# Patient Record
Sex: FEMALE | Race: BLACK OR AFRICAN AMERICAN | NOT HISPANIC OR LATINO | Employment: FULL TIME | URBAN - METROPOLITAN AREA
[De-identification: names, ages, dates, MRNs, and addresses within clinical notes are randomized per-mention and may not be internally consistent; named-entity substitution may affect disease eponyms.]

---

## 2017-01-03 ENCOUNTER — PATIENT MESSAGE (OUTPATIENT)
Dept: ADMINISTRATIVE | Facility: OTHER | Age: 42
End: 2017-01-03

## 2017-01-05 ENCOUNTER — HOSPITAL ENCOUNTER (OUTPATIENT)
Dept: RADIOLOGY | Facility: HOSPITAL | Age: 42
Discharge: HOME | End: 2017-01-05
Attending: OBSTETRICS & GYNECOLOGY
Payer: COMMERCIAL

## 2017-01-05 DIAGNOSIS — R92.8 ABNORMAL MAMMOGRAM: ICD-10-CM

## 2017-01-05 PROCEDURE — 27201044 MAMMO BREAST STEREOTACTIC BIOPSY 1ST SITE COMPLETE

## 2017-01-05 PROCEDURE — 88305 TISSUE EXAM BY PATHOLOGIST: CPT | Performed by: PATHOLOGY

## 2017-01-05 PROCEDURE — 77065 DX MAMMO INCL CAD UNI: CPT | Mod: 26,,, | Performed by: RADIOLOGY

## 2017-01-05 PROCEDURE — 88305 TISSUE EXAM BY PATHOLOGIST: CPT | Mod: 26,,, | Performed by: PATHOLOGY

## 2017-01-05 PROCEDURE — A4648 IMPLANTABLE TISSUE MARKER: HCPCS

## 2017-01-05 PROCEDURE — 77061 BREAST TOMOSYNTHESIS UNI: CPT | Mod: TC

## 2017-01-05 PROCEDURE — 77061 BREAST TOMOSYNTHESIS UNI: CPT | Mod: 26,,, | Performed by: RADIOLOGY

## 2017-01-05 PROCEDURE — 19081 BX BREAST 1ST LESION STRTCTC: CPT | Mod: ,,, | Performed by: RADIOLOGY

## 2017-01-05 PROCEDURE — 88360 TUMOR IMMUNOHISTOCHEM/MANUAL: CPT | Mod: 26,,, | Performed by: PATHOLOGY

## 2017-01-11 ENCOUNTER — TELEPHONE (OUTPATIENT)
Dept: SURGERY | Facility: CLINIC | Age: 42
End: 2017-01-11

## 2017-01-11 NOTE — TELEPHONE ENCOUNTER
----- Message from Lory De La Cruz sent at 1/11/2017  9:30 AM CST -----  Contact: Self   Avis States that they need a call returned by a nurse in reference to Dr. Santana speaking with your cardiologist , Dr. Honorio greenwood. Please call Avis  @ [phone]                                                 Thanks :)

## 2017-01-12 ENCOUNTER — RESEARCH ENCOUNTER (OUTPATIENT)
Dept: RESEARCH | Facility: HOSPITAL | Age: 42
End: 2017-01-12

## 2017-01-12 ENCOUNTER — OFFICE VISIT (OUTPATIENT)
Dept: SURGERY | Facility: CLINIC | Age: 42
End: 2017-01-12
Payer: COMMERCIAL

## 2017-01-12 ENCOUNTER — PATIENT MESSAGE (OUTPATIENT)
Dept: INTERNAL MEDICINE | Facility: CLINIC | Age: 42
End: 2017-01-12

## 2017-01-12 VITALS
SYSTOLIC BLOOD PRESSURE: 100 MMHG | HEART RATE: 81 BPM | WEIGHT: 132.63 LBS | DIASTOLIC BLOOD PRESSURE: 55 MMHG | BODY MASS INDEX: 27.84 KG/M2 | TEMPERATURE: 98 F | HEIGHT: 58 IN

## 2017-01-12 DIAGNOSIS — Z80.3 FAMILY HISTORY OF BREAST CANCER: ICD-10-CM

## 2017-01-12 DIAGNOSIS — I42.8 CARDIOMYOPATHY, NONISCHEMIC: ICD-10-CM

## 2017-01-12 DIAGNOSIS — C50.811 CANCER OF OVERLAPPING SITES OF RIGHT FEMALE BREAST: ICD-10-CM

## 2017-01-12 DIAGNOSIS — Z95.810 AUTOMATIC IMPLANTABLE CARDIOVERTER-DEFIBRILLATOR IN SITU: Primary | ICD-10-CM

## 2017-01-12 DIAGNOSIS — I50.22 HEART FAILURE, SYSTOLIC, CHRONIC: ICD-10-CM

## 2017-01-12 DIAGNOSIS — C50.911 INVASIVE DUCTAL CARCINOMA OF BREAST, FEMALE, RIGHT: ICD-10-CM

## 2017-01-12 PROCEDURE — 99999 PR PBB SHADOW E&M-EST. PATIENT-LVL III: CPT | Mod: PBBFAC,,, | Performed by: SURGERY

## 2017-01-12 PROCEDURE — 99213 OFFICE O/P EST LOW 20 MIN: CPT | Mod: S$GLB,,, | Performed by: NURSE PRACTITIONER

## 2017-01-12 PROCEDURE — 1159F MED LIST DOCD IN RCRD: CPT | Mod: S$GLB,,, | Performed by: NURSE PRACTITIONER

## 2017-01-12 PROCEDURE — 99999 PR PBB SHADOW E&M-EST. PATIENT-LVL I: CPT | Mod: PBBFAC,,,

## 2017-01-12 PROCEDURE — 99245 OFF/OP CONSLTJ NEW/EST HI 55: CPT | Mod: S$GLB,,, | Performed by: SURGERY

## 2017-01-12 NOTE — LETTER
January 15, 2017      Obi Caro Jr., MD  [address]  [facility]  [facility] [address]           [facility]  [address]  [facility] [address]  Phone: [phone]          Patient: Avis Graves   MR Number: [id number]   YOB: 1975   Date of Visit: 1/12/2017       Dear Dr. Obi Caro Jr.:    Thank you for referring Avis Graves to me for evaluation. Attached you will find relevant portions of my assessment and plan of care.    If you have questions, please do not hesitate to call me. I look forward to following Avis Graves along with you.    Sincerely,    Orestes Santana MD    Enclosure  CC:  No Recipients    If you would like to receive this communication electronically, please contact [email or web address] or [phone] to request more information on [employer] Link access.    For providers and/or their staff who would like to refer a patient to [facility], please contact us through our one-stop-shop provider referral line, [facility], at [phone].    If you feel you have received this communication in error or would no longer like to receive these types of communications, please e-mail [email or web address]

## 2017-01-12 NOTE — LETTER
[facility]  [address]  [facility] [address]  Phone: [phone] January 15, 2017      Obi Caro Jr., MD  [address] [facility]  [place] [address]    Patient: Avis Graves   MR Number: [id number]   YOB: 1975   Date of Visit: 1/12/2017     Dear Dr. Caro:    Thank you for referring Avis Graves to me for evaluation. Below are the relevant portions of my assessment and plan of care.    The patient is a very pleasant 41-year-old -American female who presents with many family members for initial office consultation regarding her newly diagnosed high-grade, grade III invasive ductal carcinoma of the right medial breast. Diagnostic imaging had revealed approximately a 3.7 cm mass in the medial 3 o'clock position of the right breast. She underwent core needle biopsy, which revealed grade III invasive ductal carcinoma that was estrogen receptor positive, progesterone receptor negative and HER-2/dann negative. Her significant comorbidity is that she has hereditary cardiomyopathy and has a left anterior chest wall AICD defibrillator in place. Her baseline ejection fraction is 30%. The patient states she does not know her own ejection fraction because Dr. Escobar and she felt that she did not necessarily want to know the percentage.     The patient presents to discuss surgical options. Certainly normally she would be a candidate to discuss neoadjuvant upfront primary chemotherapy given the clinical presentation as a T2 N0 high-grade cancer; however, she is estrogen receptor positive and HER-2/dann negative and may ultimately undergo Oncotype DX testing if her sentinel lymph node is negative oreven if she is found to have N1 disease be considered for the MINDACT data analysis or SWOG  analysis that if she has a low Oncotype score potentially defer chemotherapy as well. She may not be eligible for chemotherapy  given the degree of cardiomyopathy. Certainly, she would not be eligible for any cardiotoxic drug such as anthracyclines Adriamycin or epirubicin.     At this point, we discussed options, she is not a candidate for an MRI because of the presence of the defibrillator. She is 41 years of age and we discussed options of breast conservation surgery and radiotherapy versus mastectomy given the medial location of the tumor, she would likely have some radiotherapy affecting her cardiac status since she has a 3.7 cm mass and she is less than 50 years of age, she would not be a candidate for brachytherapy and with potential increased cardiac risks because of the radiotherapy, I have recommended a right total complete therapeutic mastectomy with traditional non-skin sparing and non-nipple sparing to optimize and reduce surgical time. She is interested in potentially a tissue expander implant technique reconstruction and certainly she would qualify for sentinel lymph node biopsy. She has a clinically negative axilla.    PAST MEDICAL HISTORY: Significant for the cardiomyopathy and congestive heart failure.  GYNECOLOGICAL HISTORY: Reveals no history of any GYN cancers.     The patient had a benign breast biopsy at age 17. She is a  0. She took oral contraceptive products x17 years, but is currently not on them as she is status post oophorectomy and subsequent hysterectomy having now had a complete total abdominal hysterectomy and bilateral salpingo-oophorectomy at age 36. She denies any history of hormone replacement therapy.    FAMILY HISTORY: Reveals a history of breast cancer in her mother. She also has a paternal aunt currently being worked up for abnormal diagnostic mammogram andimaging with biopsy pending of her breast, but no definitive breast cancer diagnosis.    PHYSICAL EXAMINATION: As noted above, also definitive clinical breast exam revealed a 4 cm mobile mass in the medial right breast there is no fixation to  the chest wall. There are no suspicious skin changes. There is no edema, erythema, peau d'orange or retraction of the skin. There are no nipple areolar complex changes. There is no palpable lymphadenopathy. She has a symmetrical breast exam. She is a candidate for upfront surgery and mastectomy as we could easily obtain surgically clear margins with the current presentation.    The patient was counseled at length. Approximate time spent with the patient and all family members today answering questions was over 60 minutes with more than 50% of time in counseling. We will refer her to our nurse practitioner midlevel provider for counseling and genetic testing since she qualifies strictly on age criteria. The patient is set up to see Dr. Nunez next Wednesday to discuss placement of a unilateral right side tissue expander. We will hopefully not perform any contralateral left surgery, unless she was found to have a genetic mutation. Anyhow, at that point, I would strongly discuss perhaps performing ipsilateral surgery only given her high-risk comorbidities with cardiomyopathy, congestive heart failure with ejection fraction of 30% and AICD. Again, the patient does not actually have her own ejection fraction.     At this point, we will await the genetic counseling and consultation. We will await consult with Dr. Nunez. We will then tentatively schedule her for a right mastectomy through traditional incision with non-skin sparing non-nipple areolar complex sparing with concomitant right axillary sentinel lymph node biopsy, possible axillary lymph node dissection with placement of a tissue expander. If her node is negative certainly, she would be a good candidate to do Oncotype DX testing, if her node positive, one can still consider genomic assessment, possibly with MammaPrint based on the MINDACT data if she is N1 to truly assess her risk and potential benefit from chemotherapy for which she could potentially have  some toxic side effects because of the underlying comorbidity cardiomyopathy. The defibrillator remains in place in the left anterior superior chest.    If you have questions, please do not hesitate to call me. I look forward to following Avis Tete along with you.    Sincerely,      Orestes Santana MD   Medical Director, [facility]  Section of General and Oncologic Surgery  [facility]    RLC/hcr    CC  MD Alejandra Bañuelos MD Hector O. Ventura, MD

## 2017-01-12 NOTE — PROGRESS NOTES
Patient presented for genetic counseling. See pedigree for full family history. Suggestive of hereditary breast cancer. Discussed hereditary verses family clustering verses sporadic cancer. Recommended Integrated BRACAnalysis based on history provided, discussed possible results, implications for self and family and insurance/cost of testing. Informed consent obtained, buccal sample collected and sent to [facility], results expected in 2-3 weeks    Time in counseling 30 min, total time 30 min

## 2017-01-12 NOTE — PROGRESS NOTES
New Breast Cancer  History and Physical  [facility]  Department of Surgery    REFERRING PROVIDER: Obi Caro Jr., MD  [address] [facility]  [place] [address]    CHIEF COMPLAINT: right breast cancer    Subjective:      Avis Graves is a 41 y.o. postmenopausal female referred for evaluation of recently diagnosed carcinoma of the right breast. The patient was initially referred for surgical evaluation of a breast lump on exam first noted 2017 and mammogram 2016 showed suspicious focal assymmetry. A stereotactic biopsy was performed on 2017 with pathology revealing infiltrating ductal carcinoma of the breast.     Patient does routinely do self breast exams.  Patient has noted a change on breast exam.  Patient denies recent nipple discharge, though she does note nipple discharge that spontaneously resolved over 10 years ago. Patient admits to to previous lumpectomy when she was 17 years old. Patient denies a personal history of breast cancer.    Invasive ductal carcinoma, Grade 3 (3,3,3).    Findings at that time were the following:   Tumor size: 3.7 cm  Tumor grade: Grade 3 (3,3,3)   Estrogen receptor: Positive; strong nuclear staining in over 95% tumor cells.  Progesterone receptor: Negative; 0% tumor cell staining.  HER-2/dann: Negative; (Stain score = 0).    Notably, Mrs. Graves has a defibrillator for hypertrophic cardiomyopathy.      GYN History:  Age of menarche was 9. Age of menopause was 37 when she had YONI-BSO for ovarian cysts. Last menstrual period was 37. Patient denies hormonal therapy. She has a 17 year long history of OCP use. Patient is .    FAMILY History:  mother with breast CA in early 50's  1 paternal aunt with recent abnormal mammogram, not diagnosed with anything yet  4 distant relatives with breast cancer (ex. father's cousin's daughter)  1 Maternal uncle with prostate cancer  1 Maternal uncle with  mesothelioma  Father with RCC  paternal GF with lung cancer      Past Medical History   Diagnosis Date    Allergy     Asthma     Cardiomyopathy     CHF (congestive heart failure)     Diverticulitis      Past Surgical History   Procedure Laterality Date    Tonsillectomy      Hysterectomy      Cardiac defibrillator placement       X 2 Medtronic     Cardiac defibrillator placement       medtronic     Current Outpatient Prescriptions on File Prior to Visit   Medication Sig Dispense Refill    cetirizine (ZYRTEC) 10 MG tablet Take 10 mg by mouth once daily.      COREG CR 80 mg 24 hr capsule Take 1 capsule (80 mg total) by mouth once daily. 30 capsule 6    enalapril (VASOTEC) 2.5 MG tablet TAKE 1 TABLET (2.5 MG TOTAL) BY MOUTH 2 (TWO) TIMES DAILY. 60 tablet 2    furosemide (LASIX) 20 MG tablet TAKE 1 TABLET (20 MG TOTAL) BY MOUTH ONCE DAILY. 30 tablet 0    furosemide (LASIX) 20 MG tablet TAKE 1 TABLET (20 MG TOTAL) BY MOUTH ONCE DAILY. 30 tablet 0    mometasone (NASONEX) 50 mcg/actuation nasal spray 2 sprays by Nasal route once daily. 1 each 3    potassium chloride (MICRO-K) 10 MEQ CpSR TAKE 1 CAPSULE (10 MEQ TOTAL) BY MOUTH 2 (TWO) TIMES DAILY. 60 capsule 0    COREG CR 80 mg 24 hr capsule TAKE 1 CAPSULE BY MOUTH DAILY 30 capsule 1     No current facility-administered medications on file prior to visit.      Social History     Social History    Marital status:      Spouse name: N/A    Number of children: N/A    Years of education: N/A     Occupational History    Not on file.     Social History Main Topics    Smoking status: Never Smoker    Smokeless tobacco: Never Used    Alcohol use No    Drug use: No    Sexual activity: Not on file     Other Topics Concern    Not on file     Social History Narrative     Family History   Problem Relation Age of Onset    Stroke Mother     Heart disease Mother     Cancer Mother     Asthma Mother     Kidney disease Father     Cancer Father      "Diabetes Brother     Stomach cancer Paternal Grandfather     Liver disease Neg Hx     Liver cancer Neg Hx     Cirrhosis Neg Hx     Colon cancer Neg Hx     Colon polyps Neg Hx     Rectal cancer Neg Hx     Esophageal cancer Neg Hx     Ulcerative colitis Neg Hx     Cystic fibrosis Neg Hx         Review of Systems  Review of Systems     - SOB: hx of asthma  - No fatigue, appetite change, weight loss, fever, chills, sweats, nausea, vomiting, diarrhea, constipation, abd pain, BPR, chest pain, palpitations, hemoptysis, LOC, seizures.         Objective:   PHYSICAL EXAM:  Visit Vitals    BP (!) 100/55 (BP Location: Left arm, Patient Position: Sitting, BP Method: Automatic)    Pulse 81    Temp 97.9 °F (36.6 °C) (Oral)    Ht 4' 10" (1.473 m)    Wt 60.1 kg (132 lb 9.6 oz)    BMI 27.71 kg/m2       Physical Exam  The patient generally appears in good health, is appropriately interactive, and is in no apparent distress.  Skin: No lesions.  Mental: Cooperative with normal affect.  Neuro: Grossly intact.  HEENT: Normal. No evidence of lymphadenopathy.  Chest: Clear to ascultation bilaterally, normal inspiratory effort.  Breast:   Heart: Regular rhythm.  No murmurs  Abdomen: non-distended  Extremities: No edema      Radiology review:    -Mammogram 1/5/2017:  On the present examination, there is a focal asymmetry measuring 3.7 cm in the posterior region of the right breast at 3 o'clock. Focal asymmetry in the right breast is suspicious.  Biopsy should be considered. Tomosynthesis guided biopsy is recommended. ACR BI-RADS Category 4: Suspicious Abnormality     -US Breast 12/30/2016: New Focal change of echogenicity in the right breast is suspicious.  Histology using core biopsy is recommended. ACR BI-RADS Category 4: Suspicious Abnormality    Pathology review:  Right breast, core biopsy 1/5/2016: Invasive ductal carcinoma, Grade 3 (3,3,3). Tumor measures 0.9 cm (9 mm) in greatest linear dimension within the core biopsy " specimen. Immunohistochemical stains for hormonal receptors will be completed and results will follow in a supplemental report.      Assessment:      Avis Graves is a 41 y.o. postmenopausal female with recently diagnosed invasive ductal carcinoma of the right breast.      Plan:      Please see Dr. Santana's dictation for the plan.      Rachelle Lauren MD  PGY-1  Breast Surgery  I have personally taken the history and examined this patient and agree with the resident's note as stated above.  ADDENDUM:  The patient is a very pleasant 41-year-old -American female   who presents with many family members for initial office consultation regarding   her newly diagnosed high-grade, grade III invasive ductal carcinoma of the right   medial breast.  Diagnostic imaging had revealed approximately a 3.7 cm mass in   the medial 3 o'clock position of the right breast.  She underwent core needle   biopsy, which revealed grade III invasive ductal carcinoma that was estrogen   receptor positive, progesterone receptor negative and HER-2/dann negative.  Her   significant comorbidity is that she has hereditary cardiomyopathy and has a left   anterior chest wall AICD defibrillator in place.  Her baseline ejection   fraction is 30%.  The patient states she does not know her own ejection fraction   because Dr. Escobar and she felt that she did not necessarily want to know the   percentage.  The patient presents to discuss surgical options.  Certainly   normally she would be a candidate to discuss neoadjuvant upfront primary   chemotherapy given the clinical presentation as a T2 N0 high-grade cancer;   however, she is estrogen receptor positive and HER-2/dann negative and may   ultimately undergo Oncotype DX testing if her sentinel lymph node is negative or   even if she is found to have N1 disease be considered for the MINDACT data   analysis or SWOG  analysis that if she has a low Oncotype score potentially   defer  chemotherapy as well.  She may not be eligible for chemotherapy given the   degree of cardiomyopathy.  Certainly, she would not be eligible for any   cardiotoxic drug such as anthracyclines Adriamycin or epirubicin.  At this   point, we discussed options, she is not a candidate for an MRI because of the   presence of the defibrillator.  She is 41 years of age and we discussed options   of breast conservation surgery and radiotherapy versus mastectomy given the   medial location of the tumor, she would likely have some radiotherapy affecting   her cardiac status since she has a 3.7 cm mass and she is less than 50 years of   age, she would not be a candidate for brachytherapy and with potential increased   cardiac risks because of the radiotherapy, I have recommended a right total   complete therapeutic mastectomy with traditional non-skin sparing and non-nipple   sparing to optimize and reduce surgical time.  She is interested in potentially   a tissue expander implant technique reconstruction and certainly she would   qualify for sentinel lymph node biopsy.  She has a clinically negative axilla.    PAST MEDICAL HISTORY:  Significant for the cardiomyopathy and congestive heart   failure.    GYNECOLOGICAL HISTORY:  Reveals no history of any GYN cancers.  The patient had   a benign breast biopsy at age 17.  She is a  0.  She took oral   contraceptive products x17 years, but is currently not on them as she is status   post oophorectomy and subsequent hysterectomy having now had a complete total   abdominal hysterectomy and bilateral salpingo-oophorectomy at age 36.  She   denies any history of hormone replacement therapy.    FAMILY HISTORY:  Reveals a history of breast cancer in her mother.  She also has   a paternal aunt currently being worked up for abnormal diagnostic mammogram and   imaging with biopsy pending of her breast, but no definitive breast cancer   diagnosis.    PHYSICAL EXAMINATION:  As noted  above, also definitive clinical breast exam   revealed a 4 cm mobile mass in the medial right breast there is no fixation to   the chest wall.  There are no suspicious skin changes.  There is no edema,   erythema, peau d'orange or retraction of the skin.  There are no nipple areolar   complex changes.  There is no palpable lymphadenopathy.  She has a symmetrical   breast exam.  She is a candidate for upfront surgery and mastectomy as we could   easily obtain surgically clear margins with the current presentation.    The patient was counseled at length.  Approximate time spent with the patient   and all family members today answering questions was over 60 minutes with more   than 50% of time in counseling.  We will refer her to our nurse practitioner   midlevel provider for counseling and genetic testing since she qualifies   strictly on age criteria.  The patient is set up to see Dr. Nunez next   Wednesday to discuss placement of a unilateral right side tissue expander.  We   will hopefully not perform any contralateral left surgery, unless she was found   to have a genetic mutation.  Anyhow, at that point, I would strongly discuss   perhaps performing ipsilateral surgery only given her high-risk comorbidities   with cardiomyopathy, congestive heart failure with ejection fraction of 30% and   AICD.  Again, the patient does not actually her own ejection fraction.  At this   point, we will await the genetic counseling and consultation.  We will await   consult with Dr. Nunez.  We will then tentatively schedule her for a right   mastectomy through traditional incision with non-skin sparing non-nipple areolar   complex sparing with concomitant right axillary sentinel lymph node biopsy,   possible axillary lymph node dissection with placement of a tissue expander.  If   her node is negative certainly, she would be a good candidate to do Oncotype DX   testing, if her node positive, one can still consider genomic  assessment,   possibly with MammaPrint based on the MINDACT data if she is N1 to truly assess   her risk and potential benefit from chemotherapy for which she could potentially   have some toxic side effects because of the underlying comorbidity   cardiomyopathy.  The defibrillator remains in place in the left anterior   superior chest.      GIANCARLO/ALEXEI  dd: 01/15/2017 14:36:57 (CST)  td: 01/16/2017 09:18:24 (CST)  Doc ID   #[id number]  Job ID #[id number]    CC:     Job # [id number].

## 2017-01-15 PROBLEM — C50.811: Status: ACTIVE | Noted: 2017-01-15

## 2017-01-15 NOTE — PROGRESS NOTES
Distress Screening Results: Psychosocial Distress screening score of Distress Score: 1   No role for psychosocial intervention.

## 2017-01-18 ENCOUNTER — OFFICE VISIT (OUTPATIENT)
Dept: INTERNAL MEDICINE | Facility: CLINIC | Age: 42
End: 2017-01-18
Payer: COMMERCIAL

## 2017-01-18 ENCOUNTER — OFFICE VISIT (OUTPATIENT)
Dept: PLASTIC SURGERY | Facility: CLINIC | Age: 42
End: 2017-01-18
Payer: COMMERCIAL

## 2017-01-18 VITALS
BODY MASS INDEX: 27.67 KG/M2 | HEIGHT: 58 IN | WEIGHT: 131.81 LBS | HEART RATE: 71 BPM | SYSTOLIC BLOOD PRESSURE: 114 MMHG | DIASTOLIC BLOOD PRESSURE: 70 MMHG

## 2017-01-18 VITALS
BODY MASS INDEX: 27.66 KG/M2 | SYSTOLIC BLOOD PRESSURE: 107 MMHG | WEIGHT: 131.75 LBS | DIASTOLIC BLOOD PRESSURE: 70 MMHG | HEART RATE: 84 BPM | TEMPERATURE: 98 F | HEIGHT: 58 IN

## 2017-01-18 DIAGNOSIS — Z01.818 PREOP TESTING: Primary | ICD-10-CM

## 2017-01-18 DIAGNOSIS — C50.811 CANCER OF OVERLAPPING SITES OF RIGHT FEMALE BREAST: Primary | ICD-10-CM

## 2017-01-18 DIAGNOSIS — Z00.00 ANNUAL PHYSICAL EXAM: Primary | ICD-10-CM

## 2017-01-18 DIAGNOSIS — I50.22 HEART FAILURE, SYSTOLIC, CHRONIC: ICD-10-CM

## 2017-01-18 DIAGNOSIS — J45.30 MILD PERSISTENT ASTHMA WITHOUT COMPLICATION: ICD-10-CM

## 2017-01-18 DIAGNOSIS — Z95.810 AUTOMATIC IMPLANTABLE CARDIOVERTER-DEFIBRILLATOR IN SITU: ICD-10-CM

## 2017-01-18 DIAGNOSIS — K58.1 IRRITABLE BOWEL SYNDROME WITH CONSTIPATION: ICD-10-CM

## 2017-01-18 DIAGNOSIS — C50.911 MALIGNANT NEOPLASM OF RIGHT FEMALE BREAST, UNSPECIFIED SITE OF BREAST: Primary | ICD-10-CM

## 2017-01-18 DIAGNOSIS — C50.811 CANCER OF OVERLAPPING SITES OF RIGHT FEMALE BREAST: ICD-10-CM

## 2017-01-18 DIAGNOSIS — I47.20 VENTRICULAR TACHYCARDIA: ICD-10-CM

## 2017-01-18 DIAGNOSIS — I42.8 NICM (NONISCHEMIC CARDIOMYOPATHY): ICD-10-CM

## 2017-01-18 PROCEDURE — 99396 PREV VISIT EST AGE 40-64: CPT | Mod: S$GLB,,, | Performed by: INTERNAL MEDICINE

## 2017-01-18 PROCEDURE — 99204 OFFICE O/P NEW MOD 45 MIN: CPT | Mod: S$GLB,,, | Performed by: SURGERY

## 2017-01-18 PROCEDURE — 99999 PR PBB SHADOW E&M-EST. PATIENT-LVL III: CPT | Mod: PBBFAC,,, | Performed by: SURGERY

## 2017-01-18 PROCEDURE — 1159F MED LIST DOCD IN RCRD: CPT | Mod: S$GLB,,, | Performed by: SURGERY

## 2017-01-18 PROCEDURE — 99999 PR PBB SHADOW E&M-EST. PATIENT-LVL III: CPT | Mod: PBBFAC,,, | Performed by: INTERNAL MEDICINE

## 2017-01-18 RX ORDER — BUDESONIDE AND FORMOTEROL FUMARATE DIHYDRATE 160; 4.5 UG/1; UG/1
1 AEROSOL RESPIRATORY (INHALATION) EVERY 12 HOURS
Qty: 10.2 G | Refills: 0
Start: 2017-01-18 | End: 2020-03-02

## 2017-01-18 NOTE — LETTER
January 18, 2017      Orestes Santana MD  [address]  [facility] [address]           [facility]  [address]  [facility] [address]  Phone: [phone]  Fax: [phone]          Patient: Avis Graves   MR Number: [id number]   YOB: 1975   Date of Visit: 1/18/2017       Dear Dr. Orestes Santana:    Thank you for referring Avis Graves to me for evaluation. Attached you will find relevant portions of my assessment and plan of care.    If you have questions, please do not hesitate to call me. I look forward to following Avis Graves along with you.    Sincerely,    Bam Nunez MD    Enclosure  CC:  No Recipients    If you would like to receive this communication electronically, please contact [email or web address] or [phone] to request more information on [employer] Link access.    For providers and/or their staff who would like to refer a patient to [facility], please contact us through our one-stop-shop provider referral line, [facility], at [phone].    If you feel you have received this communication in error or would no longer like to receive these types of communications, please e-mail [email or web address]

## 2017-01-18 NOTE — MR AVS SNAPSHOT
[facility] - Internal Medicine  [address]  [place] [address]  Phone: [phone]  Fax: [phone]                  Avis Graves   2017 11:00 AM   Office Visit    Description:  Female : 1975   Provider:  Alejandra Nuno MD   Department:  [facility]           Reason for Visit     Annual Exam     Establish Care           Diagnoses this Visit        Comments    Annual physical exam    -  Primary     Mild persistent asthma without complication         NICM (nonischemic cardiomyopathy)         Automatic implantable cardioverter-defibrillator in situ         Ventricular tachycardia         Irritable bowel syndrome with constipation         Heart failure, systolic, chronic         Cancer of overlapping sites of right female breast                To Do List           Future Appointments        Provider Department Dept Phone    2017 11:30 AM Bam Nunez MD [facility] - Plastic Surg [place] [phone]    2017 10:00 AM Liss Giraldo RN [facility] [phone]    2017 11:00 AM LAB, APPOINTMENT [place] [facility] [phone]    2017 8:00 AM HOME MONITOR DEVICE CHECK, [facility] - Arrhythmia [phone]    3/21/2017 1:40 PM Alejandra Nuno MD [facility] Internal Medicine [phone]      Goals (5 Years of Data)     None      Follow-Up and Disposition     Return in about 1 year (around 2018) for one year PE.       These Medications        Disp Refills Start End    budesonide-formoterol 160-4.5 mcg (SYMBICORT) 160-4.5 mcg/actuation HFAA 10.2 g 0 2017    Inhale 1 puff into the lungs every 12 (twelve) hours. - Inhalation    Pharmacy: [facility] - [facility]  [address] [facility] Ph #: [phone]         [facility]     [facility] Care [facility] -  Assistance  Registered nurses in the [facility] provide clinical  "advisement, health education, appointment booking, and other advisory services.  Call for this free service at [phone].             Medications           Message regarding Medications     Verify the changes and/or additions to your medication regime listed below are the same as discussed with your clinician today.  If any of these changes or additions are incorrect, please notify your healthcare provider.        START taking these NEW medications        Refills    budesonide-formoterol 160-4.5 mcg (SYMBICORT) 160-4.5 mcg/actuation HFAA 0    Sig: Inhale 1 puff into the lungs every 12 (twelve) hours.    Class: No Print    Route: Inhalation           Verify that the below list of medications is an accurate representation of the medications you are currently taking.  If none reported, the list may be blank. If incorrect, please contact your healthcare provider. Carry this list with you in case of emergency.           Current Medications     budesonide-formoterol 160-4.5 mcg (SYMBICORT) 160-4.5 mcg/actuation HFAA Inhale 1 puff into the lungs every 12 (twelve) hours.    cetirizine (ZYRTEC) 10 MG tablet Take 10 mg by mouth once daily.    COREG CR 80 mg 24 hr capsule Take 1 capsule (80 mg total) by mouth once daily.    COREG CR 80 mg 24 hr capsule TAKE 1 CAPSULE BY MOUTH DAILY    enalapril (VASOTEC) 2.5 MG tablet TAKE 1 TABLET (2.5 MG TOTAL) BY MOUTH 2 (TWO) TIMES DAILY.    furosemide (LASIX) 20 MG tablet TAKE 1 TABLET (20 MG TOTAL) BY MOUTH ONCE DAILY.    furosemide (LASIX) 20 MG tablet TAKE 1 TABLET (20 MG TOTAL) BY MOUTH ONCE DAILY.    mometasone (NASONEX) 50 mcg/actuation nasal spray 2 sprays by Nasal route once daily.    potassium chloride (MICRO-K) 10 MEQ CpSR TAKE 1 CAPSULE (10 MEQ TOTAL) BY MOUTH 2 (TWO) TIMES DAILY.           Clinical Reference Information           Vital Signs - Last Recorded  Most recent update: 1/18/2017 10:32 AM by Courtney Barrera MA    BP Pulse Ht Wt BMI    114/70 71 4' 10" (1.473 " m) 59.8 kg (131 lb 13.4 oz) 27.55 kg/m2      Blood Pressure          Most Recent Value    BP  114/70      Allergies as of 1/18/2017     Ciprofloxacin      Immunizations Administered on Date of Encounter - 1/18/2017     None      Instructions      Flu Vaccines for Adults   The flu (influenza) is caused by a virus that is easily spread. A flu vaccine protects you and others from the flu. Its best to get a flu shot each fall, as soon as the vaccine is available in your area. You can get it at your health care providers office or a health clinic. Drugstores, senior centers, and workplaces often offer flu shots, too. If you want to know if your provider has the flu vaccine available, or if you have other questions, ask your healthcare provider.    Flu facts  · The flu shot will not give you the flu.  · The flu can be dangerous--even life-threatening. Every year, about 36,000 people die of complications from the flu.  · The flu is caused by a virus. It cant be treated with antibiotics.  · Influenza is not the same as stomach flu, the 24-hour bug that causes vomiting and diarrhea. This is most likely due to a GI (gastrointestinal) infection--not the flu.  · You need to get a flu shot each year.   Flu symptoms  Flu symptoms tend to come on quickly. Fever, headache, fatigue, cough, sore throat, runny nose, and muscle aches are symptoms of the flu. Upset stomach and vomiting are not common for adults. Some symptoms, such as fatigue and cough, may last a few weeks.  How a flu shot protects you  There are many strains (types) of the flu virus. Medical experts predict which strains are most likely to make people sick each year. Flu shots are made from these strains. When you get a flu vaccine, inactivated (killed) or very mild flu viruses are injected into your body or sprayed into your nose. These cannot give you the flu. But they do prompt your body to make antibodies to fight these flu strains. If youre exposed to the  same strains later in the flu season, the antibodies will fight off the germs.  Recommendations for the flu vaccine  The CDC recommends that infants over the age of 6 months and all children and adults should get flu shots every year.  Some people are at an increased risk of developing serious complications from the flu. It is extremely important that these people get the vaccine. They include those with:  · Long-term heart and lung conditions  · Other serious medical conditions  ¨ Endocrine disorders, like diabetes  ¨ Kidney or liver disorders  ¨ Weakened immune systems from disease of medical treatment; for example, those with HIV or AIDS or taking long-term steroids or medications to treat cancer  ¨ Blood disorders, such as sickle cell disease  It is also very important that others that have an increased risk of being exposed to the flu or are around people with increased risk of complications get the vaccine. They are:  · Health care providers and other staff that provide care in hospitals, nursing homes, home health, and other facilities  · Household members, including children, of people in high-risk groups  Types of flu vaccines  The flu vaccine is available as a shot and as a nasal spray. Your health care provider will determine which vaccine is right for you.  · The shot is available in a few different forms. There is a high-dose vaccine for those over 65 and a vaccine for those with egg allergies. It is safe for most people. Talk with your provider if you have had:  ¨ A severe allergic reaction to a previous flu vaccine  ¨ Guillain-Townville syndrome (a severe paralyzing condition)  · The nasal spray is recommended for people from 2 to 49 years old. It should not be given to adults who:  ¨ Are pregnant  ¨ Have weakened immune systems  ¨ Have egg allergies  ¨ Will be in close contact with someone with a weakened immune system  ¨ Have taken antiviral medication in the past 2 days  © 4845-9121 [employer]. [address] [place] [address]. All rights reserved. This information is not intended as a substitute for professional medical care. Always follow your healthcare professional's instructions.        Pneumonia (Adult)  Pneumonia is an infection deep within the lungs. It is in the small air sacs (alveoli). Pneumonia may be caused by a virus or bacteria. Pneumonia caused by bacteria is usually treated with an antibiotic. Severe cases may need to be treated in the hospital. Milder cases can be treated at home. Symptoms usually start to get better during the first 2 days of treatment.    Home care  Follow these guidelines when caring for yourself at home:  · Rest at home for the first 2 to 3 days, or until you feel stronger. Dont let yourself get overly tired when you go back to your activities.  · Stay away from cigarette smoke - yours or other peoples.  · You may use acetaminophen or ibuprofen to control fever or pain, unless another medicine was prescribed. If you have chronic liver or kidney disease, talk with your health care provider before using these medicines. Also talk with your provider if youve had a stomach ulcer or GI bleeding. Dont give aspirin to anyone younger than 18 years of age who is ill with a fever. It may cause severe liver damage.  · Your appetite may be poor, so a light diet is fine.  · Drink 6 to 8 glasses of fluids every day to make sure you are getting enough fluids. Beverages can include water, sport drinks, sodas without caffeine, juices, tea, or soup. Fluids will help loosen secretions in the lung. This will make it easier for you to cough up the phlegm (sputum). If you also have heart or kidney disease, check with your health care provider before you drink extra fluids.  · Take antibiotic medicine prescribed until it is all gone, even if you are feeling better after a few days.  Follow-up care  Follow up with your health care provider in the next 2 to 3 days,  or as advised. This is to be sure the medicine is helping you get better.  If you are 65 or older, you should get a pneumococcal vaccine and a yearly flu (influenza) shot. You should also get these vaccines if you have chronic lung disease like asthma, emphysema, or COPD. Ask your provider about this.  When to seek medical advice  Call your health care provider right away if any of these occur:  · You dont get better within the first 48 hours of treatment  · Shortness of breath gets worse  · Rapid breathing (more than 25 breaths per minute)  · Coughing up blood  · Chest pain gets worse with breathing  · Fever of 102°F (38°C) or higher that doesnt get better with fever medicine  · Weakness, dizziness, or fainting that gets worse  · Thirst or dry mouth that gets worse  · Sinus pain, headache, or a stiff neck  · Chest pain not caused by coughing  © 4810-9733 [employer]. [address] [place] [address]. All rights reserved. This information is not intended as a substitute for professional medical care. Always follow your healthcare professional's instructions.        Pneumococcal Vaccination  There are 2 pneumococcal vaccinations that protect people against pneumococcal disease.  Pneumococcal disease  Pneumococcal disease is caused by a bacteria (Streptococcus pneumoniae). This germ is easily spread when someone with the bacteria coughs, sneezes, laughs, or talks. You can get pneumococcal disease more than once. This is because there are many different types (strains) of the bacteria. Some strains are also resistant to treatment with antibiotics.  There are different kinds of pneumococcal disease depending on what part of the body is infected, they include:  · Pneumonia. Infection in the lungs  · Meningitis. Infection of the covering of the brain and spinal cord  · Otitis media. Infection of the middle ear  · Bacteremia or septicemia. Infection in the blood  Pneumococcal disease can be  life-threatening, especially for people in high-risk groups. Each year, thousands of people die of this disease and thousands more become seriously ill.  The vaccine    The pneumococcal vaccines are the best way to avoid pneumococcal disease. They are safe and effective. The vaccine are given as shots (injections). This can be done at your health care provider's office or a health clinic. Drugstores, senior centers, and workplaces often offer vaccinations, too. If you have questions about getting vaccinated, ask your health care provider.  The pneumococcal vaccine are recommended for:  · Persons 65 and older  · Infants  · People with chronic health problems (such as diabetes, chronic lung or heart disease, liver disease); or who have a cochlear implant  · People who have weakened immune systems  · People who live in nursing homes or other long-term care facilities  · People who smoke or have asthma  They are given:  · In a 4-dose series in infants  · One time in adults, some people need a second dose of one of the vaccines  Your health care provider can tell you more about the vaccines, whether you should get them, and the number of shots you should receive.  © 5089-6870 [employer]. [address] [place] [address], [place] [address]. All rights reserved. This information is not intended as a substitute for professional medical care. Always follow your healthcare professional's instructions.

## 2017-01-18 NOTE — PROGRESS NOTES
Plastic Surgery Clinic Note    Subjective:      Avis Graves is a 41 y.o. postmenopausal female referred for evaluation of implant following recently diagnosed carcinoma of the right breast. The patient was initially referred for surgical evaluation of a breast lump on exam first noted 2017 and mammogram 2016 showed suspicious focal assymmetry. A stereotactic biopsy was performed on 2017 with pathology revealing infiltrating ductal carcinoma of the breast. Patient does routinely do self breast exams.  Patient has noted a change on breast exam.  Patient denies recent nipple discharge, though she does note nipple discharge that spontaneously resolved over 10 years ago. Patient admits to to previous lumpectomy when she was 17 years old. Patient denies a personal history of breast cancer. She is here today to discuss placement of a right breast implant.     Invasive ductal carcinoma, Grade 3 (3,3,3).    Findings at that time were the following:   Tumor size: 3.7 cm  Tumor grade: Grade 3 (3,3,3)   Estrogen receptor: Positive; strong nuclear staining in over 95% tumor cells.  Progesterone receptor: Negative; 0% tumor cell staining.  HER-2/dann: Negative; (Stain score = 0).    Notably, Mrs. Graves has a defibrillator for hypertrophic cardiomyopathy.      GYN History:  Age of menarche was 9. Age of menopause was 37 when she had YONI-BSO for ovarian cysts. Last menstrual period was 37. Patient denies hormonal therapy. She has a 17 year long history of OCP use. Patient is .    FAMILY History:  mother with breast CA in early 50's  1 paternal aunt with recent abnormal mammogram, not diagnosed with anything yet  4 distant relatives with breast cancer (ex. father's cousin's daughter)  1 Maternal uncle with prostate cancer  1 Maternal uncle with mesothelioma  Father with RCC  paternal GF with lung cancer      Past Medical History   Diagnosis Date    Allergy     Asthma     Cardiomyopathy     CHF  (congestive heart failure)     Diverticulitis      Past Surgical History   Procedure Laterality Date    Tonsillectomy      Hysterectomy      Cardiac defibrillator placement       X 2 [employer]     Cardiac defibrillator placement       medtronic     Current Outpatient Prescriptions on File Prior to Visit   Medication Sig Dispense Refill    cetirizine (ZYRTEC) 10 MG tablet Take 10 mg by mouth once daily.      COREG CR 80 mg 24 hr capsule Take 1 capsule (80 mg total) by mouth once daily. 30 capsule 6    COREG CR 80 mg 24 hr capsule TAKE 1 CAPSULE BY MOUTH DAILY 30 capsule 1    enalapril (VASOTEC) 2.5 MG tablet TAKE 1 TABLET (2.5 MG TOTAL) BY MOUTH 2 (TWO) TIMES DAILY. 60 tablet 2    furosemide (LASIX) 20 MG tablet TAKE 1 TABLET (20 MG TOTAL) BY MOUTH ONCE DAILY. 30 tablet 0    furosemide (LASIX) 20 MG tablet TAKE 1 TABLET (20 MG TOTAL) BY MOUTH ONCE DAILY. 30 tablet 0    mometasone (NASONEX) 50 mcg/actuation nasal spray 2 sprays by Nasal route once daily. 1 each 3    potassium chloride (MICRO-K) 10 MEQ CpSR TAKE 1 CAPSULE (10 MEQ TOTAL) BY MOUTH 2 (TWO) TIMES DAILY. 60 capsule 0     No current facility-administered medications on file prior to visit.      Social History     Social History    Marital status:      Spouse name: N/A    Number of children: N/A    Years of education: N/A     Occupational History    Not on file.     Social History Main Topics    Smoking status: Never Smoker    Smokeless tobacco: Never Used    Alcohol use No    Drug use: No    Sexual activity: Not on file     Other Topics Concern    Not on file     Social History Narrative     Family History   Problem Relation Age of Onset    Stroke Mother     Heart disease Mother     Cancer Mother     Asthma Mother     Kidney disease Father     Cancer Father     Kidney cancer Father     Diabetes Brother     Stomach cancer Paternal Grandfather     Lung cancer Paternal Grandfather     Asbestos Maternal Uncle      "Prostate cancer Maternal Uncle     Liver disease Neg Hx     Liver cancer Neg Hx     Cirrhosis Neg Hx     Colon cancer Neg Hx     Colon polyps Neg Hx     Rectal cancer Neg Hx     Esophageal cancer Neg Hx     Ulcerative colitis Neg Hx     Cystic fibrosis Neg Hx         Review of Systems  Review of Systems     - SOB: hx of asthma  - No fatigue, appetite change, weight loss, fever, chills, sweats, nausea, vomiting, diarrhea, constipation, abd pain, BPR, chest pain, palpitations, hemoptysis, LOC, seizures.         Objective:   PHYSICAL EXAM:  Visit Vitals    /70 (BP Location: Left arm, Patient Position: Sitting, BP Method: Automatic)    Pulse 84    Temp 97.7 °F (36.5 °C) (Oral)    Ht 4' 10" (1.473 m)    Wt 59.8 kg (131 lb 11.6 oz)    BMI 27.53 kg/m2       Physical Exam  The patient generally appears in good health, is appropriately interactive, and is in no apparent distress.  Skin: No lesions.  Mental: Cooperative with normal affect.  Neuro: Grossly intact.  HEENT: Normal. No evidence of lymphadenopathy.  Chest: Clear to ascultation bilaterally, normal inspiratory effort.  Breast: Palpable mass in the right breast  Heart: Regular rhythm.  No murmurs  Abdomen: non-distended  Extremities: No edema      Radiology review:    -Mammogram 1/5/2017:  On the present examination, there is a focal asymmetry measuring 3.7 cm in the posterior region of the right breast at 3 o'clock. Focal asymmetry in the right breast is suspicious.  Biopsy should be considered. Tomosynthesis guided biopsy is recommended. ACR BI-RADS Category 4: Suspicious Abnormality     -US Breast 12/30/2016: New Focal change of echogenicity in the right breast is suspicious.  Histology using core biopsy is recommended. ACR BI-RADS Category 4: Suspicious Abnormality    Pathology review:  Right breast, core biopsy 1/5/2016: Invasive ductal carcinoma, Grade 3 (3,3,3). Tumor measures 0.9 cm (9 mm) in greatest linear dimension within the core " biopsy specimen. Immunohistochemical stains for hormonal receptors will be completed and results will follow in a supplemental report.      Assessment:      Avis Graves is a 41 y.o. postmenopausal female with recently diagnosed invasive ductal carcinoma of the right breast here today to discuss breast implant during her mastectomy procedure.       Plan:   The procedure along with the risks, benefits and post operative course were discussed with the patient, her  and parents. All questions were answered and patient was in agreement with the plan. Plan to go ahead with right mastectomy with tissue expander placement and left mastopexy on 1/30/17. Pt's genetic testing is pending and surgical plans could change based on those results. Will discuss patient with Dr. Max Singh M.D.  PGY 1

## 2017-01-18 NOTE — PATIENT INSTRUCTIONS
Flu Vaccines for Adults   The flu (influenza) is caused by a virus that is easily spread. A flu vaccine protects you and others from the flu. Its best to get a flu shot each fall, as soon as the vaccine is available in your area. You can get it at your health care providers office or a health clinic. Drugstores, senior centers, and workplaces often offer flu shots, too. If you want to know if your provider has the flu vaccine available, or if you have other questions, ask your healthcare provider.    Flu facts  · The flu shot will not give you the flu.  · The flu can be dangerous--even life-threatening. Every year, about 36,000 people die of complications from the flu.  · The flu is caused by a virus. It cant be treated with antibiotics.  · Influenza is not the same as stomach flu, the 24-hour bug that causes vomiting and diarrhea. This is most likely due to a GI (gastrointestinal) infection--not the flu.  · You need to get a flu shot each year.   Flu symptoms  Flu symptoms tend to come on quickly. Fever, headache, fatigue, cough, sore throat, runny nose, and muscle aches are symptoms of the flu. Upset stomach and vomiting are not common for adults. Some symptoms, such as fatigue and cough, may last a few weeks.  How a flu shot protects you  There are many strains (types) of the flu virus. Medical experts predict which strains are most likely to make people sick each year. Flu shots are made from these strains. When you get a flu vaccine, inactivated (killed) or very mild flu viruses are injected into your body or sprayed into your nose. These cannot give you the flu. But they do prompt your body to make antibodies to fight these flu strains. If youre exposed to the same strains later in the flu season, the antibodies will fight off the germs.  Recommendations for the flu vaccine  The CDC recommends that infants over the age of 6 months and all children and adults should get flu shots every year.  Some  people are at an increased risk of developing serious complications from the flu. It is extremely important that these people get the vaccine. They include those with:  · Long-term heart and lung conditions  · Other serious medical conditions  ¨ Endocrine disorders, like diabetes  ¨ Kidney or liver disorders  ¨ Weakened immune systems from disease of medical treatment; for example, those with HIV or AIDS or taking long-term steroids or medications to treat cancer  ¨ Blood disorders, such as sickle cell disease  It is also very important that others that have an increased risk of being exposed to the flu or are around people with increased risk of complications get the vaccine. They are:  · Health care providers and other staff that provide care in hospitals, nursing homes, home health, and other facilities  · Household members, including children, of people in high-risk groups  Types of flu vaccines  The flu vaccine is available as a shot and as a nasal spray. Your health care provider will determine which vaccine is right for you.  · The shot is available in a few different forms. There is a high-dose vaccine for those over 65 and a vaccine for those with egg allergies. It is safe for most people. Talk with your provider if you have had:  ¨ A severe allergic reaction to a previous flu vaccine  ¨ Guillain-Fords syndrome (a severe paralyzing condition)  · The nasal spray is recommended for people from 2 to 49 years old. It should not be given to adults who:  ¨ Are pregnant  ¨ Have weakened immune systems  ¨ Have egg allergies  ¨ Will be in close contact with someone with a weakened immune system  ¨ Have taken antiviral medication in the past 2 days  © 9813-0288 [employer] [address] [place] [address], [place] [address]. All rights reserved. This information is not intended as a substitute for professional medical care. Always follow your healthcare professional's instructions.        Pneumonia  (Adult)  Pneumonia is an infection deep within the lungs. It is in the small air sacs (alveoli). Pneumonia may be caused by a virus or bacteria. Pneumonia caused by bacteria is usually treated with an antibiotic. Severe cases may need to be treated in the hospital. Milder cases can be treated at home. Symptoms usually start to get better during the first 2 days of treatment.    Home care  Follow these guidelines when caring for yourself at home:  · Rest at home for the first 2 to 3 days, or until you feel stronger. Dont let yourself get overly tired when you go back to your activities.  · Stay away from cigarette smoke - yours or other peoples.  · You may use acetaminophen or ibuprofen to control fever or pain, unless another medicine was prescribed. If you have chronic liver or kidney disease, talk with your health care provider before using these medicines. Also talk with your provider if youve had a stomach ulcer or GI bleeding. Dont give aspirin to anyone younger than 18 years of age who is ill with a fever. It may cause severe liver damage.  · Your appetite may be poor, so a light diet is fine.  · Drink 6 to 8 glasses of fluids every day to make sure you are getting enough fluids. Beverages can include water, sport drinks, sodas without caffeine, juices, tea, or soup. Fluids will help loosen secretions in the lung. This will make it easier for you to cough up the phlegm (sputum). If you also have heart or kidney disease, check with your health care provider before you drink extra fluids.  · Take antibiotic medicine prescribed until it is all gone, even if you are feeling better after a few days.  Follow-up care  Follow up with your health care provider in the next 2 to 3 days, or as advised. This is to be sure the medicine is helping you get better.  If you are 65 or older, you should get a pneumococcal vaccine and a yearly flu (influenza) shot. You should also get these vaccines if you have chronic lung  disease like asthma, emphysema, or COPD. Ask your provider about this.  When to seek medical advice  Call your health care provider right away if any of these occur:  · You dont get better within the first 48 hours of treatment  · Shortness of breath gets worse  · Rapid breathing (more than 25 breaths per minute)  · Coughing up blood  · Chest pain gets worse with breathing  · Fever of 102°F (38°C) or higher that doesnt get better with fever medicine  · Weakness, dizziness, or fainting that gets worse  · Thirst or dry mouth that gets worse  · Sinus pain, headache, or a stiff neck  · Chest pain not caused by coughing  © 8863-4014 [employer]. [address] [place] [address], [place] [address]. All rights reserved. This information is not intended as a substitute for professional medical care. Always follow your healthcare professional's instructions.        Pneumococcal Vaccination  There are 2 pneumococcal vaccinations that protect people against pneumococcal disease.  Pneumococcal disease  Pneumococcal disease is caused by a bacteria (Streptococcus pneumoniae). This germ is easily spread when someone with the bacteria coughs, sneezes, laughs, or talks. You can get pneumococcal disease more than once. This is because there are many different types (strains) of the bacteria. Some strains are also resistant to treatment with antibiotics.  There are different kinds of pneumococcal disease depending on what part of the body is infected, they include:  · Pneumonia. Infection in the lungs  · Meningitis. Infection of the covering of the brain and spinal cord  · Otitis media. Infection of the middle ear  · Bacteremia or septicemia. Infection in the blood  Pneumococcal disease can be life-threatening, especially for people in high-risk groups. Each year, thousands of people die of this disease and thousands more become seriously ill.  The vaccine    The pneumococcal vaccines are the best way to avoid pneumococcal  disease. They are safe and effective. The vaccine are given as shots (injections). This can be done at your health care provider's office or a health clinic. Drugstores, senior centers, and workplaces often offer vaccinations, too. If you have questions about getting vaccinated, ask your health care provider.  The pneumococcal vaccine are recommended for:  · Persons 65 and older  · Infants  · People with chronic health problems (such as diabetes, chronic lung or heart disease, liver disease); or who have a cochlear implant  · People who have weakened immune systems  · People who live in nursing homes or other long-term care facilities  · People who smoke or have asthma  They are given:  · In a 4-dose series in infants  · One time in adults, some people need a second dose of one of the vaccines  Your health care provider can tell you more about the vaccines, whether you should get them, and the number of shots you should receive.  © 6600-6260 [employer]. [address] [place] [address], [place] [address]. All rights reserved. This information is not intended as a substitute for professional medical care. Always follow your healthcare professional's instructions.

## 2017-01-19 ENCOUNTER — TELEPHONE (OUTPATIENT)
Dept: TRANSPLANT | Facility: CLINIC | Age: 42
End: 2017-01-19

## 2017-01-19 DIAGNOSIS — I50.22 CHRONIC SYSTOLIC CONGESTIVE HEART FAILURE: Primary | ICD-10-CM

## 2017-01-20 ENCOUNTER — ANESTHESIA EVENT (OUTPATIENT)
Dept: SURGERY | Facility: HOSPITAL | Age: 42
End: 2017-01-20
Payer: COMMERCIAL

## 2017-01-20 ENCOUNTER — HOSPITAL ENCOUNTER (OUTPATIENT)
Dept: PREADMISSION TESTING | Facility: HOSPITAL | Age: 42
Discharge: HOME | End: 2017-01-20
Attending: ANESTHESIOLOGY
Payer: COMMERCIAL

## 2017-01-20 VITALS
HEART RATE: 70 BPM | WEIGHT: 131.81 LBS | DIASTOLIC BLOOD PRESSURE: 62 MMHG | OXYGEN SATURATION: 98 % | TEMPERATURE: 98 F | HEIGHT: 58 IN | SYSTOLIC BLOOD PRESSURE: 107 MMHG | BODY MASS INDEX: 27.67 KG/M2 | RESPIRATION RATE: 18 BRPM

## 2017-01-20 NOTE — IP AVS SNAPSHOT
[facility]  [address]  [facility] [address]  Phone: [phone]           Patient Discharge Instructions    Our goal is to set you up for success. This packet includes information on your condition, medications, and your home care. It will help you to care for yourself so you don't get sicker.     Please ask your nurse if you have any questions.        There are many details to remember when preparing for your surgery. Here is what you will need to do, please ask your nurse if there are more specific instructions and if you have any questions:    1. 24 hours before procedure Do not smoke or drink alcoholic beverages 24 hours prior to your procedure    2. Eating before procedure Do not eat or drink anything 8 hours before your procedure - this includes gum, mints, and candy.     3. Day of procedure Please remove all jewelry for the procedure. If you wear contact lenses, dentures, hearing aids or glasses, bring a container to put them in during your surgery and give to a family member for safekeeping.  If your doctor has scheduled you for an overnight stay, bring a small overnight bag with any personal items that you need.    4. After procedure Make arrangements in advance for transportation home by a responsible adult. It is not safe to drive a vehicle during the 24 hours following surgery.     PLEASE NOTE: You may be contacted the day before your surgery to confirm your surgery date and arrival time. The Surgery schedule has many variables which may affect the time of your surgery case. Family members should be available if your surgery time changes.                [facility]  Unless otherwise directed by your provider, please contact [facility], our nurse care line that is available for 24/7 assistance.     [phone] (toll-free)    Registered nurses in the [facility] provide clinical advisement, health education, appointment booking, and other  advisory services.                    ** Verify the list of medication(s) below is accurate and up to date. Carry this with you in case of emergency. If your medications have changed, please notify your healthcare provider.             Medication List      TAKE these medications        Additional Info                      budesonide-formoterol 160-4.5 mcg 160-4.5 mcg/actuation Hfaa   Commonly known as:  SYMBICORT   Quantity:  10.2 g   Refills:  0   Dose:  1 puff    Instructions:  Inhale 1 puff into the lungs every 12 (twelve) hours.     Begin Date    AM    Noon    PM    Bedtime       cetirizine 10 MG tablet   Commonly known as:  ZYRTEC   Refills:  0   Dose:  10 mg    Instructions:  Take 10 mg by mouth once daily.     Begin Date    AM    Noon    PM    Bedtime       * COREG CR 80 MG 24 hr capsule   Quantity:  30 capsule   Refills:  6   Dose:  80 mg   Generic drug:  carvedilol    Instructions:  Take 1 capsule (80 mg total) by mouth once daily.     Begin Date    AM    Noon    PM    Bedtime       * COREG CR 80 MG 24 hr capsule   Quantity:  30 capsule   Refills:  1   Generic drug:  carvedilol    Instructions:  TAKE 1 CAPSULE BY MOUTH DAILY     Begin Date    AM    Noon    PM    Bedtime       enalapril 2.5 MG tablet   Commonly known as:  VASOTEC   Quantity:  60 tablet   Refills:  2    Instructions:  TAKE 1 TABLET (2.5 MG TOTAL) BY MOUTH 2 (TWO) TIMES DAILY.     Begin Date    AM    Noon    PM    Bedtime       * furosemide 20 MG tablet   Commonly known as:  LASIX   Quantity:  30 tablet   Refills:  0    Instructions:  TAKE 1 TABLET (20 MG TOTAL) BY MOUTH ONCE DAILY.     Begin Date    AM    Noon    PM    Bedtime       * furosemide 20 MG tablet   Commonly known as:  LASIX   Quantity:  30 tablet   Refills:  0    Instructions:  TAKE 1 TABLET (20 MG TOTAL) BY MOUTH ONCE DAILY.     Begin Date    AM    Noon    PM    Bedtime       mometasone 50 mcg/actuation nasal spray   Commonly known as:  NASONEX   Quantity:  1 each   Refills:  3    Dose:  2 spray    Instructions:  2 sprays by Nasal route once daily.     Begin Date    AM    Noon    PM    Bedtime       potassium chloride 10 MEQ Cpsr   Commonly known as:  MICRO-K   Quantity:  60 capsule   Refills:  0    Instructions:  TAKE 1 CAPSULE (10 MEQ TOTAL) BY MOUTH 2 (TWO) TIMES DAILY.     Begin Date    AM    Noon    PM    Bedtime       * Notice:  This list has 4 medication(s) that are the same as other medications prescribed for you. Read the directions carefully, and ask your doctor or other care provider to review them with you.               Please bring to all follow up appointments:    1. A copy of your discharge instructions.  2. All medicines you are currently taking in their original bottles.  3. Identification and insurance card.    Please arrive 15 minutes ahead of scheduled appointment time.    Please call 24 hours in advance if you must reschedule your appointment and/or time.        Your Scheduled Appointments     Jan 20, 2017 11:00 AM CST   Non-Fasting Lab with LAB, APPOINTMENT [place] [facility] ([facility])    [address] [facility] [id number] [phone]            Jan 26, 2017 11:30 AM CST   Pre OP with NURSE, PLASTIC SURGERY   [facility] [place] ([facility] )    [address] [facility] [address] [phone]            Jan 26, 2017  1:00 PM CST   CARDI PULM Stress Test with CPX   [facility] - Echo/Stress Lab ([facility] )    [address]  [place] [id number] [phone]            Jan 26, 2017  2:30 PM CST   Color Flow Doppler Echo with ECHO, [facility] - Echo/Stress Lab ([facility] )    [address]  [place] [id number] [phone]            Jan 26, 2017  3:30 PM CST   Established Patient Visit with Jennifer Gaitan MD   [facility] ([facility] )    [address]  [place] [id number] [phone]              Your Future  Surgeries/Procedures     Jan 30, 2017   Surgery with Orestes Santana MD   [facility] ([facility])    [address] [address] [phone]                  Discharge Instructions       Your surgery has been scheduled for:__________________________________________    You should report to:  ____[facility], located on the [place] side of the first floor of the           [facility] ([phone])  ____The Second Floor Surgery Center, located on the [facility] side of the            Second floor of the [facility] ([phone])  ____3rd Floor SSCU located on the [facility] side of the [facility] [phone]  Please Note   - Tell your doctor if you take Aspirin, products containing Aspirin, herbal medications  or blood thinners, such as Coumadin, Ticlid, or Plavix.  (Consult your provider regarding holding or stopping before surgery).  - Arrange for someone to drive you home following surgery.  You will not be allowed to leave the surgical facility alone or drive yourself home following sedation and anesthesia.  Before Surgery  - Stop taking all herbal medications 14days prior to surgery  - No Motrin/Advil (Ibuprofen) 7 days before surgery  - No Aleve (Naproxen) 7 days before surgery  - Stop Taking Asprin, products containing Asprin _____days before surgery  - Stop taking blood thinners_______days before surgery  - Refrain from drinking alcoholic beverages for 24hours before and after surgery  - Stop or limit smoking _________days before surgery  Night before Surgery  - DO NOT EAT OR DRINK ANYTHING AFTER MIDNIGHT, INCLUDING GUM, HARD CANDY, MINTS, OR CHEWING TOBACCO.  - Take a shower or bath (shower is recommended).  Bathe with Hibiclens soap or an antibacterial soap from the neck down.  If not supplied by your surgeon, hibiclens soap will need to be purchased over the counter in  pharmacy.  Rinse soap off thoroughly.  - Shampoo your hair with your regular shampoo  The Day of Surgery  - Take another bath or shower with hibiclens or any antibacterial soap, to reduce the chance of infection.  - Take heart and blood pressure medications with a small sip of water, as advised by the perioperative team.  - Do not take fluid pills  - You may brush your teeth and rinse your mouth, but do not swall any additional water.   - Do not apply perfumes, powder, body lotions or deodorant on the day of surgery.  - Nail polish should be removed.  - Do not wear makeup or moisturizer  - Wear comfortable clothes, such as a button front shirt and loose fitting pants.  - Leave all jewelry, including body piercings, and valuables at home.    - Bring any devices you will neeed after surgery such as crutches or canes.  - If you have sleep apnea, please bring your CPAP machine  In the event that your physical condition changes including the onset of a cold or respiratory illness, or if you have to delay or cancel your surgery, please notify your surgeon.Anesthesia: General Anesthesia  Youre due to have surgery. During surgery, youll be given medication called anesthesia. (It is also called anesthetic.) This will keep you comfortable and pain-free. Your surgeon will use general anesthesia. This sheet tells you more about it.    What Is General Anesthesia?  General anesthesia puts you into a state like deep sleep. It goes into the bloodstream (IV anesthetics), into the lungs (gas anesthetics), or both. You feel nothing during the procedure. You will not remember it. During the procedure, the anesthesia provider monitors you. He or she checks your heart rate and rhythm, blood pressure, and blood oxygen.  · IV Anesthetics  IV anesthetics are given through an IV line in your arm. Theyre often given first. This is so you are asleep before a gas anesthetic is started. Some kinds of IV anesthetics relieve pain. Others relax  you. Your doctor will decide which kind is best in your case.  · Gas Anesthetics  Gas anesthetics are breathed into the lungs. They are often used to keep you asleep. They can be given through a facemask, an endotracheal tube, or a laryngeal mask airway.  ¨ If you have a facemask, your anesthesia provider will most likely place it over your nose and mouth while youre still awake. Youll breathe oxygen through the mask as your IV anesthetic is started. Gas anesthetic may be added through the mask.  ¨ If you have an endotracheal tube or a laryngeal mask airway, it will be inserted into your throat after youre asleep.  Anesthesia Tools and Medications  You will likely have:  · IV anesthetics sent through an IV line into your bloodstream.  · Gas anesthetics breathed into your lungs, where they pass into your bloodstream.  · A pulse oximeter on the end of your finger. This measures your blood oxygen level.  · Electrocardiography leads (electrodes) on your chest. These record your heart rate and rhythm.  · A blood pressure cuff. This reads your blood pressure.  Risks and Possible Complications  General anesthesia has some risks. These include:  · Breathing problems  · Nausea and vomiting  · Sore throat or hoarseness  · Allergic reaction to the anesthetic  · Ongoing numbness (rare)  · Irregular heartbeat (rare)  · Cardiac arrest (rare)   Anesthesia Safety  · Follow all instructions you are given for how long not to eat or drink before your procedure.  · Be sure your doctor knows what medications you take. This includes over-the-counter medications, herbs, and supplements.  · Have an adult family member or friend drive you home after the procedure.  · For the first 24 hours after your surgery:  ¨ Do not drive or use heavy equipment.  ¨ Do not make important decisions or sign documents.  ¨ Avoid alcohol.  ¨ Have someone stay with you, if possible. They can watch for problems and help keep you safe.  © 4021-7478 [employer]  "[facility], [address] [place] [address]. All rights reserved. This information is not intended as a substitute for professional medical care. Always follow your healthcare professional's instructions.      Admission Information     Date & Time Provider Department CSN    1/20/2017 10:00 AM Rhonda G Leopold, MD [facility] [id number]      Care Providers     Provider Role Specialty Primary office phone    Rhonda G Leopold, MD Attending Provider Anesthesiology [phone]      Your Vitals Were     BP Pulse Temp Resp Height Weight    107/62 70 98.2 °F (36.8 °C) 18 4' 9.5" (1.461 m) 59.8 kg (131 lb 13.4 oz)    SpO2 BMI             98% 28.03 kg/m2         Recent Lab Values     No lab values to display.      Allergies as of 1/20/2017        Reactions    Ciprofloxacin Rash      Advance Directives     An advance directive is a document which, in the event you are no longer able to make decisions for yourself, tells your healthcare team what kind of treatment you do or do not want to receive, or who you would like to make those decisions for you.  If you do not currently have an advance directive, [facility] encourages you to create one.  For more information call:  [phone]), [phone] ([phone]),  or log on to [email or web address].        Language Assistance Services     ATTENTION: Language assistance services are available, free of charge. Please call [phone].      ATENCIÓN: Si habla español, tiene a ray disposición servicios gratuitos de asistencia lingüística. Llame al [phone].     [facility] Ý: N?u b?n nói Ti?ng Vi?t, có các d?ch v? h? tr? ngôn ng? mi?n phí dành cho b?n. G?i s? [phone].        Heart Failure Education       Heart Failure: Being Active  You have a condition called heart failure. Being active doesnt mean that you have to wear yourself out. Even a little movement each day helps to strengthen your heart. If you cant get out to exercise, " you can do simple stretching and strengthening exercises at home. These are good ways to keep you well-conditioned and prevent you and your heart from becoming excessively weak.    Ideas to get you started  · Add a little movement to things you do now. Walk to mail letters. Park your car at the far end of the parking lot and walk to the store. Walk up a flight of stairs instead of taking the elevator.  · Choose activities you enjoy. You might walk, swim, or ride an exercise bike. Things like gardening and washing the car count, too. Other possibilities include: washing dishes, walking the dog, walking around the mall, and doing aerobic activities with friends.  · Join a group exercise program at a [facility] or [facility], a senior center, or a community center. Or look into a hospital cardiac rehabilitation program. Ask your doctor if you qualify.  Tips to keep you going  · Get up and get dressed each day. Go to a coffee shop and read a newspaper or go somewhere that you'll be in the presence of other active people. Youll feel more like being active.  · Make a plan. Choose one or more activities that you enjoy and that you can easily do. Then plan to do at least one each day. You might write your plan on a calendar.  · Go with a friend or a group if you like company. This can help you feel supported and stay motivated, too.  · Plan social events that you enjoy. This will keep you mentally engaged as well as physically motivated to do things you find pleasure in.  For your safety  · Talk with your healthcare provider before starting an exercise program.  · Exercise indoors when its too hot or too cold outside, or when the air quality is poor. Try walking at a shopping mall.  · Wear socks and sturdy shoes to maintain your balance and prevent falls.  · Start slowly. Do a few minutes several times a day at first. Increase your time and speed little by little.  · Stop and rest whenever you feel tired or get short of  breath.  · Dont push yourself on days when you dont feel well.  © 0648-9363 [employer]. [address] [place] [address], [place] [address]. All rights reserved. This information is not intended as a substitute for professional medical care. Always follow your healthcare professional's instructions.              Heart Failure: Evaluating Your Heart  You have a condition called heart failure. To evaluate your condition, your doctor will examine you, ask questions, and do some tests. Along with looking for signs of heart failure, the doctor looks for any other health problems that may have led to heart failure. The results of your evaluation will help your doctor form a treatment plan.  Health history and physical exam  Your visit will start with a health history. Tell the doctor about any symptoms youve noticed and about all medicines you take. Then youll have a physical exam. This includes listening to your heartbeat and breathing. Youll also be checked for swelling (edema) in your legs and neck. When you have fluid buildup or fluid in the lungs, it may be called congestive heart failure.  Diagnosing heart failure     During an echocardiogram, sound waves bounce off the heart. These are converted into a picture on the screen.   The following may be done to help your doctor form a diagnosis:  · X-rays show the size and shape of your heart. These pictures can also show fluid in your lungs.  · An electrocardiogram (ECG or EKG) shows the pattern of your heartbeat. Small pads (electrodes) are placed on your chest, arms, and legs. Wires connect the pads to the ECG machine, which records your hearts electrical signals. This can give the doctor information about heart function.  · An echocardiogram uses ultrasound waves to show the structure and movement of your heart muscle. This shows how well the heart pumps. It also shows the thickness of the heart walls, and if the heart is enlarged. It is one of the most  useful, non-invasive tests as it provides information about the heart's general function. This helps your doctor make treatment decisions.  · Lab tests evaluate small amounts of blood or urine for signs of problems. A BNP lab test can help diagnose and evaluate heart failure. BNP stands for B-type natriuretic peptide. The ventricles secrete more BNP when heart failure worsens. Lab tests can also provide information about metabolic dysfunction or heart dysfunction.  Your treatment plan  Based on the results of your evaluation and tests, your doctor will develop a treatment plan. This plan is designed to relieve some of your heart failure symptoms and help make you more comfortable. Your treatment plan may include:  · Medicine to help your heart work better and improve your quality of life  · Changes in what you eat and drink to help prevent fluid from backing up in your body  · Daily monitoring of your weight and heart failure symptoms to see how well your treatment plan is working  · Exercise to help you stay healthy  · Help with quitting smoking  · Emotional and psychological support to help adjust to the changes  · Referrals to other specialists to make sure you are being treated comprehensively  © 0492-5565 [employer]. [address] [place] [address], [place] [address]. All rights reserved. This information is not intended as a substitute for professional medical care. Always follow your healthcare professional's instructions.              Heart Failure: Making Changes to Your Diet  You have a condition called heart failure. When you have heart failure, excess fluid is more likely to build up in your body because your heart isn't working well. This makes the heart work harder to pump blood. Fluid buildup causes symptoms such as shortness of breath and swelling (edema). This is often referred to as congestive heart failure or CHF. Controlling the amount of salt (sodium) you eat may help stop fluid from  building up. Your doctor may also tell you to reduce the amount of fluid you drink.  Reading food labels    Your healthcare provider will tell you how much sodium you can eat each day. Read food labels to keep track. Keep in mind that certain foods are high in salt. These include canned, frozen, and processed foods. Check the amount of sodium in each serving. Watch out for high-sodium ingredients. These include MSG (monosodium glutamate), baking soda, and sodium phosphate.   Eating less salt  Give yourself time to get used to eating less salt. It may take a little while. Here are some tips to help:  · Take the saltshaker off the table. Replace it with salt-free herb mixes and spices.  · Eat fresh or plain frozen vegetables. These have much less salt than canned vegetables.  · Choose low-sodium snacks like sodium-free pretzels, crackers, or air-popped popcorn.  · Dont add salt to your food when youre cooking. Instead, season your foods with pepper, lemon, garlic, or onion.  · When you eat out, ask that your food be cooked without added salt.  · Avoid eating fried foods as these often have a great deal of salt.  If youre told to limit fluids  You may need to limit how much fluid you have to help prevent swelling. This includes anything that is liquid at room temperature, such as ice cream and soup. If your doctor tells you to limit fluid, try these tips:  · Measure drinks in a measuring cup before you drink them. This will help you meet daily goals.  · Chill drinks to make them more refreshing.  · Suck on frozen lemon wedges to quench thirst.  · Only drink when youre thirsty.  · Chew sugarless gum or suck on hard candy to keep your mouth moist.  · Weigh yourself daily to know if your body's fluid content is rising.  My sodium goal  Your healthcare provider may give you a sodium goal to meet each day. This includes sodium found in food as well as salt that you add. My goal is to eat no more than ___________ mg of  sodium per day.     When to call your doctor  Call your doctor right away if you have any symptoms of worsening heart failure. These can include:  · Sudden weight gain  · Increased swelling of your legs or ankles  · Trouble breathing when youre resting or at night  · Increase in the number of pillows you have to sleep on  · Chest pain, pressure, discomfort, or pain in the jaw, neck, or back   © [id number] [employer]. [address] [place] [address], [place] [address]. All rights reserved. This information is not intended as a substitute for professional medical care. Always follow your healthcare professional's instructions.              Heart Failure: Medicines to Help Your Heart    You have a condition called heart failure (also known as congestive heart failure, or CHF). Your doctor will likely prescribe medicines for heart failure and any underlying health problems you have. Most heart failure patients take one or more types of medicinen. Your healthcare provider will work to find the combination of medicines that works best for you.  Heart failure medicines  Here are the most common heart failure medicines:  · ACE inhibitors lower blood pressure and decrease strain on the heart. This makes it easier for the heart to pump. Angiotensin receptor blockers have similar effects. These are prescribed for some patients instead of ACE inhibitors.  · Beta-blockers relieve stress on the heart. They also improve symptoms. They may also improve the heart's pumping action over time.  · Diuretics (also called water pills) help rid your body of excess water. This can help rid your body of swelling (edema). Having less fluid to pump means your heart doesnt have to work as hard. Some diuretics make your body lose a mineral called potassium. Your doctor will tell you if you need to take supplements or eat more foods high in potassium.  · Digoxin helps your heart pump with more strength. This helps your heart pump more  blood with each beat. So, more oxygen-rich blood travels to the rest of the body.  · Aldosterone antagonists help alter hormones and decrease strain on the heart.  · Hydralazine and nitrates are two separate medicines used together to treat heart failure. They may come in one combination pill. They lower blood pressure and decrease how hard the heart has to pump.  Medicines for related conditions  Controlling other heart problems helps keep heart failure under control, too. Depending on other heart problems you have, medicines may be prescribed to:  · Lower blood pressure (antihypertensives).  · Lower cholesterol levels (statins).  · Prevent blood clots (anticoagulants or aspirin).  · Keep the heartbeat steady (antiarrhythmics).  © 6691-4271 [employer]. [address] [place] [address], [place] [address]. All rights reserved. This information is not intended as a substitute for professional medical care. Always follow your healthcare professional's instructions.              Heart Failure: Procedures That May Help    The heart is a muscle that pumps oxygen-rich blood to all parts of the body. When you have heart failure, the heart is not able to pump as well as it should. Blood and fluid may back up into the lungs (congestive heart failure), and some parts of the body dont get enough oxygen-rich blood to work normally. These problems lead to the symptoms of heart failure.     Certain procedures may help the heart pump better in some cases of heart failure. Some procedures are done to treat health problems that may have caused the heart failure such as coronary artery disease or heart rhythm problems. For more serious heart failure, other options are available.  Treating artery and valve problems  If you have coronary artery disease or valve disease, procedures may be done to improve blood flow. This helps the heart pump better, which can improve heart failure symptoms. First, your doctor may do a cardiac  catheterization to help detect clogged blood vessels or valve damage. During this procedure, a  thin tube (catheter) in inserted into a blood vessel and guided to the heart. There a dye is injected and a special type of X-ray (angiogram) is taken of the blood vessels. Procedures to open a blocked artery or fix damaged valves can also be done using catheterization.  · Angioplasty uses a balloon-tipped instrument at the end of the catheter. The balloon is inflated to widen the narrowed artery. In many cases, a stent is expanded to further support the narrowed artery. A stent is a metal mesh tube.  · Valve surgery repairs or replacement of faulty valves can also be done during catheterization so blood can flow properly through the chambers of the heart.  Bypass surgery is another option to help treat blocked arteries. It uses a healthy blood vessel from elsewhere in the body. The healthy blood vessel is attached above and below the blocked area so that blood can flow around the blocked artery.  Treating heart rhythm problems  A device may be placed in the chest to help a weak heart maintain a healthy, heartbeat so the heart can pump more effectively:  · Pacemaker. A pacemaker is an implanted device that regulates your heartbeat electronically. It monitors your heart's rhythm and generates a painless electric impulse that helps the heart beat in a regular rhythm. A pacemaker is programmed to meet your specific heart rhythm needs.  · Biventricular pacing/cardiac resynchronization therapy. A type of pacemaker that paces both pumping chambers of the heart at the same time to coordinate contractions and to improve the heart's function. Some people with heart failure are candidates for this therapy.  · Implantable cardioverter defibrillator. A device similar to a pacemaker that senses when the heart is beating too fast and delivers an electrical shock to convert the fast rhythm to a normal rhythm. This can be a life saving  device.  In severe cases  In more serious cases of heart failure when other treatments no longer work, other options may include:  · Ventricular assist devices (VADs). These are mechanical devices used to take over the pumping function for one or both of the heart's ventricles, or pumping chambers. A VAD may be necessary when heart failure progresses to the point that medicines and other treatments no longer help. In some cases, a VAD may be used as a bridge to transplant.  · Heart transplant. This is replacing the diseased heart with a healthy one from a donor. This is an option for a few people who are very sick. A heart transplant is very serious and not an option for all patients. Your doctor can tell you more.  © 9653-5427 [employer]. [address] [place] [address], [place] [address]. All rights reserved. This information is not intended as a substitute for professional medical care. Always follow your healthcare professional's instructions.              Heart Failure: Tracking Your Weight  You have a condition called heart failure. When you have heart failure, a sudden weight gain or a steady rise in weight is a warning sign that your body is retaining too much water and salt. This could mean your heart failure is getting worse. If left untreated, it can cause problems for your lungs and result in shortness of breath. Weighing yourself each day is the best way to know if youre retaining water. If your weight goes up quickly, call your doctor. You will be given instructions on how to get rid of the excess water. You will likely need medicines and to avoid salt. This will help your heart work better.  Call your doctor if you gain more than 2 pounds in 1 day, more than 5 pounds in 1 week, or whatever weight gain you were told to report by your doctor. This is often a sign of worsening heart failure and needs to be evaluated and treated. Your doctor will tell you what to do next.   Tips for weighing  yourself    · Weigh yourself at the same time each morning, wearing the same clothes. Weigh yourself after urinating and before eating.  · Use the same scale each day. Make sure the numbers are easy to read. Put the scale on a flat, hard surface -- not on a rug or carpet.  · Do not stop weighing yourself. If you forget one day, weigh again the next morning.  How to use your weight chart  · Keep your weight chart near the scale. Write your weight on the chart as soon as you get off the scale.  · Fill in the month and the start date on the chart. Then write down your weight each day. Your chart will look like this:    · If you miss a day, leave the space blank. Weigh yourself the next day and write your weight in the next space.  · Take your weight chart with you when you go to see your doctor.  © 9673-3686 [employer]. [address] [place] [address]. All rights reserved. This information is not intended as a substitute for professional medical care. Always follow your healthcare professional's instructions.              Heart Failure: Warning Signs of a Flare-Up  You have a condition called heart failure. Once you have heart failure, flare-ups can happen. Below are signs that can mean your heart failure is getting worse. If you notice any of these warning signs, call your healthcare provider.  Swelling    · Your feet, ankles, or lower legs get puffier.  · You notice skin changes on your lower legs.  · Your shoes feel too tight.  · Your clothes are tighter in the waist.  · You have trouble getting rings on or off your fingers.  Shortness of breath  · You have to breathe harder even when youre doing your normal activities or when youre resting.  · You are short of breath walking up stairs or even short distances.  · You wake up at night short of breath or coughing.  · You need to use more pillows or sit up to sleep.  · You wake up tired or restless.  Other warning signs  · You feel weaker,  dizzy, or more tired.  · You have chest pain or changes in your heartbeat.  · You have a cough that wont go away.  · You cant remember things or dont feel like eating.  Tracking your weight  Gaining weight is often the first warning sign that heart failure is getting worse. Gaining even a few pounds can be a sign that your body is retaining excess water and salt. Weighing yourself each day in the morning after you urinate and before you eat, is the best way to know if you're retaining water. Get a scale that is easy to read and make sure you wear the same clothes and use the same scale every time you weigh. Your healthcare provider will show you how to track your weight. Call your doctor if you gain more than 2 pounds in 1 day, 5 pounds in 1 week, or whatever weight gain you were told to report by your doctor. This is often a sign of worsening heart failure and needs to be evaluated and treated before it compromises your breathing. Your doctor will tell you what to do next.    © 5990-9859 [employer]. [address] [place] [address], [place] [address]. All rights reserved. This information is not intended as a substitute for professional medical care. Always follow your healthcare professional's instructions.               [facility] complies with applicable Federal civil rights laws and does not discriminate on the basis of race, color, national origin, age, disability, or sex.

## 2017-01-20 NOTE — ANESTHESIA PREPROCEDURE EVALUATION
Pre-operative evaluation for MASTECTOMY-BREAST-UNILATERAL right non skin sparing/non nipple sparing   (Right), BIOPSY-SENTINEL NODE (Right), INJECTION-SENTINEL NODE (Right), DISSECTION-AXILLARY LYMPH NODE (Right), INSERTION-BREAST TISSUE EXPANDER (Right), MASTOPEXY (Left)    ID:   Avis Graves is a 41 y.o. female with HFrEF (25%) 2/2 NICM s/p single RV lead AICD, Hx of VT and LV Thrombus, and Moderate Asthma      Chief Complaint: SCC of the R Breast. Seen by Cardiologist (Dr. Escobar) 1/25/17 and given clearance for procedure but had repeat 2D echo 1/26/17 with concern for possible LV thrombus and recommendation for f/u limited contrast study.No follow-up noted in the chart and patient not currently on OAC. Several runs of NSVT and possible SVT on most recent AICD interrogation. No issues with previous anesthesia.    Previous Airway:  - None on file      Past Surgical History   Procedure Laterality Date    Tonsillectomy      Hysterectomy      Cardiac defibrillator placement       X 2 [employer]     Cardiac defibrillator placement       medtronic         Vital Signs Range (Last 24H):         CBC:     Recent Labs  Lab 01/20/17  1209 01/26/17  1240   WBC 6.25 7.77   RBC 4.98 4.70   HGB 15.3 14.3   HCT 45.3 42.6    229   MCV 91 91   MCH 30.7 30.4   MCHC 33.8 33.6       CMP:   Recent Labs  Lab 01/20/17  1209 01/26/17  1240    140   K 4.1 4.4    104   CO2 31* 30*   BUN 15 19   CREATININE 0.9 0.9    102   MG  --  2.2   CALCIUM 9.2 9.4   ALBUMIN  --  3.6   PROT  --  6.7   ALKPHOS  --  52*   ALT  --  15   AST  --  20   BILITOT  --  0.4       INR:  No results for input(s): INR, PROTIME, APTT in the last 720 hours.    Invalid input(s): PT      Diagnostic Studies:      EKG 1/26/17:  - NSR    2D Echo 1/26/17:  CONCLUSIONS     1 - Severely depressed left ventricular systolic function (EF 25-30%). Prominent trabeculation in the LV apex vs. thrombus, recommend  limited contrast study r/o LV  thrombus. Prior studies could not be retrieved for comparison, but apical thrombus or   prominent trabeculation was not reported.     2 - Normal right ventricular systolic function .     3 - Normal left ventricular diastolic function.     4 - The estimated PA systolic pressure is 17 mmHg.     5 - Trivial aortic regurgitation.     6 - Trivial to mild mitral regurgitation.     7 - Mild tricuspid regurgitation.     8 - Trivial pulmonic regurgitation.       [facility] Anesthesia Evaluation    I have reviewed the Patient Summary Reports.    I have reviewed the Nursing Notes.   I have reviewed the Medications.     Review of Systems  Anesthesia Hx:  No problems with previous Anesthesia Denies Hx of Anesthetic complications  History of prior surgery of interest to airway management or planning: Denies Family Hx of Anesthesia complications.   Denies Personal Hx of Anesthesia complications.   Social:  Non-Smoker, No Alcohol Use    Cardiovascular:   Exercise tolerance: good Pacemaker Past MI Denies CAD.    Denies CABG/stent.   Denies Angina. CHF      Functional Capacity good / => 4 METS, walks/jogs  Congestive Heart Failure (CHF) , on chronic Rx, no recent change Cardiomyopathy, Non-Ischemic Cardiomyopathy Meeting with Dr. Escobar next week.   Pulmonary:   Asthma Denies Shortness of breath.  Denies Recent URI.  Asthma:  last episode was 1 - 12 months ago. Emergency visits this year is none.  Inhaler use is maintenance inhaler daily and rescue inhaler PRN.    Renal/:   Denies Chronic Renal Disease.     Hepatic/GI:   GERD, well controlled Denies Liver Disease. Denies Hepatitis.    Musculoskeletal:   Denies Arthritis.   Denies Spine Disorders    Neurological:   Denies CVA. Denies Seizures.    Endocrine:   Denies Diabetes. Denies Hypothyroidism. Denies Hyperthyroidism.    Psych:   denies anxiety denies depression          Physical Exam  General:  Well nourished    Airway/Jaw/Neck:  Airway Findings: Mouth Opening: Normal Tongue: Normal   General Airway Assessment: Adult  Mallampati: II  TM Distance: Normal, at least 6 cm  Jaw/Neck Findings:  Neck ROM: Normal ROM      Dental:  Dental Findings: In tact   Chest/Lungs:  Chest/Lungs Findings: Clear to auscultation, Normal Respiratory Rate     Heart/Vascular:  Heart Findings: Rate: Normal  Rhythm: Regular Rhythm  Sounds: Normal        Mental Status:  Mental Status Findings:  Cooperative, Alert and Oriented         Addendum 1/30/17 1043: pt cleared by Dr Escobar on 1/26/17: Low to medium risk for surgery No issues   /Liss Giraldo RN        Anesthesia Plan  Type of Anesthesia, risks & benefits discussed:  Anesthesia Type:  general, regional  Patient's Preference:   Intra-op Monitoring Plan: standard ASA monitors  Intra-op Monitoring Plan Comments:   Post Op Pain Control Plan:   Post Op Pain Control Plan Comments:   Induction:   IV  Beta Blocker:  Patient is on a Beta-Blocker and has received one dose within the past 24 hours (No further documentation required).       Informed Consent: Patient understands risks and agrees with Anesthesia plan.  Questions answered. Anesthesia consent signed with patient.  ASA Score: 3     Day of Surgery Review of History & Physical:    H&P update referred to the surgeon.     Anesthesia Plan Notes: Spoke with Dr. Escobar this am. He stated OK to proceed with surgery, no further testing needed. Plan for GETA. Possible supplementation with Remifentanyl/nitrous oxide if hemodynamics don't tolerate volatile agent. Will need deactivation of AICD.         Ready For Surgery From Anesthesia Perspective.

## 2017-01-24 ENCOUNTER — DOCUMENTATION ONLY (OUTPATIENT)
Dept: TRANSPLANT | Facility: CLINIC | Age: 42
End: 2017-01-24

## 2017-01-24 ENCOUNTER — PATIENT MESSAGE (OUTPATIENT)
Dept: SURGERY | Facility: CLINIC | Age: 42
End: 2017-01-24

## 2017-01-24 ENCOUNTER — PATIENT MESSAGE (OUTPATIENT)
Dept: INTERNAL MEDICINE | Facility: CLINIC | Age: 42
End: 2017-01-24

## 2017-01-24 ENCOUNTER — PATIENT MESSAGE (OUTPATIENT)
Dept: TRANSPLANT | Facility: CLINIC | Age: 42
End: 2017-01-24

## 2017-01-24 NOTE — PROGRESS NOTES
1:30 pm: Dr. Escobar just asked that I call and reschedule pt from 1/26/17 appt with Dr. Gaitan to see him tomorrow 1/25/17 @ 1130 am. Called and discussed with pt and she is pleased with this plan

## 2017-01-25 ENCOUNTER — OFFICE VISIT (OUTPATIENT)
Dept: TRANSPLANT | Facility: CLINIC | Age: 42
End: 2017-01-25
Payer: COMMERCIAL

## 2017-01-25 VITALS
DIASTOLIC BLOOD PRESSURE: 55 MMHG | HEART RATE: 87 BPM | BODY MASS INDEX: 26.54 KG/M2 | SYSTOLIC BLOOD PRESSURE: 108 MMHG | WEIGHT: 131.63 LBS | HEIGHT: 59 IN

## 2017-01-25 DIAGNOSIS — I47.20 VENTRICULAR TACHYCARDIA: ICD-10-CM

## 2017-01-25 DIAGNOSIS — Z95.810 AUTOMATIC IMPLANTABLE CARDIOVERTER-DEFIBRILLATOR IN SITU: ICD-10-CM

## 2017-01-25 DIAGNOSIS — C50.811 CANCER OF OVERLAPPING SITES OF RIGHT FEMALE BREAST: ICD-10-CM

## 2017-01-25 DIAGNOSIS — I50.22 HEART FAILURE, SYSTOLIC, CHRONIC: Primary | ICD-10-CM

## 2017-01-25 PROCEDURE — 99999 PR PBB SHADOW E&M-EST. PATIENT-LVL II: CPT | Mod: PBBFAC,,, | Performed by: INTERNAL MEDICINE

## 2017-01-25 PROCEDURE — 1159F MED LIST DOCD IN RCRD: CPT | Mod: S$GLB,,, | Performed by: INTERNAL MEDICINE

## 2017-01-25 PROCEDURE — 99215 OFFICE O/P EST HI 40 MIN: CPT | Mod: S$GLB,,, | Performed by: INTERNAL MEDICINE

## 2017-01-25 NOTE — PROGRESS NOTES
Subjective:    Patient ID:  Avis Graves is a 41 y.o. female who presents for follow-up of Congestive Heart Failure (Pt here for cardiac clearance)      Congestive Heart Failure   Pertinent negatives include no abdominal pain, chest pain, chills, coughing, diaphoresis, fever or weakness.     41 year old with NICM with history of LV thrombus(10/18/10 echo revealed no thrombus EF of 20%),and chronic systolic heart failure she is here for preop clearance. Asymptoamtic    Review of Systems   Constitution: Negative for chills, decreased appetite, diaphoresis, fever, weakness, malaise/fatigue, night sweats, weight gain and weight loss.   Cardiovascular: Negative for chest pain, claudication, cyanosis, dyspnea on exertion, irregular heartbeat, leg swelling, near-syncope, orthopnea and palpitations.   Respiratory: Negative for cough, hemoptysis, shortness of breath, sleep disturbances due to breathing, snoring, sputum production and wheezing.    Gastrointestinal: Negative for abdominal pain and diarrhea.        Objective:    Physical Exam   Constitutional: She is oriented to person, place, and time. She appears well-developed and well-nourished.   HENT:   Head: Normocephalic and atraumatic.   Eyes: Conjunctivae and EOM are normal. Pupils are equal, round, and reactive to light.   Neck: Neck supple. No JVD present. No tracheal deviation present. No thyromegaly present.   Cardiovascular: Normal rate, regular rhythm and normal heart sounds.  PMI is displaced.  Exam reveals no gallop and no friction rub.    No murmur heard.  Pulmonary/Chest: Effort normal and breath sounds normal. No respiratory distress. She has no wheezes. She has no rales. She exhibits no tenderness.   Abdominal: Soft. Bowel sounds are normal. She exhibits no distension and no mass. There is no tenderness. There is no rebound and no guarding.   Musculoskeletal: Normal range of motion. She exhibits no edema or tenderness.   Lymphadenopathy:     She  has no cervical adenopathy.   Neurological: She is alert and oriented to person, place, and time. She has normal reflexes. No cranial nerve deficit. She exhibits normal muscle tone. Coordination normal.   Skin: Skin is warm and dry.   Psychiatric: She has a normal mood and affect. Her behavior is normal. Thought content normal.         Assessment:       1. Heart failure, systolic, chronic    2. Ventricular tachycardia    3. Cancer of overlapping sites of right female breast    4. Automatic implantable cardioverter-defibrillator in situ         Plan:       Low to medium risk for surgery  No issues

## 2017-01-26 ENCOUNTER — HOSPITAL ENCOUNTER (OUTPATIENT)
Dept: CARDIOLOGY | Facility: CLINIC | Age: 42
Discharge: HOME | End: 2017-01-26
Payer: COMMERCIAL

## 2017-01-26 DIAGNOSIS — I50.22 CHRONIC SYSTOLIC CONGESTIVE HEART FAILURE: ICD-10-CM

## 2017-01-26 LAB
AORTIC VALVE REGURGITATION: ABNORMAL
DIASTOLIC DYSFUNCTION: NO
DIASTOLIC DYSFUNCTION: NO
ESTIMATED PA SYSTOLIC PRESSURE: 17.44
MITRAL VALVE MOBILITY: NORMAL
MITRAL VALVE REGURGITATION: ABNORMAL
RETIRED EF AND QEF - SEE NOTES: 25 (ref 55–65)
TRICUSPID VALVE REGURGITATION: ABNORMAL

## 2017-01-26 PROCEDURE — 94621 CARDIOPULM EXERCISE TESTING: CPT | Mod: S$GLB,,, | Performed by: INTERNAL MEDICINE

## 2017-01-26 PROCEDURE — 93306 TTE W/DOPPLER COMPLETE: CPT | Mod: S$GLB,,, | Performed by: INTERNAL MEDICINE

## 2017-01-30 ENCOUNTER — DOCUMENTATION ONLY (OUTPATIENT)
Dept: SURGERY | Facility: CLINIC | Age: 42
End: 2017-01-30

## 2017-01-30 DIAGNOSIS — C50.911 MALIGNANT NEOPLASM OF RIGHT FEMALE BREAST, UNSPECIFIED SITE OF BREAST: Primary | ICD-10-CM

## 2017-01-30 NOTE — PROGRESS NOTES
Results received from [facility], negative [facility], patient phoned and results discussed, this therefore most probably represents a family clustering verses sporadic cancer

## 2017-01-31 ENCOUNTER — TELEPHONE (OUTPATIENT)
Dept: ELECTROPHYSIOLOGY | Facility: CLINIC | Age: 42
End: 2017-01-31

## 2017-01-31 NOTE — TELEPHONE ENCOUNTER
I called patient to see if she is connected to her home ICD monitor. Her home number is disconnected. I called patient mobile number. No answer. I left a message for her to return my call.

## 2017-02-07 ENCOUNTER — SURGERY (OUTPATIENT)
Age: 42
End: 2017-02-07

## 2017-02-07 ENCOUNTER — HOSPITAL ENCOUNTER (OUTPATIENT)
Facility: HOSPITAL | Age: 42
Discharge: HOME | End: 2017-02-08
Attending: SURGERY | Admitting: SURGERY
Payer: COMMERCIAL

## 2017-02-07 ENCOUNTER — ANESTHESIA (OUTPATIENT)
Dept: SURGERY | Facility: HOSPITAL | Age: 42
End: 2017-02-07
Payer: COMMERCIAL

## 2017-02-07 ENCOUNTER — HOSPITAL ENCOUNTER (OUTPATIENT)
Dept: RADIOLOGY | Facility: HOSPITAL | Age: 42
Discharge: HOME | End: 2017-02-07
Attending: SURGERY | Admitting: SURGERY
Payer: COMMERCIAL

## 2017-02-07 DIAGNOSIS — Z95.810 PRESENCE OF AUTOMATIC IMPLANTABLE CARDIOVERTER-DEFIBRILLATOR: ICD-10-CM

## 2017-02-07 DIAGNOSIS — C50.811 CANCER OF OVERLAPPING SITES OF RIGHT FEMALE BREAST: Primary | ICD-10-CM

## 2017-02-07 DIAGNOSIS — C50.919 BREAST CANCER: ICD-10-CM

## 2017-02-07 DIAGNOSIS — C50.911 MALIGNANT NEOPLASM OF RIGHT FEMALE BREAST, UNSPECIFIED SITE OF BREAST: ICD-10-CM

## 2017-02-07 LAB
ABO + RH BLD: NORMAL
ALBUMIN SERPL BCP-MCNC: 3.6 G/DL
ALP SERPL-CCNC: 53 U/L
ALT SERPL W/O P-5'-P-CCNC: 16 U/L
ANION GAP SERPL CALC-SCNC: 8 MMOL/L
AST SERPL-CCNC: 28 U/L
BILIRUB SERPL-MCNC: 0.6 MG/DL
BLD GP AB SCN CELLS X3 SERPL QL: NORMAL
BUN SERPL-MCNC: 14 MG/DL
CALCIUM SERPL-MCNC: 8.3 MG/DL
CHLORIDE SERPL-SCNC: 110 MMOL/L
CO2 SERPL-SCNC: 23 MMOL/L
CREAT SERPL-MCNC: 0.8 MG/DL
EST. GFR  (AFRICAN AMERICAN): >60 ML/MIN/1.73 M^2
EST. GFR  (NON AFRICAN AMERICAN): >60 ML/MIN/1.73 M^2
GLUCOSE SERPL-MCNC: 177 MG/DL
MAGNESIUM SERPL-MCNC: 2.1 MG/DL
PHOSPHATE SERPL-MCNC: 3.3 MG/DL
POTASSIUM SERPL-SCNC: 4.8 MMOL/L
PROT SERPL-MCNC: 6.4 G/DL
SODIUM SERPL-SCNC: 141 MMOL/L

## 2017-02-07 PROCEDURE — 63600175 PHARM REV CODE 636 W HCPCS

## 2017-02-07 PROCEDURE — 71000033 HC RECOVERY, INTIAL HOUR: Performed by: SURGERY

## 2017-02-07 PROCEDURE — 19357 TISS XPNDR PLMT BRST RCNSTJ: CPT | Mod: ,,, | Performed by: SURGERY

## 2017-02-07 PROCEDURE — 27201423 OPTIME MED/SURG SUP & DEVICES STERILE SUPPLY: Performed by: SURGERY

## 2017-02-07 PROCEDURE — 93287 PERI-PX DEVICE EVAL & PRGR: CPT | Mod: 59,,, | Performed by: INTERNAL MEDICINE

## 2017-02-07 PROCEDURE — 88341 IMHCHEM/IMCYTCHM EA ADD ANTB: CPT | Mod: 26,,, | Performed by: PATHOLOGY

## 2017-02-07 PROCEDURE — 37000009 HC ANESTHESIA EA ADD 15 MINS: Performed by: SURGERY

## 2017-02-07 PROCEDURE — 36000707: Performed by: SURGERY

## 2017-02-07 PROCEDURE — 38792 RA TRACER ID OF SENTINL NODE: CPT | Mod: ,,, | Performed by: RADIOLOGY

## 2017-02-07 PROCEDURE — C1789 PROSTHESIS, BREAST, IMP: HCPCS | Performed by: SURGERY

## 2017-02-07 PROCEDURE — 80053 COMPREHEN METABOLIC PANEL: CPT

## 2017-02-07 PROCEDURE — 63600175 PHARM REV CODE 636 W HCPCS: Performed by: SURGERY

## 2017-02-07 PROCEDURE — 25000003 PHARM REV CODE 250: Performed by: ANESTHESIOLOGY

## 2017-02-07 PROCEDURE — 64520 N BLOCK LUMBAR/THORACIC: CPT | Performed by: ANESTHESIOLOGY

## 2017-02-07 PROCEDURE — 88342 IMHCHEM/IMCYTCHM 1ST ANTB: CPT | Mod: 26,,, | Performed by: PATHOLOGY

## 2017-02-07 PROCEDURE — 38792 RA TRACER ID OF SENTINL NODE: CPT | Mod: 59,,, | Performed by: SURGERY

## 2017-02-07 PROCEDURE — 63600175 PHARM REV CODE 636 W HCPCS: Performed by: ANESTHESIOLOGY

## 2017-02-07 PROCEDURE — 36000706: Performed by: SURGERY

## 2017-02-07 PROCEDURE — 71000039 HC RECOVERY, EACH ADD'L HOUR: Performed by: SURGERY

## 2017-02-07 PROCEDURE — 38900 IO MAP OF SENT LYMPH NODE: CPT | Mod: ,,, | Performed by: SURGERY

## 2017-02-07 PROCEDURE — 27000221 HC OXYGEN, UP TO 24 HOURS

## 2017-02-07 PROCEDURE — G0378 HOSPITAL OBSERVATION PER HR: HCPCS

## 2017-02-07 PROCEDURE — 25000003 PHARM REV CODE 250: Performed by: STUDENT IN AN ORGANIZED HEALTH CARE EDUCATION/TRAINING PROGRAM

## 2017-02-07 PROCEDURE — C1729 CATH, DRAINAGE: HCPCS | Performed by: SURGERY

## 2017-02-07 PROCEDURE — 15777 ACELLULAR DERM MATRIX IMPLT: CPT | Mod: ,,, | Performed by: SURGERY

## 2017-02-07 PROCEDURE — 84100 ASSAY OF PHOSPHORUS: CPT

## 2017-02-07 PROCEDURE — 93287 PERI-PX DEVICE EVAL & PRGR: CPT | Mod: ,,, | Performed by: INTERNAL MEDICINE

## 2017-02-07 PROCEDURE — 25000003 PHARM REV CODE 250: Performed by: SURGERY

## 2017-02-07 PROCEDURE — 88307 TISSUE EXAM BY PATHOLOGIST: CPT | Mod: 26,,, | Performed by: PATHOLOGY

## 2017-02-07 PROCEDURE — 88305 TISSUE EXAM BY PATHOLOGIST: CPT | Mod: 26,,, | Performed by: PATHOLOGY

## 2017-02-07 PROCEDURE — 88305 TISSUE EXAM BY PATHOLOGIST: CPT | Performed by: PATHOLOGY

## 2017-02-07 PROCEDURE — 38525 BIOPSY/REMOVAL LYMPH NODES: CPT | Mod: 51,RT,, | Performed by: SURGERY

## 2017-02-07 PROCEDURE — 88331 PATH CONSLTJ SURG 1 BLK 1SPC: CPT | Mod: 26,,, | Performed by: PATHOLOGY

## 2017-02-07 PROCEDURE — 19303 MAST SIMPLE COMPLETE: CPT | Mod: RT,,, | Performed by: SURGERY

## 2017-02-07 PROCEDURE — 86901 BLOOD TYPING SEROLOGIC RH(D): CPT

## 2017-02-07 PROCEDURE — D9220A PRA ANESTHESIA: Mod: ,,, | Performed by: ANESTHESIOLOGY

## 2017-02-07 PROCEDURE — 38792 RA TRACER ID OF SENTINL NODE: CPT | Mod: TC

## 2017-02-07 PROCEDURE — 83735 ASSAY OF MAGNESIUM: CPT

## 2017-02-07 PROCEDURE — 37000008 HC ANESTHESIA 1ST 15 MINUTES: Performed by: SURGERY

## 2017-02-07 PROCEDURE — 64450 NJX AA&/STRD OTHER PN/BRANCH: CPT | Mod: 59,RT,, | Performed by: ANESTHESIOLOGY

## 2017-02-07 PROCEDURE — 86900 BLOOD TYPING SEROLOGIC ABO: CPT

## 2017-02-07 PROCEDURE — 76942 ECHO GUIDE FOR BIOPSY: CPT | Mod: 26,,, | Performed by: ANESTHESIOLOGY

## 2017-02-07 DEVICE — IMPLANTABLE DEVICE: Type: IMPLANTABLE DEVICE | Site: BREAST | Status: FUNCTIONAL

## 2017-02-07 DEVICE — GRAFT PERF 9.6X19.3 THCK TISS: Type: IMPLANTABLE DEVICE | Site: BREAST | Status: FUNCTIONAL

## 2017-02-07 RX ORDER — FUROSEMIDE 20 MG/1
20 TABLET ORAL DAILY
Status: DISCONTINUED | OUTPATIENT
Start: 2017-02-08 | End: 2017-02-08 | Stop reason: HOSPADM

## 2017-02-07 RX ORDER — CARVEDILOL 25 MG/1
25 TABLET ORAL 2 TIMES DAILY
Status: DISCONTINUED | OUTPATIENT
Start: 2017-02-07 | End: 2017-02-08 | Stop reason: HOSPADM

## 2017-02-07 RX ORDER — SUCCINYLCHOLINE CHLORIDE 20 MG/ML
INJECTION INTRAMUSCULAR; INTRAVENOUS
Status: DISCONTINUED | OUTPATIENT
Start: 2017-02-07 | End: 2017-02-07

## 2017-02-07 RX ORDER — ENALAPRIL MALEATE 2.5 MG/1
2.5 TABLET ORAL DAILY
Status: DISCONTINUED | OUTPATIENT
Start: 2017-02-08 | End: 2017-02-08 | Stop reason: HOSPADM

## 2017-02-07 RX ORDER — OXYCODONE HYDROCHLORIDE 5 MG/1
5 TABLET ORAL
Status: DISCONTINUED | OUTPATIENT
Start: 2017-02-07 | End: 2017-02-07

## 2017-02-07 RX ORDER — HYDROCODONE BITARTRATE AND ACETAMINOPHEN 5; 325 MG/1; MG/1
1 TABLET ORAL EVERY 4 HOURS PRN
Status: DISCONTINUED | OUTPATIENT
Start: 2017-02-07 | End: 2017-02-08 | Stop reason: HOSPADM

## 2017-02-07 RX ORDER — ONDANSETRON 8 MG/1
8 TABLET, ORALLY DISINTEGRATING ORAL EVERY 8 HOURS PRN
Status: DISCONTINUED | OUTPATIENT
Start: 2017-02-07 | End: 2017-02-08 | Stop reason: HOSPADM

## 2017-02-07 RX ORDER — HYDROCODONE BITARTRATE AND ACETAMINOPHEN 10; 325 MG/1; MG/1
1 TABLET ORAL EVERY 4 HOURS PRN
Status: DISCONTINUED | OUTPATIENT
Start: 2017-02-07 | End: 2017-02-08 | Stop reason: HOSPADM

## 2017-02-07 RX ORDER — MIDAZOLAM HYDROCHLORIDE 1 MG/ML
2 INJECTION INTRAMUSCULAR; INTRAVENOUS ONCE
Status: COMPLETED | OUTPATIENT
Start: 2017-02-07 | End: 2017-02-07

## 2017-02-07 RX ORDER — KETAMINE HCL IN 0.9 % NACL 50 MG/5 ML
SYRINGE (ML) INTRAVENOUS
Status: DISCONTINUED | OUTPATIENT
Start: 2017-02-07 | End: 2017-02-07

## 2017-02-07 RX ORDER — SODIUM CHLORIDE 0.9 % (FLUSH) 0.9 %
3 SYRINGE (ML) INJECTION EVERY 8 HOURS
Status: DISCONTINUED | OUTPATIENT
Start: 2017-02-07 | End: 2017-02-08 | Stop reason: HOSPADM

## 2017-02-07 RX ORDER — ISOSULFAN BLUE 50 MG/5ML
INJECTION, SOLUTION SUBCUTANEOUS
Status: DISCONTINUED | OUTPATIENT
Start: 2017-02-07 | End: 2017-02-07 | Stop reason: HOSPADM

## 2017-02-07 RX ORDER — ETOMIDATE 2 MG/ML
INJECTION INTRAVENOUS
Status: DISCONTINUED | OUTPATIENT
Start: 2017-02-07 | End: 2017-02-07

## 2017-02-07 RX ORDER — HYDROMORPHONE HYDROCHLORIDE 2 MG/ML
INJECTION, SOLUTION INTRAMUSCULAR; INTRAVENOUS; SUBCUTANEOUS
Status: DISCONTINUED | OUTPATIENT
Start: 2017-02-07 | End: 2017-02-07

## 2017-02-07 RX ORDER — ONDANSETRON 2 MG/ML
4 INJECTION INTRAMUSCULAR; INTRAVENOUS EVERY 12 HOURS PRN
Status: DISCONTINUED | OUTPATIENT
Start: 2017-02-07 | End: 2017-02-07

## 2017-02-07 RX ORDER — FENTANYL CITRATE 50 UG/ML
25 INJECTION, SOLUTION INTRAMUSCULAR; INTRAVENOUS EVERY 5 MIN PRN
Status: DISCONTINUED | OUTPATIENT
Start: 2017-02-07 | End: 2017-02-07

## 2017-02-07 RX ORDER — ROCURONIUM BROMIDE 10 MG/ML
INJECTION, SOLUTION INTRAVENOUS
Status: DISCONTINUED | OUTPATIENT
Start: 2017-02-07 | End: 2017-02-07

## 2017-02-07 RX ORDER — SODIUM CHLORIDE 9 MG/ML
INJECTION, SOLUTION INTRAVENOUS CONTINUOUS
Status: DISCONTINUED | OUTPATIENT
Start: 2017-02-07 | End: 2017-02-07

## 2017-02-07 RX ORDER — ACETAMINOPHEN 10 MG/ML
INJECTION, SOLUTION INTRAVENOUS
Status: DISCONTINUED | OUTPATIENT
Start: 2017-02-07 | End: 2017-02-07

## 2017-02-07 RX ORDER — GLYCOPYRROLATE 0.2 MG/ML
INJECTION INTRAMUSCULAR; INTRAVENOUS
Status: DISCONTINUED | OUTPATIENT
Start: 2017-02-07 | End: 2017-02-07

## 2017-02-07 RX ORDER — FLUTICASONE PROPIONATE 50 MCG
2 SPRAY, SUSPENSION (ML) NASAL DAILY PRN
Status: DISCONTINUED | OUTPATIENT
Start: 2017-02-07 | End: 2017-02-08 | Stop reason: HOSPADM

## 2017-02-07 RX ORDER — FUROSEMIDE 10 MG/ML
10 INJECTION INTRAMUSCULAR; INTRAVENOUS ONCE
Status: COMPLETED | OUTPATIENT
Start: 2017-02-07 | End: 2017-02-07

## 2017-02-07 RX ORDER — FENTANYL CITRATE 50 UG/ML
100 INJECTION, SOLUTION INTRAMUSCULAR; INTRAVENOUS ONCE
Status: COMPLETED | OUTPATIENT
Start: 2017-02-07 | End: 2017-02-07

## 2017-02-07 RX ORDER — PROMETHAZINE HYDROCHLORIDE 25 MG/ML
INJECTION, SOLUTION INTRAMUSCULAR; INTRAVENOUS
Status: DISCONTINUED | OUTPATIENT
Start: 2017-02-07 | End: 2017-02-07

## 2017-02-07 RX ORDER — FAMOTIDINE 10 MG/ML
INJECTION INTRAVENOUS
Status: DISCONTINUED | OUTPATIENT
Start: 2017-02-07 | End: 2017-02-07

## 2017-02-07 RX ORDER — LIDOCAINE HYDROCHLORIDE 10 MG/ML
1 INJECTION, SOLUTION EPIDURAL; INFILTRATION; INTRACAUDAL; PERINEURAL ONCE
Status: DISCONTINUED | OUTPATIENT
Start: 2017-02-07 | End: 2017-02-07

## 2017-02-07 RX ORDER — FENTANYL CITRATE 50 UG/ML
25 INJECTION, SOLUTION INTRAMUSCULAR; INTRAVENOUS EVERY 5 MIN PRN
Status: CANCELLED | OUTPATIENT
Start: 2017-02-07

## 2017-02-07 RX ORDER — POTASSIUM CHLORIDE 750 MG/1
10 CAPSULE, EXTENDED RELEASE ORAL 2 TIMES DAILY
Status: DISCONTINUED | OUTPATIENT
Start: 2017-02-07 | End: 2017-02-08 | Stop reason: HOSPADM

## 2017-02-07 RX ORDER — BACITRACIN 50000 [IU]/1
INJECTION, POWDER, FOR SOLUTION INTRAMUSCULAR
Status: DISCONTINUED | OUTPATIENT
Start: 2017-02-07 | End: 2017-02-07 | Stop reason: HOSPADM

## 2017-02-07 RX ORDER — DEXAMETHASONE SODIUM PHOSPHATE 4 MG/ML
INJECTION, SOLUTION INTRA-ARTICULAR; INTRALESIONAL; INTRAMUSCULAR; INTRAVENOUS; SOFT TISSUE
Status: DISCONTINUED | OUTPATIENT
Start: 2017-02-07 | End: 2017-02-07

## 2017-02-07 RX ORDER — SODIUM CHLORIDE 0.9 % (FLUSH) 0.9 %
3 SYRINGE (ML) INJECTION EVERY 8 HOURS
Status: DISCONTINUED | OUTPATIENT
Start: 2017-02-07 | End: 2017-02-07

## 2017-02-07 RX ORDER — ESMOLOL HYDROCHLORIDE 10 MG/ML
INJECTION INTRAVENOUS
Status: DISCONTINUED | OUTPATIENT
Start: 2017-02-07 | End: 2017-02-07

## 2017-02-07 RX ORDER — FLUTICASONE FUROATE AND VILANTEROL 100; 25 UG/1; UG/1
1 POWDER RESPIRATORY (INHALATION) DAILY
Status: DISCONTINUED | OUTPATIENT
Start: 2017-02-08 | End: 2017-02-08 | Stop reason: HOSPADM

## 2017-02-07 RX ORDER — MIDAZOLAM HYDROCHLORIDE 1 MG/ML
0.5 INJECTION INTRAMUSCULAR; INTRAVENOUS EVERY 5 MIN PRN
Status: CANCELLED | OUTPATIENT
Start: 2017-02-07

## 2017-02-07 RX ORDER — CLINDAMYCIN PHOSPHATE 900 MG/50ML
INJECTION, SOLUTION INTRAVENOUS CONTINUOUS PRN
Status: DISCONTINUED | OUTPATIENT
Start: 2017-02-07 | End: 2017-02-07 | Stop reason: HOSPADM

## 2017-02-07 RX ORDER — ONDANSETRON 2 MG/ML
4 INJECTION INTRAMUSCULAR; INTRAVENOUS ONCE
Status: DISCONTINUED | OUTPATIENT
Start: 2017-02-07 | End: 2017-02-07

## 2017-02-07 RX ORDER — SODIUM CHLORIDE 0.9 % (FLUSH) 0.9 %
3 SYRINGE (ML) INJECTION
Status: DISCONTINUED | OUTPATIENT
Start: 2017-02-07 | End: 2017-02-07

## 2017-02-07 RX ORDER — HYDROMORPHONE HYDROCHLORIDE 1 MG/ML
0.2 INJECTION, SOLUTION INTRAMUSCULAR; INTRAVENOUS; SUBCUTANEOUS EVERY 5 MIN PRN
Status: DISCONTINUED | OUTPATIENT
Start: 2017-02-07 | End: 2017-02-07

## 2017-02-07 RX ORDER — LIDOCAINE HCL/PF 100 MG/5ML
SYRINGE (ML) INTRAVENOUS
Status: DISCONTINUED | OUTPATIENT
Start: 2017-02-07 | End: 2017-02-07

## 2017-02-07 RX ORDER — CETIRIZINE HYDROCHLORIDE 10 MG/1
10 TABLET ORAL DAILY PRN
Status: DISCONTINUED | OUTPATIENT
Start: 2017-02-07 | End: 2017-02-08 | Stop reason: HOSPADM

## 2017-02-07 RX ADMIN — SUCCINYLCHOLINE CHLORIDE 120 MG: 20 INJECTION, SOLUTION INTRAMUSCULAR; INTRAVENOUS at 08:02

## 2017-02-07 RX ADMIN — HYDROMORPHONE HYDROCHLORIDE 0.2 MG: 1 INJECTION, SOLUTION INTRAMUSCULAR; INTRAVENOUS; SUBCUTANEOUS at 02:02

## 2017-02-07 RX ADMIN — ISOSULFAN BLUE 5 ML: 10 INJECTION, SOLUTION SUBCUTANEOUS at 11:02

## 2017-02-07 RX ADMIN — OXYCODONE HYDROCHLORIDE 5 MG: 5 TABLET ORAL at 01:02

## 2017-02-07 RX ADMIN — Medication 3 ML: at 10:02

## 2017-02-07 RX ADMIN — HYDROMORPHONE HYDROCHLORIDE 0.4 MG: 2 INJECTION INTRAMUSCULAR; INTRAVENOUS; SUBCUTANEOUS at 10:02

## 2017-02-07 RX ADMIN — ROCURONIUM BROMIDE 10 MG: 10 INJECTION, SOLUTION INTRAVENOUS at 10:02

## 2017-02-07 RX ADMIN — FENTANYL CITRATE 150 MCG: 50 INJECTION, SOLUTION INTRAMUSCULAR; INTRAVENOUS at 08:02

## 2017-02-07 RX ADMIN — ROCURONIUM BROMIDE 20 MG: 10 INJECTION, SOLUTION INTRAVENOUS at 08:02

## 2017-02-07 RX ADMIN — FENTANYL CITRATE 50 MCG: 50 INJECTION, SOLUTION INTRAMUSCULAR; INTRAVENOUS at 09:02

## 2017-02-07 RX ADMIN — SODIUM CHLORIDE, SODIUM GLUCONATE, SODIUM ACETATE, POTASSIUM CHLORIDE, MAGNESIUM CHLORIDE, SODIUM PHOSPHATE, DIBASIC, AND POTASSIUM PHOSPHATE: .53; .5; .37; .037; .03; .012; .00082 INJECTION, SOLUTION INTRAVENOUS at 08:02

## 2017-02-07 RX ADMIN — BACITRACIN 50000 UNITS: 50000 INJECTION, POWDER, LYOPHILIZED, FOR SOLUTION INTRAMUSCULAR at 09:02

## 2017-02-07 RX ADMIN — Medication 10 MG: at 10:02

## 2017-02-07 RX ADMIN — GLYCOPYRROLATE 0.6 MG: 0.2 INJECTION, SOLUTION INTRAMUSCULAR; INTRAVENOUS at 11:02

## 2017-02-07 RX ADMIN — ROCURONIUM BROMIDE 5 MG: 10 INJECTION, SOLUTION INTRAVENOUS at 08:02

## 2017-02-07 RX ADMIN — DEXAMETHASONE SODIUM PHOSPHATE 8 MG: 4 INJECTION, SOLUTION INTRAMUSCULAR; INTRAVENOUS at 08:02

## 2017-02-07 RX ADMIN — ETOMIDATE 20 MG: 2 INJECTION, SOLUTION INTRAVENOUS at 08:02

## 2017-02-07 RX ADMIN — PROMETHAZINE HYDROCHLORIDE 6.25 MG: 25 INJECTION INTRAMUSCULAR; INTRAVENOUS at 11:02

## 2017-02-07 RX ADMIN — SODIUM CHLORIDE 10 ML/HR: 0.9 INJECTION, SOLUTION INTRAVENOUS at 06:02

## 2017-02-07 RX ADMIN — Medication 20 MG: at 09:02

## 2017-02-07 RX ADMIN — MIDAZOLAM HYDROCHLORIDE 1 MG: 1 INJECTION, SOLUTION INTRAMUSCULAR; INTRAVENOUS at 07:02

## 2017-02-07 RX ADMIN — FAMOTIDINE 20 MG: 10 INJECTION, SOLUTION INTRAVENOUS at 11:02

## 2017-02-07 RX ADMIN — HYDROCODONE BITARTRATE AND ACETAMINOPHEN 1 TABLET: 10; 325 TABLET ORAL at 05:02

## 2017-02-07 RX ADMIN — CLINDAMYCIN PHOSPHATE 900 MG: 18 INJECTION, SOLUTION INTRAVENOUS at 09:02

## 2017-02-07 RX ADMIN — Medication 2 G: at 09:02

## 2017-02-07 RX ADMIN — ACETAMINOPHEN 1000 MG: 10 INJECTION, SOLUTION INTRAVENOUS at 08:02

## 2017-02-07 RX ADMIN — SODIUM CHLORIDE: 0.9 INJECTION, SOLUTION INTRAVENOUS at 07:02

## 2017-02-07 RX ADMIN — ISOSULFAN BLUE 5 ML: 10 INJECTION, SOLUTION SUBCUTANEOUS at 08:02

## 2017-02-07 RX ADMIN — LIDOCAINE HYDROCHLORIDE 60 MG: 20 INJECTION, SOLUTION INTRAVENOUS at 08:02

## 2017-02-07 RX ADMIN — FUROSEMIDE 10 MG: 10 INJECTION, SOLUTION INTRAVENOUS at 01:02

## 2017-02-07 RX ADMIN — ESMOLOL HYDROCHLORIDE 10 MG: 10 INJECTION INTRAVENOUS at 11:02

## 2017-02-07 RX ADMIN — MIDAZOLAM HYDROCHLORIDE 2 MG: 1 INJECTION, SOLUTION INTRAMUSCULAR; INTRAVENOUS at 07:02

## 2017-02-07 RX ADMIN — HYDROCODONE BITARTRATE AND ACETAMINOPHEN 1 TABLET: 10; 325 TABLET ORAL at 09:02

## 2017-02-07 RX ADMIN — ROCURONIUM BROMIDE 20 MG: 10 INJECTION, SOLUTION INTRAVENOUS at 09:02

## 2017-02-07 NOTE — PROGRESS NOTES
Pt seen in [facility] #23 prior to surgery.  Pt has a single chamber MDT ICD with 0% .  Tachy therapy disabled for surgery.  Call device clinic at [phone] after surgery for device reprogramming.

## 2017-02-07 NOTE — TRANSFER OF CARE
Anesthesia Transfer of Care Note    Patient: Avis Graves    Procedure(s) Performed: Procedure(s) (LRB):  MASTECTOMY-BREAST-UNILATERAL right non skin sparing/non nipple sparing   (Right)  BIOPSY-SENTINEL NODE (Right)  INJECTION-SENTINEL NODE (Right)  INSERTION-BREAST TISSUE EXPANDER (Right)    Patient location: PACU    Anesthesia Type: general    Transport from OR: Transported from OR on 6-10 L/min O2 by face mask with adequate spontaneous ventilation    Post pain: adequate analgesia    Post assessment: no apparent anesthetic complications and tolerated procedure well    Post vital signs: stable    Level of consciousness: awake    Nausea/Vomiting: no nausea/vomiting    Complications: none    Comments: Had run of SVT in OR. Stopped with esmolol IV. Needs IV Lasix and f/u labs.       Last vitals:   Visit Vitals    BP (!) 98/54    Pulse 73    Temp 36.5 °C (97.7 °F) (Axillary)    Resp 18    SpO2 100%    Breastfeeding No

## 2017-02-07 NOTE — H&P (VIEW-ONLY)
New Breast Cancer  History and Physical  [facility]  Department of Surgery    REFERRING PROVIDER: Obi Caro Jr., MD  [address] [facility]  [place] [id number]    CHIEF COMPLAINT: right breast cancer    Subjective:      Avis Graves is a 41 y.o. postmenopausal female referred for evaluation of recently diagnosed carcinoma of the right breast. The patient was initially referred for surgical evaluation of a breast lump on exam first noted 2017 and mammogram 2016 showed suspicious focal assymmetry. A stereotactic biopsy was performed on 2017 with pathology revealing infiltrating ductal carcinoma of the breast.     Patient does routinely do self breast exams.  Patient has noted a change on breast exam.  Patient denies recent nipple discharge, though she does note nipple discharge that spontaneously resolved over 10 years ago. Patient admits to to previous lumpectomy when she was 17 years old. Patient denies a personal history of breast cancer.    Invasive ductal carcinoma, Grade 3 (3,3,3).    Findings at that time were the following:   Tumor size: 3.7 cm  Tumor grade: Grade 3 (3,3,3)   Estrogen receptor: Positive; strong nuclear staining in over 95% tumor cells.  Progesterone receptor: Negative; 0% tumor cell staining.  HER-2/dann: Negative; (Stain score = 0).    Notably, Mrs. Graves has a defibrillator for hypertrophic cardiomyopathy.      GYN History:  Age of menarche was 9. Age of menopause was 37 when she had YONI-BSO for ovarian cysts. Last menstrual period was 37. Patient denies hormonal therapy. She has a 17 year long history of OCP use. Patient is .    FAMILY History:  mother with breast CA in early 50's  1 paternal aunt with recent abnormal mammogram, not diagnosed with anything yet  4 distant relatives with breast cancer (ex. father's cousin's daughter)  1 Maternal uncle with prostate cancer  1 Maternal uncle with  mesothelioma  Father with RCC  paternal GF with lung cancer      Past Medical History   Diagnosis Date    Allergy     Asthma     Cardiomyopathy     CHF (congestive heart failure)     Diverticulitis      Past Surgical History   Procedure Laterality Date    Tonsillectomy      Hysterectomy      Cardiac defibrillator placement       X 2 Medtronic     Cardiac defibrillator placement       medtronic     Current Outpatient Prescriptions on File Prior to Visit   Medication Sig Dispense Refill    cetirizine (ZYRTEC) 10 MG tablet Take 10 mg by mouth once daily.      COREG CR 80 mg 24 hr capsule Take 1 capsule (80 mg total) by mouth once daily. 30 capsule 6    enalapril (VASOTEC) 2.5 MG tablet TAKE 1 TABLET (2.5 MG TOTAL) BY MOUTH 2 (TWO) TIMES DAILY. 60 tablet 2    furosemide (LASIX) 20 MG tablet TAKE 1 TABLET (20 MG TOTAL) BY MOUTH ONCE DAILY. 30 tablet 0    furosemide (LASIX) 20 MG tablet TAKE 1 TABLET (20 MG TOTAL) BY MOUTH ONCE DAILY. 30 tablet 0    mometasone (NASONEX) 50 mcg/actuation nasal spray 2 sprays by Nasal route once daily. 1 each 3    potassium chloride (MICRO-K) 10 MEQ CpSR TAKE 1 CAPSULE (10 MEQ TOTAL) BY MOUTH 2 (TWO) TIMES DAILY. 60 capsule 0    COREG CR 80 mg 24 hr capsule TAKE 1 CAPSULE BY MOUTH DAILY 30 capsule 1     No current facility-administered medications on file prior to visit.      Social History     Social History    Marital status:      Spouse name: N/A    Number of children: N/A    Years of education: N/A     Occupational History    Not on file.     Social History Main Topics    Smoking status: Never Smoker    Smokeless tobacco: Never Used    Alcohol use No    Drug use: No    Sexual activity: Not on file     Other Topics Concern    Not on file     Social History Narrative     Family History   Problem Relation Age of Onset    Stroke Mother     Heart disease Mother     Cancer Mother     Asthma Mother     Kidney disease Father     Cancer Father      "Diabetes Brother     Stomach cancer Paternal Grandfather     Liver disease Neg Hx     Liver cancer Neg Hx     Cirrhosis Neg Hx     Colon cancer Neg Hx     Colon polyps Neg Hx     Rectal cancer Neg Hx     Esophageal cancer Neg Hx     Ulcerative colitis Neg Hx     Cystic fibrosis Neg Hx         Review of Systems  Review of Systems     - SOB: hx of asthma  - No fatigue, appetite change, weight loss, fever, chills, sweats, nausea, vomiting, diarrhea, constipation, abd pain, BPR, chest pain, palpitations, hemoptysis, LOC, seizures.         Objective:   PHYSICAL EXAM:  Visit Vitals    BP (!) 100/55 (BP Location: Left arm, Patient Position: Sitting, BP Method: Automatic)    Pulse 81    Temp 97.9 °F (36.6 °C) (Oral)    Ht 4' 10" (1.473 m)    Wt 60.1 kg (132 lb 9.6 oz)    BMI 27.71 kg/m2       Physical Exam  The patient generally appears in good health, is appropriately interactive, and is in no apparent distress.  Skin: No lesions.  Mental: Cooperative with normal affect.  Neuro: Grossly intact.  HEENT: Normal. No evidence of lymphadenopathy.  Chest: Clear to ascultation bilaterally, normal inspiratory effort.  Breast:   Heart: Regular rhythm.  No murmurs  Abdomen: non-distended  Extremities: No edema      Radiology review:    -Mammogram 1/5/2017:  On the present examination, there is a focal asymmetry measuring 3.7 cm in the posterior region of the right breast at 3 o'clock. Focal asymmetry in the right breast is suspicious.  Biopsy should be considered. Tomosynthesis guided biopsy is recommended. ACR BI-RADS Category 4: Suspicious Abnormality     -US Breast 12/30/2016: New Focal change of echogenicity in the right breast is suspicious.  Histology using core biopsy is recommended. ACR BI-RADS Category 4: Suspicious Abnormality    Pathology review:  Right breast, core biopsy 1/5/2016: Invasive ductal carcinoma, Grade 3 (3,3,3). Tumor measures 0.9 cm (9 mm) in greatest linear dimension within the core biopsy " specimen. Immunohistochemical stains for hormonal receptors will be completed and results will follow in a supplemental report.      Assessment:      Avis Graves is a 41 y.o. postmenopausal female with recently diagnosed invasive ductal carcinoma of the right breast.      Plan:      Please see Dr. Santana's dictation for the plan.      Rachelle Lauren MD  PGY-1  Breast Surgery  I have personally taken the history and examined this patient and agree with the resident's note as stated above.  ADDENDUM:  The patient is a very pleasant 41-year-old -American female   who presents with many family members for initial office consultation regarding   her newly diagnosed high-grade, grade III invasive ductal carcinoma of the right   medial breast.  Diagnostic imaging had revealed approximately a 3.7 cm mass in   the medial 3 o'clock position of the right breast.  She underwent core needle   biopsy, which revealed grade III invasive ductal carcinoma that was estrogen   receptor positive, progesterone receptor negative and HER-2/dann negative.  Her   significant comorbidity is that she has hereditary cardiomyopathy and has a left   anterior chest wall AICD defibrillator in place.  Her baseline ejection   fraction is 30%.  The patient states she does not know her own ejection fraction   because Dr. Escobar and she felt that she did not necessarily want to know the   percentage.  The patient presents to discuss surgical options.  Certainly   normally she would be a candidate to discuss neoadjuvant upfront primary   chemotherapy given the clinical presentation as a T2 N0 high-grade cancer;   however, she is estrogen receptor positive and HER-2/dann negative and may   ultimately undergo Oncotype DX testing if her sentinel lymph node is negative or   even if she is found to have N1 disease be considered for the MINDACT data   analysis or SWOG  analysis that if she has a low Oncotype score potentially   defer  chemotherapy as well.  She may not be eligible for chemotherapy given the   degree of cardiomyopathy.  Certainly, she would not be eligible for any   cardiotoxic drug such as anthracyclines Adriamycin or epirubicin.  At this   point, we discussed options, she is not a candidate for an MRI because of the   presence of the defibrillator.  She is 41 years of age and we discussed options   of breast conservation surgery and radiotherapy versus mastectomy given the   medial location of the tumor, she would likely have some radiotherapy affecting   her cardiac status since she has a 3.7 cm mass and she is less than 50 years of   age, she would not be a candidate for brachytherapy and with potential increased   cardiac risks because of the radiotherapy, I have recommended a right total   complete therapeutic mastectomy with traditional non-skin sparing and non-nipple   sparing to optimize and reduce surgical time.  She is interested in potentially   a tissue expander implant technique reconstruction and certainly she would   qualify for sentinel lymph node biopsy.  She has a clinically negative axilla.    PAST MEDICAL HISTORY:  Significant for the cardiomyopathy and congestive heart   failure.    GYNECOLOGICAL HISTORY:  Reveals no history of any GYN cancers.  The patient had   a benign breast biopsy at age 17.  She is a  0.  She took oral   contraceptive products x17 years, but is currently not on them as she is status   post oophorectomy and subsequent hysterectomy having now had a complete total   abdominal hysterectomy and bilateral salpingo-oophorectomy at age 36.  She   denies any history of hormone replacement therapy.    FAMILY HISTORY:  Reveals a history of breast cancer in her mother.  She also has   a paternal aunt currently being worked up for abnormal diagnostic mammogram and   imaging with biopsy pending of her breast, but no definitive breast cancer   diagnosis.    PHYSICAL EXAMINATION:  As noted  above, also definitive clinical breast exam   revealed a 4 cm mobile mass in the medial right breast there is no fixation to   the chest wall.  There are no suspicious skin changes.  There is no edema,   erythema, peau d'orange or retraction of the skin.  There are no nipple areolar   complex changes.  There is no palpable lymphadenopathy.  She has a symmetrical   breast exam.  She is a candidate for upfront surgery and mastectomy as we could   easily obtain surgically clear margins with the current presentation.    The patient was counseled at length.  Approximate time spent with the patient   and all family members today answering questions was over 60 minutes with more   than 50% of time in counseling.  We will refer her to our nurse practitioner   midlevel provider for counseling and genetic testing since she qualifies   strictly on age criteria.  The patient is set up to see Dr. Nunez next   Wednesday to discuss placement of a unilateral right side tissue expander.  We   will hopefully not perform any contralateral left surgery, unless she was found   to have a genetic mutation.  Anyhow, at that point, I would strongly discuss   perhaps performing ipsilateral surgery only given her high-risk comorbidities   with cardiomyopathy, congestive heart failure with ejection fraction of 30% and   AICD.  Again, the patient does not actually her own ejection fraction.  At this   point, we will await the genetic counseling and consultation.  We will await   consult with Dr. Nunez.  We will then tentatively schedule her for a right   mastectomy through traditional incision with non-skin sparing non-nipple areolar   complex sparing with concomitant right axillary sentinel lymph node biopsy,   possible axillary lymph node dissection with placement of a tissue expander.  If   her node is negative certainly, she would be a good candidate to do Oncotype DX   testing, if her node positive, one can still consider genomic  assessment,   possibly with MammaPrint based on the MINDACT data if she is N1 to truly assess   her risk and potential benefit from chemotherapy for which she could potentially   have some toxic side effects because of the underlying comorbidity   cardiomyopathy.  The defibrillator remains in place in the left anterior   superior chest.      GIANCARLO/ALEXEI  dd: 01/15/2017 14:36:57 (CST)  td: 01/16/2017 09:18:24 (CST)  Doc ID   #[id number]  Job ID #[id number]    CC:     Job # [id number].

## 2017-02-07 NOTE — PROGRESS NOTES
Paged pacemaker beeper on call to notify pt to have procedure today. Paged DR. Roberto Yeung for clarification on 2 Decho orders.   0600- Paged resident on call for DR. Santana and for Dr. Simon to notify no sx consents or sx orders.   0617- Left message for Maren and spoke w/ Tricia at [facility] notify  ready.   0620- Per Dr. Han, anesthesia, spoke w/ Dr. MARIA A Escobar, and 2D echo does not need to be repeated. Will discontnue the order. Per Dr. Han, AICD does need to be turned off.   0625- Paged EP fellow on call again to notify pacemaker needs to be turned off.  0629- Spoke w/ Dr. Jha from EP will get someone to turn of pacemaker.   0640- Tommy at bedside to turn off pacemaker. Pt put on monitor.   0650- Report given to Karma Kimbrough RN. Pt aaox3 and in no distress.   0655- Spoke w/ DR. Jeancarlos Goncalves w/ Dr. Santana , stated will do site donavon after conference.

## 2017-02-07 NOTE — ANESTHESIA PROCEDURE NOTES
Paravertebral Single Shot Block    Patient location during procedure: pre-op   Block not for primary anesthetic.  Reason for block: at surgeon's request and post-op pain management   Post-op Pain Location: right breast pain  Start time: 2/7/2017 7:00 AM  Timeout: 2/7/2017 7:00 AM   End time: 2/7/2017 7:15 AM  Staffing  Anesthesiologist: APOLONIA ADAMS  Resident/CRNA: SO MYLES  Performed by: resident/CRNA   Preanesthetic Checklist  Completed: patient identified, site marked, surgical consent, pre-op evaluation, timeout performed, IV checked, risks and benefits discussed and monitors and equipment checked  Peripheral Block  Patient position: sitting  Prep: ChloraPrep  Patient monitoring: heart rate, cardiac monitor, continuous pulse ox, continuous capnometry and frequent blood pressure checks  Block type: paravertebral - thoracic  Laterality: right  Injection technique: single shot  Needle  Needle type: Tuohy   Needle gauge: 17 G  Needle length: 3.5 in  Needle localization: anatomical landmarks     Assessment  Injection assessment: negative aspiration and negative parasthesia  Paresthesia pain: none  Heart rate change: no  Slow fractionated injection: yes  Medications:  Bolus administered: 30 mL of 0.5 ropivacaine  Epinephrine added: 3.75 mcg/mL (1/300,000)  Additional Notes  T2 os at 4.0 cm  T4 os at 3.5 cm  VSS.  [facility] RN monitoring vitals throughout procedure.  Patient tolerated procedure well.

## 2017-02-07 NOTE — ANESTHESIA PROCEDURE NOTES
Pec Single Shot Block    Patient location during procedure: pre-op   Block not for primary anesthetic.  Reason for block: at surgeon's request and post-op pain management   Post-op Pain Location: right breast pain   Start time: 2/7/2017 7:15 AM  Timeout: 2/7/2017 7:15 AM   End time: 2/7/2017 7:21 AM  Staffing  Anesthesiologist: APOLONIA ADAMS  Resident/CRNA: ROBB CHAVEZ  Performed by: resident/CRNA   Preanesthetic Checklist  Completed: patient identified, site marked, surgical consent, pre-op evaluation, timeout performed, IV checked, risks and benefits discussed and monitors and equipment checked  Peripheral Block  Patient position: supine  Prep: ChloraPrep  Patient monitoring: heart rate, cardiac monitor, continuous pulse ox, continuous capnometry and frequent blood pressure checks  Block type: pectoral  Laterality: right  Injection technique: single shot  Needle  Needle type: Stimuplex   Needle gauge: 21 G  Needle length: 4 in  Needle localization: anatomical landmarks and ultrasound guidance   -ultrasound image captured on disc.  Assessment  Injection assessment: negative aspiration, negative parasthesia and local visualized surrounding nerve  Paresthesia pain: none  Heart rate change: no  Slow fractionated injection: yes  Medications:  Bolus administered: 10 mL of 0.25 ropivacaine (plus clonidine 50mcg & dexamethasone 1mg per 20ml)  Epinephrine added: 3.75 mcg/mL (1/300,000)  Additional Notes  VSS.  [facility] RN monitoring vitals throughout procedure.  Patient tolerated procedure well.     + decadron + clonidine

## 2017-02-07 NOTE — ANESTHESIA POSTPROCEDURE EVALUATION
Anesthesia Post Evaluation    Patient: Avis Graves    Procedure(s) Performed: Procedure(s) (LRB):  MASTECTOMY-BREAST-UNILATERAL right non skin sparing/non nipple sparing   (Right)  BIOPSY-SENTINEL NODE (Right)  INJECTION-SENTINEL NODE (Right)  INSERTION-BREAST TISSUE EXPANDER (Right)    Final Anesthesia Type: general  Patient location during evaluation: PACU  Patient participation: Yes- Able to Participate  Level of consciousness: awake and alert and oriented  Post-procedure vital signs: reviewed and stable  Pain management: adequate  Airway patency: patent  PONV status at discharge: No PONV  Anesthetic complications: no      Cardiovascular status: blood pressure returned to baseline and hemodynamically stable  Respiratory status: unassisted, spontaneous ventilation and room air  Hydration status: euvolemic  Follow-up not needed.        Visit Vitals    BP (!) 115/57    Pulse 65    Temp 36.5 °C (97.7 °F) (Axillary)    Resp 16    SpO2 100%    Breastfeeding No       Pain/Vamshi Score: Pain Assessment Performed: Yes (2/7/2017 12:30 PM)  Presence of Pain: denies (2/7/2017 12:30 PM)  Pain Rating Prior to Med Admin: 5 (2/7/2017  2:10 PM)  Pain Rating Post Med Admin: 0 (2/7/2017  6:06 AM)  Vamshi Score: 8 (2/7/2017 12:30 PM)    Patient had intermittent runs of SVT intraoperatively which resolved with esmolol. Electrolytes check post-op--all lytes WNL. OK for discharge.

## 2017-02-07 NOTE — ANESTHESIA RELEASE NOTE
Anesthesia Release from PACU Note    Patient: Avis Graves    Procedure(s) Performed: Procedure(s) (LRB):  MASTECTOMY-BREAST-UNILATERAL right non skin sparing/non nipple sparing   (Right)  BIOPSY-SENTINEL NODE (Right)  INJECTION-SENTINEL NODE (Right)  INSERTION-BREAST TISSUE EXPANDER (Right)    Last Vitals:   Visit Vitals    BP (!) 115/57    Pulse 65    Temp 36.5 °C (97.7 °F) (Axillary)    Resp 16    SpO2 100%    Breastfeeding No       Anesthesia type: general    Post pain: Adequate analgesia    Post assessment: no apparent anesthetic complications, tolerated procedure well and no evidence of recall    Post vital signs: stable    Level of consciousness: awake, alert  and oriented    Nausea/Vomiting: no nausea/no vomiting    Complications: none    Airway Patency: patent    Respiratory: unassisted, spontaneous ventilation, room air    Cardiovascular: stable and blood pressure at baseline    Hydration: euvolemic

## 2017-02-07 NOTE — PROGRESS NOTES
ICD device clinic.  Order received to enable ICD in PACU.  Programming done at bedside.  ICD tachy detection and therapies enabled.  Pt will follow up with device clinic as prev scheduled.

## 2017-02-07 NOTE — IP AVS SNAPSHOT
[facility]  [address]  [place] [address]  Phone: [phone]           Patient Discharge Instructions     Our goal is to set you up for success. This packet includes information on your condition, medications, and your home care. It will help you to care for yourself so you don't get sicker and need to go back to the hospital.     Please ask your nurse if you have any questions.        There are many details to remember when preparing to leave the hospital. Here is what you will need to do:    1. Take your medicine. If you are prescribed medications, review your Medication List in the following pages. You may have new medications to  at the pharmacy and others that you'll need to stop taking. Review the instructions for how and when to take your medications. Talk with your doctor or nurses if you are unsure of what to do.     2. Go to your follow-up appointments. Specific follow-up information is listed in the following pages. Your may be contacted by a transition nurse or clinical provider about future appointments. Be sure we have all of the phone numbers to reach you, if needed. Please contact your provider's office if you are unable to make an appointment.     3. Watch for warning signs. Your doctor or nurse will give you detailed warning signs to watch for and when to call for assistance. These instructions may also include educational information about your condition. If you experience any of warning signs to your health, call your doctor.               [facility]  Unless otherwise directed by your provider, please contact [facility], our nurse care line that is available for 24/7 assistance.     [phone] (toll-free)    Registered nurses in the [facility] provide clinical advisement, health education, appointment booking, and other advisory services.                    ** Verify the list of medication(s) below is accurate and up  to date. Carry this with you in case of emergency. If your medications have changed, please notify your healthcare provider.             Medication List      START taking these medications        Additional Info                      clindamycin 300 MG capsule   Commonly known as:  CLEOCIN   Quantity:  30 capsule   Refills:  0   Dose:  300 mg    Instructions:  Take 1 capsule (300 mg total) by mouth 3 (three) times daily.     Begin Date    AM    Noon    PM    Bedtime       diazePAM 5 MG tablet   Commonly known as:  VALIUM   Quantity:  28 tablet   Refills:  0   Dose:  5 mg    Instructions:  Take 1 tablet (5 mg total) by mouth every 6 (six) hours as needed (muscle spasms).     Begin Date    AM    Noon    PM    Bedtime       hydrocodone-acetaminophen 5-325mg 5-325 mg per tablet   Commonly known as:  NORCO   Quantity:  60 tablet   Refills:  0   Dose:  1-2 tablet    Instructions:  Take 1-2 tablets by mouth every 4 (four) hours as needed for Pain.     Begin Date    AM    Noon    PM    Bedtime       senna-docusate 8.6-50 mg 8.6-50 mg per tablet   Commonly known as:  PERICOLACE   Quantity:  30 tablet   Refills:  0   Dose:  1 tablet    Instructions:  Take 1 tablet by mouth once daily. While taking prescription pain medications.     Begin Date    AM    Noon    PM    Bedtime         CHANGE how you take these medications        Additional Info                      COREG CR 80 MG 24 hr capsule   Quantity:  30 capsule   Refills:  6   Dose:  80 mg   What changed:  when to take this   Generic drug:  carvedilol    Instructions:  Take 1 capsule (80 mg total) by mouth once daily.     Begin Date    AM    Noon    PM    Bedtime       mometasone 50 mcg/actuation nasal spray   Commonly known as:  NASONEX   Quantity:  1 each   Refills:  3   Dose:  2 spray   What changed:    - when to take this  - reasons to take this    Instructions:  2 sprays by Nasal route once daily.     Begin Date    AM    Noon    PM    Bedtime         CONTINUE taking  these medications        Additional Info                      budesonide-formoterol 160-4.5 mcg 160-4.5 mcg/actuation Hfaa   Commonly known as:  SYMBICORT   Quantity:  10.2 g   Refills:  0   Dose:  1 puff    Instructions:  Inhale 1 puff into the lungs every 12 (twelve) hours.     Begin Date    AM    Noon    PM    Bedtime       cetirizine 10 MG tablet   Commonly known as:  ZYRTEC   Refills:  0   Dose:  10 mg    Instructions:  Take 10 mg by mouth daily as needed.     Begin Date    AM    Noon    PM    Bedtime       enalapril 2.5 MG tablet   Commonly known as:  VASOTEC   Quantity:  60 tablet   Refills:  2    Last time this was given:  2.5 mg on 2/8/2017  9:29 AM   Instructions:  TAKE 1 TABLET (2.5 MG TOTAL) BY MOUTH 2 (TWO) TIMES DAILY.     Begin Date    AM    Noon    PM    Bedtime       furosemide 20 MG tablet   Commonly known as:  LASIX   Quantity:  30 tablet   Refills:  0    Instructions:  TAKE 1 TABLET (20 MG TOTAL) BY MOUTH ONCE DAILY.     Begin Date    AM    Noon    PM    Bedtime       potassium chloride 10 MEQ Cpsr   Commonly known as:  MICRO-K   Quantity:  60 capsule   Refills:  0    Instructions:  TAKE 1 CAPSULE (10 MEQ TOTAL) BY MOUTH 2 (TWO) TIMES DAILY.     Begin Date    AM    Noon    PM    Bedtime       PROBIOTIC ORAL   Refills:  0    Instructions:  Take by mouth every evening.     Begin Date    AM    Noon    PM    Bedtime       UNABLE TO FIND   Refills:  0    Instructions:  Bioclens 1 tablet every AM for bowels (OTC)     Begin Date    AM    Noon    PM    Bedtime            Where to Get Your Medications      You can get these medications from any pharmacy     Bring a paper prescription for each of these medications     clindamycin 300 MG capsule    diazePAM 5 MG tablet    hydrocodone-acetaminophen 5-325mg 5-325 mg per tablet    senna-docusate 8.6-50 mg 8.6-50 mg per tablet                  Please bring to all follow up appointments:    1. A copy of your discharge instructions.  2. All medicines you are  currently taking in their original bottles.  3. Identification and insurance card.    Please arrive 15 minutes ahead of scheduled appointment time.    Please call 24 hours in advance if you must reschedule your appointment and/or time.        Your Scheduled Appointments     Feb 23, 2017  8:45 AM CST   Post OP with Orestes Santana MD   [facility] ([address] )    [address]  [facility] [id number] [phone]            Mar 21, 2017  1:40 PM CDT   Physical with Alejandra Nuno MD   [facility] ([facility])    [address]  [facility] [address] [phone]            May 10, 2017  8:00 AM CDT   Remote Interrogation with HOME MONITOR DEVICE CHECK, [facility]   [facility] ([facility] )    [address]  [facility] [address] [phone]              Follow-up Information     Follow up with Bam Nunez MD. Call on 2/15/2017.    Specialty:  Plastic Surgery    Why:  Incision check and possible drain removal.    Contact information:    [address]  [place] [id number] [phone]          Follow up with Orestes Santana MD In 2 weeks.    Specialty:  General Surgery    Why:  s/p R- mastectomy , For wound re-check    Contact information:    [address] [facility] [id number] [phone]          Discharge Instructions     Future Orders    Activity as tolerated     Call MD for:  persistent nausea and vomiting or diarrhea     Call MD for:  redness, tenderness, or signs of infection (pain, swelling, redness, odor or green/yellow discharge around incision site)     Call MD for:  severe uncontrolled pain     Call MD for:  temperature >100.4     Diet general     Questions:    Total calories:      Fat restriction, if any:      Protein restriction, if any:      Na restriction, if any:      Fluid restriction:      Additional restrictions:      Lifting restrictions     Comments:    Do  not lift anything >10 pounds for 6 weeks.    No dressing needed     Other restrictions (specify):     Comments:    No driving while on prescription pain medication.    Shower on day dressing removed (No bath)     Comments:    Okay to shower 48 hours after surgery. Do not submerge incision in water. Do not go swimming.        Discharge Instructions       POSTOPERATIVE INSTRUCTIONS FOLLOWING   MASTECTOMY AND/OR AXILLARY LYMPH NODE DISSECTION    The following are post-operative instructions that will help you to recover from your surgery.  Please read over these instructions carefully and contact us if we can answer any of your questions or concerns.    Post-op care/Dressing/breast binder (surgi-bra)  A surgical bra may be placed around your chest after your surgery.  If you are given the bra, please wear it for the first 48 hours after surgery. After 48 hours you can remove your surgical bra and dressing to shower/cleanse the chest wall with antibacterial soap and warm water. Do not take a tub bath and do not soak the surgical site for at least 2 weeks.     The final pathology report will be available approximately 7-10 days after your surgery.  Our office will call you with your pathology report when it becomes available.    If blue dye was used to locate your sentinel lymph nodes, your urine and stool may be blue-green in color for 1 or 2 days.    Dr. Nam patients: please wear the surgical bra as close to 24 hours a day as possible until your post-operative clinic appointment.  If the elastic around the bra irritates your skin, you may wear a soft t-shirt underneath the bra. You may shower AFTER the drains are removed.  Please sponge bathe until then. Please do not remove the white strips of tape (steri-strips) that cover your incision.  They will be removed at your clinic visit. You may go without wearing the bra long enough to bath, to launder and dry the bra. If you have fluffy filler placed inside the bra,  the filler should be removed whenever the bra is taken off. Please reinsert the fluffy filler, or insert the new soft filler, under the bra when you put the bra back on.  If the bra is extremely uncomfortable, you may wear a supportive sports bra instead after 2 days.    Activity    You will be able to do much of your own personal care, such as bathing, dressing, preparing simple meals, etc.   A short walk each day will help with your recovery   You may find that you need to take rest breaks between activities, but you should not need to stay in bed for prolonged periods of time during the day. A good rule during this time is to listen to your body, do what is comfortable, and stop and rest when your feel tired.  If it hurts, dont do it.   Return to taking your daily medications as prescribed   Please avoid activities that require moderate to heavy lifting (grocery shopping) or pushing/pulling (vacuuming) and repetitive motions (such as washing windows). Do not lift anything heavier than a gallon of milk.   Following a lymph node dissection, dont avoid using your arm, but dont exercise your arm until after your first post-operative visit.  At your first post-op visit, you will be given arm exercises to regain movement and flexibility.  You may be referred to physical therapy if needed.   You may restart driving when you are no longer on narcotics and you feel safe turning the wheel and stopping quickly.   You will need to be out of work approximately 2-6 weeks depending on your particular surgery and how well you are recovering.  We will evaluate how you are doing at the first post-op appointment.  This is a good time to ask when you may return to work and what activities you may do.    Medication for pain   You will be given a prescription for pain medication. You should not drive or operate machinery while taking these.  Please take prescription pain medication (narcotics) with food.  Narcotics can  cause, or worsen, constipation.  You will need to increase your fluid intake, eat high fiber foods (such as fruits and bran) and make sure that you are up and walking. You may need to take an over the counter stool softener for constipation.   Short term use of an icepack may be helpful to decrease discomfort and swelling, particularly to the armpit after lymph node surgery.   A small pillow positioned in the armpit may also decrease discomfort after lymph node surgery.   If you are given a prescription for antibiotics, take them as prescribed.    How to care for your Drain(s)  1. Wash hands--STRIP or milk the drainage tube as it comes out of your body toward the bulb.   a. Beginning where the drain comes out of your body, hold drainage tubing with one hand and with the other, stretch and release tubing an inch at time while moving downward with both hands toward the bulb.  b. Do this 2-3 times before emptying the bulb.  2. Remove the stopper from the bulbs port  (drainage port)  3. Pour the drainage in the measuring cup provided by the nurse  4. Flatten/squeeze the bulb to create a vacuum and replace the stopper before letting go of the bulb.  5. Record the date, time and amount of drainage in ccs (not ounces) each time bulb is emptied. If you have more than one drain, record each separately.  6. Discard the drainage into the toilet after measuring and then wash hands.  7. Empty bulbs 2-3 times/day or as needed if it fills up before 8 hours.  8. Remember to bring the output record with you to your doctors appointment.    Please report the following:   Temperature greater than 101 degrees   Discharge or bad odor from the wound   Excessive bleeding, such as saturated bloody dressing or extreme bruising   Redness at incision and/or drain sites   Swelling or buildup of fluid around incision   Persistent fevers, chills, nausea, vomiting, or diarrhea    Additional information  Your surgeon will see you  approximately 2 weeks following your surgery.  If this follow-up appointment has not been made, please call the office.    If you have any questions or problems, please call my office or my nurse.    Dr. Shyann Garcia, RN Ciaran Anderson, JEFFREY Anderson, RN Rachael Galindo, RN  [phone]    Neda Oleary PA-C  [phone]  Fany Harrison, JEFFREY  [phone]    After hours and on weekends, you may call the main [facility] line at [phone] and ask to have the general surgery resident paged or have me paged      Lymphedema Risk Reduction    Lymphedema is a swelling of a part of the body, caused by an insufficient lymphatic system and an accumulation of fluid in the bodys tissues.  Lymphedema may occur when normal drainage of fluid is disrupted, such as an infection, injury, cancer, scar tissue, or removal of lymph nodes.    If you had a full axillary lymph node dissection procedure, you may be at greater risk for lymphedema.     For those patients having a sentinel lymph node biopsy, these risks may be smaller and the recommendations are provided for your review and consideration.    The following list contains recommendations for reducing your risk of developing lymphedema.    I. Skin Care--avoid trauma/injury to reduce infection risk   Keep the hand and arm on the side of surgery clean and dry   Pay attention to nail care and do not cut cuticles   Avoid punctures, such as injections and blood draws from you on the side of your surgery   Wear gloves while doing activities that may cause skin injury (washing dishes, gardening, etc.)   If scratches or punctures occur, wash area with soap and water, and observe for signs of infections (redness, drainage, swelling)   If a rash, itching, redness, pain, increased skin temperatures, fever, or flu-like symptoms occur, contact your physician  "immediately for early treatment of a possible infection  II. Activity/Lifestyle   Gradually build up the duration and intensity of any activity or exercise   Take frequent rest periods during activity to allow for arm recovery   Monitor your arm and upper body during and after activity for any change in size, shape, tissue, texture, soreness, heaviness, or firmness  III. Avoid constriction of your arm on the side of your surgery   Avoid having blood pressure taken on the arm on the side of your surgery   Wear loose fitting jewelry and clothing   Be careful not to rest a heavy purse, luggage, or grocery bags on that arm   When you return to wearing a bra, make sure that it is well fitted and not too tight      Discharge References/Attachments     BREAST SURGERY, EXERCISES AFTER: BALL SQUEEZE, ARM CROSS, BROOM STRETCH   (ENGLISH)    BREAST SURGERY, EXERCISES AFTER: WALL CLIMB, CHICKEN WING   (ENGLISH)    MASTECTOMY: HEALING AT HOME (ENGLISH)        Primary Diagnosis     Your primary diagnosis was:  Breast Cancer      Admission Information     Date & Time Provider Department [facility]    2/7/2017  5:11 AM Orestes Santana MD [facility] [id number]      Care Providers     Provider Role Specialty Primary office phone    Orestes Santana MD Attending Provider General Surgery [phone]    Bam Nunez MD Surgeon  Plastic Surgery [phone]    Orestes Santana MD Surgeon  General Surgery [phone]      Your Vitals Were     BP Pulse Temp Resp Height Weight    121/68 (BP Location: Left arm, Patient Position: Lying, BP Method: Automatic) 77 97.5 °F (36.4 °C) (Oral) 18 4' 9" (1.448 m) 58.1 kg (128 lb)    SpO2 BMI             95% 27.7 kg/m2         Recent Lab Values     No lab values to display.      Pending Labs     Order Current Status    Specimen to Pathology - Surgery In process      Allergies as of 2/8/2017        Reactions    Ciprofloxacin Rash      Advance Directives     An " advance directive is a document which, in the event you are no longer able to make decisions for yourself, tells your healthcare team what kind of treatment you do or do not want to receive, or who you would like to make those decisions for you.  If you do not currently have an advance directive, [facility] encourages you to create one.  For more information call:  [phone]), [phone] ([phone]),  or log on to [email or web address].        Language Assistance Services     ATTENTION: Language assistance services are available, free of charge. Please call [phone].      ATENCIÓN: Si habla español, tiene a ray disposición servicios gratuitos de asistencia lingüística. Llame al [phone].     CHÚ Ý: N?u b?n nói Ti?ng Vi?t, có các d?ch v? h? tr? ngôn ng? mi?n phí dành cho b?n. G?i s? [phone].        Heart Failure Education       Heart Failure: Being Active  You have a condition called heart failure. Being active doesnt mean that you have to wear yourself out. Even a little movement each day helps to strengthen your heart. If you cant get out to exercise, you can do simple stretching and strengthening exercises at home. These are good ways to keep you well-conditioned and prevent you and your heart from becoming excessively weak.    Ideas to get you started  · Add a little movement to things you do now. Walk to mail letters. Park your car at the far end of the parking lot and walk to the store. Walk up a flight of stairs instead of taking the elevator.  · Choose activities you enjoy. You might walk, swim, or ride an exercise bike. Things like gardening and washing the car count, too. Other possibilities include: washing dishes, walking the dog, walking around the mall, and doing aerobic activities with friends.  · Join a group exercise program at a [facility], a senior center, or a community center. Or look into a hospital cardiac rehabilitation program. Ask your doctor if  you qualify.  Tips to keep you going  · Get up and get dressed each day. Go to a coffee shop and read a newspaper or go somewhere that you'll be in the presence of other active people. Youll feel more like being active.  · Make a plan. Choose one or more activities that you enjoy and that you can easily do. Then plan to do at least one each day. You might write your plan on a calendar.  · Go with a friend or a group if you like company. This can help you feel supported and stay motivated, too.  · Plan social events that you enjoy. This will keep you mentally engaged as well as physically motivated to do things you find pleasure in.  For your safety  · Talk with your healthcare provider before starting an exercise program.  · Exercise indoors when its too hot or too cold outside, or when the air quality is poor. Try walking at a shopping mall.  · Wear socks and sturdy shoes to maintain your balance and prevent falls.  · Start slowly. Do a few minutes several times a day at first. Increase your time and speed little by little.  · Stop and rest whenever you feel tired or get short of breath.  · Dont push yourself on days when you dont feel well.  Date Last Reviewed: 3/20/2016  © 9529-0752 [employer]. [address] [place] [address], [place] [address]. All rights reserved. This information is not intended as a substitute for professional medical care. Always follow your healthcare professional's instructions.              Heart Failure: Evaluating Your Heart  You have a condition called heart failure. To evaluate your condition, your doctor will examine you, ask questions, and do some tests. Along with looking for signs of heart failure, the doctor looks for any other health problems that may have led to heart failure. The results of your evaluation will help your doctor form a treatment plan.  Health history and physical exam  Your visit will start with a health history. Tell the doctor about any symptoms  youve noticed and about all medicines you take. Then youll have a physical exam. This includes listening to your heartbeat and breathing. Youll also be checked for swelling (edema) in your legs and neck. When you have fluid buildup or fluid in the lungs, it may be called congestive heart failure.  Diagnosing heart failure     During an echocardiogram, sound waves bounce off the heart. These are converted into a picture on the screen.   The following may be done to help your doctor form a diagnosis:  · X-rays show the size and shape of your heart. These pictures can also show fluid in your lungs.  · An electrocardiogram (ECG or EKG) shows the pattern of your heartbeat. Small pads (electrodes) are placed on your chest, arms, and legs. Wires connect the pads to the ECG machine, which records your hearts electrical signals. This can give the doctor information about heart function.  · An echocardiogram uses ultrasound waves to show the structure and movement of your heart muscle. This shows how well the heart pumps. It also shows the thickness of the heart walls, and if the heart is enlarged. It is one of the most useful, non-invasive tests as it provides information about the heart's general function. This helps your doctor make treatment decisions.  · Lab tests evaluate small amounts of blood or urine for signs of problems. A BNP lab test can help diagnose and evaluate heart failure. BNP stands for B-type natriuretic peptide. The ventricles secrete more BNP when heart failure worsens. Lab tests can also provide information about metabolic dysfunction or heart dysfunction.  Your treatment plan  Based on the results of your evaluation and tests, your doctor will develop a treatment plan. This plan is designed to relieve some of your heart failure symptoms and help make you more comfortable. Your treatment plan may include:  · Medicine to help your heart work better and improve your quality of life  · Changes in what  you eat and drink to help prevent fluid from backing up in your body  · Daily monitoring of your weight and heart failure symptoms to see how well your treatment plan is working  · Exercise to help you stay healthy  · Help with quitting smoking  · Emotional and psychological support to help adjust to the changes  · Referrals to other specialists to make sure you are being treated comprehensively  Date Last Reviewed: 3/21/2016  © 1960-0993 [email or web address]. [email or web address] [place] [address], [place] [address]. All rights reserved. This information is not intended as a substitute for professional medical care. Always follow your healthcare professional's instructions.              Heart Failure: Making Changes to Your Diet  You have a condition called heart failure. When you have heart failure, excess fluid is more likely to build up in your body because your heart isn't working well. This makes the heart work harder to pump blood. Fluid buildup causes symptoms such as shortness of breath and swelling (edema). This is often referred to as congestive heart failure or CHF. Controlling the amount of salt (sodium) you eat may help stop fluid from building up. Your doctor may also tell you to reduce the amount of fluid you drink.  Reading food labels    Your healthcare provider will tell you how much sodium you can eat each day. Read food labels to keep track. Keep in mind that certain foods are high in salt. These include canned, frozen, and processed foods. Check the amount of sodium in each serving. Watch out for high-sodium ingredients. These include MSG (monosodium glutamate), baking soda, and sodium phosphate.   Eating less salt  Give yourself time to get used to eating less salt. It may take a little while. Here are some tips to help:  · Take the saltshaker off the table. Replace it with salt-free herb mixes and spices.  · Eat fresh or plain frozen vegetables. These have much less salt than canned  vegetables.  · Choose low-sodium snacks like sodium-free pretzels, crackers, or air-popped popcorn.  · Dont add salt to your food when youre cooking. Instead, season your foods with pepper, lemon, garlic, or onion.  · When you eat out, ask that your food be cooked without added salt.  · Avoid eating fried foods as these often have a great deal of salt.  If youre told to limit fluids  You may need to limit how much fluid you have to help prevent swelling. This includes anything that is liquid at room temperature, such as ice cream and soup. If your doctor tells you to limit fluid, try these tips:  · Measure drinks in a measuring cup before you drink them. This will help you meet daily goals.  · Chill drinks to make them more refreshing.  · Suck on frozen lemon wedges to quench thirst.  · Only drink when youre thirsty.  · Chew sugarless gum or suck on hard candy to keep your mouth moist.  · Weigh yourself daily to know if your body's fluid content is rising.  My sodium goal  Your healthcare provider may give you a sodium goal to meet each day. This includes sodium found in food as well as salt that you add. My goal is to eat no more than ___________ mg of sodium per day.     When to call your doctor  Call your doctor right away if you have any symptoms of worsening heart failure. These can include:  · Sudden weight gain  · Increased swelling of your legs or ankles  · Trouble breathing when youre resting or at night  · Increase in the number of pillows you have to sleep on  · Chest pain, pressure, discomfort, or pain in the jaw, neck, or back   Date Last Reviewed: 3/21/2016  © 3871-0340 [employer]. [address] [place] [address], [place] [address]. All rights reserved. This information is not intended as a substitute for professional medical care. Always follow your healthcare professional's instructions.              Heart Failure: Medicines to Help Your Heart    You have a condition called heart failure  (also known as congestive heart failure, or CHF). Your doctor will likely prescribe medicines for heart failure and any underlying health problems you have. Most heart failure patients take one or more types of medicinen. Your healthcare provider will work to find the combination of medicines that works best for you.  Heart failure medicines  Here are the most common heart failure medicines:  · ACE inhibitors lower blood pressure and decrease strain on the heart. This makes it easier for the heart to pump. Angiotensin receptor blockers have similar effects. These are prescribed for some patients instead of ACE inhibitors.  · Beta-blockers relieve stress on the heart. They also improve symptoms. They may also improve the heart's pumping action over time.  · Diuretics (also called water pills) help rid your body of excess water. This can help rid your body of swelling (edema). Having less fluid to pump means your heart doesnt have to work as hard. Some diuretics make your body lose a mineral called potassium. Your doctor will tell you if you need to take supplements or eat more foods high in potassium.  · Digoxin helps your heart pump with more strength. This helps your heart pump more blood with each beat. So, more oxygen-rich blood travels to the rest of the body.  · Aldosterone antagonists help alter hormones and decrease strain on the heart.  · Hydralazine and nitrates are two separate medicines used together to treat heart failure. They may come in one combination pill. They lower blood pressure and decrease how hard the heart has to pump.  Medicines for related conditions  Controlling other heart problems helps keep heart failure under control, too. Depending on other heart problems you have, medicines may be prescribed to:  · Lower blood pressure (antihypertensives).  · Lower cholesterol levels (statins).  · Prevent blood clots (anticoagulants or aspirin).  · Keep the heartbeat steady  (antiarrhythmics).  Date Last Reviewed: 3/5/2016  © 3155-7177 [employer]. [address] [place] [address], [place] [address]. All rights reserved. This information is not intended as a substitute for professional medical care. Always follow your healthcare professional's instructions.              Heart Failure: Procedures That May Help    The heart is a muscle that pumps oxygen-rich blood to all parts of the body. When you have heart failure, the heart is not able to pump as well as it should. Blood and fluid may back up into the lungs (congestive heart failure), and some parts of the body dont get enough oxygen-rich blood to work normally. These problems lead to the symptoms of heart failure.     Certain procedures may help the heart pump better in some cases of heart failure. Some procedures are done to treat health problems that may have caused the heart failure such as coronary artery disease or heart rhythm problems. For more serious heart failure, other options are available.  Treating artery and valve problems  If you have coronary artery disease or valve disease, procedures may be done to improve blood flow. This helps the heart pump better, which can improve heart failure symptoms. First, your doctor may do a cardiac catheterization to help detect clogged blood vessels or valve damage. During this procedure, a  thin tube (catheter) in inserted into a blood vessel and guided to the heart. There a dye is injected and a special type of X-ray (angiogram) is taken of the blood vessels. Procedures to open a blocked artery or fix damaged valves can also be done using catheterization.  · Angioplasty uses a balloon-tipped instrument at the end of the catheter. The balloon is inflated to widen the narrowed artery. In many cases, a stent is expanded to further support the narrowed artery. A stent is a metal mesh tube.  · Valve surgery repairs or replacement of faulty valves can also be done during  catheterization so blood can flow properly through the chambers of the heart.  Bypass surgery is another option to help treat blocked arteries. It uses a healthy blood vessel from elsewhere in the body. The healthy blood vessel is attached above and below the blocked area so that blood can flow around the blocked artery.  Treating heart rhythm problems  A device may be placed in the chest to help a weak heart maintain a healthy, heartbeat so the heart can pump more effectively:  · Pacemaker. A pacemaker is an implanted device that regulates your heartbeat electronically. It monitors your heart's rhythm and generates a painless electric impulse that helps the heart beat in a regular rhythm. A pacemaker is programmed to meet your specific heart rhythm needs.  · Biventricular pacing/cardiac resynchronization therapy. A type of pacemaker that paces both pumping chambers of the heart at the same time to coordinate contractions and to improve the heart's function. Some people with heart failure are candidates for this therapy.  · Implantable cardioverter defibrillator. A device similar to a pacemaker that senses when the heart is beating too fast and delivers an electrical shock to convert the fast rhythm to a normal rhythm. This can be a life saving device.  In severe cases  In more serious cases of heart failure when other treatments no longer work, other options may include:  · Ventricular assist devices (VADs). These are mechanical devices used to take over the pumping function for one or both of the heart's ventricles, or pumping chambers. A VAD may be necessary when heart failure progresses to the point that medicines and other treatments no longer help. In some cases, a VAD may be used as a bridge to transplant.  · Heart transplant. This is replacing the diseased heart with a healthy one from a donor. This is an option for a few people who are very sick. A heart transplant is very serious and not an option for all  patients. Your doctor can tell you more.  Date Last Reviewed: 3/20/2016  © 3601-3544 [employer]. [address] [place] [address], [place] [address]. All rights reserved. This information is not intended as a substitute for professional medical care. Always follow your healthcare professional's instructions.              Heart Failure: Tracking Your Weight  You have a condition called heart failure. When you have heart failure, a sudden weight gain or a steady rise in weight is a warning sign that your body is retaining too much water and salt. This could mean your heart failure is getting worse. If left untreated, it can cause problems for your lungs and result in shortness of breath. Weighing yourself each day is the best way to know if youre retaining water. If your weight goes up quickly, call your doctor. You will be given instructions on how to get rid of the excess water. You will likely need medicines and to avoid salt. This will help your heart work better.  Call your doctor if you gain more than 2 pounds in 1 day, more than 5 pounds in 1 week, or whatever weight gain you were told to report by your doctor. This is often a sign of worsening heart failure and needs to be evaluated and treated. Your doctor will tell you what to do next.   Tips for weighing yourself    · Weigh yourself at the same time each morning, wearing the same clothes. Weigh yourself after urinating and before eating.  · Use the same scale each day. Make sure the numbers are easy to read. Put the scale on a flat, hard surface -- not on a rug or carpet.  · Do not stop weighing yourself. If you forget one day, weigh again the next morning.  How to use your weight chart  · Keep your weight chart near the scale. Write your weight on the chart as soon as you get off the scale.  · Fill in the month and the start date on the chart. Then write down your weight each day. Your chart will look like this:    · If you miss a day, leave the  space blank. Weigh yourself the next day and write your weight in the next space.  · Take your weight chart with you when you go to see your doctor.  Date Last Reviewed: 3/20/2016  © 2984-6236 [employer]. [address] [place] [address], [place] [address]. All rights reserved. This information is not intended as a substitute for professional medical care. Always follow your healthcare professional's instructions.              Heart Failure: Warning Signs of a Flare-Up  You have a condition called heart failure. Once you have heart failure, flare-ups can happen. Below are signs that can mean your heart failure is getting worse. If you notice any of these warning signs, call your healthcare provider.  Swelling    · Your feet, ankles, or lower legs get puffier.  · You notice skin changes on your lower legs.  · Your shoes feel too tight.  · Your clothes are tighter in the waist.  · You have trouble getting rings on or off your fingers.  Shortness of breath  · You have to breathe harder even when youre doing your normal activities or when youre resting.  · You are short of breath walking up stairs or even short distances.  · You wake up at night short of breath or coughing.  · You need to use more pillows or sit up to sleep.  · You wake up tired or restless.  Other warning signs  · You feel weaker, dizzy, or more tired.  · You have chest pain or changes in your heartbeat.  · You have a cough that wont go away.  · You cant remember things or dont feel like eating.  Tracking your weight  Gaining weight is often the first warning sign that heart failure is getting worse. Gaining even a few pounds can be a sign that your body is retaining excess water and salt. Weighing yourself each day in the morning after you urinate and before you eat, is the best way to know if you're retaining water. Get a scale that is easy to read and make sure you wear the same clothes and use the same scale every time you weigh. Your  healthcare provider will show you how to track your weight. Call your doctor if you gain more than 2 pounds in 1 day, 5 pounds in 1 week, or whatever weight gain you were told to report by your doctor. This is often a sign of worsening heart failure and needs to be evaluated and treated before it compromises your breathing. Your doctor will tell you what to do next.    Date Last Reviewed: 3/15/2016  © 7397-1501 [employer]. [address] [place] [address], [place] [address]. All rights reserved. This information is not intended as a substitute for professional medical care. Always follow your healthcare professional's instructions.               [facility] complies with applicable Federal civil rights laws and does not discriminate on the basis of race, color, national origin, age, disability, or sex.

## 2017-02-08 VITALS
SYSTOLIC BLOOD PRESSURE: 109 MMHG | BODY MASS INDEX: 27.61 KG/M2 | TEMPERATURE: 98 F | DIASTOLIC BLOOD PRESSURE: 54 MMHG | HEIGHT: 57 IN | WEIGHT: 128 LBS | OXYGEN SATURATION: 95 % | RESPIRATION RATE: 16 BRPM | HEART RATE: 85 BPM

## 2017-02-08 LAB
ANION GAP SERPL CALC-SCNC: 6 MMOL/L
BASOPHILS # BLD AUTO: 0.01 K/UL
BASOPHILS NFR BLD: 0.1 %
BUN SERPL-MCNC: 13 MG/DL
CALCIUM SERPL-MCNC: 8.6 MG/DL
CHLORIDE SERPL-SCNC: 107 MMOL/L
CO2 SERPL-SCNC: 27 MMOL/L
CREAT SERPL-MCNC: 0.8 MG/DL
DIFFERENTIAL METHOD: ABNORMAL
EOSINOPHIL # BLD AUTO: 0 K/UL
EOSINOPHIL NFR BLD: 0 %
ERYTHROCYTE [DISTWIDTH] IN BLOOD BY AUTOMATED COUNT: 12 %
EST. GFR  (AFRICAN AMERICAN): >60 ML/MIN/1.73 M^2
EST. GFR  (NON AFRICAN AMERICAN): >60 ML/MIN/1.73 M^2
GLUCOSE SERPL-MCNC: 107 MG/DL
HCT VFR BLD AUTO: 38.7 %
HGB BLD-MCNC: 12.7 G/DL
LYMPHOCYTES # BLD AUTO: 2.1 K/UL
LYMPHOCYTES NFR BLD: 17.5 %
MAGNESIUM SERPL-MCNC: 2.1 MG/DL
MCH RBC QN AUTO: 30.3 PG
MCHC RBC AUTO-ENTMCNC: 32.8 %
MCV RBC AUTO: 92 FL
MONOCYTES # BLD AUTO: 1.5 K/UL
MONOCYTES NFR BLD: 12.6 %
NEUTROPHILS # BLD AUTO: 8.3 K/UL
NEUTROPHILS NFR BLD: 69.6 %
PHOSPHATE SERPL-MCNC: 3.5 MG/DL
PLATELET # BLD AUTO: 190 K/UL
PMV BLD AUTO: 10.7 FL
POTASSIUM SERPL-SCNC: 4.1 MMOL/L
RBC # BLD AUTO: 4.19 M/UL
SODIUM SERPL-SCNC: 140 MMOL/L
WBC # BLD AUTO: 11.86 K/UL

## 2017-02-08 PROCEDURE — 80048 BASIC METABOLIC PNL TOTAL CA: CPT

## 2017-02-08 PROCEDURE — 85025 COMPLETE CBC W/AUTO DIFF WBC: CPT

## 2017-02-08 PROCEDURE — 25000003 PHARM REV CODE 250: Performed by: STUDENT IN AN ORGANIZED HEALTH CARE EDUCATION/TRAINING PROGRAM

## 2017-02-08 PROCEDURE — 84100 ASSAY OF PHOSPHORUS: CPT

## 2017-02-08 PROCEDURE — 83735 ASSAY OF MAGNESIUM: CPT

## 2017-02-08 PROCEDURE — 36415 COLL VENOUS BLD VENIPUNCTURE: CPT

## 2017-02-08 RX ORDER — CLINDAMYCIN HYDROCHLORIDE 300 MG/1
300 CAPSULE ORAL 3 TIMES DAILY
Qty: 30 CAPSULE | Refills: 0 | Status: SHIPPED | OUTPATIENT
Start: 2017-02-08 | End: 2017-02-18

## 2017-02-08 RX ORDER — ONDANSETRON 4 MG/1
4 TABLET, ORALLY DISINTEGRATING ORAL EVERY 6 HOURS PRN
Qty: 30 TABLET | Refills: 0 | Status: SHIPPED | OUTPATIENT
Start: 2017-02-08 | End: 2017-08-31 | Stop reason: SDUPTHER

## 2017-02-08 RX ORDER — AMOXICILLIN 250 MG
1 CAPSULE ORAL DAILY
Qty: 30 TABLET | Refills: 0 | Status: SHIPPED | OUTPATIENT
Start: 2017-02-08 | End: 2018-07-10 | Stop reason: SDUPTHER

## 2017-02-08 RX ORDER — HYDROCODONE BITARTRATE AND ACETAMINOPHEN 5; 325 MG/1; MG/1
1-2 TABLET ORAL EVERY 4 HOURS PRN
Qty: 60 TABLET | Refills: 0 | Status: SHIPPED | OUTPATIENT
Start: 2017-02-08 | End: 2017-12-14

## 2017-02-08 RX ORDER — HYDROMORPHONE HYDROCHLORIDE 1 MG/ML
0.5 INJECTION, SOLUTION INTRAMUSCULAR; INTRAVENOUS; SUBCUTANEOUS EVERY 4 HOURS PRN
Status: DISCONTINUED | OUTPATIENT
Start: 2017-02-08 | End: 2017-02-08 | Stop reason: HOSPADM

## 2017-02-08 RX ORDER — DIAZEPAM 5 MG/1
5 TABLET ORAL EVERY 6 HOURS PRN
Qty: 28 TABLET | Refills: 0 | Status: SHIPPED | OUTPATIENT
Start: 2017-02-08 | End: 2017-12-14

## 2017-02-08 RX ORDER — DIAZEPAM 5 MG/1
5 TABLET ORAL EVERY 6 HOURS PRN
Status: DISCONTINUED | OUTPATIENT
Start: 2017-02-08 | End: 2017-02-08 | Stop reason: HOSPADM

## 2017-02-08 RX ADMIN — HYDROMORPHONE HYDROCHLORIDE 0.5 MG: 1 INJECTION, SOLUTION INTRAMUSCULAR; INTRAVENOUS; SUBCUTANEOUS at 01:02

## 2017-02-08 RX ADMIN — HYDROCODONE BITARTRATE AND ACETAMINOPHEN 1 TABLET: 10; 325 TABLET ORAL at 01:02

## 2017-02-08 RX ADMIN — ENALAPRIL MALEATE 2.5 MG: 2.5 TABLET ORAL at 09:02

## 2017-02-08 RX ADMIN — HYDROCODONE BITARTRATE AND ACETAMINOPHEN 1 TABLET: 10; 325 TABLET ORAL at 09:02

## 2017-02-08 RX ADMIN — HYDROCODONE BITARTRATE AND ACETAMINOPHEN 1 TABLET: 10; 325 TABLET ORAL at 04:02

## 2017-02-08 NOTE — PROGRESS NOTES
Pt settled in room, no immediate needs, call light explained and given to patient, SCDs intact, I. S at bedside. BITE pain therapy menu, TV guide, pain control pamphlet, diet menu given, explained, and offered to patient

## 2017-02-08 NOTE — PROGRESS NOTES
Progress Note  Breast Surgery    Admit Date: 2/7/2017  Attending: Max  S/P: Procedure(s) (LRB):  MASTECTOMY-BREAST-UNILATERAL right non skin sparing/non nipple sparing   (Right)  BIOPSY-SENTINEL NODE (Right)  INJECTION-SENTINEL NODE (Right)  INSERTION-BREAST TISSUE EXPANDER (Right)    Post-operative Day: 1 Day Post-Op    Hospital Day: 2    SUBJECTIVE:   No acute events overnight. Pain well controlled with PRN, one dose of IV dilaudid required. Doing well this morning. Singleton in place, to be d/c-ed this AM.     OBJECTIVE:   Vital Signs Range (Last 24H):     Temp:  [97.4 °F (36.3 °C)-98.2 °F (36.8 °C)]   Pulse:  [64-99]   Resp:  [14-21]   BP: ()/(54-75)   SpO2:  [94 %-100 %]     I & O (Last 24H):           Intake/Output Summary (Last 24 hours) at 02/08/17 0911  Last data filed at 02/08/17 0600   Gross per 24 hour   Intake             1790 ml   Output             2230 ml   Net             -440 ml     Physical Exam:  General: No acute distress  HEENT: normocephalic, atraumatic  Lungs: even and unlabored respirations  Chest: Heart: regular rate   Abdomen: soft, ND  Neuro: alert and oriented x3  Extremities: no edema, warm and well perfused    Wound:  right breast incision cdi, dermabond and tape, no erythema, expected ttp and minimal swelling, bra in place    Drains/Lines:  R-breast x 2, ss, 105 and 70    Laboratory:  CBC:   Recent Labs  Lab 02/08/17  0446   WBC 11.86   RBC 4.19   HGB 12.7   HCT 38.7      MCV 92   MCH 30.3   MCHC 32.8     CMP:   Recent Labs  Lab 02/07/17  1224 02/08/17  0446   * 107   CALCIUM 8.3* 8.6*   ALBUMIN 3.6  --    PROT 6.4  --     140   K 4.8 4.1   CO2 23 27    107   BUN 14 13   CREATININE 0.8 0.8   ALKPHOS 53*  --    ALT 16  --    AST 28  --    BILITOT 0.6  --      Imaging: none    Microbiology: none    ASSESSMENT/PLAN:   LUISA NESBITT 41 y.o.female s/p Procedure(s) (LRB):  MASTECTOMY-BREAST-UNILATERAL right non skin sparing/non nipple sparing    (Right)  BIOPSY-SENTINEL NODE (Right)  INJECTION-SENTINEL NODE (Right)  INSERTION-BREAST TISSUE EXPANDER (Right) 1 Day Post-Op, doing well.     Cardiac Diet  Pain and muscle relaxant prn  Nausea prn  Home meds  Lasix x 1 this AM  Daily labs; no lyte derangements, H/H stable, no leukocytosis  Encourage ambulation and IS    Dispo: ready for discharge once seen by staff this morning, will keep drains in place and follow-up with plastics in 1 week. Rx for norco, valium, and clindamycin.     Rachelle Chand MD  General Surgery, PGY-1  Pager: [phone]

## 2017-02-08 NOTE — PROGRESS NOTES
Pt discharged. Instructions and prescriptions given and explained. Pt verbalized understanding with no questions. Pt AAOx3, drain care teaching provided along with paperwork with explanation.

## 2017-02-08 NOTE — DISCHARGE INSTRUCTIONS
POSTOPERATIVE INSTRUCTIONS FOLLOWING   MASTECTOMY AND/OR AXILLARY LYMPH NODE DISSECTION    The following are post-operative instructions that will help you to recover from your surgery.  Please read over these instructions carefully and contact us if we can answer any of your questions or concerns.    Post-op care/Dressing/breast binder (surgi-bra)  A surgical bra may be placed around your chest after your surgery.  If you are given the bra, please wear it for the first 48 hours after surgery. After 48 hours you can remove your surgical bra and dressing to shower/cleanse the chest wall with antibacterial soap and warm water. Do not take a tub bath and do not soak the surgical site for at least 2 weeks.     The final pathology report will be available approximately 7-10 days after your surgery.  Our office will call you with your pathology report when it becomes available.    If blue dye was used to locate your sentinel lymph nodes, your urine and stool may be blue-green in color for 1 or 2 days.    Dr. Nam patients: please wear the surgical bra as close to 24 hours a day as possible until your post-operative clinic appointment.  If the elastic around the bra irritates your skin, you may wear a soft t-shirt underneath the bra. You may shower AFTER the drains are removed.  Please sponge bathe until then. Please do not remove the white strips of tape (steri-strips) that cover your incision.  They will be removed at your clinic visit. You may go without wearing the bra long enough to bath, to launder and dry the bra. If you have fluffy filler placed inside the bra, the filler should be removed whenever the bra is taken off. Please reinsert the fluffy filler, or insert the new soft filler, under the bra when you put the bra back on.  If the bra is extremely uncomfortable, you may wear a supportive sports bra instead after 2 days.    Activity    You will be able to do much of your own personal care, such as bathing,  dressing, preparing simple meals, etc.   A short walk each day will help with your recovery   You may find that you need to take rest breaks between activities, but you should not need to stay in bed for prolonged periods of time during the day. A good rule during this time is to listen to your body, do what is comfortable, and stop and rest when your feel tired.  If it hurts, dont do it.   Return to taking your daily medications as prescribed   Please avoid activities that require moderate to heavy lifting (grocery shopping) or pushing/pulling (vacuuming) and repetitive motions (such as washing windows). Do not lift anything heavier than a gallon of milk.   Following a lymph node dissection, dont avoid using your arm, but dont exercise your arm until after your first post-operative visit.  At your first post-op visit, you will be given arm exercises to regain movement and flexibility.  You may be referred to physical therapy if needed.   You may restart driving when you are no longer on narcotics and you feel safe turning the wheel and stopping quickly.   You will need to be out of work approximately 2-6 weeks depending on your particular surgery and how well you are recovering.  We will evaluate how you are doing at the first post-op appointment.  This is a good time to ask when you may return to work and what activities you may do.    Medication for pain   You will be given a prescription for pain medication. You should not drive or operate machinery while taking these.  Please take prescription pain medication (narcotics) with food.  Narcotics can cause, or worsen, constipation.  You will need to increase your fluid intake, eat high fiber foods (such as fruits and bran) and make sure that you are up and walking. You may need to take an over the counter stool softener for constipation.   Short term use of an icepack may be helpful to decrease discomfort and swelling, particularly to the armpit after  lymph node surgery.   A small pillow positioned in the armpit may also decrease discomfort after lymph node surgery.   If you are given a prescription for antibiotics, take them as prescribed.    How to care for your Drain(s)  1. Wash hands--STRIP or milk the drainage tube as it comes out of your body toward the bulb.   a. Beginning where the drain comes out of your body, hold drainage tubing with one hand and with the other, stretch and release tubing an inch at time while moving downward with both hands toward the bulb.  b. Do this 2-3 times before emptying the bulb.  2. Remove the stopper from the bulbs port  (drainage port)  3. Pour the drainage in the measuring cup provided by the nurse  4. Flatten/squeeze the bulb to create a vacuum and replace the stopper before letting go of the bulb.  5. Record the date, time and amount of drainage in ccs (not ounces) each time bulb is emptied. If you have more than one drain, record each separately.  6. Discard the drainage into the toilet after measuring and then wash hands.  7. Empty bulbs 2-3 times/day or as needed if it fills up before 8 hours.  8. Remember to bring the output record with you to your doctors appointment.    Please report the following:   Temperature greater than 101 degrees   Discharge or bad odor from the wound   Excessive bleeding, such as saturated bloody dressing or extreme bruising   Redness at incision and/or drain sites   Swelling or buildup of fluid around incision   Persistent fevers, chills, nausea, vomiting, or diarrhea    Additional information  Your surgeon will see you approximately 2 weeks following your surgery.  If this follow-up appointment has not been made, please call the office.    If you have any questions or problems, please call my office or my nurse.    Dr. Shyann Garcia, JEFFREY Anderson, JEFFREY Anderson, JEFFREY Galindo,  RN  [phone]    Neda Oleary PA-C  [phone]  Fany Harrison RN  [phone]    After hours and on weekends, you may call the main [facility] line at [phone] and ask to have the general surgery resident paged or have me paged      Lymphedema Risk Reduction    Lymphedema is a swelling of a part of the body, caused by an insufficient lymphatic system and an accumulation of fluid in the bodys tissues.  Lymphedema may occur when normal drainage of fluid is disrupted, such as an infection, injury, cancer, scar tissue, or removal of lymph nodes.    If you had a full axillary lymph node dissection procedure, you may be at greater risk for lymphedema.     For those patients having a sentinel lymph node biopsy, these risks may be smaller and the recommendations are provided for your review and consideration.    The following list contains recommendations for reducing your risk of developing lymphedema.    I. Skin Care--avoid trauma/injury to reduce infection risk   Keep the hand and arm on the side of surgery clean and dry   Pay attention to nail care and do not cut cuticles   Avoid punctures, such as injections and blood draws from you on the side of your surgery   Wear gloves while doing activities that may cause skin injury (washing dishes, gardening, etc.)   If scratches or punctures occur, wash area with soap and water, and observe for signs of infections (redness, drainage, swelling)   If a rash, itching, redness, pain, increased skin temperatures, fever, or flu-like symptoms occur, contact your physician immediately for early treatment of a possible infection  II. Activity/Lifestyle   Gradually build up the duration and intensity of any activity or exercise   Take frequent rest periods during activity to allow for arm recovery   Monitor your arm and upper body during and after activity for any change in size, shape, tissue, texture, soreness,  heaviness, or firmness  III. Avoid constriction of your arm on the side of your surgery   Avoid having blood pressure taken on the arm on the side of your surgery   Wear loose fitting jewelry and clothing   Be careful not to rest a heavy purse, luggage, or grocery bags on that arm   When you return to wearing a bra, make sure that it is well fitted and not too tight

## 2017-02-08 NOTE — NURSING TRANSFER
Nursing Transfer Note      2/7/2017     Transfer To: 522    Transfer via stretcher    Transfer with cardiac monitoring    Transported by pct    Medicines sent: n/a    Chart send with patient: Yes    Notified: spouse

## 2017-02-08 NOTE — OP NOTE
DATE OF PROCEDURE:  02/07/2017    PREOPERATIVE DIAGNOSIS:  Breast cancer.    POSTOPERATIVE DIAGNOSIS:  Breast cancer.    PROCEDURES PERFORMED:  1.  Immediate right breast reconstruction using tissue expander.  2.  Placement of AlloDerm, right breast.    DESCRIPTION OF PROCEDURE:  After completion of the mastectomy, I entered the   room.  The pectoralis major muscle was elevated from the chest wall and was   divided from its insertion site.  Next, a small flap of pectoralis minor   serratus anterior flap was elevated laterally.  A template was made of the base   of the tissue expander.  Methylene blue was used to donavon it out.  Next, a   perforated AlloDerm was sutured to the inframammary fold using a running 2-0 PDS   mattress suture.  The AlloDerm was also sutured to the pectoralis major muscle   using a running 2-0 PDS mattress suture.  The tissue expander was then soaked in   triple antibiotic solution.  Gloves were changed.  Chest wall was then   reprepped.  The expander was then placed in the sub-AlloDerm subpectoral pocket   and inflated with 300 mL of fluid.  A drain was placed below the tissue expander   and one in the subcutaneous space.  Skin was then closed using interrupted 3-0   Monocryl followed by a running 4-0 Monocryl subcuticular suture.  There were no   complications.      CRB/HN  dd: 02/07/2017 15:12:50 (CST)  td: 02/07/2017 19:38:28 (CST)  Doc ID   #[id number]  Job ID #[id number]    CC:

## 2017-02-08 NOTE — DISCHARGE SUMMARY
[facility]  Discharge Summary  General Surgery      Admit Date: 2/7/2017    Discharge Date and Time:  02/08/2017 9:31 AM    Attending Physician: Oretses Santana MD     Discharge Provider: Antonio Crandall    Reason for Admission: R breast invasive ductal carcinoma    Procedures Performed: Procedure(s) (LRB):  MASTECTOMY-BREAST-UNILATERAL right non skin sparing/non nipple sparing   (Right)  BIOPSY-SENTINEL NODE (Right)  INJECTION-SENTINEL NODE (Right)  INSERTION-BREAST TISSUE EXPANDER (Right)    Hospital Course (synopsis of major diagnoses, care, treatment, and services provided during the course of the hospital stay): 42 yo F with R breast IDC admitted for R mastectomy with SLNB and tissue expander placement.  The procedure was uncomplicated and the patient tolerated it well.  She complained of shoulder pain overnight which was controlled with IV dilaudid.  She tolerated a regular diet on POD 1 and her pain was controlled with PO meds.  She was discharged home in stable condition.     Consults: none      Final Diagnoses:   Principal Problem: Breast cancer   Secondary Diagnoses:   Active Hospital Problems    Diagnosis  POA    *Breast cancer [C50.919]  Yes      Resolved Hospital Problems    Diagnosis Date Resolved POA   No resolved problems to display.       Discharged Condition: good    Disposition: Home or Self Care    Follow Up/Patient Instructions:     Medications:  Reconciled Home Medications:   Current Discharge Medication List      START taking these medications    Details   clindamycin (CLEOCIN) 300 MG capsule Take 1 capsule (300 mg total) by mouth 3 (three) times daily.  Qty: 30 capsule, Refills: 0      diazePAM (VALIUM) 5 MG tablet Take 1 tablet (5 mg total) by mouth every 6 (six) hours as needed (muscle spasms).  Qty: 28 tablet, Refills: 0      hydrocodone-acetaminophen 5-325mg (NORCO) 5-325 mg per tablet Take 1-2 tablets by mouth every 4 (four) hours as needed for Pain.  Qty: 60  tablet, Refills: 0      senna-docusate 8.6-50 mg (PERICOLACE) 8.6-50 mg per tablet Take 1 tablet by mouth once daily. While taking prescription pain medications.  Qty: 30 tablet, Refills: 0         CONTINUE these medications which have NOT CHANGED    Details   budesonide-formoterol 160-4.5 mcg (SYMBICORT) 160-4.5 mcg/actuation HFAA Inhale 1 puff into the lungs every 12 (twelve) hours.  Qty: 10.2 g, Refills: 0      cetirizine (ZYRTEC) 10 MG tablet Take 10 mg by mouth daily as needed.       COREG CR 80 mg 24 hr capsule Take 1 capsule (80 mg total) by mouth once daily.  Qty: 30 capsule, Refills: 6      enalapril (VASOTEC) 2.5 MG tablet TAKE 1 TABLET (2.5 MG TOTAL) BY MOUTH 2 (TWO) TIMES DAILY.  Qty: 60 tablet, Refills: 2    Associated Diagnoses: Chronic combined systolic and diastolic congestive heart failure      furosemide (LASIX) 20 MG tablet TAKE 1 TABLET (20 MG TOTAL) BY MOUTH ONCE DAILY.  Qty: 30 tablet, Refills: 0      LACTOBACILLUS ACIDOPHILUS (PROBIOTIC ORAL) Take by mouth every evening.      mometasone (NASONEX) 50 mcg/actuation nasal spray 2 sprays by Nasal route once daily.  Qty: 1 each, Refills: 3      potassium chloride (MICRO-K) 10 MEQ CpSR TAKE 1 CAPSULE (10 MEQ TOTAL) BY MOUTH 2 (TWO) TIMES DAILY.  Qty: 60 capsule, Refills: 0    Associated Diagnoses: Hypokalemia      UNABLE TO FIND Bioclens 1 tablet every AM for bowels (OTC)             Discharge Procedure Orders  Diet general     Activity as tolerated     Lifting restrictions   Order Comments: Do not lift anything >10 pounds for 6 weeks.     Call MD for:  temperature >100.4     Call MD for:  persistent nausea and vomiting or diarrhea     Call MD for:  severe uncontrolled pain     Call MD for:  redness, tenderness, or signs of infection (pain, swelling, redness, odor or green/yellow discharge around incision site)     No dressing needed     Shower on day dressing removed (No bath)   Order Comments: Okay to shower 48 hours after surgery. Do not submerge  incision in water. Do not go swimming.     Other restrictions (specify):   Order Comments: No driving while on prescription pain medication.       Follow-up Information     Follow up with Bam Nunez MD. Call on 2/15/2017.    Specialty:  Plastic Surgery    Why:  Incision check and possible drain removal.    Contact information:    [address]  [facility] [id number] [phone]          Follow up with Orestes Santana MD In 2 weeks.    Specialty:  General Surgery    Why:  s/p R- mastectomy , For wound re-check    Contact information:    [address]  [facility] [address] [phone]

## 2017-02-08 NOTE — ADDENDUM NOTE
Addendum  created 02/08/17 0808 by Jc Chacon MD    Anesthesia Event edited, Procedure Event Log accessed

## 2017-02-08 NOTE — PLAN OF CARE
Problem: Patient Care Overview  Goal: Discharge Needs Assessment  Outcome: Ongoing (interventions implemented as appropriate)  Pt awake, alert and oriented x4. Vitals stable. Singleton in place for tonight. No fall or truama this shift. q2h rounding protocol followed. q2h rounding protocol followed. Prn pain med. schdule on white board. Pt. Aware of how to use call light and bed controls.

## 2017-02-08 NOTE — PROGRESS NOTES
Progress Note  Plastic Surgery    Admit Date: 2/7/2017  Post-operative Day: 1 Day Post-Op  Hospital Day: 2    SUBJECTIVE:     Patient doing well this morning. No acute events overnight. Tolerated procedure well.     Follow-up For: Procedure(s) (LRB):  MASTECTOMY-BREAST-UNILATERAL right non skin sparing/non nipple sparing   (Right)  BIOPSY-SENTINEL NODE (Right)  INJECTION-SENTINEL NODE (Right)  INSERTION-BREAST TISSUE EXPANDER (Right)    Scheduled Meds:   carvedilol  25 mg Oral BID    enalapril  2.5 mg Oral Daily    fluticasone-vilanterol  1 puff Inhalation Daily    furosemide  20 mg Oral Daily    potassium chloride  10 mEq Oral BID    sodium chloride 0.9%  3 mL Intravenous Q8H     Continuous Infusions:   PRN Meds:cetirizine, fluticasone, hydrocodone-acetaminophen 10-325mg, hydrocodone-acetaminophen 5-325mg, HYDROmorphone, ondansetron    Review of patient's allergies indicates:   Allergen Reactions    Ciprofloxacin Rash       OBJECTIVE:     Vital Signs (Most Recent)  Temp: 97.5 °F (36.4 °C) (02/08/17 0430)  Pulse: 80 (02/08/17 0700)  Resp: 18 (02/08/17 0430)  BP: 121/68 (02/08/17 0430)  SpO2: 95 % (02/08/17 0430)    Vital Signs Range (Last 24H):  Temp:  [97.4 °F (36.3 °C)-98.2 °F (36.8 °C)]   Pulse:  [64-99]   Resp:  [14-21]   BP: ()/(54-75)   SpO2:  [94 %-100 %]     I & O (Last 24H):  Intake/Output Summary (Last 24 hours) at 02/08/17 0808  Last data filed at 02/08/17 0600   Gross per 24 hour   Intake             1790 ml   Output             2380 ml   Net             -590 ml     Physical Exam:  Right breast incision c/d/i, dermabond and tape in place, no errythema, drains x2 in place with ss output, bra and ABD in place      ASSESSMENT/PLAN:     40 y/o female s/p right mastectomy and SLNB and tissue expander reconstruction. POD 1  - Patient tolerated procedure well  - Continue drains and drain teaching per nursing staff  - Follow up in PLS clinic 1 week  - Recommend Rx for muscle relaxer to improve  muscle pain/spasm  - Likely discharge today per primary service.

## 2017-02-08 NOTE — OP NOTE
DATE OF PROCEDURE:  02/07/2017.    PRIMARY SURGEON:  Orestes Santana M.D.    PREOPERATIVE DIAGNOSIS:  T2 invasive infiltrating carcinoma of the right medial   breast.    POSTOPERATIVE DIAGNOSIS:  T2 invasive infiltrating carcinoma of the right medial   breast.    PROCEDURES PERFORMED:  Right total complete therapeutic mastectomy; right deep   axillary sentinel lymph node biopsies x2; injection of right breast with   isosulfan Lymphazurin blue dye and technetium-labeled radiocolloid for sentinel   lymph node identification.    PROCEDURE IN DETAIL:  The patient underwent informed consent.  The right breast   was marked.  She underwent a preoperative paravertebral block per the Anesthesia   regional team.  She was brought to the Operating Room under general anesthesia.    The right breast was injected in the subareolar region with both the   technetium-labeled radiocolloid and the isosulfan Lymphazurin blue dye.    Appropriate time was allowed for this to migrate to the axilla while the right   breast, anterior chest, right arm and axilla were prepped and draped in a   sterile fashion.  A standard mastectomy transverse elliptical incision was   marked to excise the entire nipple-areolar complex as well as the skin directly   over the mass in the medial right breast as well as the prior core needle biopsy   site more medial to the area of the breast mass itself.  The standard   transverse ellipse was marked.  The skin was incised sharply.  A hot spot was   already noted in the right axilla.  The superior mastectomy flap was raised   cephalad to the clavicle, medially to the midline to the lateral border of the   sternum, identifying the intercostal perforators, but not crossing midline.  The   lateral dissection was carried up and over the upper outer quadrant axillary   tail of Valadez breast tissue, identifying the superior lateral margin of the   pectoralis muscle.  We followed the radioactivity and blue dye up and  over the   upper outer quadrant axillary tail of Valadez breast tissue through it to the   clavipectoral fascia through that and into the level 1 region of the axilla   where we identified several sentinel nodes.  The first sentinel node was hot and   blue and subsequent to that, we trimmed off some additional axillary tissue   from that which was not radioactive or blue; this was submitted as additional   axillary tissue from the axilla.  After placing the probe back in the axilla.    We found another sentinel lymph node that was hot and blue with the greatest   radioactivity on sentinel nodes being over 3000.  We found a third sentinel   node, which was warm and blue with counts in the few hundred range.  The three   lymph nodes were submitted for frozen section.  The additional axillary tissue,   which did not have any suspicious lymphadenopathy, blue dye or radioactivity   within it, was submitted as specimen #2.  The frozen section revealed two   negative nodes and one that was suspicious, but not confirmatory, for carcinoma.    I spoke with the pathologist directly and it was felt to defer final decision   on immunostains and final histopathology rather than performing a completion   axillary dissection at that time given the suspicious, but not diagnostic nature   of the frozen section.  Once this was done, the probe was placed back in the   axilla.  There was scant radioactivity, certainly well less than 10% of the   greatest hottest node and this was in the level 3 region, medial and deep to the   pectoralis minor muscle.  There was no palpable adenopathy and no further blue   dye staining noted in the axilla.  The mastectomy was then completed by taking   the inferior flap down to 1 cm below the inframammary crease identifying the   fascia of the rectus abdominis muscle.  It was carried medially to midline to   the lateral border of the sternum and laterally to identify the anterior border   of the  latissimus dorsi muscle, the serratus anterior muscle and the lateral   border of the pectoralis muscle.  The breast was removed from a medial to   lateral direction taking the pectoralis fascia with it.  The T2 mass could be   palpated within the medial aspect of the mastectomy specimen, but it was felt to   have excellent gross margins and the mass was felt to be well contained within   the mastectomy specimen.  The dissection was carried to the level of the   clavipectoral fascia to the level 1 region of the axilla to the point where the   first sentinel lymph node had been taken.  The breast was oriented with a short   stitch superiorly and a long stitch lateral and submitted as specimen #5.  Skin   flaps were inspected.  They were made hemostatic with cautery.  They were   uniform in thickness, approximately 6 to 7 mm, leaving only skin and   subcutaneous tissue, having performed the dissection outside of the superficial   investing fascial layer of the breast.  With appropriate sentinel lymph node   sampling and a suspicious but not confirmatory frozen section, we did not   perform the axillary dissection and we will defer that possibility to permanent   sections on the sentinel lymph node tissue.  This was discussed with the family.    The case was turned over to Plastic Surgery for anticipated tissue expander   reconstruction by Dr. Nunez.  She tolerated this portion of the procedure well   without complication.  Estimated blood loss was minimal.  All needle,   instrument and sponge counts were accounted for.      GIANCARLO/ALEXEI  dd: 02/08/2017 08:39:38 (CST)  td: 02/08/2017 09:14:56 (CST)  Doc ID   #[id number]  Job ID #[id number]    CC:

## 2017-02-09 NOTE — PROGRESS NOTES
Spoke with Dr. Chand; MD notified of pt's request for Zofran Rx. MD stated she would call Rx in to pt's pharmacy ([facility]) in [place], [place].

## 2017-02-09 NOTE — PROGRESS NOTES
Pt pending discharge home at this time and requesting prescription for Zofran. Paged on-call to request. Awaiting return response. Will continue to monitor.

## 2017-02-09 NOTE — PROGRESS NOTES
Second page placed to on-call team re: antiemetic prescription for discharge. Awaiting return response.

## 2017-02-13 DIAGNOSIS — E87.6 HYPOKALEMIA: ICD-10-CM

## 2017-02-13 RX ORDER — POTASSIUM CHLORIDE 750 MG/1
CAPSULE, EXTENDED RELEASE ORAL
Qty: 60 CAPSULE | Refills: 0 | Status: SHIPPED | OUTPATIENT
Start: 2017-02-13 | End: 2017-04-28 | Stop reason: SDUPTHER

## 2017-02-15 ENCOUNTER — OFFICE VISIT (OUTPATIENT)
Dept: PLASTIC SURGERY | Facility: CLINIC | Age: 42
End: 2017-02-15
Payer: COMMERCIAL

## 2017-02-15 VITALS
BODY MASS INDEX: 29.01 KG/M2 | SYSTOLIC BLOOD PRESSURE: 92 MMHG | WEIGHT: 134.06 LBS | TEMPERATURE: 98 F | DIASTOLIC BLOOD PRESSURE: 66 MMHG | HEART RATE: 86 BPM

## 2017-02-15 DIAGNOSIS — C50.811 MALIGNANT NEOPLASM OF OVERLAPPING SITES OF RIGHT FEMALE BREAST: Primary | ICD-10-CM

## 2017-02-15 DIAGNOSIS — Z09 SURGERY FOLLOW-UP EXAMINATION: Primary | ICD-10-CM

## 2017-02-15 PROCEDURE — 99999 PR PBB SHADOW E&M-EST. PATIENT-LVL II: CPT | Mod: PBBFAC,,, | Performed by: SURGERY

## 2017-02-15 PROCEDURE — 99024 POSTOP FOLLOW-UP VISIT: CPT | Mod: S$GLB,,, | Performed by: SURGERY

## 2017-02-15 NOTE — PROGRESS NOTES
Plastic Surgery Post Op Visit    DATE OF PROCEDURE: 02/07/2017     DIAGNOSIS: Breast cancer.     PROCEDURES PERFORMED:  1. Immediate right breast reconstruction using tissue expander.  2. Placement of AlloDerm, right breast.    Patient presents today for post op visit. Doing well. C/o burning nerve pain in right axilla, no drainage from incision, drains working well    Right breast incision clean and dry with dermabond tape in place, no erythema, deep drain removed today. Superficial drain in place with serous output.    Plan for follow up 1 week for drain removal. Post op care and activity restrictions discussed.

## 2017-02-21 ENCOUNTER — OFFICE VISIT (OUTPATIENT)
Dept: SURGERY | Facility: CLINIC | Age: 42
End: 2017-02-21
Payer: COMMERCIAL

## 2017-02-21 VITALS
HEART RATE: 77 BPM | HEIGHT: 59 IN | BODY MASS INDEX: 25.64 KG/M2 | SYSTOLIC BLOOD PRESSURE: 104 MMHG | TEMPERATURE: 98 F | WEIGHT: 127.19 LBS | DIASTOLIC BLOOD PRESSURE: 58 MMHG

## 2017-02-21 DIAGNOSIS — C50.811 CANCER OF OVERLAPPING SITES OF RIGHT FEMALE BREAST: Primary | ICD-10-CM

## 2017-02-21 PROCEDURE — 99999 PR PBB SHADOW E&M-EST. PATIENT-LVL III: CPT | Mod: PBBFAC,,, | Performed by: SURGERY

## 2017-02-21 PROCEDURE — 99024 POSTOP FOLLOW-UP VISIT: CPT | Mod: S$GLB,,, | Performed by: SURGERY

## 2017-02-21 NOTE — Clinical Note
F/u with me in 4 months.  Set up through IR for a left arm peripheral pasport catheter placement for her to have her chemotherapy be administered through. [facility]

## 2017-02-21 NOTE — PROGRESS NOTES
REFERRING PHYSICIAN:  No referring provider defined for this encounter.       Gonzalo Rivas MD    MEDICAL ONCOLOGIST:    Needs referral  RADIATION ONCOLOGIST:   Needs referral    DIAGNOSIS:    This is a 41 y.o. female with a stage pT2 N1a M0 grade III IDC ER + (95%) [place]- (0%) HER2/ dann negative (stain score = 0) of the right breast.    TREATMENT SUMMARY:  The patient is status post right non skin sparing and non nipple sparing mastectomy and sentinel node biopsy on 2/7/17 with plastics coordinated tissue expansion.  Final pathology showed 3/4 lymph nodes with macrometastases, extra-jonathon extension and lymph-vascular invasion present. She was discussed at tumor board conference 2/21/17. Decided to proceed with systemic chemotherapy with placement of pass port by IR given defibrillator for cardiomyopathy. Also, due to her cardiomyopathy and EF of 20%, not be eligible for cardiotoxic drugs such as adriamycin. Will then proceed with XRT as recommended at conference this morning as well, and then she will receive endocrine therapy.  PET negative for metastatic disease.     INTERVAL HISTORY:   Avsi Graves comes in for a post-op check.  She denies fever, chills, chest pain or shortness of breath.  Her pain is well controlled. Only complaint is right inner arm burning sensation, worse at night.     MEDICATIONS:  Current Outpatient Prescriptions   Medication Sig Dispense Refill    budesonide-formoterol 160-4.5 mcg (SYMBICORT) 160-4.5 mcg/actuation HFAA Inhale 1 puff into the lungs every 12 (twelve) hours. 10.2 g 0    cetirizine (ZYRTEC) 10 MG tablet Take 10 mg by mouth daily as needed.       COREG CR 80 mg 24 hr capsule Take 1 capsule (80 mg total) by mouth once daily. (Patient taking differently: Take 80 mg by mouth every evening. ) 30 capsule 6    diazePAM (VALIUM) 5 MG tablet Take 1 tablet (5 mg total) by mouth every 6 (six) hours as needed (muscle spasms). 28 tablet 0    enalapril (VASOTEC) 2.5 MG  tablet TAKE 1 TABLET (2.5 MG TOTAL) BY MOUTH 2 (TWO) TIMES DAILY. 60 tablet 2    furosemide (LASIX) 20 MG tablet TAKE 1 TABLET (20 MG TOTAL) BY MOUTH ONCE DAILY. 30 tablet 0    ondansetron (ZOFRAN-ODT) 4 MG TbDL Take 1 tablet (4 mg total) by mouth every 6 (six) hours as needed. 30 tablet 0    senna-docusate 8.6-50 mg (PERICOLACE) 8.6-50 mg per tablet Take 1 tablet by mouth once daily. While taking prescription pain medications. 30 tablet 0    hydrocodone-acetaminophen 5-325mg (NORCO) 5-325 mg per tablet Take 1-2 tablets by mouth every 4 (four) hours as needed for Pain. 60 tablet 0    LACTOBACILLUS ACIDOPHILUS (PROBIOTIC ORAL) Take by mouth every evening.      mometasone (NASONEX) 50 mcg/actuation nasal spray 2 sprays by Nasal route once daily. (Patient taking differently: 2 sprays by Nasal route daily as needed. ) 1 each 3    potassium chloride (MICRO-K) 10 MEQ CpSR TAKE 1 CAPSULE (10 MEQ TOTAL) BY MOUTH 2 (TWO) TIMES DAILY. 60 capsule 0    UNABLE TO FIND Bioclens 1 tablet every AM for bowels (OTC)       No current facility-administered medications for this visit.        ALLERGIES:   Review of patient's allergies indicates:   Allergen Reactions    Ciprofloxacin Rash       PHYSICAL EXAMINATION:   General:  This is a well appearing female with appropriate speech, affect and gait.     Breast:  Incision clean, dry, and intact    IMPRESSION:   The patient has had an uneventful postoperative course.    PLAN:   1. return in 4 months for a follow up office visit and breast exam  2. right mammogram in 6 months  3. The patient is advised in continued exam of the breast chest wall and to report to this office sooner should she note any areas of abnormality or concern.   4.  She has been instructed to meet with med onc and rad onc for discussion of adjuvant treatment recommendations    Rachelle Chand MD  General Surgery, PGY-I  Pager: [phone]  I have personally taken the history and examined this patient and agree  with the resident's note as stated above.  Avis Graves is a 41-year-old -American female who presents with   her  and a friend for her postoperative visit.  On 02/07/2017, she   underwent a right total complete therapeutic mastectomy with right axillary   sentinel lymph node biopsy with tissue expander placement in the subpectoral   position by Dr. Nunez.  She presents today to discuss her pathology results   further.  Pathology had revealed 3 of the 4 sentinel nodes that were sampled   positive for macro-metastatic breast cancer with extranodal extension.  The   primary tumor measured 4 cm in greatest dimension.  Surgical margins with the   mastectomy were 1 cm.  She was presented at multidisciplinary breast cancer   conference today after we obtained a PET scan by PET/CT method for staging   purposes, which revealed no evidence of any distant or further regional   metastatic disease.  The patient was recommended at this point with or without   completion axillary dissection that she would receive comprehensive chest wall   radiation and regional lymphatic radiotherapy including the internal mammaries   because of the 3 macro-metastatic nodes with extranodal extension.  We therefore   will defer completion axillary dissection given the AMAROS trial data and that   local regional control should be essentially equivalent with either completion   axillary dissection or regional lymphatic radiotherapy.  The patient was also   presented to discuss adjuvant therapy options.  Her tumor was estrogen receptor   positive, progesterone receptor negative and HER-2/dann negative.  She has   cardiomyopathy with a baseline ejection fraction of 25% to 30%.  Therefore, she   is not a candidate for anthracyclines or Adriamycin because of this.  She was   discussed that she could receive CMF versus taxane with platinum-based therapy.    She is scheduled to see Dr. Jerry Birmingham for Medical Oncology  consultation   as well as Dr. Karen Tompkins for Radiation/Oncology consultation, both on   02/23/2017.  The incision site is healing appropriately without signs of   infection.  She continues her follow up with Plastic Surgery following tissue   expander placement.  The drain is still in place, but she has no signs of   infection.  A copy of the pathology report was discussed with the patient and   provided to her.  From my standpoint, she will follow up with me in 4 months and   we discussed setting her up through Interventional Radiology for a peripheral   passport placement in her left upper extremity for her to receive her adjuvant   chemotherapy since she has an AICD defibrillator on the left chest wall and she   has recently completed a mastectomy with tissue expander placement on the right   chest wall.  This was discussed with her as well.  Follow up with me in 4 months   or sooner if there are any surgical or surgical oncology issues the patient has   while undergoing adjuvant therapies as outlined above.      RLC/HN  dd: 02/21/2017 16:58:29 (CST)  td: 02/22/2017 07:26:49 (CST)  Doc ID   #[id number]  Job ID #[id number]    CC:      Job # [id number]    Patient may desire to have her chemotherapy and radiation done closer to home in [place] which is about an hour away from [place] but wishes to proceed with med and rad onc consults here to get our treatment recommendations.

## 2017-02-21 NOTE — LETTER
[facility]  [address]  [facility] [address]  Phone: [phone] February 21, 2017      [place]  [address] [facility] [address]  [facility] [address]    Patient: Avis Graves   MR Number: [id number]   YOB: 1975   Date of Visit: 2/21/2017     Dear Dr. Rivas:    I saw your patient Avis Graves in my Breast Surgery Clinic. Below are the relevant portions of my assessment and plan of care.    Avis Graves is a 41-year-old -American female who presents with her  and a friend for her postoperative visit. On 02/07/2017, she underwent a right total complete therapeutic mastectomy with right axillary sentinel lymph node biopsy with tissue expander placement in the subpectoral position by Dr. Nunez.    She presents today to discuss her pathology results further. Pathology had revealed 3 of the 4 sentinel nodes that were sampled positive for macro-metastatic breast cancer with extranodal extension. The primary tumor measured 4 cm in greatest dimension. Surgical margins with the mastectomy were 1 cm. She was presented at multidisciplinary breast cancer conference today after we obtained a PET scan by PET/CT method for staging purposes, which revealed no evidence of any distant or further regional metastatic disease. The patient was recommended at this point with or without completion axillary dissection that she would receive comprehensive chest wall radiation and regional lymphatic radiotherapy including the internal mammaries because of the 3 macro-metastatic nodes with extranodal extension. We therefore will defer completion axillary dissection given the AMAROS trial data and that local regional control should be essentially equivalent with either completion axillary dissection or regional lymphatic radiotherapy.     The patient was also presented to discuss adjuvant therapy options. Her tumor was estrogen receptor positive,  progesterone receptor negative and HER-2/dann negative. She has cardiomyopathy with a baseline ejection fraction of 25% to 30%. Therefore, she is not a candidate for anthracyclines or Adriamycin because of this. She was discussed that she could receive CMF versus taxane with platinum-based therapy. She is scheduled to see Dr. Jerry Birmingham for Medical Oncology consultation as well as Dr. Karen Tompkins for Radiation/Oncology consultation, both on 02/23/2017.     The incision site is healing appropriately without signs of infection. She continues her follow up with Plastic Surgery following tissue expander placement. The drain is still in place, but she has no signs of infection. A copy of the pathology report was discussed with the patient and provided to her.     From my standpoint, she will follow up with me in 4 months and we discussed setting her up through Interventional Radiology for a peripheral passport placement in her left upper extremity for her to receive her adjuvant chemotherapy since she has an AICD defibrillator on the left chest wall and she has recently completed a mastectomy with tissue expander placement on the right chest wall. This was discussed with her as well. Follow up with me in 4 months or sooner if there are any surgical or surgical oncology issues the patient has while undergoing adjuvant therapies as outlined above.    If you have questions, please do not hesitate to call me. I look forward to following Avis Epstein along with you.    Sincerely,        Orestes Santana MD   Medical Director, [facility] Breast [place]  Section of General and Oncologic Surgery  [facility]    RLC/hcr    CC  MD Bam Hayden Jr., MD Mary L. McCormick, MD Hector O. Ventura, MD Chris Theodossiou, MD Mini J. Elnaggar, MD

## 2017-02-22 ENCOUNTER — OFFICE VISIT (OUTPATIENT)
Dept: PLASTIC SURGERY | Facility: CLINIC | Age: 42
End: 2017-02-22
Payer: COMMERCIAL

## 2017-02-22 VITALS
SYSTOLIC BLOOD PRESSURE: 98 MMHG | TEMPERATURE: 98 F | BODY MASS INDEX: 25.76 KG/M2 | DIASTOLIC BLOOD PRESSURE: 67 MMHG | HEART RATE: 76 BPM | WEIGHT: 127.56 LBS

## 2017-02-22 DIAGNOSIS — Z09 SURGERY FOLLOW-UP EXAMINATION: Primary | ICD-10-CM

## 2017-02-22 PROCEDURE — 99024 POSTOP FOLLOW-UP VISIT: CPT | Mod: S$GLB,,, | Performed by: PHYSICIAN ASSISTANT

## 2017-02-22 PROCEDURE — 99999 PR PBB SHADOW E&M-EST. PATIENT-LVL III: CPT | Mod: PBBFAC,,, | Performed by: PHYSICIAN ASSISTANT

## 2017-02-22 NOTE — PROGRESS NOTES
Avis Graves presents to Plastic Surgery Clinic on 2/22/2017 for a follow up visit status post R breast recon with TE placement on 02/07/2017    Vitals:    02/22/17 1109   BP: 98/67   Pulse: 76   Temp: 98.3 °F (36.8 °C)     Current Outpatient Prescriptions on File Prior to Visit   Medication Sig Dispense Refill    budesonide-formoterol 160-4.5 mcg (SYMBICORT) 160-4.5 mcg/actuation HFAA Inhale 1 puff into the lungs every 12 (twelve) hours. 10.2 g 0    cetirizine (ZYRTEC) 10 MG tablet Take 10 mg by mouth daily as needed.       COREG CR 80 mg 24 hr capsule Take 1 capsule (80 mg total) by mouth once daily. (Patient taking differently: Take 80 mg by mouth every evening. ) 30 capsule 6    diazePAM (VALIUM) 5 MG tablet Take 1 tablet (5 mg total) by mouth every 6 (six) hours as needed (muscle spasms). 28 tablet 0    enalapril (VASOTEC) 2.5 MG tablet TAKE 1 TABLET (2.5 MG TOTAL) BY MOUTH 2 (TWO) TIMES DAILY. 60 tablet 2    furosemide (LASIX) 20 MG tablet TAKE 1 TABLET (20 MG TOTAL) BY MOUTH ONCE DAILY. 30 tablet 0    hydrocodone-acetaminophen 5-325mg (NORCO) 5-325 mg per tablet Take 1-2 tablets by mouth every 4 (four) hours as needed for Pain. 60 tablet 0    LACTOBACILLUS ACIDOPHILUS (PROBIOTIC ORAL) Take by mouth every evening.      mometasone (NASONEX) 50 mcg/actuation nasal spray 2 sprays by Nasal route once daily. (Patient taking differently: 2 sprays by Nasal route daily as needed. ) 1 each 3    ondansetron (ZOFRAN-ODT) 4 MG TbDL Take 1 tablet (4 mg total) by mouth every 6 (six) hours as needed. 30 tablet 0    potassium chloride (MICRO-K) 10 MEQ CpSR TAKE 1 CAPSULE (10 MEQ TOTAL) BY MOUTH 2 (TWO) TIMES DAILY. 60 capsule 0    senna-docusate 8.6-50 mg (PERICOLACE) 8.6-50 mg per tablet Take 1 tablet by mouth once daily. While taking prescription pain medications. 30 tablet 0    UNABLE TO FIND Bioclens 1 tablet every AM for bowels (OTC)       No current facility-administered medications on file prior  to visit.      Patient Active Problem List   Diagnosis    Heart failure, systolic, chronic    Ventricular tachycardia    Automatic implantable cardioverter-defibrillator in situ    Thrombus due to any device, implant or graft    Generalized headaches    NICM (nonischemic cardiomyopathy)    Abdominal pain, other specified site    Irritable bowel syndrome with constipation    Colitis    Clostridium difficile colitis    Diverticulitis of colon    Cancer of overlapping sites of right female breast    Mild persistent asthma without complication    Breast cancer     Past Surgical History   Procedure Laterality Date    Tonsillectomy      Hysterectomy      Cardiac defibrillator placement       X 2 [employer]     Cardiac defibrillator placement       medtronic     Doing well today and seen by myself and Dr. Bam Nunez     R breast incisional tape intact. Mastectomy flaps viable. No sign of infection.     80ccNS injected to R TE for 80/380cc total. Tolerated well.     Drain removed as she reports approx 25cc/24 hours, serous output.     All questions were answered. She will return to clinic in 1 week. The patient was advised to call the clinic with any questions or concerns prior to their next visit.

## 2017-02-23 ENCOUNTER — TELEPHONE (OUTPATIENT)
Dept: HEMATOLOGY/ONCOLOGY | Facility: CLINIC | Age: 42
End: 2017-02-23

## 2017-02-23 ENCOUNTER — OFFICE VISIT (OUTPATIENT)
Dept: SURGERY | Facility: CLINIC | Age: 42
End: 2017-02-23
Payer: COMMERCIAL

## 2017-02-23 VITALS
DIASTOLIC BLOOD PRESSURE: 61 MMHG | DIASTOLIC BLOOD PRESSURE: 61 MMHG | HEART RATE: 77 BPM | TEMPERATURE: 98 F | RESPIRATION RATE: 18 BRPM | SYSTOLIC BLOOD PRESSURE: 85 MMHG | WEIGHT: 127 LBS | SYSTOLIC BLOOD PRESSURE: 85 MMHG | BODY MASS INDEX: 12.23 KG/M2 | HEART RATE: 75 BPM | TEMPERATURE: 98 F | BODY MASS INDEX: 26.54 KG/M2 | RESPIRATION RATE: 14 BRPM | WEIGHT: 58.25 LBS | HEIGHT: 58 IN

## 2017-02-23 DIAGNOSIS — C50.811 CANCER OF OVERLAPPING SITES OF RIGHT FEMALE BREAST: Primary | ICD-10-CM

## 2017-02-23 DIAGNOSIS — I42.8 NICM (NONISCHEMIC CARDIOMYOPATHY): ICD-10-CM

## 2017-02-23 DIAGNOSIS — I50.22 HEART FAILURE, SYSTOLIC, CHRONIC: ICD-10-CM

## 2017-02-23 DIAGNOSIS — C50.211 MALIGNANT NEOPLASM OF UPPER-INNER QUADRANT OF RIGHT FEMALE BREAST: ICD-10-CM

## 2017-02-23 PROCEDURE — 99205 OFFICE O/P NEW HI 60 MIN: CPT | Mod: S$GLB,,, | Performed by: INTERNAL MEDICINE

## 2017-02-23 PROCEDURE — 99205 OFFICE O/P NEW HI 60 MIN: CPT | Mod: S$GLB,,, | Performed by: RADIOLOGY

## 2017-02-23 PROCEDURE — 99999 PR PBB SHADOW E&M-EST. PATIENT-LVL III: CPT | Mod: PBBFAC,,, | Performed by: INTERNAL MEDICINE

## 2017-02-23 PROCEDURE — 99999 PR PBB SHADOW E&M-EST. PATIENT-LVL III: CPT | Mod: PBBFAC,,, | Performed by: RADIOLOGY

## 2017-02-23 NOTE — PATIENT INSTRUCTIONS
"  Radiation Therapy Treatment  Radiation therapy can help you in your fight against cancer. It begins with a consultation with your doctor to discuss a treatment strategy. If radiation is indicated you will then return for a "simulation." The simulation is a planning session that helps the doctor target your cancer and design a radiation plan to protect normal tissues. After the simulation and plan are completed (anywhere from 1 to 7 days you will return for another verification session and then begin your daily treatments. Treatment is usually once daily Monday to Friday and takes less than a half an hour. Sometimes radiation is recommended twice a day usually 4 to 6 hours between treatments. After the course of radiation is complete you will be scheduled to return for follow up appointments to make sure the cancer is under control and any side effects that may have occurred during treatment are taken care of.  Radiation therapy uses high-energy X-rays to kill cancer cells.   Your treatment planning visit --the simulation  Your radiation therapy team uses a special machine called a simulator to map out your treatment. The simulator is usually an X-ray machine (flouroscopy) or CT scanner (computerized tomography) or MRI scanner or PET-CT scanner machine. Laser lights act as guides to help position your body accurately. During this visit:  · The best position for your body is determined. Notes are made in your chart so youll be placed the same way each time.  · Special devices may be used to keep your body correctly positioned and still during treatment. These may include molds, masks, rests, and blocks.  · Ink marks are made on your skin over the spot to be treated. Tiny permanent tattoos may also be used. The marks act as a target for the treatment to stay at the exact same place each time.  · Markers, such as metal balls or wires, may be placed on or in your body. Sometime these are taped to this skin to help " with the imaging process. These work with the X-rays to position your body. The markers are removed when the visit is over.  After the imaging and data are acquired the information is sent into the computer planning system for your doctor and their team of physicists, dosimetrists, to design a treatment field. The field will best target your cancer and its routes of spread while helping limit radiation to normal tissues nearby.  Your treatments  Each treatment usually takes 10 to 30 minutes. You may need to change into a hospital gown. The radiation therapist positions you on the treatment table, then leaves the room. Sometimes before each treatment delivery imaging will be obtained on the machine. The machine may take digital X-rays or a CT scan to help align you. During treatment, lie as still as you can and breathe normally. You will hear noises coming from the machine. You can talk to the radiation therapist, who watches you from the control room on a TV monitor. After treatment, the therapist will help you off the table. You can then get dressed and go back to your normal activities.  Date Last Reviewed: 1/14/2016 © 2000-2016 [employer]. [address] [place] [address]. All rights reserved. This information is not intended as a substitute for professional medical care. Always follow your healthcare professional's instructions.        Radiation Therapy: Managing Short-Term Side Effects     Take short walks daily.     Radiation therapy uses high-energy X-rays or particles to kill cancer cells. Some normal cells can also be affected and result in side effects, such as dry skin, fatigue, or appetite changes. Most side effects heal when your radiation therapy is over.  Having side effects of radiation therapy does not mean that your cancer is getting worse or that therapy isnt working.   Caring for your skin  Skin reactions may occur where your body receives radiation. Your skin may become  dry, itchy, red, and peeling. It may darken in that spot, like a tan. To care for your skin:  · Dont scrub or use soap on the treatment area.  · Ask your therapy team what lotion to use.  · Avoid sun on the treated area. Ask your team about using a sunscreen.  · Do not remove ink marks unless your radiation therapist says you can. Dont scrub or use soap on the marks when you wash. Let water run over them and pat them dry.  · Protect your skin from heat or cold. Avoid hot tubs, saunas, hot pads, and ice packs.  · Wear soft, loose clothing to avoid rubbing skin.  Fighting fatigue  The cancer itself or the radiation therapy may cause you to feel tired. Your body is working hard to heal and repair itself. To feel better:  · Try light exercise each day. Take short walks.  · Plan tasks for the times when you tend to have the most energy. Ask for help when you need it.  · Relax before you go to bed to sleep better. Try reading or listening to soothing music.  · Be sure to let your cancer care team know if you continue to have fatigue that is not getting better. They may be able to offer ways to help.   Coping with appetite changes  Tell your therapy team if you find it hard to eat or have no appetite. You may be referred to a nutritionist, a specialist in meal planning. To keep your strength up, you need to eat well and maintain your weight. Think of healthy eating as part of your treatment. Try these tips:  · Eat slowly.  · Eat small meals several times a day.  · Eat more food when youre feeling better, even if it is not mealtime.  · Ask others to keep you company when you eat.  · Stock up on easy-to-prepare foods.  · Eat foods high in protein and calories.  · Drink plenty of water and other fluids.  · Ask your healthcare provider before taking any vitamins.  Site-specific side effects  These side effects include the following:   · Hair loss may happen in the area being treated. The hair often grows back after  treatment.  · Your mouth or throat can become dry or sore if the head or neck is being treated. Sip cool water to help ease discomfort.  · Nausea and bowel changes can happen with radiation to the pelvic region. Tell your healthcare provider if you have nausea, diarrhea or constipation. You may be given medicine or told to follow a special diet.  Talk to your healthcare team  Radiation therapy can also have other side effects, including some that might not show up until years later. Be sure to talk to your healthcare team about what to expect with the type of radiation therapy you are getting, including when you should call them with concerns.   Date Last Reviewed: 5/1/2016  © 8327-0507 [employer]. [address] [place] [address], [place] [address]. All rights reserved. This information is not intended as a substitute for professional medical care. Always follow your healthcare professional's instructions.    Return for follow up and CT/sim after chemotherapy.

## 2017-02-23 NOTE — TELEPHONE ENCOUNTER
Left message letting pt know her port placement is scheduled for 3/23 @ 8 am. Asked pt to call back with any questions or concerns. Also let pt know I will send a letter in the mail with the apt info and instructions on where to go.

## 2017-02-23 NOTE — PROGRESS NOTES
REFERRING PHYSICIAN: Orestes Santana M.D.    DIAGNOSIS:   p T2 N1a M0 infiltrating ductal carcinoma of the right breast    HISTORY OF PRESENT ILLNESS:   Ms. Graves is a 41 year old female with cardiomyopathy, CHF, and with defibrillator on the left chest wall, who was recently diagnosed with loco regionally advanced right breast cancer after she presented with a palpable nodule in the lower inner quadrant of the right breast. A mammogram and ultrasound in 2016 revealed 3.7 cm lesion at the 3 o'clock position. A core needle biopsy on 2017 revealed infiltrating ductal carcinoma, grade 3, which is ER positive (>95%), ND negative, and Her 2 negative. On 2017, she underwent non skin sparing non nipple sparing mastectomy, sentinel node biopsy, and tissue expander placement for future reconstruction. The pathology revealed the right breast with 4 cm IDC, grade 2, with associated DCIS, solid type with central necrosis. Three out of 4 sentinel lymph nodes were positive with extracapsular extension. The closest margin is 1 cm from the deep margin. A PET scan on 2017 is negative for metastasis. She is here today for recommendations regarding further treatment.      At present, she is healing from the surgery without any unexpected sided effects. She is currently undergoing serial expansions of the temporary tissue expander per plastic surgery. She reports pain associated with that. She demes skin erythema, discharge, fever, night sweats, or weight loss.    EO    REVIEW OF SYSTEMS:   As above.  In addition, patient denies headaches, visual problems, dizziness, chest pain, shortness of breath, cough, nausea, vomiting, diarrhea, or any new bony pains.  Patient also denies easy bruising, skin rashes, or numbness or tingling.      GYN HISTORY:  Menarche at age 9. Surgical menopause at age 37. 17 year history of OCP, denies hormone replacement therapy.     PAST MEDICAL HISTORY:  Past Medical  History   Diagnosis Date    Allergy     Asthma     Cardiomyopathy     CHF (congestive heart failure)     Diverticulitis        PAST SURGICAL HISTORY:  Past Surgical History   Procedure Laterality Date    Tonsillectomy      Hysterectomy      Cardiac defibrillator placement       X 2 Medtronic     Cardiac defibrillator placement       medtronic       ALLERGIES:   Review of patient's allergies indicates:   Allergen Reactions    Ciprofloxacin Rash       MEDICATIONS:  Current Outpatient Prescriptions   Medication Sig    budesonide-formoterol 160-4.5 mcg (SYMBICORT) 160-4.5 mcg/actuation HFAA Inhale 1 puff into the lungs every 12 (twelve) hours.    cetirizine (ZYRTEC) 10 MG tablet Take 10 mg by mouth daily as needed.     COREG CR 80 mg 24 hr capsule Take 1 capsule (80 mg total) by mouth once daily. (Patient taking differently: Take 80 mg by mouth every evening. )    diazePAM (VALIUM) 5 MG tablet Take 1 tablet (5 mg total) by mouth every 6 (six) hours as needed (muscle spasms).    enalapril (VASOTEC) 2.5 MG tablet TAKE 1 TABLET (2.5 MG TOTAL) BY MOUTH 2 (TWO) TIMES DAILY.    furosemide (LASIX) 20 MG tablet TAKE 1 TABLET (20 MG TOTAL) BY MOUTH ONCE DAILY.    hydrocodone-acetaminophen 5-325mg (NORCO) 5-325 mg per tablet Take 1-2 tablets by mouth every 4 (four) hours as needed for Pain.    LACTOBACILLUS ACIDOPHILUS (PROBIOTIC ORAL) Take by mouth every evening.    mometasone (NASONEX) 50 mcg/actuation nasal spray 2 sprays by Nasal route once daily. (Patient taking differently: 2 sprays by Nasal route daily as needed. )    ondansetron (ZOFRAN-ODT) 4 MG TbDL Take 1 tablet (4 mg total) by mouth every 6 (six) hours as needed.    potassium chloride (MICRO-K) 10 MEQ CpSR TAKE 1 CAPSULE (10 MEQ TOTAL) BY MOUTH 2 (TWO) TIMES DAILY.    senna-docusate 8.6-50 mg (PERICOLACE) 8.6-50 mg per tablet Take 1 tablet by mouth once daily. While taking prescription pain medications.    UNABLE TO FIND Bioclens 1 tablet every  "AM for bowels (OTC)     No current facility-administered medications for this visit.        SOCIAL HISTORY:  Social History     Social History    Marital status:      Spouse name: N/A    Number of children: N/A    Years of education: N/A     Occupational History    Not on file.     Social History Main Topics    Smoking status: Never Smoker    Smokeless tobacco: Never Used    Alcohol use No    Drug use: No    Sexual activity: Not on file     Other Topics Concern    Not on file     Social History Narrative       FAMILY HISTORY:  Family History   Problem Relation Age of Onset    Stroke Mother     Heart disease Mother     Cancer Mother     Asthma Mother     Kidney disease Father     Cancer Father     Kidney cancer Father     Diabetes Brother     Stomach cancer Paternal Grandfather     Lung cancer Paternal Grandfather     Asbestos Maternal Uncle     Prostate cancer Maternal Uncle     Liver disease Neg Hx     Liver cancer Neg Hx     Cirrhosis Neg Hx     Colon cancer Neg Hx     Colon polyps Neg Hx     Rectal cancer Neg Hx     Esophageal cancer Neg Hx     Ulcerative colitis Neg Hx     Cystic fibrosis Neg Hx          PHYSICAL EXAMINATION:  Visit Vitals    BP (!) 85/61    Pulse 75    Temp 97.8 °F (36.6 °C) (Oral)    Resp 14    Ht 4' 10" (1.473 m)    Wt 26.4 kg (58 lb 4 oz)    BMI 12.17 kg/m2     GENERAL: Patient is alert and oriented, in no acute distress.  HEENT:Extraocular muscles are intact.  Oropharynx is clear without lesions.  There is no cervical or supraclavicular lymphadenopathy palpated.  No thyromegaly noted.  HEART: Regular rate and rhythm.  LUNGS: Clear to auscultation bilaterally.  BREAST EXAM: The scar secondary to MRM is noted on the right chest wall with tissue expander in place. Tenderness to palpation of the right chest wall. No abnormal masses palpated in the right chest wall or right axilla. The left breast and left axilla; are also without palpable " masses  ABDOMEN:Soft, non tender, non distended, without hepatosplenomegaly.  Normoactive bowel sounds.  EXTREMITIES: No clubbing, cyanosis, or edema.  NEUROLOGICAL: Cranial nerve II through XII grossly intact.  Sensation is intact.  Strength is 5 out of 5 in the upper and lower extremities bilaterally.    ASSESSMENT:  This is a 41 year old female with CHF cardiomyopathy and AICD defibrillator, now with pT2 N1a M0 infiltrating ductal carcinoma of the right breast who underwent right breast mastectomy and sentinel node biopsy on 2/7/2017 with 4 cm mass and 3 out of 4 sentinel lymph nodes positive for involvement with extracapsular extension. This lesion is ER +/DE- and Her2 negative.     PLAN:  After review of the pathology slides and radiological images in the multidisciplinary breast conference, patient is noted to have loco regionally advanced right breast cancer. She is recommended to undergo systemic chemotherapy followed by postoperative radiation to the right breast mound and draining lymph nodes including the right supraclavicular region and right internal mammary nodes. I plan to deliver 5040 cGy.    The risks, benefits, and side effects of radiation were explained in detail to the patient and her family who are here today. All of their questions were answered and informed consent was signed. I plan to see the patient back upon completion for chemotherapy for follow up and treatment planning CT.     Psychosocial Distress screening score of Distress Score: 0 noted and reviewed. No intervention indicated.    I spent approximately 60 minutes reviewing the available records and evaluating the patient, out of which over 50% of the time was spent face to face with the patient in counseling and coordinating this patient's care.

## 2017-02-23 NOTE — TELEPHONE ENCOUNTER
----- Message from Jerry Birmingham MD sent at 2/23/2017 10:13 AM CST -----  Please arrange for a placement of a port by IR on or around 3/23/2017

## 2017-03-01 ENCOUNTER — OFFICE VISIT (OUTPATIENT)
Dept: PLASTIC SURGERY | Facility: CLINIC | Age: 42
End: 2017-03-01
Payer: COMMERCIAL

## 2017-03-01 VITALS
WEIGHT: 127.75 LBS | DIASTOLIC BLOOD PRESSURE: 65 MMHG | TEMPERATURE: 98 F | SYSTOLIC BLOOD PRESSURE: 93 MMHG | BODY MASS INDEX: 26.7 KG/M2 | HEART RATE: 78 BPM

## 2017-03-01 DIAGNOSIS — C50.811 CANCER OF OVERLAPPING SITES OF RIGHT FEMALE BREAST: Primary | ICD-10-CM

## 2017-03-01 DIAGNOSIS — Z09 SURGERY FOLLOW-UP EXAMINATION: Primary | ICD-10-CM

## 2017-03-01 PROCEDURE — 99024 POSTOP FOLLOW-UP VISIT: CPT | Mod: S$GLB,,, | Performed by: SURGERY

## 2017-03-01 PROCEDURE — 99999 PR PBB SHADOW E&M-EST. PATIENT-LVL II: CPT | Mod: PBBFAC,,, | Performed by: SURGERY

## 2017-03-01 NOTE — PROGRESS NOTES
Avis Graves presents to [facility] on 3[facility]/2017 for a follow up visit status post R breast recon with TE placement on 02/07/2017    Vitals:    03/01/17 1150   BP: 93/65   Pulse: 78   Temp: 97.5 °F (36.4 °C)     Current Outpatient Prescriptions on File Prior to Visit   Medication Sig Dispense Refill    budesonide-formoterol 160-4.5 mcg (SYMBICORT) 160-4.5 mcg/actuation HFAA Inhale 1 puff into the lungs every 12 (twelve) hours. 10.2 g 0    cetirizine (ZYRTEC) 10 MG tablet Take 10 mg by mouth daily as needed.       COREG CR 80 mg 24 hr capsule Take 1 capsule (80 mg total) by mouth once daily. (Patient taking differently: Take 80 mg by mouth every evening. ) 30 capsule 6    diazePAM (VALIUM) 5 MG tablet Take 1 tablet (5 mg total) by mouth every 6 (six) hours as needed (muscle spasms). 28 tablet 0    enalapril (VASOTEC) 2.5 MG tablet TAKE 1 TABLET (2.5 MG TOTAL) BY MOUTH 2 (TWO) TIMES DAILY. 60 tablet 2    furosemide (LASIX) 20 MG tablet TAKE 1 TABLET (20 MG TOTAL) BY MOUTH ONCE DAILY. 30 tablet 0    hydrocodone-acetaminophen 5-325mg (NORCO) 5-325 mg per tablet Take 1-2 tablets by mouth every 4 (four) hours as needed for Pain. 60 tablet 0    LACTOBACILLUS ACIDOPHILUS (PROBIOTIC ORAL) Take by mouth every evening.      mometasone (NASONEX) 50 mcg/actuation nasal spray 2 sprays by Nasal route once daily. (Patient taking differently: 2 sprays by Nasal route daily as needed. ) 1 each 3    ondansetron (ZOFRAN-ODT) 4 MG TbDL Take 1 tablet (4 mg total) by mouth every 6 (six) hours as needed. 30 tablet 0    potassium chloride (MICRO-K) 10 MEQ CpSR TAKE 1 CAPSULE (10 MEQ TOTAL) BY MOUTH 2 (TWO) TIMES DAILY. 60 capsule 0    senna-docusate 8.6-50 mg (PERICOLACE) 8.6-50 mg per tablet Take 1 tablet by mouth once daily. While taking prescription pain medications. 30 tablet 0    UNABLE TO FIND Bioclens 1 tablet every AM for bowels (OTC)       No current facility-administered medications on file prior to  visit.      Patient Active Problem List   Diagnosis    Heart failure, systolic, chronic    Ventricular tachycardia    Automatic implantable cardioverter-defibrillator in situ    Thrombus due to any device, implant or graft    Generalized headaches    NICM (nonischemic cardiomyopathy)    Abdominal pain, other specified site    Irritable bowel syndrome with constipation    Colitis    Clostridium difficile colitis    Diverticulitis of colon    Cancer of overlapping sites of right female breast    Mild persistent asthma without complication    Breast cancer     Past Surgical History:   Procedure Laterality Date    CARDIAC DEFIBRILLATOR PLACEMENT      X 2 Medtronic     CARDIAC DEFIBRILLATOR PLACEMENT      medtronic    HYSTERECTOMY      TONSILLECTOMY       Doing well today. Tolerated TE expansion last week. Healing well.    R breast incisional tape intact. Mastectomy flaps viable. No sign of infection. Tapes removed today.    All questions were answered. She will return to clinic in 2 weeks for tissue expansion. The patient was advised to call the clinic with any questions or concerns prior to their next visit.     Patient may need additional chemo and radiation. Discussed how this would change schedule for reconstruction or need for free flap.

## 2017-03-06 DIAGNOSIS — C50.811 CANCER OF OVERLAPPING SITES OF RIGHT FEMALE BREAST: Primary | ICD-10-CM

## 2017-03-15 ENCOUNTER — OFFICE VISIT (OUTPATIENT)
Dept: PLASTIC SURGERY | Facility: CLINIC | Age: 42
End: 2017-03-15
Payer: COMMERCIAL

## 2017-03-15 VITALS — BODY MASS INDEX: 27.12 KG/M2 | WEIGHT: 129.75 LBS | TEMPERATURE: 98 F

## 2017-03-15 DIAGNOSIS — Z09 SURGERY FOLLOW-UP EXAMINATION: Primary | ICD-10-CM

## 2017-03-15 PROCEDURE — 99999 PR PBB SHADOW E&M-EST. PATIENT-LVL II: CPT | Mod: PBBFAC,,, | Performed by: SURGERY

## 2017-03-15 PROCEDURE — 99024 POSTOP FOLLOW-UP VISIT: CPT | Mod: S$GLB,,, | Performed by: SURGERY

## 2017-03-15 NOTE — PROGRESS NOTES
Avis Graves presents to [facility] on 3/15/2017 for a follow up visit status post R breast recon with TE placement on 02/07/2017    There were no vitals filed for this visit.  Current Outpatient Prescriptions on File Prior to Visit   Medication Sig Dispense Refill    budesonide-formoterol 160-4.5 mcg (SYMBICORT) 160-4.5 mcg/actuation HFAA Inhale 1 puff into the lungs every 12 (twelve) hours. 10.2 g 0    cetirizine (ZYRTEC) 10 MG tablet Take 10 mg by mouth daily as needed.       COREG CR 80 mg 24 hr capsule Take 1 capsule (80 mg total) by mouth once daily. (Patient taking differently: Take 80 mg by mouth every evening. ) 30 capsule 6    diazePAM (VALIUM) 5 MG tablet Take 1 tablet (5 mg total) by mouth every 6 (six) hours as needed (muscle spasms). 28 tablet 0    enalapril (VASOTEC) 2.5 MG tablet TAKE 1 TABLET (2.5 MG TOTAL) BY MOUTH 2 (TWO) TIMES DAILY. 60 tablet 2    furosemide (LASIX) 20 MG tablet TAKE 1 TABLET (20 MG TOTAL) BY MOUTH ONCE DAILY. 30 tablet 0    hydrocodone-acetaminophen 5-325mg (NORCO) 5-325 mg per tablet Take 1-2 tablets by mouth every 4 (four) hours as needed for Pain. 60 tablet 0    LACTOBACILLUS ACIDOPHILUS (PROBIOTIC ORAL) Take by mouth every evening.      mometasone (NASONEX) 50 mcg/actuation nasal spray 2 sprays by Nasal route once daily. (Patient taking differently: 2 sprays by Nasal route daily as needed. ) 1 each 3    ondansetron (ZOFRAN-ODT) 4 MG TbDL Take 1 tablet (4 mg total) by mouth every 6 (six) hours as needed. 30 tablet 0    potassium chloride (MICRO-K) 10 MEQ CpSR TAKE 1 CAPSULE (10 MEQ TOTAL) BY MOUTH 2 (TWO) TIMES DAILY. 60 capsule 0    senna-docusate 8.6-50 mg (PERICOLACE) 8.6-50 mg per tablet Take 1 tablet by mouth once daily. While taking prescription pain medications. 30 tablet 0    UNABLE TO FIND Bioclens 1 tablet every AM for bowels (OTC)       No current facility-administered medications on file prior to visit.      Patient Active  Problem List   Diagnosis    Heart failure, systolic, chronic    Ventricular tachycardia    Automatic implantable cardioverter-defibrillator in situ    Thrombus due to any device, implant or graft    Generalized headaches    NICM (nonischemic cardiomyopathy)    Abdominal pain, other specified site    Irritable bowel syndrome with constipation    Colitis    Clostridium difficile colitis    Diverticulitis of colon    Cancer of overlapping sites of right female breast    Mild persistent asthma without complication    Breast cancer     Past Surgical History:   Procedure Laterality Date    CARDIAC DEFIBRILLATOR PLACEMENT      X 2 Medtronic     CARDIAC DEFIBRILLATOR PLACEMENT      medtronic    HYSTERECTOMY      TONSILLECTOMY       Doing well today. Tolerated last TE expansion well. Healing well.    R breast incisional tape intact. Mastectomy flaps viable. No sign of infection. Expanding well.     Underwent expansion today.    80ccNS injected to R TE for 80/460cc total. Tolerated well.     All questions were answered. She will return to clinic in 2 weeks for tissue expansion. The patient was advised to call the clinic with any questions or concerns prior to their next visit.

## 2017-03-24 ENCOUNTER — TELEPHONE (OUTPATIENT)
Dept: HEMATOLOGY/ONCOLOGY | Facility: CLINIC | Age: 42
End: 2017-03-24

## 2017-03-24 NOTE — TELEPHONE ENCOUNTER
----- Message from Praveena Raman sent at 3/24/2017 11:20 AM CDT -----  Contact: Self   Patient wants to know where they are placing the port. Please call back at [phone].

## 2017-03-28 ENCOUNTER — TELEPHONE (OUTPATIENT)
Dept: SURGERY | Facility: CLINIC | Age: 42
End: 2017-03-28

## 2017-03-28 ENCOUNTER — PATIENT MESSAGE (OUTPATIENT)
Dept: PLASTIC SURGERY | Facility: CLINIC | Age: 42
End: 2017-03-28

## 2017-03-28 ENCOUNTER — TELEPHONE (OUTPATIENT)
Dept: PLASTIC SURGERY | Facility: CLINIC | Age: 42
End: 2017-03-28

## 2017-03-28 ENCOUNTER — PATIENT MESSAGE (OUTPATIENT)
Dept: HEMATOLOGY/ONCOLOGY | Facility: CLINIC | Age: 42
End: 2017-03-28

## 2017-03-28 ENCOUNTER — TELEPHONE (OUTPATIENT)
Dept: HEMATOLOGY/ONCOLOGY | Facility: CLINIC | Age: 42
End: 2017-03-28

## 2017-03-28 NOTE — TELEPHONE ENCOUNTER
----- Message from Praveena Raman sent at 3/28/2017 10:34 AM CDT -----  Contact: Self  Patient needs a dr note for the appt on Feb 23. Please call back at [phone].

## 2017-03-28 NOTE — TELEPHONE ENCOUNTER
----- Message from Dominga Shipman sent at 3/28/2017 10:29 AM CDT -----  Contact: pt  Pt called in said she need a email of her visit report from feb 21,2017

## 2017-03-29 ENCOUNTER — LAB VISIT (OUTPATIENT)
Dept: LAB | Facility: HOSPITAL | Age: 42
End: 2017-03-29
Payer: COMMERCIAL

## 2017-03-29 ENCOUNTER — INITIAL CONSULT (OUTPATIENT)
Dept: INTERVENTIONAL RADIOLOGY/VASCULAR | Facility: CLINIC | Age: 42
End: 2017-03-29
Payer: COMMERCIAL

## 2017-03-29 VITALS
HEART RATE: 66 BPM | HEIGHT: 58 IN | SYSTOLIC BLOOD PRESSURE: 109 MMHG | DIASTOLIC BLOOD PRESSURE: 67 MMHG | WEIGHT: 128 LBS | BODY MASS INDEX: 26.87 KG/M2

## 2017-03-29 DIAGNOSIS — C50.811 CANCER OF OVERLAPPING SITES OF RIGHT FEMALE BREAST: ICD-10-CM

## 2017-03-29 DIAGNOSIS — Z45.2 ENCOUNTER FOR INSERTION OF VENOUS ACCESS PORT: Primary | ICD-10-CM

## 2017-03-29 DIAGNOSIS — C50.811 CANCER OF OVERLAPPING SITES OF RIGHT FEMALE BREAST: Primary | ICD-10-CM

## 2017-03-29 LAB
BASOPHILS # BLD AUTO: 0.01 K/UL
BASOPHILS NFR BLD: 0.2 %
DIFFERENTIAL METHOD: NORMAL
EOSINOPHIL # BLD AUTO: 0.1 K/UL
EOSINOPHIL NFR BLD: 1 %
ERYTHROCYTE [DISTWIDTH] IN BLOOD BY AUTOMATED COUNT: 11.9 %
HCT VFR BLD AUTO: 39.7 %
HGB BLD-MCNC: 13 G/DL
INR PPP: 1.2
LYMPHOCYTES # BLD AUTO: 2 K/UL
LYMPHOCYTES NFR BLD: 39.4 %
MCH RBC QN AUTO: 30.1 PG
MCHC RBC AUTO-ENTMCNC: 32.7 %
MCV RBC AUTO: 92 FL
MONOCYTES # BLD AUTO: 0.3 K/UL
MONOCYTES NFR BLD: 6.3 %
NEUTROPHILS # BLD AUTO: 2.7 K/UL
NEUTROPHILS NFR BLD: 52.9 %
PLATELET # BLD AUTO: 191 K/UL
PMV BLD AUTO: 10.4 FL
PROTHROMBIN TIME: 12.5 SEC
RBC # BLD AUTO: 4.32 M/UL
WBC # BLD AUTO: 5.1 K/UL

## 2017-03-29 PROCEDURE — 85610 PROTHROMBIN TIME: CPT

## 2017-03-29 PROCEDURE — 85025 COMPLETE CBC W/AUTO DIFF WBC: CPT

## 2017-03-29 PROCEDURE — 36415 COLL VENOUS BLD VENIPUNCTURE: CPT

## 2017-03-29 PROCEDURE — 99204 OFFICE O/P NEW MOD 45 MIN: CPT | Mod: S$GLB,,, | Performed by: NURSE PRACTITIONER

## 2017-03-29 PROCEDURE — 1160F RVW MEDS BY RX/DR IN RCRD: CPT | Mod: S$GLB,,, | Performed by: NURSE PRACTITIONER

## 2017-03-29 PROCEDURE — 99999 PR PBB SHADOW E&M-EST. PATIENT-LVL II: CPT | Mod: PBBFAC,,,

## 2017-03-29 NOTE — PROGRESS NOTES
Subjective:       Patient ID: Avis Graves is a 42 y.o. female.    Chief Complaint: Breast Cancer    HPI Comments: Patient in today for pre-procedure consult for a port placement scheduled on 03/30/17 at 9:00AM. Patient was diagnosed with carcinoma of the right breast and had a right modified radical mastectomy with sentinel lymph node biopsy and insertion of tissue expanders on 02/07/17. She still has some residual tenderness of the right upper arm. She has no other complaints today.     Review of Systems   Constitutional: Positive for activity change (not currently working, but manages all ADL's ) and fatigue. Negative for appetite change, chills, fever and unexpected weight change.   HENT: Negative.    Eyes: Negative.    Respiratory: Negative for apnea, cough and chest tightness.    Cardiovascular: Negative for chest pain, palpitations and leg swelling.        AICD since 2012 left chest wall. Chronic systolic heart failure and cardiomyopathy    Gastrointestinal: Negative for abdominal distention, abdominal pain, blood in stool, constipation, diarrhea, nausea and vomiting.   Endocrine: Negative.    Genitourinary: Negative for difficulty urinating, dysuria, frequency and urgency.   Musculoskeletal:        Right upper arm tenderness   Skin:        Tissue expander right chest (right mastectomy site).    Neurological: Negative for dizziness, tremors, seizures, syncope, facial asymmetry, speech difficulty, weakness, light-headedness, numbness and headaches.   Hematological: Does not bruise/bleed easily.       Objective:      Physical Exam   Constitutional: She is oriented to person, place, and time. She appears well-developed and well-nourished.   HENT:   Head: Normocephalic and atraumatic.   Eyes: EOM are normal. Pupils are equal, round, and reactive to light.   Neck: Normal range of motion. Neck supple.   Cardiovascular: Normal rate, regular rhythm, normal heart sounds and intact distal pulses.    AICD  visualized (left chest wall)   Pulmonary/Chest: Effort normal. No respiratory distress. She has no wheezes. She has rhonchi (left upper posterior and anterior ).   Abdominal: Soft. Bowel sounds are normal. She exhibits no distension and no mass. There is no tenderness. There is no rebound and no guarding.   Musculoskeletal: Normal range of motion.   Lymphadenopathy:     She has no cervical adenopathy.   Neurological: She is alert and oriented to person, place, and time.   Skin: Skin is warm and dry.   Tissue expander noted right upper chest   Psychiatric: She has a normal mood and affect. Her behavior is normal. Judgment and thought content normal.   Vitals reviewed.      Assessment:       1. Encounter for insertion of venous access port    2. Cancer of overlapping sites of right female breast        Plan:     Port placement procedure discussed in detail with the patient including risks, benefits, potential complications, usual pre and post procedure course. Informed consent signed. Verbal and written pre-procedure instructions provided. JEFFREY Stevenson visited with the patient and gave her pre and post procedure instructions. Patient verbalized understanding. CBC and INR collected prior to clinic visit today. Will forward an EPIC message to Ro Harden and Torrey regarding presence of AICD and tissue expander.

## 2017-03-30 ENCOUNTER — HOSPITAL ENCOUNTER (OUTPATIENT)
Facility: HOSPITAL | Age: 42
Discharge: HOME | End: 2017-03-30
Attending: INTERNAL MEDICINE | Admitting: RADIOLOGY
Payer: COMMERCIAL

## 2017-03-30 VITALS
RESPIRATION RATE: 17 BRPM | TEMPERATURE: 98 F | BODY MASS INDEX: 26.03 KG/M2 | OXYGEN SATURATION: 100 % | HEART RATE: 65 BPM | WEIGHT: 124 LBS | DIASTOLIC BLOOD PRESSURE: 66 MMHG | SYSTOLIC BLOOD PRESSURE: 140 MMHG | HEIGHT: 58 IN

## 2017-03-30 DIAGNOSIS — I50.22 HEART FAILURE, SYSTOLIC, CHRONIC: ICD-10-CM

## 2017-03-30 DIAGNOSIS — C50.811 CANCER OF OVERLAPPING SITES OF RIGHT FEMALE BREAST: ICD-10-CM

## 2017-03-30 DIAGNOSIS — I42.8 NICM (NONISCHEMIC CARDIOMYOPATHY): ICD-10-CM

## 2017-03-30 PROCEDURE — 25000003 PHARM REV CODE 250: Performed by: RADIOLOGY

## 2017-03-30 PROCEDURE — 63600175 PHARM REV CODE 636 W HCPCS: Performed by: NURSE PRACTITIONER

## 2017-03-30 PROCEDURE — 63600175 PHARM REV CODE 636 W HCPCS: Performed by: RADIOLOGY

## 2017-03-30 RX ORDER — HEPARIN 100 UNIT/ML
SYRINGE INTRAVENOUS CODE/TRAUMA/SEDATION MEDICATION
Status: COMPLETED | OUTPATIENT
Start: 2017-03-30 | End: 2017-03-30

## 2017-03-30 RX ORDER — SODIUM CHLORIDE 9 MG/ML
500 INJECTION, SOLUTION INTRAVENOUS CONTINUOUS
Status: DISCONTINUED | OUTPATIENT
Start: 2017-03-30 | End: 2017-03-30 | Stop reason: HOSPADM

## 2017-03-30 RX ORDER — MIDAZOLAM HYDROCHLORIDE 1 MG/ML
INJECTION, SOLUTION INTRAMUSCULAR; INTRAVENOUS CODE/TRAUMA/SEDATION MEDICATION
Status: COMPLETED | OUTPATIENT
Start: 2017-03-30 | End: 2017-03-30

## 2017-03-30 RX ORDER — FENTANYL CITRATE 50 UG/ML
INJECTION, SOLUTION INTRAMUSCULAR; INTRAVENOUS CODE/TRAUMA/SEDATION MEDICATION
Status: COMPLETED | OUTPATIENT
Start: 2017-03-30 | End: 2017-03-30

## 2017-03-30 RX ORDER — CEFAZOLIN SODIUM 1 G/50ML
1 SOLUTION INTRAVENOUS ONCE
Status: COMPLETED | OUTPATIENT
Start: 2017-03-30 | End: 2017-03-30

## 2017-03-30 RX ADMIN — MIDAZOLAM HYDROCHLORIDE 0.5 MG: 1 INJECTION, SOLUTION INTRAMUSCULAR; INTRAVENOUS at 02:03

## 2017-03-30 RX ADMIN — FENTANYL CITRATE 25 MCG: 50 INJECTION, SOLUTION INTRAMUSCULAR; INTRAVENOUS at 02:03

## 2017-03-30 RX ADMIN — CEFAZOLIN SODIUM 1 G: 1 SOLUTION INTRAVENOUS at 02:03

## 2017-03-30 RX ADMIN — HEPARIN SODIUM (PORCINE) LOCK FLUSH IV SOLN 100 UNIT/ML 100 UNITS: 100 SOLUTION at 02:03

## 2017-03-30 NOTE — PROGRESS NOTES
Recovery completed, pt tolerated well. No apparent distress noted. Dressing applied CDI. Discharge instructions reviewed and acknowledged. Pt discharged via wheelchair and private vehicle, accompanied by family and transport.

## 2017-03-30 NOTE — DISCHARGE SUMMARY
Radiology Discharge Summary      Hospital Course: No complications    Admit Date: 3/30/2017  Discharge Date: 03/30/2017     Instructions Given to Patient: Yes  Diet: Resume prior diet  Activity: activity as tolerated    Description of Condition on Discharge: Stable  Vital Signs (Most Recent): Temp: 97.9 °F (36.6 °C) (03/30/17 0956)  Pulse: 69 (03/30/17 1500)  Resp: 15 (03/30/17 1500)  BP: (!) 140/65 (03/30/17 1500)  SpO2: 99 % (03/30/17 1500)    Discharge Disposition: Home    Discharge Diagnosis: Port placement for chemotherapy for breast cancer.     Ayala Soto MD  PGY-3  Department of Radiology  Pager: [phone]

## 2017-03-30 NOTE — PROCEDURES
Radiology Post-Procedure Note    Pre Op Diagnosis: Breast cancer    Post Op Diagnosis: Same    Procedure: PORT placement    Procedure performed by: Ayala Soto and Tani Gupta    Written Informed Consent Obtained: Yes    Specimen Removed: No    Estimated Blood Loss: Minimal    Findings:   Using realtime U/S guidance a 5 Fr port catheter was placed into the left Basilic Vein vein with tip of the catheter in the SVC.    Port is ready for use.     Ayala Soto MD  PGY-3  Department of Radiology  Pager: [phone]

## 2017-03-30 NOTE — PROGRESS NOTES
Spoke to Beba in pacemaker clinic regarding pt AICD; spoke to Mariam in IR and no cautery used. June stated no interrogation needed.

## 2017-03-30 NOTE — PROGRESS NOTES
Pt arrived to [facility], no acute distres snoted. Report received from JEFFREY Reed. Will continue to monitor.

## 2017-03-30 NOTE — H&P
Radiology History & Physical      SUBJECTIVE:     Chief Complaint: Breast cancer    History of Present Illness:  Avis Graves is a 42 y.o. female who presents for port placement.  Past Medical History:   Diagnosis Date    Allergy     Asthma     Cardiomyopathy     CHF (congestive heart failure)     Diverticulitis      Past Surgical History:   Procedure Laterality Date    CARDIAC DEFIBRILLATOR PLACEMENT      X 2 Medtronic     CARDIAC DEFIBRILLATOR PLACEMENT      medtronic    HYSTERECTOMY      MASTECTOMY Right 02/07/2017    TONSILLECTOMY         Home Meds:   Prior to Admission medications    Medication Sig Start Date End Date Taking? Authorizing Provider   COREG CR 80 mg 24 hr capsule Take 1 capsule (80 mg total) by mouth once daily.  Patient taking differently: Take 80 mg by mouth every evening.  6/22/15  Yes Honorio Escobar MD   diazePAM (VALIUM) 5 MG tablet Take 1 tablet (5 mg total) by mouth every 6 (six) hours as needed (muscle spasms). 2/8/17 3/30/17 Yes Rachelle Chand MD   enalapril (VASOTEC) 2.5 MG tablet TAKE 1 TABLET (2.5 MG TOTAL) BY MOUTH 2 (TWO) TIMES DAILY. 9/16/16  Yes Honorio Escobar MD   furosemide (LASIX) 20 MG tablet TAKE 1 TABLET (20 MG TOTAL) BY MOUTH ONCE DAILY. 11/8/16  Yes Honorio Escobar MD   hydrocodone-acetaminophen 5-325mg (NORCO) 5-325 mg per tablet Take 1-2 tablets by mouth every 4 (four) hours as needed for Pain. 2/8/17  Yes Rahcelle Chand MD   potassium chloride (MICRO-K) 10 MEQ CpSR TAKE 1 CAPSULE (10 MEQ TOTAL) BY MOUTH 2 (TWO) TIMES DAILY. 2/13/17  Yes Honorio Escobar MD   senna-docusate 8.6-50 mg (PERICOLACE) 8.6-50 mg per tablet Take 1 tablet by mouth once daily. While taking prescription pain medications. 2/8/17  Yes Rachelle Chand MD   budesonide-formoterol 160-4.5 mcg (SYMBICORT) 160-4.5 mcg/actuation HFAA Inhale 1 puff into the lungs every 12 (twelve) hours. 1/18/17 1/18/18  Alejandra Nuno MD   cetirizine (ZYRTEC)  10 MG tablet Take 10 mg by mouth daily as needed.     Historical Provider, MD   LACTOBACILLUS ACIDOPHILUS (PROBIOTIC ORAL) Take by mouth every evening.    Historical Provider, MD   mometasone (NASONEX) 50 mcg/actuation nasal spray 2 sprays by Nasal route once daily.  Patient taking differently: 2 sprays by Nasal route daily as needed.  5/29/14   Honorio Escobar MD   ondansetron (ZOFRAN-ODT) 4 MG TbDL Take 1 tablet (4 mg total) by mouth every 6 (six) hours as needed. 2/8/17   Rachelle Chand MD   UNABLE TO FIND Bioclens 1 tablet every AM for bowels (OTC)    Historical Provider, MD     Anticoagulants/Antiplatelets: no anticoagulation    Allergies:   Review of patient's allergies indicates:   Allergen Reactions    Ciprofloxacin Rash     Sedation History:  no adverse reactions    Review of Systems:   Hematological: no known coagulopathies  Respiratory: no shortness of breath  Cardiovascular: no chest pain  Gastrointestinal: no abdominal pain  Genito-Urinary: no dysuria  Musculoskeletal: negative  Neurological: no TIA or stroke symptoms         OBJECTIVE:     Vital Signs (Most Recent)  Temp: 97.9 °F (36.6 °C) (03/30/17 0956)  Pulse: 68 (03/30/17 0956)  Resp: (!) 68 (03/30/17 0956)  BP: 111/71 (03/30/17 0956)  SpO2: 98 % (03/30/17 0956)    Physical Exam:  ASA: 3  Mallampati: 3    General: no acute distress  Mental Status: alert and oriented to person, place and time  HEENT: normocephalic, atraumatic  Chest: unlabored breathing  Heart: regular heart rate  Abdomen: nondistended  Extremity: moves all extremities    Laboratory  Lab Results   Component Value Date    INR 1.2 03/29/2017       Lab Results   Component Value Date    WBC 5.10 03/29/2017    HGB 13.0 03/29/2017    HCT 39.7 03/29/2017    MCV 92 03/29/2017     03/29/2017      Lab Results   Component Value Date     02/08/2017     02/08/2017    K 4.1 02/08/2017     02/08/2017    CO2 27 02/08/2017    BUN 13 02/08/2017    CREATININE  0.8 02/08/2017    CALCIUM 8.6 (L) 02/08/2017    MG 2.1 02/08/2017    ALT 16 02/07/2017    AST 28 02/07/2017    ALBUMIN 3.6 02/07/2017    BILITOT 0.6 02/07/2017    BILIDIR 0.1 12/05/2005       ASSESSMENT/PLAN:     Sedation Plan: moderate  Patient will undergo port placement.    Ayala Soto MD  PGY-3  Department of Radiology  Pager: [phone]

## 2017-03-30 NOTE — DISCHARGE INSTRUCTIONS
[facility] [phone] (MON-FRI 8 AM- 5PM). Radiology Resident on call [phone].

## 2017-03-30 NOTE — IP AVS SNAPSHOT
[facility]  [address]  [facility] [address]  Phone: [phone]           Patient Discharge Instructions   Our goal is to set you up for success. This packet includes information on your condition, medications, and your home care.  It will help you care for yourself to prevent having to return to the hospital.     Please ask your nurse if you have any questions.      There are many details to remember when preparing to leave the hospital. Here is what you will need to do:    1. Take your medicine. If you are prescribed medications, review your Medication List on the following pages. You may have new medications to  at the pharmacy and others that you'll need to stop taking. Review the instructions for how and when to take your medications. Talk with your doctor or nurses if you are unsure of what to do.     2. Go to your follow-up appointments. Specific follow-up information is listed in the following pages. Your may be contacted by a nurse or clinical provider about future appointments. Be sure we have all of the phone numbers to reach you. Please contact your provider's office if you are unable to make an appointment.     3. Watch for warning signs. Your doctor or nurse will give you detailed warning signs to watch for and when to call for assistance. These instructions may also include educational information about your condition. If you experience any of warning signs to your health, call your doctor.           [facility]  Unless otherwise directed by your provider, please   contact [facility], our nurse care line   that is available for 24/7 assistance.     [phone] (toll-free)     Registered nurses in the [facility]   provide: appointment scheduling, clinical advisement, health education, and other advisory services.                  ** Verify the list of medication(s) below is accurate and up to date. Carry this with you in case of  emergency. If your medications have changed, please notify your healthcare provider.             Medication List      ASK your doctor about these medications        Additional Info                      budesonide-formoterol 160-4.5 mcg 160-4.5 mcg/actuation Hfaa   Commonly known as:  SYMBICORT   Quantity:  10.2 g   Refills:  0   Dose:  1 puff    Instructions:  Inhale 1 puff into the lungs every 12 (twelve) hours.     Begin Date    AM    Noon    PM    Bedtime       cetirizine 10 MG tablet   Commonly known as:  ZYRTEC   Refills:  0   Dose:  10 mg    Instructions:  Take 10 mg by mouth daily as needed.     Begin Date    AM    Noon    PM    Bedtime       COREG CR 80 MG 24 hr capsule   Quantity:  30 capsule   Refills:  6   Dose:  80 mg   Generic drug:  carvedilol    Instructions:  Take 1 capsule (80 mg total) by mouth once daily.     Begin Date    AM    Noon    PM    Bedtime       diazePAM 5 MG tablet   Commonly known as:  VALIUM   Quantity:  28 tablet   Refills:  0   Dose:  5 mg    Instructions:  Take 1 tablet (5 mg total) by mouth every 6 (six) hours as needed (muscle spasms).     Begin Date    AM    Noon    PM    Bedtime       enalapril 2.5 MG tablet   Commonly known as:  VASOTEC   Quantity:  60 tablet   Refills:  2    Instructions:  TAKE 1 TABLET (2.5 MG TOTAL) BY MOUTH 2 (TWO) TIMES DAILY.     Begin Date    AM    Noon    PM    Bedtime       furosemide 20 MG tablet   Commonly known as:  LASIX   Quantity:  30 tablet   Refills:  0    Instructions:  TAKE 1 TABLET (20 MG TOTAL) BY MOUTH ONCE DAILY.     Begin Date    AM    Noon    PM    Bedtime       hydrocodone-acetaminophen 5-325mg 5-325 mg per tablet   Commonly known as:  NORCO   Quantity:  60 tablet   Refills:  0   Dose:  1-2 tablet    Instructions:  Take 1-2 tablets by mouth every 4 (four) hours as needed for Pain.     Begin Date    AM    Noon    PM    Bedtime       mometasone 50 mcg/actuation nasal spray   Commonly known as:  NASONEX   Quantity:  1 each   Refills:  3    Dose:  2 spray    Instructions:  2 sprays by Nasal route once daily.     Begin Date    AM    Noon    PM    Bedtime       ondansetron 4 MG Tbdl   Commonly known as:  ZOFRAN-ODT   Quantity:  30 tablet   Refills:  0   Dose:  4 mg    Instructions:  Take 1 tablet (4 mg total) by mouth every 6 (six) hours as needed.     Begin Date    AM    Noon    PM    Bedtime       potassium chloride 10 MEQ Cpsr   Commonly known as:  MICRO-K   Quantity:  60 capsule   Refills:  0    Instructions:  TAKE 1 CAPSULE (10 MEQ TOTAL) BY MOUTH 2 (TWO) TIMES DAILY.     Begin Date    AM    Noon    PM    Bedtime       PROBIOTIC ORAL   Refills:  0    Instructions:  Take by mouth every evening.     Begin Date    AM    Noon    PM    Bedtime       senna-docusate 8.6-50 mg 8.6-50 mg per tablet   Commonly known as:  PERICOLACE   Quantity:  30 tablet   Refills:  0   Dose:  1 tablet    Instructions:  Take 1 tablet by mouth once daily. While taking prescription pain medications.     Begin Date    AM    Noon    PM    Bedtime       UNABLE TO FIND   Refills:  0    Instructions:  Bioclens 1 tablet every AM for bowels (OTC)     Begin Date    AM    Noon    PM    Bedtime                  Please bring to all follow up appointments:    1. A copy of your discharge instructions.  2. All medicines you are currently taking in their original bottles.  3. Identification and insurance card.    Please arrive 15 minutes ahead of scheduled appointment time.    Please call 24 hours in advance if you must reschedule your appointment and/or time.        Your Scheduled Appointments     Apr 05, 2017 10:45 AM CDT   Post OP with KIRAN Munoz - Plastic Surg [place] ([facility] )    [address]  [facility] [address] [phone]            May 10, 2017  8:00 AM CDT   Remote Interrogation with HOME MONITOR DEVICE CHECK, [facility]   Omar Gillespie - Arrhythmia ([facility] )    [address]  [facility] [address] [phone]             "Jun 20, 2017  9:00 AM CDT   [facility]    [address]  [facility] [address] [phone]                  Discharge Instructions       [facility] [phone] (MON-FRI 8 AM- 5PM). Radiology Resident on call [phone].      Discharge References/Attachments     SEDATION, PROCEDURAL (ADULT) (ENGLISH)        Admission Information     Date & Time Provider Department CSN    3/30/2017  7:52 AM Jerry Birmingham MD [facility] [id number]      Care Providers     Provider Role Specialty Primary office phone    Jerry Birmingham MD Attending Provider Hematology and Oncology [phone]    [facility] Provider Surgeon  -- Number not on file      Your Vitals Were     BP Pulse Temp Resp Height Weight    148/69 65 98.4 °F (36.9 °C) (Oral) 15 4' 10" (1.473 m) 56.2 kg (124 lb)    SpO2 BMI             100% 25.92 kg/m2         Recent Lab Values     No lab values to display.      Allergies as of 3/30/2017        Reactions    Ciprofloxacin Rash      Advance Directives     An advance directive is a document which, in the event you are no longer able to make decisions for yourself, tells your healthcare team what kind of treatment you do or do not want to receive, or who you would like to make those decisions for you.  If you do not currently have an advance directive, [facility] encourages you to create one.  For more information call:  [phone]), [phone] ([phone]),  or log on to [email or web address].        Language Assistance Services     ATTENTION: Language assistance services are available, free of charge. Please call [phone].      ATENCIÓN: Si habla español, tiene a ray disposición servicios gratuitos de asistencia lingüística. Llame al [phone].     CHÚ Ý: N?u b?n nói Ti?ng Vi?t, có các d?ch v? h? tr? ngôn ng? mi?n phí dành cho b?n. G?i s? [phone].        Heart Failure " Education       Heart Failure: Being Active  You have a condition called heart failure. Being active doesnt mean that you have to wear yourself out. Even a little movement each day helps to strengthen your heart. If you cant get out to exercise, you can do simple stretching and strengthening exercises at home. These are good ways to keep you well-conditioned and prevent you and your heart from becoming excessively weak.    Ideas to get you started  · Add a little movement to things you do now. Walk to mail letters. Park your car at the far end of the parking lot and walk to the store. Walk up a flight of stairs instead of taking the elevator.  · Choose activities you enjoy. You might walk, swim, or ride an exercise bike. Things like gardening and washing the car count, too. Other possibilities include: washing dishes, walking the dog, walking around the mall, and doing aerobic activities with friends.  · Join a group exercise program at a [facility] or [facility], a senior center, or a community center. Or look into a hospital cardiac rehabilitation program. Ask your doctor if you qualify.  Tips to keep you going  · Get up and get dressed each day. Go to a coffee shop and read a newspaper or go somewhere that you'll be in the presence of other active people. Youll feel more like being active.  · Make a plan. Choose one or more activities that you enjoy and that you can easily do. Then plan to do at least one each day. You might write your plan on a calendar.  · Go with a friend or a group if you like company. This can help you feel supported and stay motivated, too.  · Plan social events that you enjoy. This will keep you mentally engaged as well as physically motivated to do things you find pleasure in.  For your safety  · Talk with your healthcare provider before starting an exercise program.  · Exercise indoors when its too hot or too cold outside, or when the air quality is poor. Try walking at a shopping mall.  · Wear  socks and sturdy shoes to maintain your balance and prevent falls.  · Start slowly. Do a few minutes several times a day at first. Increase your time and speed little by little.  · Stop and rest whenever you feel tired or get short of breath.  · Dont push yourself on days when you dont feel well.  Date Last Reviewed: 3/20/2016  © 0060-5961 [employer]. [address] [place] [address], [place] [address]. All rights reserved. This information is not intended as a substitute for professional medical care. Always follow your healthcare professional's instructions.              Heart Failure: Evaluating Your Heart  You have a condition called heart failure. To evaluate your condition, your doctor will examine you, ask questions, and do some tests. Along with looking for signs of heart failure, the doctor looks for any other health problems that may have led to heart failure. The results of your evaluation will help your doctor form a treatment plan.  Health history and physical exam  Your visit will start with a health history. Tell the doctor about any symptoms youve noticed and about all medicines you take. Then youll have a physical exam. This includes listening to your heartbeat and breathing. Youll also be checked for swelling (edema) in your legs and neck. When you have fluid buildup or fluid in the lungs, it may be called congestive heart failure.  Diagnosing heart failure     During an echocardiogram, sound waves bounce off the heart. These are converted into a picture on the screen.   The following may be done to help your doctor form a diagnosis:  · X-rays show the size and shape of your heart. These pictures can also show fluid in your lungs.  · An electrocardiogram (ECG or EKG) shows the pattern of your heartbeat. Small pads (electrodes) are placed on your chest, arms, and legs. Wires connect the pads to the ECG machine, which records your hearts electrical signals. This can give the doctor  information about heart function.  · An echocardiogram uses ultrasound waves to show the structure and movement of your heart muscle. This shows how well the heart pumps. It also shows the thickness of the heart walls, and if the heart is enlarged. It is one of the most useful, non-invasive tests as it provides information about the heart's general function. This helps your doctor make treatment decisions.  · Lab tests evaluate small amounts of blood or urine for signs of problems. A BNP lab test can help diagnose and evaluate heart failure. BNP stands for B-type natriuretic peptide. The ventricles secrete more BNP when heart failure worsens. Lab tests can also provide information about metabolic dysfunction or heart dysfunction.  Your treatment plan  Based on the results of your evaluation and tests, your doctor will develop a treatment plan. This plan is designed to relieve some of your heart failure symptoms and help make you more comfortable. Your treatment plan may include:  · Medicine to help your heart work better and improve your quality of life  · Changes in what you eat and drink to help prevent fluid from backing up in your body  · Daily monitoring of your weight and heart failure symptoms to see how well your treatment plan is working  · Exercise to help you stay healthy  · Help with quitting smoking  · Emotional and psychological support to help adjust to the changes  · Referrals to other specialists to make sure you are being treated comprehensively  Date Last Reviewed: 3/21/2016  © 9326-7218 [employer]. [address] [place] [address], [place] [address]. All rights reserved. This information is not intended as a substitute for professional medical care. Always follow your healthcare professional's instructions.              Heart Failure: Making Changes to Your Diet  You have a condition called heart failure. When you have heart failure, excess fluid is more likely to build up in your body  because your heart isn't working well. This makes the heart work harder to pump blood. Fluid buildup causes symptoms such as shortness of breath and swelling (edema). This is often referred to as congestive heart failure or CHF. Controlling the amount of salt (sodium) you eat may help stop fluid from building up. Your doctor may also tell you to reduce the amount of fluid you drink.  Reading food labels    Your healthcare provider will tell you how much sodium you can eat each day. Read food labels to keep track. Keep in mind that certain foods are high in salt. These include canned, frozen, and processed foods. Check the amount of sodium in each serving. Watch out for high-sodium ingredients. These include MSG (monosodium glutamate), baking soda, and sodium phosphate.   Eating less salt  Give yourself time to get used to eating less salt. It may take a little while. Here are some tips to help:  · Take the saltshaker off the table. Replace it with salt-free herb mixes and spices.  · Eat fresh or plain frozen vegetables. These have much less salt than canned vegetables.  · Choose low-sodium snacks like sodium-free pretzels, crackers, or air-popped popcorn.  · Dont add salt to your food when youre cooking. Instead, season your foods with pepper, lemon, garlic, or onion.  · When you eat out, ask that your food be cooked without added salt.  · Avoid eating fried foods as these often have a great deal of salt.  If youre told to limit fluids  You may need to limit how much fluid you have to help prevent swelling. This includes anything that is liquid at room temperature, such as ice cream and soup. If your doctor tells you to limit fluid, try these tips:  · Measure drinks in a measuring cup before you drink them. This will help you meet daily goals.  · Chill drinks to make them more refreshing.  · Suck on frozen lemon wedges to quench thirst.  · Only drink when youre thirsty.  · Chew sugarless gum or suck on hard  candy to keep your mouth moist.  · Weigh yourself daily to know if your body's fluid content is rising.  My sodium goal  Your healthcare provider may give you a sodium goal to meet each day. This includes sodium found in food as well as salt that you add. My goal is to eat no more than ___________ mg of sodium per day.     When to call your doctor  Call your doctor right away if you have any symptoms of worsening heart failure. These can include:  · Sudden weight gain  · Increased swelling of your legs or ankles  · Trouble breathing when youre resting or at night  · Increase in the number of pillows you have to sleep on  · Chest pain, pressure, discomfort, or pain in the jaw, neck, or back   Date Last Reviewed: 3/21/2016  © 1329-8933 [employer]. [address] [place] [address]. All rights reserved. This information is not intended as a substitute for professional medical care. Always follow your healthcare professional's instructions.              Heart Failure: Medicines to Help Your Heart    You have a condition called heart failure (also known as congestive heart failure, or CHF). Your doctor will likely prescribe medicines for heart failure and any underlying health problems you have. Most heart failure patients take one or more types of medicinen. Your healthcare provider will work to find the combination of medicines that works best for you.  Heart failure medicines  Here are the most common heart failure medicines:  · ACE inhibitors lower blood pressure and decrease strain on the heart. This makes it easier for the heart to pump. Angiotensin receptor blockers have similar effects. These are prescribed for some patients instead of ACE inhibitors.  · Beta-blockers relieve stress on the heart. They also improve symptoms. They may also improve the heart's pumping action over time.  · Diuretics (also called water pills) help rid your body of excess water. This can help rid your body of  swelling (edema). Having less fluid to pump means your heart doesnt have to work as hard. Some diuretics make your body lose a mineral called potassium. Your doctor will tell you if you need to take supplements or eat more foods high in potassium.  · Digoxin helps your heart pump with more strength. This helps your heart pump more blood with each beat. So, more oxygen-rich blood travels to the rest of the body.  · Aldosterone antagonists help alter hormones and decrease strain on the heart.  · Hydralazine and nitrates are two separate medicines used together to treat heart failure. They may come in one combination pill. They lower blood pressure and decrease how hard the heart has to pump.  Medicines for related conditions  Controlling other heart problems helps keep heart failure under control, too. Depending on other heart problems you have, medicines may be prescribed to:  · Lower blood pressure (antihypertensives).  · Lower cholesterol levels (statins).  · Prevent blood clots (anticoagulants or aspirin).  · Keep the heartbeat steady (antiarrhythmics).  Date Last Reviewed: 3/5/2016  © 0696-4226 [employer]. [address] [place] [address]. All rights reserved. This information is not intended as a substitute for professional medical care. Always follow your healthcare professional's instructions.              Heart Failure: Procedures That May Help    The heart is a muscle that pumps oxygen-rich blood to all parts of the body. When you have heart failure, the heart is not able to pump as well as it should. Blood and fluid may back up into the lungs (congestive heart failure), and some parts of the body dont get enough oxygen-rich blood to work normally. These problems lead to the symptoms of heart failure.     Certain procedures may help the heart pump better in some cases of heart failure. Some procedures are done to treat health problems that may have caused the heart failure such as  coronary artery disease or heart rhythm problems. For more serious heart failure, other options are available.  Treating artery and valve problems  If you have coronary artery disease or valve disease, procedures may be done to improve blood flow. This helps the heart pump better, which can improve heart failure symptoms. First, your doctor may do a cardiac catheterization to help detect clogged blood vessels or valve damage. During this procedure, a  thin tube (catheter) in inserted into a blood vessel and guided to the heart. There a dye is injected and a special type of X-ray (angiogram) is taken of the blood vessels. Procedures to open a blocked artery or fix damaged valves can also be done using catheterization.  · Angioplasty uses a balloon-tipped instrument at the end of the catheter. The balloon is inflated to widen the narrowed artery. In many cases, a stent is expanded to further support the narrowed artery. A stent is a metal mesh tube.  · Valve surgery repairs or replacement of faulty valves can also be done during catheterization so blood can flow properly through the chambers of the heart.  Bypass surgery is another option to help treat blocked arteries. It uses a healthy blood vessel from elsewhere in the body. The healthy blood vessel is attached above and below the blocked area so that blood can flow around the blocked artery.  Treating heart rhythm problems  A device may be placed in the chest to help a weak heart maintain a healthy, heartbeat so the heart can pump more effectively:  · Pacemaker. A pacemaker is an implanted device that regulates your heartbeat electronically. It monitors your heart's rhythm and generates a painless electric impulse that helps the heart beat in a regular rhythm. A pacemaker is programmed to meet your specific heart rhythm needs.  · Biventricular pacing/cardiac resynchronization therapy. A type of pacemaker that paces both pumping chambers of the heart at the same  time to coordinate contractions and to improve the heart's function. Some people with heart failure are candidates for this therapy.  · Implantable cardioverter defibrillator. A device similar to a pacemaker that senses when the heart is beating too fast and delivers an electrical shock to convert the fast rhythm to a normal rhythm. This can be a life saving device.  In severe cases  In more serious cases of heart failure when other treatments no longer work, other options may include:  · Ventricular assist devices (VADs). These are mechanical devices used to take over the pumping function for one or both of the heart's ventricles, or pumping chambers. A VAD may be necessary when heart failure progresses to the point that medicines and other treatments no longer help. In some cases, a VAD may be used as a bridge to transplant.  · Heart transplant. This is replacing the diseased heart with a healthy one from a donor. This is an option for a few people who are very sick. A heart transplant is very serious and not an option for all patients. Your doctor can tell you more.  Date Last Reviewed: 3/20/2016  © 9942-7473 [employer]. [address] [place] [address]. All rights reserved. This information is not intended as a substitute for professional medical care. Always follow your healthcare professional's instructions.              Heart Failure: Tracking Your Weight  You have a condition called heart failure. When you have heart failure, a sudden weight gain or a steady rise in weight is a warning sign that your body is retaining too much water and salt. This could mean your heart failure is getting worse. If left untreated, it can cause problems for your lungs and result in shortness of breath. Weighing yourself each day is the best way to know if youre retaining water. If your weight goes up quickly, call your doctor. You will be given instructions on how to get rid of the excess water. You  will likely need medicines and to avoid salt. This will help your heart work better.  Call your doctor if you gain more than 2 pounds in 1 day, more than 5 pounds in 1 week, or whatever weight gain you were told to report by your doctor. This is often a sign of worsening heart failure and needs to be evaluated and treated. Your doctor will tell you what to do next.   Tips for weighing yourself    · Weigh yourself at the same time each morning, wearing the same clothes. Weigh yourself after urinating and before eating.  · Use the same scale each day. Make sure the numbers are easy to read. Put the scale on a flat, hard surface -- not on a rug or carpet.  · Do not stop weighing yourself. If you forget one day, weigh again the next morning.  How to use your weight chart  · Keep your weight chart near the scale. Write your weight on the chart as soon as you get off the scale.  · Fill in the month and the start date on the chart. Then write down your weight each day. Your chart will look like this:    · If you miss a day, leave the space blank. Weigh yourself the next day and write your weight in the next space.  · Take your weight chart with you when you go to see your doctor.  Date Last Reviewed: 3/20/2016  © 3233-8002 [employer]. [address] [place] [address], [place] [address]. All rights reserved. This information is not intended as a substitute for professional medical care. Always follow your healthcare professional's instructions.              Heart Failure: Warning Signs of a Flare-Up  You have a condition called heart failure. Once you have heart failure, flare-ups can happen. Below are signs that can mean your heart failure is getting worse. If you notice any of these warning signs, call your healthcare provider.  Swelling    · Your feet, ankles, or lower legs get puffier.  · You notice skin changes on your lower legs.  · Your shoes feel too tight.  · Your clothes are tighter in the waist.  · You have  trouble getting rings on or off your fingers.  Shortness of breath  · You have to breathe harder even when youre doing your normal activities or when youre resting.  · You are short of breath walking up stairs or even short distances.  · You wake up at night short of breath or coughing.  · You need to use more pillows or sit up to sleep.  · You wake up tired or restless.  Other warning signs  · You feel weaker, dizzy, or more tired.  · You have chest pain or changes in your heartbeat.  · You have a cough that wont go away.  · You cant remember things or dont feel like eating.  Tracking your weight  Gaining weight is often the first warning sign that heart failure is getting worse. Gaining even a few pounds can be a sign that your body is retaining excess water and salt. Weighing yourself each day in the morning after you urinate and before you eat, is the best way to know if you're retaining water. Get a scale that is easy to read and make sure you wear the same clothes and use the same scale every time you weigh. Your healthcare provider will show you how to track your weight. Call your doctor if you gain more than 2 pounds in 1 day, 5 pounds in 1 week, or whatever weight gain you were told to report by your doctor. This is often a sign of worsening heart failure and needs to be evaluated and treated before it compromises your breathing. Your doctor will tell you what to do next.    Date Last Reviewed: 3/15/2016  © 6254-7625 [employer]. [address] [place] [address], [place] [address]. All rights reserved. This information is not intended as a substitute for professional medical care. Always follow your healthcare professional's instructions.               [facility] complies with applicable Federal civil rights laws and does not discriminate on the basis of race, color, national origin, age, disability, or sex.

## 2017-04-04 ENCOUNTER — PATIENT MESSAGE (OUTPATIENT)
Dept: SURGERY | Facility: CLINIC | Age: 42
End: 2017-04-04

## 2017-04-05 ENCOUNTER — OFFICE VISIT (OUTPATIENT)
Dept: PLASTIC SURGERY | Facility: CLINIC | Age: 42
End: 2017-04-05
Payer: COMMERCIAL

## 2017-04-05 VITALS
WEIGHT: 126.19 LBS | DIASTOLIC BLOOD PRESSURE: 74 MMHG | BODY MASS INDEX: 26.38 KG/M2 | SYSTOLIC BLOOD PRESSURE: 102 MMHG | TEMPERATURE: 98 F | HEART RATE: 84 BPM

## 2017-04-05 DIAGNOSIS — Z09 SURGERY FOLLOW-UP EXAMINATION: Primary | ICD-10-CM

## 2017-04-05 PROCEDURE — 99999 PR PBB SHADOW E&M-EST. PATIENT-LVL III: CPT | Mod: PBBFAC,,, | Performed by: PHYSICIAN ASSISTANT

## 2017-04-05 PROCEDURE — 99024 POSTOP FOLLOW-UP VISIT: CPT | Mod: S$GLB,,, | Performed by: PHYSICIAN ASSISTANT

## 2017-04-05 NOTE — PROGRESS NOTES
[facility]vis Graves [facility]2017 for a follow up visit status post R breast recon with TE placement. Here today for expansion.     Vitals:    04/05/17 1050   BP: 102/74   Pulse: 84   Temp: 97.5 °F (36.4 °C)     Current Outpatient Prescriptions on File Prior to Visit   Medication Sig Dispense Refill    budesonide-formoterol 160-4.5 mcg (SYMBICORT) 160-4.5 mcg/actuation HFAA Inhale 1 puff into the lungs every 12 (twelve) hours. 10.2 g 0    cetirizine (ZYRTEC) 10 MG tablet Take 10 mg by mouth daily as needed.       COREG CR 80 mg 24 hr capsule Take 1 capsule (80 mg total) by mouth once daily. (Patient taking differently: Take 80 mg by mouth every evening. ) 30 capsule 6    diazePAM (VALIUM) 5 MG tablet Take 1 tablet (5 mg total) by mouth every 6 (six) hours as needed (muscle spasms). 28 tablet 0    enalapril (VASOTEC) 2.5 MG tablet TAKE 1 TABLET (2.5 MG TOTAL) BY MOUTH 2 (TWO) TIMES DAILY. 60 tablet 2    furosemide (LASIX) 20 MG tablet TAKE 1 TABLET (20 MG TOTAL) BY MOUTH ONCE DAILY. 30 tablet 0    hydrocodone-acetaminophen 5-325mg (NORCO) 5-325 mg per tablet Take 1-2 tablets by mouth every 4 (four) hours as needed for Pain. 60 tablet 0    LACTOBACILLUS ACIDOPHILUS (PROBIOTIC ORAL) Take by mouth every evening.      mometasone (NASONEX) 50 mcg/actuation nasal spray 2 sprays by Nasal route once daily. (Patient taking differently: 2 sprays by Nasal route daily as needed. ) 1 each 3    ondansetron (ZOFRAN-ODT) 4 MG TbDL Take 1 tablet (4 mg total) by mouth every 6 (six) hours as needed. 30 tablet 0    potassium chloride (MICRO-K) 10 MEQ CpSR TAKE 1 CAPSULE (10 MEQ TOTAL) BY MOUTH 2 (TWO) TIMES DAILY. 60 capsule 0    senna-docusate 8.6-50 mg (PERICOLACE) 8.6-50 mg per tablet Take 1 tablet by mouth once daily. While taking prescription pain medications. 30 tablet 0    UNABLE TO FIND Bioclens 1 tablet every AM for bowels (OTC)       No current facility-administered medications  on file prior to visit.      Patient Active Problem List   Diagnosis    Heart failure, systolic, chronic    Ventricular tachycardia    Automatic implantable cardioverter-defibrillator in situ    Thrombus due to any device, implant or graft    Generalized headaches    NICM (nonischemic cardiomyopathy)    Abdominal pain, other specified site    Irritable bowel syndrome with constipation    Colitis    Clostridium difficile colitis    Diverticulitis of colon    Cancer of overlapping sites of right female breast    Mild persistent asthma without complication    Breast cancer     Past Surgical History:   Procedure Laterality Date    CARDIAC DEFIBRILLATOR PLACEMENT      X 2 Medtronic     CARDIAC DEFIBRILLATOR PLACEMENT      medtronic    HYSTERECTOMY      MASTECTOMY Right 02/07/2017    TONSILLECTOMY       Doing well today. Incision well healed.     60ccNS to R breast TE for 60/520cc total.     Tolerated well. Will have her return to clinic in 2 weeks to see Dr. Bam Nunez  For discussion of her next staged reconstruction as she does not wish to have any further expansions secondary to discomfort.     The patient was advised to call the clinic with any questions or concerns prior to their next visit.

## 2017-04-11 ENCOUNTER — PATIENT MESSAGE (OUTPATIENT)
Dept: HEMATOLOGY/ONCOLOGY | Facility: CLINIC | Age: 42
End: 2017-04-11

## 2017-04-11 ENCOUNTER — OFFICE VISIT (OUTPATIENT)
Dept: HEMATOLOGY/ONCOLOGY | Facility: CLINIC | Age: 42
End: 2017-04-11
Payer: COMMERCIAL

## 2017-04-11 VITALS
TEMPERATURE: 98 F | DIASTOLIC BLOOD PRESSURE: 58 MMHG | SYSTOLIC BLOOD PRESSURE: 94 MMHG | HEART RATE: 88 BPM | WEIGHT: 127.19 LBS | RESPIRATION RATE: 18 BRPM | BODY MASS INDEX: 26.7 KG/M2 | HEIGHT: 58 IN | OXYGEN SATURATION: 94 %

## 2017-04-11 DIAGNOSIS — C50.611 CANCER OF AXILLARY TAIL OF RIGHT BREAST: ICD-10-CM

## 2017-04-11 DIAGNOSIS — I42.8 NICM (NONISCHEMIC CARDIOMYOPATHY): Primary | ICD-10-CM

## 2017-04-11 PROCEDURE — 99999 PR PBB SHADOW E&M-EST. PATIENT-LVL III: CPT | Mod: PBBFAC,,, | Performed by: INTERNAL MEDICINE

## 2017-04-11 PROCEDURE — 1160F RVW MEDS BY RX/DR IN RCRD: CPT | Mod: S$GLB,,, | Performed by: INTERNAL MEDICINE

## 2017-04-11 PROCEDURE — 99215 OFFICE O/P EST HI 40 MIN: CPT | Mod: S$GLB,,, | Performed by: INTERNAL MEDICINE

## 2017-04-11 NOTE — PROGRESS NOTES
Subjective:       Patient ID: Avis Graves is a 42 y.o. female.    Chief Complaint: No chief complaint on file.    HPI   Ms. Graves returns today for follow up.  Briefly, she is a 42-year-old -American female with a history of cardiomyopathy and an EF of less than 30%, who was recently diagnosed with a carcinoma of the right   breast, that was ER strongly positive, [place] negative, and HER-2 negative.  On 2/07/2017 she underwent a right modified radical mastectomy with sentinel lymph node biopsy and insertion of tissue expanders.  The   pathology report from the 02/07/2017 procedure indicates that she had a 4 cm carcinoma; three of the four sentinel lymph nodes were positive with macrometastases.  The patient was referred for further evaluation.    Of note, she had a PET scan that was essentially negative.  Also, she has a history of prior hysterectomy and oophorectomy.      Review of Systems  Overall she feels OK.  She has fully recuperated from her surgery.  She appears anxious with the recent developments but she denies any depression, easy bruising, fevers, chills, night  sweats, weight loss, nausea, vomiting, diarrhea, constipation, diplopia, blurred vision, headache, chest pain, palpitations, shortness of breath, breast pain, abdominal pain, extremity pain, or difficulty ambulating.  The remainder of the ten-point ROS, including general, skin, lymph, H/N, cardiorespiratory, GI, , Neuro, Endocrine, and psychiatric is negative.     Objective:      Physical Exam  She is alert, oriented to time, place, person, pleasant, well      nourished, in no acute physical distress.  She id here with her .                                  VITAL SIGNS:  Reviewed                                      HEENT:  Normal.  There are no nasal, oral, lip, gingival, auricular, lid,    or conjunctival lesions.  Mucosae are moist and pink, and there is no        thrush.  Pupils are equal, reactive to light and  accommodation.              Extraocular muscle movements are intact.  Dentition is good.  There is no frontal or maxillary tenderness.                                     NECK:  Supple without JVD, adenopathy, or thyromegaly.                       LUNGS:  Clear to auscultation without wheezing, rales, or rhonchi.           CARDIOVASCULAR:  Reveals an S1, S2, no murmurs, no rubs, no gallops.         ABDOMEN:  Soft, nontender, without organomegaly.  Bowel sounds are    present.                                                                     EXTREMITIES:  No cyanosis, clubbing, or edema.                               BREASTS:  She is status post right mastectomy with a tissue expander in place and a well-healed incision.    There are no masses in the left breast.     An AICD is palpated in the left infraclavicular area.                                    LYMPHATIC:  There is no cervical, axillary, or supraclavicular adenopathy.   SKIN:  Warm and moist, without petechiae, rashes, induration, or ecchymoses.           NEUROLOGIC:  DTRs are 0-1+ bilaterally, symmetrical, motor function is 5/5,  and cranial nerves are  within normal limits.  A portacath is seen in the distal right arm, medially.    Assessment:       1. NICM (nonischemic cardiomyopathy)    2. 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 She has decided to proceed with weekly taxol, which is a reasonable choice.  However, she wants to delay her chemotherapy until March 25th.  I have asked her to obtain labs on March 24th and see me on the 25th for her chemotherapy.  Upon completion of 12 weeks of taxol, we will discuss the option of CMF.  She anibal also need XRT and adjuvant hormonal therapy with AIs.  Her questions were answered to her satisfaction.    Her questions and her family's multiple questions were answered to her satisfaction.

## 2017-04-18 ENCOUNTER — PATIENT MESSAGE (OUTPATIENT)
Dept: HEMATOLOGY/ONCOLOGY | Facility: CLINIC | Age: 42
End: 2017-04-18

## 2017-04-24 ENCOUNTER — LAB VISIT (OUTPATIENT)
Dept: LAB | Facility: HOSPITAL | Age: 42
End: 2017-04-24
Attending: INTERNAL MEDICINE
Payer: COMMERCIAL

## 2017-04-24 DIAGNOSIS — Z00.6 EXAMINATION OF PARTICIPANT IN CLINICAL TRIAL: ICD-10-CM

## 2017-04-24 DIAGNOSIS — C50.919 MALIGNANT NEOPLASM OF FEMALE BREAST, UNSPECIFIED LATERALITY, UNSPECIFIED SITE OF BREAST: ICD-10-CM

## 2017-04-24 DIAGNOSIS — C50.919 MALIGNANT NEOPLASM OF FEMALE BREAST, UNSPECIFIED LATERALITY, UNSPECIFIED SITE OF BREAST: Primary | ICD-10-CM

## 2017-04-24 DIAGNOSIS — C50.611 CANCER OF AXILLARY TAIL OF RIGHT BREAST: ICD-10-CM

## 2017-04-24 LAB
ALBUMIN SERPL BCP-MCNC: 3.4 G/DL
ALP SERPL-CCNC: 66 U/L
ALT SERPL W/O P-5'-P-CCNC: 15 U/L
ANION GAP SERPL CALC-SCNC: 9 MMOL/L
AST SERPL-CCNC: 17 U/L
BILIRUB SERPL-MCNC: 0.2 MG/DL
BUN SERPL-MCNC: 16 MG/DL
CALCIUM SERPL-MCNC: 8.9 MG/DL
CHLORIDE SERPL-SCNC: 107 MMOL/L
CO2 SERPL-SCNC: 25 MMOL/L
CREAT SERPL-MCNC: 0.8 MG/DL
ERYTHROCYTE [DISTWIDTH] IN BLOOD BY AUTOMATED COUNT: 12 %
EST. GFR  (AFRICAN AMERICAN): >60 ML/MIN/1.73 M^2
EST. GFR  (NON AFRICAN AMERICAN): >60 ML/MIN/1.73 M^2
GLUCOSE SERPL-MCNC: 164 MG/DL
HCT VFR BLD AUTO: 41.1 %
HGB BLD-MCNC: 13.6 G/DL
MCH RBC QN AUTO: 30.6 PG
MCHC RBC AUTO-ENTMCNC: 33.1 %
MCV RBC AUTO: 92 FL
NEUTROPHILS # BLD AUTO: 3.4 K/UL
PLATELET # BLD AUTO: 184 K/UL
PMV BLD AUTO: 11.8 FL
POTASSIUM SERPL-SCNC: 3.9 MMOL/L
PROT SERPL-MCNC: 6.2 G/DL
RBC # BLD AUTO: 4.45 M/UL
SODIUM SERPL-SCNC: 141 MMOL/L
WBC # BLD AUTO: 6.36 K/UL

## 2017-04-24 PROCEDURE — 36415 COLL VENOUS BLD VENIPUNCTURE: CPT

## 2017-04-24 PROCEDURE — 85027 COMPLETE CBC AUTOMATED: CPT

## 2017-04-24 PROCEDURE — 80053 COMPREHEN METABOLIC PANEL: CPT

## 2017-04-24 RX ORDER — EPINEPHRINE 0.3 MG/.3ML
0.3 INJECTION SUBCUTANEOUS ONCE AS NEEDED
Status: CANCELLED | OUTPATIENT
Start: 2017-04-26 | End: 2017-04-25

## 2017-04-24 RX ORDER — FAMOTIDINE 10 MG/ML
20 INJECTION INTRAVENOUS
Status: CANCELLED | OUTPATIENT
Start: 2017-04-26

## 2017-04-24 RX ORDER — DIPHENHYDRAMINE HYDROCHLORIDE 50 MG/ML
50 INJECTION INTRAMUSCULAR; INTRAVENOUS ONCE AS NEEDED
Status: CANCELLED | OUTPATIENT
Start: 2017-04-26 | End: 2017-04-25

## 2017-04-24 RX ORDER — SODIUM CHLORIDE 0.9 % (FLUSH) 0.9 %
10 SYRINGE (ML) INJECTION
Status: CANCELLED | OUTPATIENT
Start: 2017-04-26

## 2017-04-24 RX ORDER — HEPARIN 100 UNIT/ML
500 SYRINGE INTRAVENOUS
Status: CANCELLED | OUTPATIENT
Start: 2017-04-26

## 2017-04-26 ENCOUNTER — INFUSION (OUTPATIENT)
Dept: INFUSION THERAPY | Facility: HOSPITAL | Age: 42
End: 2017-04-26
Attending: FAMILY MEDICINE
Payer: COMMERCIAL

## 2017-04-26 ENCOUNTER — OFFICE VISIT (OUTPATIENT)
Dept: HEMATOLOGY/ONCOLOGY | Facility: CLINIC | Age: 42
End: 2017-04-26
Attending: INTERNAL MEDICINE
Payer: COMMERCIAL

## 2017-04-26 ENCOUNTER — OFFICE VISIT (OUTPATIENT)
Dept: PLASTIC SURGERY | Facility: CLINIC | Age: 42
End: 2017-04-26
Payer: COMMERCIAL

## 2017-04-26 VITALS
BODY MASS INDEX: 24.54 KG/M2 | RESPIRATION RATE: 18 BRPM | HEIGHT: 60 IN | WEIGHT: 125 LBS | HEART RATE: 79 BPM | DIASTOLIC BLOOD PRESSURE: 61 MMHG | SYSTOLIC BLOOD PRESSURE: 106 MMHG | OXYGEN SATURATION: 99 % | TEMPERATURE: 98 F

## 2017-04-26 VITALS
RESPIRATION RATE: 18 BRPM | BODY MASS INDEX: 26.7 KG/M2 | HEART RATE: 83 BPM | WEIGHT: 127.19 LBS | HEIGHT: 58 IN | SYSTOLIC BLOOD PRESSURE: 105 MMHG | DIASTOLIC BLOOD PRESSURE: 71 MMHG

## 2017-04-26 VITALS — SYSTOLIC BLOOD PRESSURE: 117 MMHG | HEART RATE: 78 BPM | DIASTOLIC BLOOD PRESSURE: 82 MMHG | TEMPERATURE: 98 F

## 2017-04-26 DIAGNOSIS — Z51.11 ENCOUNTER FOR ANTINEOPLASTIC CHEMOTHERAPY: ICD-10-CM

## 2017-04-26 DIAGNOSIS — Z09 SURGERY FOLLOW-UP EXAMINATION: Primary | ICD-10-CM

## 2017-04-26 DIAGNOSIS — I50.22 HEART FAILURE, SYSTOLIC, CHRONIC: ICD-10-CM

## 2017-04-26 DIAGNOSIS — C50.611 CANCER OF AXILLARY TAIL OF RIGHT BREAST: Primary | ICD-10-CM

## 2017-04-26 PROBLEM — C50.919 BREAST CANCER: Status: RESOLVED | Noted: 2017-02-07 | Resolved: 2017-04-26

## 2017-04-26 PROBLEM — C50.811: Status: RESOLVED | Noted: 2017-01-15 | Resolved: 2017-04-26

## 2017-04-26 PROCEDURE — 99999 PR PBB SHADOW E&M-EST. PATIENT-LVL III: CPT | Mod: PBBFAC,,, | Performed by: PHYSICIAN ASSISTANT

## 2017-04-26 PROCEDURE — 63600175 PHARM REV CODE 636 W HCPCS: Performed by: INTERNAL MEDICINE

## 2017-04-26 PROCEDURE — 99999 PR PBB SHADOW E&M-EST. PATIENT-LVL III: CPT | Mod: PBBFAC,,, | Performed by: INTERNAL MEDICINE

## 2017-04-26 PROCEDURE — 99215 OFFICE O/P EST HI 40 MIN: CPT | Mod: S$GLB,,, | Performed by: INTERNAL MEDICINE

## 2017-04-26 PROCEDURE — 96375 TX/PRO/DX INJ NEW DRUG ADDON: CPT

## 2017-04-26 PROCEDURE — 99024 POSTOP FOLLOW-UP VISIT: CPT | Mod: S$GLB,,, | Performed by: PHYSICIAN ASSISTANT

## 2017-04-26 PROCEDURE — 25000003 PHARM REV CODE 250: Performed by: INTERNAL MEDICINE

## 2017-04-26 PROCEDURE — 1160F RVW MEDS BY RX/DR IN RCRD: CPT | Mod: S$GLB,,, | Performed by: INTERNAL MEDICINE

## 2017-04-26 PROCEDURE — 96413 CHEMO IV INFUSION 1 HR: CPT

## 2017-04-26 PROCEDURE — 96367 TX/PROPH/DG ADDL SEQ IV INF: CPT

## 2017-04-26 RX ORDER — SODIUM CHLORIDE 0.9 % (FLUSH) 0.9 %
10 SYRINGE (ML) INJECTION
Status: DISCONTINUED | OUTPATIENT
Start: 2017-04-26 | End: 2017-04-26 | Stop reason: HOSPADM

## 2017-04-26 RX ORDER — FAMOTIDINE 10 MG/ML
INJECTION INTRAVENOUS
Status: DISCONTINUED
Start: 2017-04-26 | End: 2017-04-26 | Stop reason: HOSPADM

## 2017-04-26 RX ORDER — HEPARIN 100 UNIT/ML
SYRINGE INTRAVENOUS
Status: DISCONTINUED
Start: 2017-04-26 | End: 2017-04-26 | Stop reason: HOSPADM

## 2017-04-26 RX ORDER — FAMOTIDINE 10 MG/ML
20 INJECTION INTRAVENOUS
Status: COMPLETED | OUTPATIENT
Start: 2017-04-26 | End: 2017-04-26

## 2017-04-26 RX ORDER — ONDANSETRON 4 MG/1
4 TABLET, FILM COATED ORAL EVERY 6 HOURS PRN
Qty: 30 TABLET | Refills: 1 | Status: SHIPPED | OUTPATIENT
Start: 2017-04-26 | End: 2017-12-14

## 2017-04-26 RX ORDER — EPINEPHRINE 0.3 MG/.3ML
0.3 INJECTION SUBCUTANEOUS ONCE AS NEEDED
Status: DISCONTINUED | OUTPATIENT
Start: 2017-04-26 | End: 2017-04-26 | Stop reason: HOSPADM

## 2017-04-26 RX ORDER — DIPHENHYDRAMINE HYDROCHLORIDE 50 MG/ML
50 INJECTION INTRAMUSCULAR; INTRAVENOUS ONCE AS NEEDED
Status: DISCONTINUED | OUTPATIENT
Start: 2017-04-26 | End: 2017-04-26 | Stop reason: HOSPADM

## 2017-04-26 RX ORDER — HEPARIN 100 UNIT/ML
500 SYRINGE INTRAVENOUS
Status: DISCONTINUED | OUTPATIENT
Start: 2017-04-26 | End: 2017-04-26 | Stop reason: HOSPADM

## 2017-04-26 RX ADMIN — FAMOTIDINE 20 MG: 10 INJECTION INTRAVENOUS at 10:04

## 2017-04-26 RX ADMIN — HYDROCORTISONE SODIUM SUCCINATE 100 MG: 100 INJECTION, POWDER, FOR SOLUTION INTRAMUSCULAR; INTRAVENOUS at 11:04

## 2017-04-26 RX ADMIN — PACLITAXEL 126 MG: 6 INJECTION, SOLUTION, CONCENTRATE INTRAVENOUS at 10:04

## 2017-04-26 RX ADMIN — HEPARIN 500 UNITS: 100 SYRINGE at 12:04

## 2017-04-26 RX ADMIN — Medication 1 MG: at 10:04

## 2017-04-26 RX ADMIN — SODIUM CHLORIDE, PRESERVATIVE FREE 10 ML: 5 INJECTION INTRAVENOUS at 12:04

## 2017-04-26 RX ADMIN — DIPHENHYDRAMINE HYDROCHLORIDE 25 MG: 50 INJECTION, SOLUTION INTRAMUSCULAR; INTRAVENOUS at 10:04

## 2017-04-26 RX ADMIN — Medication 10 MG: at 09:04

## 2017-04-26 NOTE — MR AVS SNAPSHOT
Patient Information     Patient Name Sex Avis Lopez Female 1975      Visit Information        Provider Department Dept Phone Center    2017 9:30 AM [facility] Chemotherapy Infusion [phone] [facility]      Patient Instructions     None      Your Current Medications Are     budesonide-formoterol 160-4.5 mcg (SYMBICORT) 160-4.5 mcg/actuation HFAA    cetirizine (ZYRTEC) 10 MG tablet    COREG CR 80 mg 24 hr capsule    diazePAM (VALIUM) 5 MG tablet    enalapril (VASOTEC) 2.5 MG tablet    furosemide (LASIX) 20 MG tablet    hydrocodone-acetaminophen 5-325mg (NORCO) 5-325 mg per tablet    LACTOBACILLUS ACIDOPHILUS (PROBIOTIC ORAL)    mometasone (NASONEX) 50 mcg/actuation nasal spray    ondansetron (ZOFRAN, AS HYDROCHLORIDE,) 4 MG tablet    ondansetron (ZOFRAN-ODT) 4 MG TbDL    potassium chloride (MICRO-K) 10 MEQ CpSR    senna-docusate 8.6-50 mg (PERICOLACE) 8.6-50 mg per tablet    UNABLE TO FIND      Facility-Administered Medications     alteplase injection 2 mg    dexamethasone IVPB 10 mg    diphenhydrAMINE (BENADRYL) 25 mg in sodium chloride 0.9% 50 mL IVPB    diphenhydrAMINE injection 50 mg    epinephrine (EPIPEN) 0.3 mg/0.3 mL pen injection 0.3 mg    famotidine (PF) 20 mg/2 mL injection 20 mg    famotidine (PF) 20 mg/2 mL injection    granisetron IVPB 1 mg    heparin, porcine (PF) 100 unit/mL injection flush 500 Units    hydrocortisone sodium succinate (SOLU-CORTEF) 100 mg injection    hydrocortisone sodium succinate injection 100 mg    paclitaxel (TAXOL) 80 mg/m2 = 126 mg in sodium chloride 0.9% 250 mL chemo infusion    sodium chloride 0.9% 250 mL flush bag    sodium chloride 0.9% flush 10 mL      Appointments for Next Year     2017  1:30 PM POST-OP (15 min.) Omar Gillespie - Plastic Surg KIRAN Prasad    Arrive at check-in approximately 15 minutes before your scheduled appointment time. Bring all outside medical records and imaging, along with a list of your  current medications and insurance card.    [facility]    5/3/2017 10:00 AM INFUSION 015 MIN (15 min.) [facility] ROOM, 01 [facility]    Arrive at check-in approximately 15 minutes before your scheduled appointment time. Bring all outside medical records and imaging, along with a list of your current medications and insurance card.    [facility]    5/3/2017 11:00 AM ESTABLISHED PATIENT (30 min.) [facility] Hematology Oncology Jerry Birmingham MD    Arrive at check-in approximately 15 minutes before your scheduled appointment time. Bring all outside medical records and imaging, along with a list of your current medications and insurance card.    [facility]    5/3/2017 11:30 AM INFUSION 090 MIN (90 min.) [facility] CHAIR 07 [facility]    Arrive at check-in approximately 15 minutes before your scheduled appointment time. Bring all outside medical records and imaging, along with a list of your current medications and insurance card.    [facility]    5/10/2017  8:00 AM REMOTE INTERROGATION (30 min.) Omar Gillespie - Arrhythmia HOME MONITOR DEVICE CHECK, [facility]    This is an AT HOME appointment, please DO NOT travel to the clinic for the scheduled date and time listed. Manual transmissions can be transmitted at any time on the appointment date listed above, if required.    [facility] - 3rd Floor    6/20/2017  9:00 AM POST-OP (15 min.) [facility] Breast Surgery Orestes Santana MD    Arrive at check-in approximately 15 minutes before your scheduled appointment time. Bring all outside medical records and imaging, along with a list of your current medications and insurance card.    [facility]         Default Flowsheet Data (last 24 hours)      Amb Complex Vitals Arron        04/26/17 0847 04/26/17 0732             Measurements    Weight 56.7 kg (125 lb) 57.7 kg (127 lb 3.3 oz)       Height 5' (1.524 m) 4'  "10" (1.473 m)       BSA (Calculated - sq m) 1.55 sq meters 1.54 sq meters       BMI (Calculated) 24.5 26.6       /61        Temp 97.7 °F (36.5 °C)        Pulse 79        Resp 18        SpO2 99 %        Pain Assessment    Pain Score Zero                Allergies     Ciprofloxacin Rash      Medications You Received from 04/25/2017 1137 to 04/26/2017 1137        Date/Time Order Dose Route Action     04/26/2017 0947 dexamethasone IVPB 10 mg 10 mg Intravenous New Bag     04/26/2017 1030 diphenhydrAMINE (BENADRYL) 25 mg in sodium chloride 0.9% 50 mL IVPB 25 mg Intravenous New Bag     04/26/2017 1010 famotidine (PF) 20 mg/2 mL injection 20 mg 20 mg Intravenous Given     04/26/2017 1010 granisetron IVPB 1 mg 1 mg Intravenous New Bag     04/26/2017 1105 hydrocortisone sodium succinate injection 100 mg 100 mg Intravenous Given     04/26/2017 1052 paclitaxel (TAXOL) 80 mg/m2 = 126 mg in sodium chloride 0.9% 250 mL chemo infusion 126 mg Intravenous New Bag      Current Discharge Medication List     Cannot display discharge medications since this is not an admission.      "

## 2017-04-26 NOTE — PROGRESS NOTES
Subjective:       Patient ID: Avis Graves is a 42 y.o. female.    Chief Complaint: No chief complaint on file.    HPI   Ms. Graves returns today to begin her adjuvant chemotherapy with weekly taxol.  Her CBC was normal, and so was her hepatic function.  Briefly, she is a 42-year-old -American female with a history of cardiomyopathy and an EF of less than 30%, who was recently diagnosed with a carcinoma of the right   breast, that was ER strongly positive, [place] negative, and HER-2 negative.  On 2/07/2017 she underwent a right modified radical mastectomy with sentinel lymph node biopsy and insertion of tissue expanders.  The   pathology report from the 02/07/2017 procedure indicates that she had a 4 cm carcinoma; three of the four sentinel lymph nodes were positive with macrometastases.  The patient was referred for further evaluation.    Of note, she had a PET scan that was essentially negative.  She also has a history of prior hysterectomy and oophorectomy.      Review of Systems  Overall she feels OK.  She is sore from the tissue expander and the port insertion and somewhat appears anxious with the  Upcoming chemotherapy, but she denies any depression, vomiting, diarrhea, constipation, diplopia, blurred vision, headaches, chest pain, palpitations, shortness of breath, left breast pain, abdominal pain, extremity pain, or difficulty ambulating.  The remainder of the ten-point ROS, including general, skin, lymph, H/N, cardiorespiratory, GI, , Neuro, Endocrine, and psychiatric is negative.     Objective:      Physical Exam  She is alert, oriented to time, place, person, pleasant, well      nourished, in no acute physical distress.  She id here with her .                                  VITAL SIGNS:  Reviewed                                      HEENT:  Normal.  There are no nasal, oral, lip, gingival, auricular, lid,    or conjunctival lesions.  Mucosae are moist and pink, and there is no         thrush.  Pupils are equal, reactive to light and accommodation.              Extraocular muscle movements are intact.  Dentition is good.  There is no frontal or maxillary tenderness.                                     NECK:  Supple without JVD, adenopathy, or thyromegaly.                       LUNGS:  Clear to auscultation without wheezing, rales, or rhonchi.           CARDIOVASCULAR:  Reveals an S1, S2, no murmurs, no rubs, no gallops.         ABDOMEN:  Soft, nontender, without organomegaly.  Bowel sounds are    present.                                                                     EXTREMITIES:  No cyanosis, clubbing, or edema.                               BREASTS:  She is status post right mastectomy with a tissue expander in place and a well-healed incision.    There are no masses in the left breast.     An AICD is palpated in the left infraclavicular area. \  A left sided port is seen on her arm, medially.  there is an ecchymosis associated with her incision.                                   LYMPHATIC:  There is no cervical, axillary, or supraclavicular adenopathy.   SKIN:  Warm and moist, without petechiae, rashes, induration, or ecchymoses.           NEUROLOGIC:  DTRs are 0-1+ bilaterally, symmetrical, motor function is 5/5,  and cranial nerves are  within normal limits.  A portacath is seen in the distal right arm, medially.    Assessment:       1. 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 Heart failure, systolic, chronic    3. Encounter for antineoplastic chemotherapy        Plan:        I had a long discussion with her; the pros and cons of adjuvant chemotherapy with taxol were discussed in detail.  Neutropenic precautions given.  Upon completion of 12 weeks of taxol, we will discuss the option of CMF.  She anibal also need XRT and adjuvant hormonal therapy with AIs.  Her questions were answered to her satisfaction.  I will see her with repeat labs in a week.

## 2017-04-26 NOTE — PLAN OF CARE
Problem: Patient Care Overview (Adult)  Goal: Plan of Care Review  Outcome: Ongoing (interventions implemented as appropriate)  Teaching done. Pt. Very nervous.

## 2017-04-26 NOTE — NURSING
Pt began complaining of SOB shortly after Paclitaxel initiated. Pt denies, Chest pain, back pain or flushing. Pt skin warm and dry. Pt airway clear and patent RR even and unlabored. Dr. Lizarraga notified and pt evaluated by him. Pt placed on O2 and SOB began resolving immediately. Pt to be re-started on Taxol after symptoms completely resolve, per Dr. Lizarraga. Pt in NAD, VSS and pt AAOx4.  Taxol re-started and pt tolerated well.

## 2017-04-28 DIAGNOSIS — E87.6 HYPOKALEMIA: ICD-10-CM

## 2017-04-28 DIAGNOSIS — I50.42 CHRONIC COMBINED SYSTOLIC AND DIASTOLIC CONGESTIVE HEART FAILURE: ICD-10-CM

## 2017-04-28 RX ORDER — ENALAPRIL MALEATE 2.5 MG/1
TABLET ORAL
Qty: 60 TABLET | Refills: 1 | Status: SHIPPED | OUTPATIENT
Start: 2017-04-28 | End: 2017-08-31 | Stop reason: SDUPTHER

## 2017-04-28 RX ORDER — POTASSIUM CHLORIDE 750 MG/1
CAPSULE, EXTENDED RELEASE ORAL
Qty: 60 CAPSULE | Refills: 0 | Status: SHIPPED | OUTPATIENT
Start: 2017-04-28 | End: 2017-06-08 | Stop reason: SDUPTHER

## 2017-05-02 NOTE — PROGRESS NOTES
Avis Graves presents [facility] on 4/26/2017 for a follow up visit status post R breast recon with TE placement    Vitals:    04/26/17 1345   BP: 117/82   Pulse: 78   Temp: 97.9 °F (36.6 °C)     Current Outpatient Prescriptions on File Prior to Visit   Medication Sig Dispense Refill    budesonide-formoterol 160-4.5 mcg (SYMBICORT) 160-4.5 mcg/actuation HFAA Inhale 1 puff into the lungs every 12 (twelve) hours. 10.2 g 0    cetirizine (ZYRTEC) 10 MG tablet Take 10 mg by mouth daily as needed.       COREG CR 80 mg 24 hr capsule Take 1 capsule (80 mg total) by mouth once daily. (Patient taking differently: Take 80 mg by mouth every evening. ) 30 capsule 6    diazePAM (VALIUM) 5 MG tablet Take 1 tablet (5 mg total) by mouth every 6 (six) hours as needed (muscle spasms). 28 tablet 0    furosemide (LASIX) 20 MG tablet TAKE 1 TABLET (20 MG TOTAL) BY MOUTH ONCE DAILY. 30 tablet 0    hydrocodone-acetaminophen 5-325mg (NORCO) 5-325 mg per tablet Take 1-2 tablets by mouth every 4 (four) hours as needed for Pain. 60 tablet 0    LACTOBACILLUS ACIDOPHILUS (PROBIOTIC ORAL) Take by mouth every evening.      mometasone (NASONEX) 50 mcg/actuation nasal spray 2 sprays by Nasal route once daily. (Patient taking differently: 2 sprays by Nasal route daily as needed. ) 1 each 3    ondansetron (ZOFRAN, AS HYDROCHLORIDE,) 4 MG tablet Take 1 tablet (4 mg total) by mouth every 6 (six) hours as needed for Nausea. 30 tablet 1    ondansetron (ZOFRAN-ODT) 4 MG TbDL Take 1 tablet (4 mg total) by mouth every 6 (six) hours as needed. 30 tablet 0    senna-docusate 8.6-50 mg (PERICOLACE) 8.6-50 mg per tablet Take 1 tablet by mouth once daily. While taking prescription pain medications. 30 tablet 0    UNABLE TO FIND Bioclens 1 tablet every AM for bowels (OTC)       No current facility-administered medications on file prior to visit.      Patient Active Problem List   Diagnosis    Heart failure, systolic, chronic     Ventricular tachycardia    Automatic implantable cardioverter-defibrillator in situ    Thrombus due to any device, implant or graft    Generalized headaches    NICM (nonischemic cardiomyopathy)    Abdominal pain, other specified site    Irritable bowel syndrome with constipation    Colitis    Clostridium difficile colitis    Diverticulitis of colon    Mild persistent asthma without complication    Cancer of axillary tail of right breast    Encounter for antineoplastic chemotherapy     Past Surgical History:   Procedure Laterality Date    CARDIAC DEFIBRILLATOR PLACEMENT      X 2 Medtronic     CARDIAC DEFIBRILLATOR PLACEMENT      medtronic    HYSTERECTOMY      MASTECTOMY Right 02/07/2017    TONSILLECTOMY       She was seen by Dr. Bam Nunez      Long discussion about the need for L mastopexy and R implant exchange. She will need XRT of R breast therefor no surgery for at least 6 months following completion of XRT. Will have her return to clinic once she starts XRT.     All questions were answered. The patient was advised to call the clinic with any questions or concerns prior to their next visit.

## 2017-05-03 ENCOUNTER — OFFICE VISIT (OUTPATIENT)
Dept: HEMATOLOGY/ONCOLOGY | Facility: CLINIC | Age: 42
End: 2017-05-03
Attending: INTERNAL MEDICINE
Payer: COMMERCIAL

## 2017-05-03 ENCOUNTER — INFUSION (OUTPATIENT)
Dept: INFUSION THERAPY | Facility: HOSPITAL | Age: 42
End: 2017-05-03
Attending: FAMILY MEDICINE
Payer: COMMERCIAL

## 2017-05-03 VITALS
WEIGHT: 125 LBS | TEMPERATURE: 98 F | HEIGHT: 57 IN | SYSTOLIC BLOOD PRESSURE: 103 MMHG | OXYGEN SATURATION: 100 % | RESPIRATION RATE: 18 BRPM | DIASTOLIC BLOOD PRESSURE: 57 MMHG | HEART RATE: 87 BPM | BODY MASS INDEX: 26.97 KG/M2

## 2017-05-03 VITALS
HEIGHT: 60 IN | RESPIRATION RATE: 18 BRPM | WEIGHT: 125 LBS | SYSTOLIC BLOOD PRESSURE: 103 MMHG | BODY MASS INDEX: 24.54 KG/M2 | HEART RATE: 87 BPM | TEMPERATURE: 98 F | DIASTOLIC BLOOD PRESSURE: 57 MMHG

## 2017-05-03 VITALS — DIASTOLIC BLOOD PRESSURE: 59 MMHG | SYSTOLIC BLOOD PRESSURE: 101 MMHG | HEART RATE: 80 BPM | RESPIRATION RATE: 16 BRPM

## 2017-05-03 DIAGNOSIS — C50.611 CANCER OF AXILLARY TAIL OF RIGHT BREAST: Primary | ICD-10-CM

## 2017-05-03 DIAGNOSIS — C50.611 CANCER OF AXILLARY TAIL OF RIGHT BREAST: ICD-10-CM

## 2017-05-03 DIAGNOSIS — Z51.11 ENCOUNTER FOR ANTINEOPLASTIC CHEMOTHERAPY: ICD-10-CM

## 2017-05-03 LAB
ALBUMIN SERPL BCP-MCNC: 3.6 G/DL
ALP SERPL-CCNC: 47 U/L
ALT SERPL W/O P-5'-P-CCNC: 13 U/L
ANION GAP SERPL CALC-SCNC: 8 MMOL/L
AST SERPL-CCNC: 14 U/L
BILIRUB SERPL-MCNC: 0.3 MG/DL
BUN SERPL-MCNC: 11 MG/DL
CALCIUM SERPL-MCNC: 9.1 MG/DL
CHLORIDE SERPL-SCNC: 107 MMOL/L
CO2 SERPL-SCNC: 28 MMOL/L
CREAT SERPL-MCNC: 0.8 MG/DL
ERYTHROCYTE [DISTWIDTH] IN BLOOD BY AUTOMATED COUNT: 11.9 %
EST. GFR  (AFRICAN AMERICAN): >60 ML/MIN/1.73 M^2
EST. GFR  (NON AFRICAN AMERICAN): >60 ML/MIN/1.73 M^2
GLUCOSE SERPL-MCNC: 107 MG/DL
HCT VFR BLD AUTO: 38.6 %
HGB BLD-MCNC: 12.4 G/DL
MCH RBC QN AUTO: 29.8 PG
MCHC RBC AUTO-ENTMCNC: 32.1 %
MCV RBC AUTO: 93 FL
NEUTROPHILS # BLD AUTO: 2.7 K/UL
PLATELET # BLD AUTO: 184 K/UL
PMV BLD AUTO: 10.8 FL
POTASSIUM SERPL-SCNC: 4.3 MMOL/L
PROT SERPL-MCNC: 6.3 G/DL
RBC # BLD AUTO: 4.16 M/UL
SODIUM SERPL-SCNC: 143 MMOL/L
WBC # BLD AUTO: 5.17 K/UL

## 2017-05-03 PROCEDURE — 36592 COLLECT BLOOD FROM PICC: CPT

## 2017-05-03 PROCEDURE — 99999 PR PBB SHADOW E&M-EST. PATIENT-LVL III: CPT | Mod: 25,PBBFAC,, | Performed by: INTERNAL MEDICINE

## 2017-05-03 PROCEDURE — 63600175 PHARM REV CODE 636 W HCPCS: Performed by: INTERNAL MEDICINE

## 2017-05-03 PROCEDURE — 96367 TX/PROPH/DG ADDL SEQ IV INF: CPT

## 2017-05-03 PROCEDURE — 1160F RVW MEDS BY RX/DR IN RCRD: CPT | Mod: S$GLB,,, | Performed by: INTERNAL MEDICINE

## 2017-05-03 PROCEDURE — 25000003 PHARM REV CODE 250: Performed by: INTERNAL MEDICINE

## 2017-05-03 PROCEDURE — 96375 TX/PRO/DX INJ NEW DRUG ADDON: CPT

## 2017-05-03 PROCEDURE — 80053 COMPREHEN METABOLIC PANEL: CPT

## 2017-05-03 PROCEDURE — 96413 CHEMO IV INFUSION 1 HR: CPT

## 2017-05-03 PROCEDURE — 99215 OFFICE O/P EST HI 40 MIN: CPT | Mod: S$GLB,,, | Performed by: INTERNAL MEDICINE

## 2017-05-03 PROCEDURE — 85027 COMPLETE CBC AUTOMATED: CPT

## 2017-05-03 RX ORDER — SODIUM CHLORIDE 0.9 % (FLUSH) 0.9 %
10 SYRINGE (ML) INJECTION
Status: CANCELLED | OUTPATIENT
Start: 2017-05-03

## 2017-05-03 RX ORDER — DIPHENHYDRAMINE HYDROCHLORIDE 50 MG/ML
50 INJECTION INTRAMUSCULAR; INTRAVENOUS ONCE AS NEEDED
Status: CANCELLED | OUTPATIENT
Start: 2017-05-03 | End: 2017-05-03

## 2017-05-03 RX ORDER — SODIUM CHLORIDE 0.9 % (FLUSH) 0.9 %
10 SYRINGE (ML) INJECTION
Status: DISCONTINUED | OUTPATIENT
Start: 2017-05-03 | End: 2017-05-03 | Stop reason: HOSPADM

## 2017-05-03 RX ORDER — EPINEPHRINE 0.3 MG/.3ML
0.3 INJECTION SUBCUTANEOUS ONCE AS NEEDED
Status: CANCELLED | OUTPATIENT
Start: 2017-05-03 | End: 2017-05-03

## 2017-05-03 RX ORDER — FAMOTIDINE 10 MG/ML
20 INJECTION INTRAVENOUS
Status: CANCELLED | OUTPATIENT
Start: 2017-05-03

## 2017-05-03 RX ORDER — HEPARIN 100 UNIT/ML
500 SYRINGE INTRAVENOUS
Status: DISCONTINUED | OUTPATIENT
Start: 2017-05-03 | End: 2017-05-03 | Stop reason: HOSPADM

## 2017-05-03 RX ORDER — FAMOTIDINE 10 MG/ML
20 INJECTION INTRAVENOUS
Status: COMPLETED | OUTPATIENT
Start: 2017-05-03 | End: 2017-05-03

## 2017-05-03 RX ORDER — EPINEPHRINE 0.3 MG/.3ML
0.3 INJECTION SUBCUTANEOUS ONCE AS NEEDED
Status: DISCONTINUED | OUTPATIENT
Start: 2017-05-03 | End: 2017-05-03 | Stop reason: HOSPADM

## 2017-05-03 RX ORDER — DIPHENHYDRAMINE HYDROCHLORIDE 50 MG/ML
50 INJECTION INTRAMUSCULAR; INTRAVENOUS ONCE AS NEEDED
Status: DISCONTINUED | OUTPATIENT
Start: 2017-05-03 | End: 2017-05-03 | Stop reason: HOSPADM

## 2017-05-03 RX ORDER — HEPARIN 100 UNIT/ML
500 SYRINGE INTRAVENOUS
Status: CANCELLED | OUTPATIENT
Start: 2017-05-03

## 2017-05-03 RX ADMIN — HEPARIN 500 UNITS: 100 SYRINGE at 12:05

## 2017-05-03 RX ADMIN — Medication 20 MG: at 10:05

## 2017-05-03 RX ADMIN — Medication 1 MG: at 10:05

## 2017-05-03 RX ADMIN — SODIUM CHLORIDE: 0.9 INJECTION, SOLUTION INTRAVENOUS at 10:05

## 2017-05-03 RX ADMIN — PACLITAXEL 126 MG: 6 INJECTION, SOLUTION, CONCENTRATE INTRAVENOUS at 11:05

## 2017-05-03 RX ADMIN — SODIUM CHLORIDE, PRESERVATIVE FREE 10 ML: 5 INJECTION INTRAVENOUS at 12:05

## 2017-05-03 RX ADMIN — FAMOTIDINE 20 MG: 10 INJECTION INTRAVENOUS at 10:05

## 2017-05-03 RX ADMIN — DIPHENHYDRAMINE HYDROCHLORIDE 50 MG: 50 INJECTION, SOLUTION INTRAMUSCULAR; INTRAVENOUS at 10:05

## 2017-05-03 NOTE — PROGRESS NOTES
Subjective:       Patient ID: Avis Graves is a 42 y.o. female.    Chief Complaint: No chief complaint on file.    HPI   Ms. Graves returns today to continue her adjuvant chemotherapy with weekly taxol.  During her infusion last week she had a reaction to the taxol consisting of SOB and chest heaviness.  Symptoms resolved with benadryl and steroids and she was able to finish her infusionHer CBC was normal, and so was her hepatic function.  Briefly, she is a 42-year-old -American female with a history of cardiomyopathy and an EF of less than 30%, who was recently diagnosed with a carcinoma of the right breast, that was ER strongly positive, SC negative, and HER-2 negative.  On 2/07/2017 she underwent a right modified radical mastectomy with sentinel lymph node biopsy and insertion of tissue expanders.  The pathology report from the 02/07/2017 procedure indicates that she had a 4 cm carcinoma; three of the four sentinel lymph nodes were positive with macrometastases.  The patient was referred for further evaluation.    Today's labs were reviewed.  Her CBC and her hepatic function are normal.    Review of Systems  Overall she feels OK.  She is sore from the tissue expander and the port insertion and somewhat appears anxious with the upcoming chemotherapy, but she denies any depression, vomiting, diarrhea, constipation, diplopia, blurred vision, headaches, chest pain, palpitations, shortness of breath, left breast pain, abdominal pain, extremity pain, or difficulty ambulating.  The remainder of the ten-point ROS, including general, skin, lymph, H/N, cardiorespiratory, GI, , Neuro, Endocrine, and psychiatric is negative.     Objective:      Physical Exam  She is alert, oriented to time, place, person, pleasant, well      nourished, in no acute physical distress.  She is here with a female cousin.                                  VITAL SIGNS:  Reviewed                                      HEENT:  Normal.   There are no nasal, oral, lip, gingival, auricular, lid,    or conjunctival lesions.  Mucosae are moist and pink, and there is no        thrush.  Pupils are equal, reactive to light and accommodation.              Extraocular muscle movements are intact.  Dentition is good.  There is no frontal or maxillary tenderness.                                     NECK:  Supple without JVD, adenopathy, or thyromegaly.                       LUNGS:  Clear to auscultation without wheezing, rales, or rhonchi.           CARDIOVASCULAR:  Reveals an S1, S2, no murmurs, no rubs, no gallops.         ABDOMEN:  Soft, nontender, without organomegaly.  Bowel sounds are    present.                                                                     EXTREMITIES:  No cyanosis, clubbing, or edema.                               BREASTS:  She is status post right mastectomy with a tissue expander in place and a well-healed incision.    There are no masses in the left breast.     An AICD is palpated in the left infraclavicular area. \  A left sided port is seen on her arm, medially.  there is an ecchymosis associated with her incision.                                   LYMPHATIC:  There is no cervical, axillary, or supraclavicular adenopathy.   SKIN:  Warm and moist, without petechiae, rashes, induration, or ecchymoses.           NEUROLOGIC:  DTRs are 0-1+ bilaterally, symmetrical, motor function is 5/5,  and cranial nerves are  within normal limits.  A portacath is seen in the distal right arm, medially.    Assessment:       1. 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 Encounter for antineoplastic chemotherapy        Plan:        I had a long discussion with her; she will proceed with the second cycle of chemotherapy today.  Her dose of dexamethasone will be 20 mg and benadryl will be increased to 50 mg prior to her infusion.  Hopefully she will not experience another reaction, but if she does, next time she will be premedicated with 20 mg  of dexamethasone IV 12 and 6 hours prior.  Her questions were answered to her satisfaction.  I will see her with repeat labs in 2 weeks.  She will need to remain on weekly taxol with weekly CBC and CMP

## 2017-05-03 NOTE — PLAN OF CARE
"Problem: Patient Care Overview (Adult)  Goal: Plan of Care Review  Outcome: Ongoing (interventions implemented as appropriate)  Patient independently ambulated into chemo infusion clinic today accompanied by sister. Patient skin warm and dry, RR even and unlabored. Patient in NAD and appears comfortable. Patient denies any pain or discomfort and tolerated Taxol treatment well. Pt reports feeling "really high" from Benadryl. Dr. Lizarraga evaluated pt in chemo clinic and stated pt doesn't need to be seen for next chemo infusion unless she has an issue.  Patient follow up discussed with pt and sister; verbal understanding expressed.       "

## 2017-05-03 NOTE — Clinical Note
Weekly labs and weekly chemo, see me in two weeks, unless she has a reaction today, in which case we should see her next week again

## 2017-05-09 ENCOUNTER — LAB VISIT (OUTPATIENT)
Dept: LAB | Facility: HOSPITAL | Age: 42
End: 2017-05-09
Attending: INTERNAL MEDICINE
Payer: COMMERCIAL

## 2017-05-09 DIAGNOSIS — C50.611 CANCER OF AXILLARY TAIL OF RIGHT BREAST: ICD-10-CM

## 2017-05-09 DIAGNOSIS — Z51.11 ENCOUNTER FOR ANTINEOPLASTIC CHEMOTHERAPY: ICD-10-CM

## 2017-05-09 LAB
ALBUMIN SERPL BCP-MCNC: 3.5 G/DL
ALP SERPL-CCNC: 47 U/L
ALT SERPL W/O P-5'-P-CCNC: 14 U/L
ANION GAP SERPL CALC-SCNC: 9 MMOL/L
AST SERPL-CCNC: 13 U/L
BILIRUB SERPL-MCNC: 0.5 MG/DL
BUN SERPL-MCNC: 14 MG/DL
CALCIUM SERPL-MCNC: 9.6 MG/DL
CHLORIDE SERPL-SCNC: 106 MMOL/L
CO2 SERPL-SCNC: 28 MMOL/L
CREAT SERPL-MCNC: 1 MG/DL
ERYTHROCYTE [DISTWIDTH] IN BLOOD BY AUTOMATED COUNT: 11.9 %
EST. GFR  (AFRICAN AMERICAN): >60 ML/MIN/1.73 M^2
EST. GFR  (NON AFRICAN AMERICAN): >60 ML/MIN/1.73 M^2
GLUCOSE SERPL-MCNC: 117 MG/DL
HCT VFR BLD AUTO: 38.6 %
HGB BLD-MCNC: 12.7 G/DL
MCH RBC QN AUTO: 29.9 PG
MCHC RBC AUTO-ENTMCNC: 32.9 %
MCV RBC AUTO: 91 FL
NEUTROPHILS # BLD AUTO: 2.6 K/UL
PLATELET # BLD AUTO: 206 K/UL
PMV BLD AUTO: 10.7 FL
POTASSIUM SERPL-SCNC: 4.2 MMOL/L
PROT SERPL-MCNC: 6.3 G/DL
RBC # BLD AUTO: 4.25 M/UL
SODIUM SERPL-SCNC: 143 MMOL/L
WBC # BLD AUTO: 4.96 K/UL

## 2017-05-09 PROCEDURE — 85027 COMPLETE CBC AUTOMATED: CPT

## 2017-05-09 PROCEDURE — 36415 COLL VENOUS BLD VENIPUNCTURE: CPT

## 2017-05-09 PROCEDURE — 80053 COMPREHEN METABOLIC PANEL: CPT

## 2017-05-09 RX ORDER — DIPHENHYDRAMINE HYDROCHLORIDE 50 MG/ML
50 INJECTION INTRAMUSCULAR; INTRAVENOUS ONCE AS NEEDED
Status: CANCELLED | OUTPATIENT
Start: 2017-05-10 | End: 2017-05-10

## 2017-05-09 RX ORDER — EPINEPHRINE 0.3 MG/.3ML
0.3 INJECTION SUBCUTANEOUS ONCE AS NEEDED
Status: CANCELLED | OUTPATIENT
Start: 2017-05-10 | End: 2017-05-10

## 2017-05-09 RX ORDER — HEPARIN 100 UNIT/ML
500 SYRINGE INTRAVENOUS
Status: CANCELLED | OUTPATIENT
Start: 2017-05-10

## 2017-05-09 RX ORDER — FAMOTIDINE 10 MG/ML
20 INJECTION INTRAVENOUS
Status: CANCELLED | OUTPATIENT
Start: 2017-05-10

## 2017-05-09 RX ORDER — SODIUM CHLORIDE 0.9 % (FLUSH) 0.9 %
10 SYRINGE (ML) INJECTION
Status: CANCELLED | OUTPATIENT
Start: 2017-05-10

## 2017-05-10 ENCOUNTER — TELEPHONE (OUTPATIENT)
Dept: HEMATOLOGY/ONCOLOGY | Facility: CLINIC | Age: 42
End: 2017-05-10

## 2017-05-10 ENCOUNTER — CLINICAL SUPPORT (OUTPATIENT)
Dept: ELECTROPHYSIOLOGY | Facility: CLINIC | Age: 42
End: 2017-05-10
Payer: COMMERCIAL

## 2017-05-10 ENCOUNTER — INFUSION (OUTPATIENT)
Dept: INFUSION THERAPY | Facility: HOSPITAL | Age: 42
End: 2017-05-10
Attending: FAMILY MEDICINE
Payer: COMMERCIAL

## 2017-05-10 VITALS
WEIGHT: 125 LBS | DIASTOLIC BLOOD PRESSURE: 78 MMHG | HEIGHT: 57 IN | HEART RATE: 77 BPM | BODY MASS INDEX: 26.97 KG/M2 | TEMPERATURE: 98 F | RESPIRATION RATE: 16 BRPM | SYSTOLIC BLOOD PRESSURE: 119 MMHG

## 2017-05-10 DIAGNOSIS — Z51.11 ENCOUNTER FOR ANTINEOPLASTIC CHEMOTHERAPY: Primary | ICD-10-CM

## 2017-05-10 DIAGNOSIS — I50.22 CHRONIC SYSTOLIC HEART FAILURE: ICD-10-CM

## 2017-05-10 DIAGNOSIS — C50.611 CANCER OF AXILLARY TAIL OF RIGHT BREAST: Primary | ICD-10-CM

## 2017-05-10 DIAGNOSIS — Z95.810 ICD (IMPLANTABLE CARDIOVERTER-DEFIBRILLATOR) IN PLACE: Primary | ICD-10-CM

## 2017-05-10 DIAGNOSIS — I42.8 NICM (NONISCHEMIC CARDIOMYOPATHY): ICD-10-CM

## 2017-05-10 DIAGNOSIS — Z95.810 ICD (IMPLANTABLE CARDIOVERTER-DEFIBRILLATOR) IN PLACE: ICD-10-CM

## 2017-05-10 DIAGNOSIS — I47.20 V-TACH: ICD-10-CM

## 2017-05-10 PROCEDURE — 63600175 PHARM REV CODE 636 W HCPCS: Performed by: INTERNAL MEDICINE

## 2017-05-10 PROCEDURE — 93296 REM INTERROG EVL PM/IDS: CPT | Mod: S$GLB,,, | Performed by: INTERNAL MEDICINE

## 2017-05-10 PROCEDURE — 96375 TX/PRO/DX INJ NEW DRUG ADDON: CPT

## 2017-05-10 PROCEDURE — 25000003 PHARM REV CODE 250

## 2017-05-10 PROCEDURE — 25000003 PHARM REV CODE 250: Performed by: INTERNAL MEDICINE

## 2017-05-10 PROCEDURE — 96413 CHEMO IV INFUSION 1 HR: CPT

## 2017-05-10 PROCEDURE — 96367 TX/PROPH/DG ADDL SEQ IV INF: CPT

## 2017-05-10 PROCEDURE — 93295 DEV INTERROG REMOTE 1/2/MLT: CPT | Mod: S$GLB,,, | Performed by: INTERNAL MEDICINE

## 2017-05-10 RX ORDER — HEPARIN 100 UNIT/ML
500 SYRINGE INTRAVENOUS
Status: DISCONTINUED | OUTPATIENT
Start: 2017-05-10 | End: 2017-05-10 | Stop reason: HOSPADM

## 2017-05-10 RX ORDER — SODIUM CHLORIDE 0.9 % (FLUSH) 0.9 %
10 SYRINGE (ML) INJECTION
Status: DISCONTINUED | OUTPATIENT
Start: 2017-05-10 | End: 2017-05-10 | Stop reason: HOSPADM

## 2017-05-10 RX ORDER — HEPARIN 100 UNIT/ML
SYRINGE INTRAVENOUS
Status: DISCONTINUED
Start: 2017-05-10 | End: 2017-05-10 | Stop reason: HOSPADM

## 2017-05-10 RX ORDER — HEPARIN 100 UNIT/ML
SYRINGE INTRAVENOUS
Status: COMPLETED
Start: 2017-05-10 | End: 2017-05-10

## 2017-05-10 RX ORDER — FAMOTIDINE 10 MG/ML
20 INJECTION INTRAVENOUS
Status: COMPLETED | OUTPATIENT
Start: 2017-05-10 | End: 2017-05-10

## 2017-05-10 RX ORDER — EPINEPHRINE 0.3 MG/.3ML
0.3 INJECTION SUBCUTANEOUS ONCE AS NEEDED
Status: DISCONTINUED | OUTPATIENT
Start: 2017-05-10 | End: 2017-05-10 | Stop reason: HOSPADM

## 2017-05-10 RX ORDER — FAMOTIDINE 10 MG/ML
INJECTION INTRAVENOUS
Status: COMPLETED
Start: 2017-05-10 | End: 2017-05-10

## 2017-05-10 RX ORDER — DIPHENHYDRAMINE HYDROCHLORIDE 50 MG/ML
50 INJECTION INTRAMUSCULAR; INTRAVENOUS ONCE AS NEEDED
Status: DISCONTINUED | OUTPATIENT
Start: 2017-05-10 | End: 2017-05-10 | Stop reason: HOSPADM

## 2017-05-10 RX ADMIN — FAMOTIDINE 20 MG: 10 INJECTION INTRAVENOUS at 09:05

## 2017-05-10 RX ADMIN — SODIUM CHLORIDE, PRESERVATIVE FREE 10 ML: 5 INJECTION INTRAVENOUS at 11:05

## 2017-05-10 RX ADMIN — HEPARIN 500 UNITS: 100 SYRINGE at 11:05

## 2017-05-10 RX ADMIN — DIPHENHYDRAMINE HYDROCHLORIDE 50 MG: 50 INJECTION, SOLUTION INTRAMUSCULAR; INTRAVENOUS at 10:05

## 2017-05-10 RX ADMIN — Medication 20 MG: at 09:05

## 2017-05-10 RX ADMIN — SODIUM CHLORIDE: 0.9 INJECTION, SOLUTION INTRAVENOUS at 09:05

## 2017-05-10 RX ADMIN — PACLITAXEL 126 MG: 6 INJECTION, SOLUTION, CONCENTRATE INTRAVENOUS at 10:05

## 2017-05-10 RX ADMIN — Medication 1 MG: at 09:05

## 2017-05-10 NOTE — PLAN OF CARE
Problem: Patient Care Overview (Adult)  Goal: Plan of Care Review  Outcome: Ongoing (interventions implemented as appropriate)  Did well on chemo today. No problems.

## 2017-05-11 ENCOUNTER — TELEPHONE (OUTPATIENT)
Dept: HEMATOLOGY/ONCOLOGY | Facility: CLINIC | Age: 42
End: 2017-05-11

## 2017-05-13 ENCOUNTER — HOSPITAL ENCOUNTER (EMERGENCY)
Facility: HOSPITAL | Age: 42
Discharge: HOME | End: 2017-05-13
Attending: EMERGENCY MEDICINE | Admitting: EMERGENCY MEDICINE
Payer: COMMERCIAL

## 2017-05-13 VITALS
OXYGEN SATURATION: 98 % | RESPIRATION RATE: 20 BRPM | DIASTOLIC BLOOD PRESSURE: 61 MMHG | TEMPERATURE: 99 F | HEART RATE: 82 BPM | SYSTOLIC BLOOD PRESSURE: 94 MMHG

## 2017-05-13 DIAGNOSIS — R05.9 COUGH: ICD-10-CM

## 2017-05-13 DIAGNOSIS — J06.9 VIRAL URI WITH COUGH: Primary | ICD-10-CM

## 2017-05-13 DIAGNOSIS — J02.9 SORE THROAT: ICD-10-CM

## 2017-05-13 DIAGNOSIS — R50.9 FEVER: ICD-10-CM

## 2017-05-13 LAB
ALBUMIN SERPL BCP-MCNC: 3.4 G/DL
ALP SERPL-CCNC: 55 U/L
ALT SERPL W/O P-5'-P-CCNC: 14 U/L
ANION GAP SERPL CALC-SCNC: 9 MMOL/L
APTT BLDCRRT: 22.1 SEC
AST SERPL-CCNC: 13 U/L
BACTERIA #/AREA URNS AUTO: NORMAL /HPF
BASOPHILS # BLD AUTO: 0.01 K/UL
BASOPHILS NFR BLD: 0.1 %
BILIRUB SERPL-MCNC: 0.5 MG/DL
BILIRUB UR QL STRIP: NEGATIVE
BUN SERPL-MCNC: 24 MG/DL
CALCIUM SERPL-MCNC: 9 MG/DL
CHLORIDE SERPL-SCNC: 106 MMOL/L
CLARITY UR REFRACT.AUTO: CLEAR
CO2 SERPL-SCNC: 25 MMOL/L
COLOR UR AUTO: YELLOW
CREAT SERPL-MCNC: 1 MG/DL
DEPRECATED S PYO AG THROAT QL EIA: NEGATIVE
DIFFERENTIAL METHOD: ABNORMAL
EOSINOPHIL # BLD AUTO: 0 K/UL
EOSINOPHIL NFR BLD: 0.2 %
ERYTHROCYTE [DISTWIDTH] IN BLOOD BY AUTOMATED COUNT: 12.3 %
EST. GFR  (AFRICAN AMERICAN): >60 ML/MIN/1.73 M^2
EST. GFR  (NON AFRICAN AMERICAN): >60 ML/MIN/1.73 M^2
GLUCOSE SERPL-MCNC: 143 MG/DL
GLUCOSE UR QL STRIP: NEGATIVE
HCT VFR BLD AUTO: 38.5 %
HGB BLD-MCNC: 12.7 G/DL
HGB UR QL STRIP: NEGATIVE
INR PPP: 1.2
KETONES UR QL STRIP: ABNORMAL
LACTATE SERPL-SCNC: 1.2 MMOL/L
LEUKOCYTE ESTERASE UR QL STRIP: ABNORMAL
LYMPHOCYTES # BLD AUTO: 2.3 K/UL
LYMPHOCYTES NFR BLD: 23.7 %
MCH RBC QN AUTO: 30.2 PG
MCHC RBC AUTO-ENTMCNC: 33 %
MCV RBC AUTO: 92 FL
MICROSCOPIC COMMENT: NORMAL
MONOCYTES # BLD AUTO: 0.3 K/UL
MONOCYTES NFR BLD: 2.8 %
NEUTROPHILS # BLD AUTO: 7.2 K/UL
NEUTROPHILS NFR BLD: 72.9 %
NITRITE UR QL STRIP: NEGATIVE
PH UR STRIP: 6 [PH] (ref 5–8)
PLATELET # BLD AUTO: 231 K/UL
PMV BLD AUTO: 11 FL
POTASSIUM SERPL-SCNC: 4.4 MMOL/L
PROT SERPL-MCNC: 6.3 G/DL
PROT UR QL STRIP: NEGATIVE
PROTHROMBIN TIME: 12.3 SEC
RBC # BLD AUTO: 4.2 M/UL
RBC #/AREA URNS AUTO: 0 /HPF (ref 0–4)
SODIUM SERPL-SCNC: 140 MMOL/L
SP GR UR STRIP: >=1.03 (ref 1–1.03)
SQUAMOUS #/AREA URNS AUTO: 1 /HPF
URN SPEC COLLECT METH UR: ABNORMAL
UROBILINOGEN UR STRIP-ACNC: NEGATIVE EU/DL
WBC # BLD AUTO: 9.84 K/UL
WBC #/AREA URNS AUTO: 4 /HPF (ref 0–5)

## 2017-05-13 PROCEDURE — 25000003 PHARM REV CODE 250: Performed by: EMERGENCY MEDICINE

## 2017-05-13 PROCEDURE — 93010 ELECTROCARDIOGRAM REPORT: CPT | Mod: ,,, | Performed by: INTERNAL MEDICINE

## 2017-05-13 PROCEDURE — 87880 STREP A ASSAY W/OPTIC: CPT

## 2017-05-13 PROCEDURE — 87086 URINE CULTURE/COLONY COUNT: CPT

## 2017-05-13 PROCEDURE — 80053 COMPREHEN METABOLIC PANEL: CPT

## 2017-05-13 PROCEDURE — 87088 URINE BACTERIA CULTURE: CPT

## 2017-05-13 PROCEDURE — 85610 PROTHROMBIN TIME: CPT

## 2017-05-13 PROCEDURE — 85025 COMPLETE CBC W/AUTO DIFF WBC: CPT

## 2017-05-13 PROCEDURE — 94761 N-INVAS EAR/PLS OXIMETRY MLT: CPT

## 2017-05-13 PROCEDURE — 96374 THER/PROPH/DIAG INJ IV PUSH: CPT

## 2017-05-13 PROCEDURE — 81001 URINALYSIS AUTO W/SCOPE: CPT

## 2017-05-13 PROCEDURE — 83605 ASSAY OF LACTIC ACID: CPT

## 2017-05-13 PROCEDURE — 85730 THROMBOPLASTIN TIME PARTIAL: CPT

## 2017-05-13 PROCEDURE — 99284 EMERGENCY DEPT VISIT MOD MDM: CPT | Mod: 25

## 2017-05-13 PROCEDURE — 99285 EMERGENCY DEPT VISIT HI MDM: CPT | Mod: ,,, | Performed by: EMERGENCY MEDICINE

## 2017-05-13 PROCEDURE — 87081 CULTURE SCREEN ONLY: CPT

## 2017-05-13 PROCEDURE — 87040 BLOOD CULTURE FOR BACTERIA: CPT | Mod: 59

## 2017-05-13 RX ORDER — HEPARIN 100 UNIT/ML
5 SYRINGE INTRAVENOUS ONCE
Status: COMPLETED | OUTPATIENT
Start: 2017-05-14 | End: 2017-05-13

## 2017-05-13 RX ADMIN — HEPARIN 500 UNITS: 100 SYRINGE at 11:05

## 2017-05-13 NOTE — ED AVS SNAPSHOT
[facility]  [address]  [facility] [address]               Avis Graves   2017  8:32 PM   ED    Description:  Female : 1975   Department:  [facility]           Your Care was Coordinated By:     Provider Role From To    Cory Bryant MD Attending Provider 17 --    Cory Giron MD Resident 17 --      Reason for Visit     Fever Post Chemo           Diagnoses this Visit        Comments    Viral URI with cough    -  Primary     Fever         Sore throat         Cough           ED Disposition     ED Disposition Condition Comment    Discharge             To Do List           Follow-up Information     Go to See below for appointments.    Why:  For follow-up        Go to [facility].    Specialty:  Emergency Medicine    Why:  As needed, If symptoms worsen - fever, feeling worse    Contact information:    [address]  [facility] [address]  [phone]      [facility]     [facility] Care Line -  Assistance  Unless otherwise directed by your provider, please contact [facility], our nurse care line that is available for  assistance.     Registered nurses in the [facility] provide: appointment scheduling, clinical advisement, health education, and other advisory services.  Call: [phone] (toll free)               Medications           Message regarding Medications     Verify the changes and/or additions to your medication regime listed below are the same as discussed with your clinician today.  If any of these changes or additions are incorrect, please notify your healthcare provider.             Verify that the below list of medications is an accurate representation of the medications you are currently taking.  If none reported, the list may be blank. If incorrect, please contact your healthcare provider. Carry this list  with you in case of emergency.           Current Medications     budesonide-formoterol 160-4.5 mcg (SYMBICORT) 160-4.5 mcg/actuation HFAA Inhale 1 puff into the lungs every 12 (twelve) hours.    cetirizine (ZYRTEC) 10 MG tablet Take 10 mg by mouth daily as needed.     COREG CR 80 mg 24 hr capsule Take 1 capsule (80 mg total) by mouth once daily.    diazePAM (VALIUM) 5 MG tablet Take 1 tablet (5 mg total) by mouth every 6 (six) hours as needed (muscle spasms).    enalapril (VASOTEC) 2.5 MG tablet TAKE 1 TABLET (2.5 MG TOTAL) BY MOUTH 2 (TWO) TIMES DAILY.    furosemide (LASIX) 20 MG tablet TAKE 1 TABLET (20 MG TOTAL) BY MOUTH ONCE DAILY.    hydrocodone-acetaminophen 5-325mg (NORCO) 5-325 mg per tablet Take 1-2 tablets by mouth every 4 (four) hours as needed for Pain.    LACTOBACILLUS ACIDOPHILUS (PROBIOTIC ORAL) Take by mouth every evening.    mometasone (NASONEX) 50 mcg/actuation nasal spray 2 sprays by Nasal route once daily.    ondansetron (ZOFRAN, AS HYDROCHLORIDE,) 4 MG tablet Take 1 tablet (4 mg total) by mouth every 6 (six) hours as needed for Nausea.    ondansetron (ZOFRAN-ODT) 4 MG TbDL Take 1 tablet (4 mg total) by mouth every 6 (six) hours as needed.    potassium chloride (MICRO-K) 10 MEQ CpSR TAKE 1 CAPSULE (10 MEQ TOTAL) BY MOUTH 2 (TWO) TIMES DAILY.    senna-docusate 8.6-50 mg (PERICOLACE) 8.6-50 mg per tablet Take 1 tablet by mouth once daily. While taking prescription pain medications.    UNABLE TO FIND Bioclens 1 tablet every AM for bowels (OTC)           Clinical Reference Information           Your Vitals Were     BP Pulse Temp Resp SpO2       94/64 90 98.8 °F (37.1 °C) (Oral) 21 97%       Allergies as of 5/13/2017        Reactions    Ciprofloxacin Rash      Immunizations Administered on Date of Encounter - 5/13/2017     None      ED Micro, Lab, POCT     Start Ordered       Status Ordering Provider    05/14/17 0052 05/13/17 2053  Lactic acid, plasma #2  In 4 hours      Acknowledged     05/13/17 5556  05/13/17 2054  Urine culture **CANNOT BE ORDERED STAT**  Once      In process     05/13/17 2054 05/13/17 2053    Once,   Status:  Canceled      Canceled     05/13/17 2053 05/13/17 2053  Rapid Strep Screen  STAT      Final result     05/13/17 2053 05/13/17 2053  Strep A culture, throat  Once      In process     05/13/17 2053 05/13/17 2053  Urinalysis Microscopic  Once      Final result     05/13/17 2040 05/13/17 2053  Urinalysis  STAT      Final result     05/13/17 2037 05/13/17 2053  APTT  STAT      Final result     05/13/17 2037 05/13/17 2053  Blood culture x two cultures. Draw prior to antibiotics.  Every 15 min     Comments:  Aerobic and anaerobic   Start Status   05/13/17 2037 In process   05/13/17 2052 In process       Acknowledged     05/13/17 2037 05/13/17 2053  CBC auto differential  STAT      Final result     05/13/17 2037 05/13/17 2053  Comprehensive metabolic panel  STAT      Final result     05/13/17 2037 05/13/17 2053  Lactic acid, plasma #1  STAT      Final result     05/13/17 2037 05/13/17 2053  Protime-INR  STAT      Final result       ED Imaging Orders     Start Ordered       Status Ordering Provider    05/13/17 2039 05/13/17 2053  X-Ray Chest PA And Lateral  1 time imaging      Final result         Discharge Instructions         Future Appointments  Date Time Provider Department Center   5/16/2017 9:00 AM CHAIR 06 [facility]   5/17/2017 9:30 AM Jerry Birmingham MD [facility]EM [facility]   5/17/2017 10:00 AM CHAIR 06 [facility]   5/23/2017 10:00 AM CHAIR 01 [facility]   5/30/2017 10:00 AM CHAIR 01 [facility]   6/20/2017 9:00 AM Orestes Santana MD [facility]   8/21/2017 11:00 AM PACEMAKER, ICD [facility] [address]         Viral Upper Respiratory Illness (Adult)  You have a viral upper respiratory illness (URI), which is another term for the common cold. This illness is contagious during the first few days. It  is spread through the air by coughing and sneezing. It may also be spread by direct contact (touching the sick person and then touching your own eyes, nose, or mouth). Frequent handwashing will decrease risk of spread. Most viral illnesses go away within 7 to 10 days with rest and simple home remedies. Sometimes the illness may last for several weeks. Antibiotics will not kill a virus, and they are generally not prescribed for this condition.    Home care  · If symptoms are severe, rest at home for the first 2 to 3 days. When you resume activity, don't let yourself get too tired.  · Avoid being exposed to cigarette smoke (yours or others).  · You may use acetaminophen or ibuprofen to control pain and fever, unless another medicine was prescribed. (Note: If you have chronic liver or kidney disease, have ever had a stomach ulcer or gastrointestinal bleeding, or are taking blood-thinning medicines, talk with your healthcare provider before using these medicines.) Aspirin should never be given to anyone under 18 years of age who is ill with a viral infection or fever. It may cause severe liver or brain damage.  · Your appetite may be poor, so a light diet is fine. Avoid dehydration by drinking 6 to 8 glasses of fluids per day (water, soft drinks, juices, tea, or soup). Extra fluids will help loosen secretions in the nose and lungs.  · Over-the-counter cold medicines will not shorten the length of time youre sick, but they may be helpful for the following symptoms: cough, sore throat, and nasal and sinus congestion. (Note: Do not use decongestants if you have high blood pressure.)  Follow-up care  Follow up with your healthcare provider, or as advised.  When to seek medical advice  Call your healthcare provider right away if any of these occur:  · Cough with lots of colored sputum (mucus)  · Severe headache; face, neck, or ear pain  · Difficulty swallowing due to throat pain  · Fever of 100.4°F (38°C)  Call 911, or get  immediate medical care  Call emergency services right away if any of these occur:  · Chest pain, shortness of breath, wheezing, or difficulty breathing  · Coughing up blood  · Inability to swallow due to throat pain  Date Last Reviewed: 9/13/2015 © 2000-2016 [employer]. [address] [place] [address]. All rights reserved. This information is not intended as a substitute for professional medical care. Always follow your healthcare professional's instructions.          Your Scheduled Appointments     May 16, 2017  9:00 AM CDT   Infusion 15 Min with CHAIR 06 [facility] ([facility])    [address]  [facility] [address]   [phone]            May 17, 2017  9:30 AM CDT   Established Patient Visit with Jerry Birmingham MD   [facility] ([facility]    [address], [address]  [facility] [address]   [phone]            May 17, 2017 10:00 AM CDT   Infusion 90 Min with CHAIR 06 [facility])    [address]  [facility] [address]   [phone]            May 23, 2017 10:00 AM CDT   Infusion 15 Min with CHAIR 01 [facility] ([facility])    [address]  [facility] [address]   [phone]            May 30, 2017 10:00 AM CDT   Infusion 15 Min with CHAIR 01 [facility])    [address]  [facility] [address]   [phone]               [facility] complies with applicable Federal civil rights laws and does not discriminate on the basis of race, color, national origin, age, disability, or sex.        Language Assistance Services     ATTENTION: Language assistance services are available, free of charge. Please call [phone].      ATENCIÓN: carolann Chen  disposición servicios gratuitos de asistencia lingüística. [place] [phone].     CHERELLE Ý: N?u b?n nói Ti?ng Vi?t, có các d?ch v? h? tr? ngôn ng? mi?n phí dành cho b?n. G?i s? [phone].

## 2017-05-14 NOTE — ED TRIAGE NOTES
Pt reports cough, body aches, and sore throat for the past few days. Pt currently getting chemo, last session was on Wednesday. Pt reports highest fever was 101.0 around 7pm today.

## 2017-05-14 NOTE — DISCHARGE INSTRUCTIONS
Future Appointments  Date Time Provider Department Center   5/16/2017 9:00 AM CHAIR 06 [facility]   5/17/2017 9:30 AM Jerry Birmingham MD [facility] HEM [facility]C [facility]   5/17/2017 10:00 AM CHAIR 06 [facility]   5/23/2017 10:00 AM CHAIR 01 [facility]   5/30/2017 10:00 AM CHAIR 01 [facility]   6/20/2017 9:00 AM Orestes Santana MD [facility] [address]   8/21/2017 11:00 AM PACEMAKER, ICD [facility]         Viral Upper Respiratory Illness (Adult)  You have a viral upper respiratory illness (URI), which is another term for the common cold. This illness is contagious during the first few days. It is spread through the air by coughing and sneezing. It may also be spread by direct contact (touching the sick person and then touching your own eyes, nose, or mouth). Frequent handwashing will decrease risk of spread. Most viral illnesses go away within 7 to 10 days with rest and simple home remedies. Sometimes the illness may last for several weeks. Antibiotics will not kill a virus, and they are generally not prescribed for this condition.    Home care  · If symptoms are severe, rest at home for the first 2 to 3 days. When you resume activity, don't let yourself get too tired.  · Avoid being exposed to cigarette smoke (yours or others).  · You may use acetaminophen or ibuprofen to control pain and fever, unless another medicine was prescribed. (Note: If you have chronic liver or kidney disease, have ever had a stomach ulcer or gastrointestinal bleeding, or are taking blood-thinning medicines, talk with your healthcare provider before using these medicines.) Aspirin should never be given to anyone under 18 years of age who is ill with a viral infection or fever. It may cause severe liver or brain damage.  · Your appetite may be poor, so a light diet is fine. Avoid dehydration by drinking 6 to 8 glasses of fluids per day (water, soft drinks,  juices, tea, or soup). Extra fluids will help loosen secretions in the nose and lungs.  · Over-the-counter cold medicines will not shorten the length of time youre sick, but they may be helpful for the following symptoms: cough, sore throat, and nasal and sinus congestion. (Note: Do not use decongestants if you have high blood pressure.)  Follow-up care  Follow up with your healthcare provider, or as advised.  When to seek medical advice  Call your healthcare provider right away if any of these occur:  · Cough with lots of colored sputum (mucus)  · Severe headache; face, neck, or ear pain  · Difficulty swallowing due to throat pain  · Fever of 100.4°F (38°C)  Call 911, or get immediate medical care  Call emergency services right away if any of these occur:  · Chest pain, shortness of breath, wheezing, or difficulty breathing  · Coughing up blood  · Inability to swallow due to throat pain  Date Last Reviewed: 9/13/2015  © 8913-5327 [employer]. [address] [place] [address], [place] [address]. All rights reserved. This information is not intended as a substitute for professional medical care. Always follow your healthcare professional's instructions.

## 2017-05-14 NOTE — ED PROVIDER NOTES
Encounter Date: 5/13/2017       History     Chief Complaint   Patient presents with    Fever Post Chemo     fever pt reports 100.1 F and last chemo tx wednesday, denies NVD, denies CP or SOB     Review of patient's allergies indicates:   Allergen Reactions    Ciprofloxacin Rash     The history is provided by the patient and medical records.     42-year-old woman with ER positive right breast cancer positive axillary lymph node status post mastectomy currently receiving adjuvant chemotherapy last treatment 3 days ago presents with new sore throat, cough productive of clear and yellow sputum, body aches, and low-grade fever to 100.1 which all started today, of which the fever has been relieved by Tylenol taken at home.  Denies chest pain, shortness of breath, nausea, diarrhea, dysuria, and rash.    Past Medical History:   Diagnosis Date    Allergy     Asthma     Cancer     Breast     Cardiomyopathy     CHF (congestive heart failure)     Diverticulitis      Past Surgical History:   Procedure Laterality Date    CARDIAC DEFIBRILLATOR PLACEMENT      X 2 Medtronic     CARDIAC DEFIBRILLATOR PLACEMENT      medtronic    HYSTERECTOMY      MASTECTOMY Right 02/07/2017    TONSILLECTOMY       Family History   Problem Relation Age of Onset    Stroke Mother     Heart disease Mother     Cancer Mother 50    Asthma Mother     Breast cancer Mother     Kidney disease Father     Cancer Father     Kidney cancer Father     Diabetes Brother     Stomach cancer Paternal Grandfather     Lung cancer Paternal Grandfather     Asbestos Maternal Uncle     Prostate cancer Maternal Uncle     Liver disease Neg Hx     Liver cancer Neg Hx     Cirrhosis Neg Hx     Colon cancer Neg Hx     Colon polyps Neg Hx     Rectal cancer Neg Hx     Esophageal cancer Neg Hx     Ulcerative colitis Neg Hx     Cystic fibrosis Neg Hx      Social History   Substance Use Topics    Smoking status: Never Smoker    Smokeless tobacco: Never  Used    Alcohol use No     Review of Systems   Constitutional: Positive for fever. Negative for diaphoresis.   HENT: Positive for sinus pressure and sore throat. Negative for drooling, facial swelling, rhinorrhea and sneezing.    Eyes: Negative for discharge and redness.   Respiratory: Positive for cough. Negative for shortness of breath and stridor.    Cardiovascular: Negative for chest pain and leg swelling.   Gastrointestinal: Negative for abdominal pain, nausea and vomiting.   Genitourinary: Negative for dysuria and hematuria.   Musculoskeletal: Positive for myalgias. Negative for joint swelling and neck stiffness.   Skin: Negative for rash and wound.   Neurological: Negative for facial asymmetry and speech difficulty.   Psychiatric/Behavioral: Negative for agitation and confusion.       Physical Exam   Initial Vitals   BP Pulse Resp Temp SpO2   05/13/17 2030 05/13/17 2030 05/13/17 2030 05/13/17 2030 05/13/17 2030   100/63 99 18 98.8 °F (37.1 °C) 98 %     Physical Exam    Nursing note and vitals reviewed.  Constitutional: She appears well-developed and well-nourished. She is not diaphoretic. No distress.   HENT:   Head: Normocephalic and atraumatic.   Right Ear: External ear normal.   Left Ear: External ear normal.   Nose: Nose normal.   Mouth/Throat: Posterior oropharyngeal edema (left) and posterior oropharyngeal erythema (left) present.   No oral thrush.   Eyes: Conjunctivae are normal. Pupils are equal, round, and reactive to light. Right eye exhibits no discharge. Left eye exhibits no discharge. No scleral icterus.   Neck: Normal range of motion. Neck supple. No thyromegaly present. No tracheal deviation present. No JVD present.   Cardiovascular: Normal rate, regular rhythm, normal heart sounds and intact distal pulses. Exam reveals no gallop and no friction rub.    No murmur heard.  Pulmonary/Chest: Breath sounds normal. No stridor. No respiratory distress. She has no wheezes. She has no rhonchi. She has  no rales. She exhibits no tenderness.   Abdominal: Soft. Bowel sounds are normal. She exhibits no distension. There is no tenderness. There is no rebound and no guarding.   Musculoskeletal: Normal range of motion. She exhibits no edema.   Lymphadenopathy:     She has no cervical adenopathy.   Neurological: She is alert and oriented to person, place, and time.   Skin: Skin is warm and dry.   Subcutaneous port to left upper arm normal tenderness, erythema, or swelling.   Psychiatric: She has a normal mood and affect. Her behavior is normal.         ED Course   Procedures  Labs Reviewed   CBC W/ AUTO DIFFERENTIAL - Abnormal; Notable for the following:        Result Value    Mono% 2.8 (*)     All other components within normal limits   COMPREHENSIVE METABOLIC PANEL - Abnormal; Notable for the following:     Glucose 143 (*)     BUN, Bld 24 (*)     Albumin 3.4 (*)     All other components within normal limits   URINALYSIS - Abnormal; Notable for the following:     Specific Gravity, UA >=1.030 (*)     Ketones, UA Trace (*)     Leukocytes, UA Trace (*)     All other components within normal limits   THROAT SCREEN, RAPID   CULTURE, BLOOD    Narrative:     Aerobic and anaerobic   CULTURE, BLOOD    Narrative:     Aerobic and anaerobic   CULTURE, URINE   CULTURE, STREP A,  THROAT   APTT   LACTIC ACID, PLASMA   PROTIME-INR   URINALYSIS MICROSCOPIC   LACTIC ACID, PLASMA          HOII MDM:  Avis Graves is a 42 y.o. female with  ER positive right breast cancer positive axillary lymph node status post mastectomy currently receiving adjuvant chemotherapy last treatment 3 days ago presents with new sore throat, cough productive of clear and yellow sputum, body aches, and low-grade fever to 100.1 which all started today.  Exam with left posterior oropharyngeal swelling and erythema, lungs clear to auscultation bilaterally.  Ddx includes strep throat, bronchitis, pneumonia, UTI, sepsis.    Infectious workup in process  including strep, chest x-ray, UA, urine culture, blood cultures, lactic acid.  Patient currently afebrile, holding off on IV fluids as patient is at baseline blood pressure and has cardiomyopathy.    Cory Giron, PGY2 9:18 PM 05/13/2017     CBC normal.  Coags normal.  CMP unremarkable.  Lactate 1.2.  Rapid strep negative.  Urinalysis no infection.  Chest x-ray no acute cardiopulmonary process interpreted by me.    Spoke with oncology, feel that if patient is feeling well and when otherwise keep patient if she didn't have diagnosis of cancer, feel the patient is okay for discharge with return precautions.  Patient states that she feels better after she took Tylenol home.  Return precautions given, follow-up with oncology.    Cory Giron, PGY2 10:59 PM 05/13/2017        X-Rays:   Independently Interpreted Readings:   Other Readings:  Cxr:  No acute infiltrate, consolidation or effusion.                   Attending Attestation:   Physician Attestation Statement for Resident:  As the supervising MD   Physician Attestation Statement: I have personally seen and examined this patient.   I agree with the above history. -: Slight sore throat, sinus congestion, post-nasal drip.    No n/v/d  No rash  No cough  No neck pain   As the supervising MD I agree with the above PE.   -: Mild oropharyngeal erythema  No facial swelling/erythema  Mmm  ctab  Rrr, nl s1/2  abd soft, ntnd   As the supervising MD I agree with the above treatment, course, plan, and disposition.   -: R/o febrile neutropenia.  Suspect viral uri as etiology.  Well-appearing.    I have reviewed the following: old records at this facility.                    ED Course     Clinical Impression:   The primary encounter diagnosis was Viral URI with cough. Diagnoses of Fever, Sore throat, and Cough were also pertinent to this visit.          Cory Giron MD  Resident  05/13/17 5714       Cory Bryant MD  05/14/17 [id number]

## 2017-05-15 LAB — BACTERIA UR CULT: NORMAL

## 2017-05-15 RX ORDER — DIPHENHYDRAMINE HYDROCHLORIDE 50 MG/ML
50 INJECTION INTRAMUSCULAR; INTRAVENOUS ONCE AS NEEDED
Status: CANCELLED | OUTPATIENT
Start: 2017-05-17 | End: 2017-05-17

## 2017-05-15 RX ORDER — SODIUM CHLORIDE 0.9 % (FLUSH) 0.9 %
10 SYRINGE (ML) INJECTION
Status: CANCELLED | OUTPATIENT
Start: 2017-05-17

## 2017-05-15 RX ORDER — EPINEPHRINE 0.3 MG/.3ML
0.3 INJECTION SUBCUTANEOUS ONCE AS NEEDED
Status: CANCELLED | OUTPATIENT
Start: 2017-05-17 | End: 2017-05-17

## 2017-05-15 RX ORDER — FAMOTIDINE 10 MG/ML
20 INJECTION INTRAVENOUS
Status: CANCELLED | OUTPATIENT
Start: 2017-05-17

## 2017-05-15 RX ORDER — HEPARIN 100 UNIT/ML
500 SYRINGE INTRAVENOUS
Status: CANCELLED | OUTPATIENT
Start: 2017-05-17

## 2017-05-16 ENCOUNTER — INFUSION (OUTPATIENT)
Dept: INFUSION THERAPY | Facility: HOSPITAL | Age: 42
End: 2017-05-16
Attending: FAMILY MEDICINE
Payer: COMMERCIAL

## 2017-05-16 VITALS
WEIGHT: 125 LBS | DIASTOLIC BLOOD PRESSURE: 82 MMHG | TEMPERATURE: 98 F | HEART RATE: 85 BPM | HEIGHT: 57 IN | SYSTOLIC BLOOD PRESSURE: 114 MMHG | RESPIRATION RATE: 18 BRPM | BODY MASS INDEX: 26.97 KG/M2

## 2017-05-16 DIAGNOSIS — Z51.11 ENCOUNTER FOR ANTINEOPLASTIC CHEMOTHERAPY: ICD-10-CM

## 2017-05-16 LAB — BACTERIA THROAT CULT: NORMAL

## 2017-05-16 PROCEDURE — 36592 COLLECT BLOOD FROM PICC: CPT

## 2017-05-16 PROCEDURE — 25000003 PHARM REV CODE 250: Performed by: INTERNAL MEDICINE

## 2017-05-16 RX ORDER — HEPARIN 100 UNIT/ML
SYRINGE INTRAVENOUS
Status: DISCONTINUED
Start: 2017-05-16 | End: 2017-05-16 | Stop reason: HOSPADM

## 2017-05-16 RX ORDER — HEPARIN 100 UNIT/ML
500 SYRINGE INTRAVENOUS
Status: COMPLETED | OUTPATIENT
Start: 2017-05-16 | End: 2017-05-16

## 2017-05-16 RX ORDER — SODIUM CHLORIDE 0.9 % (FLUSH) 0.9 %
10 SYRINGE (ML) INJECTION
Status: DISCONTINUED | OUTPATIENT
Start: 2017-05-16 | End: 2017-05-16 | Stop reason: HOSPADM

## 2017-05-16 RX ADMIN — SODIUM CHLORIDE, PRESERVATIVE FREE 10 ML: 5 INJECTION INTRAVENOUS at 10:05

## 2017-05-16 RX ADMIN — HEPARIN 500 UNITS: 100 SYRINGE at 10:05

## 2017-05-16 NOTE — PLAN OF CARE
Problem: Patient Care Overview (Adult)  Goal: Plan of Care Review  Patient independently ambulated into chemo infusion clinic today unaccompanied. Patient skin warm and dry, RR even and unlabored. Patient in NAD and appears comfortable. Patient denies any pain or discomfort and tolerated lab draw from port well. Patient follow up discussed and patient verbally expressed understanding.

## 2017-05-17 ENCOUNTER — INFUSION (OUTPATIENT)
Dept: INFUSION THERAPY | Facility: HOSPITAL | Age: 42
End: 2017-05-17
Attending: FAMILY MEDICINE
Payer: COMMERCIAL

## 2017-05-17 ENCOUNTER — OFFICE VISIT (OUTPATIENT)
Dept: HEMATOLOGY/ONCOLOGY | Facility: CLINIC | Age: 42
End: 2017-05-17
Attending: INTERNAL MEDICINE
Payer: COMMERCIAL

## 2017-05-17 VITALS
OXYGEN SATURATION: 100 % | BODY MASS INDEX: 24.16 KG/M2 | RESPIRATION RATE: 18 BRPM | SYSTOLIC BLOOD PRESSURE: 118 MMHG | WEIGHT: 112 LBS | TEMPERATURE: 98 F | HEIGHT: 57 IN | DIASTOLIC BLOOD PRESSURE: 84 MMHG | HEART RATE: 79 BPM

## 2017-05-17 VITALS — HEIGHT: 57 IN | BODY MASS INDEX: 26.97 KG/M2 | WEIGHT: 125 LBS

## 2017-05-17 DIAGNOSIS — C50.611 CANCER OF AXILLARY TAIL OF RIGHT BREAST: Primary | ICD-10-CM

## 2017-05-17 DIAGNOSIS — Z51.11 ENCOUNTER FOR ANTINEOPLASTIC CHEMOTHERAPY: ICD-10-CM

## 2017-05-17 DIAGNOSIS — I42.9 CARDIOMYOPATHY, IDIOPATHIC: ICD-10-CM

## 2017-05-17 DIAGNOSIS — D64.81 ANEMIA ASSOCIATED WITH CHEMOTHERAPY: ICD-10-CM

## 2017-05-17 DIAGNOSIS — T45.1X5A ANEMIA ASSOCIATED WITH CHEMOTHERAPY: ICD-10-CM

## 2017-05-17 PROCEDURE — 63600175 PHARM REV CODE 636 W HCPCS: Performed by: INTERNAL MEDICINE

## 2017-05-17 PROCEDURE — 99999 PR PBB SHADOW E&M-EST. PATIENT-LVL III: CPT | Mod: PBBFAC,,, | Performed by: INTERNAL MEDICINE

## 2017-05-17 PROCEDURE — 25000003 PHARM REV CODE 250: Performed by: INTERNAL MEDICINE

## 2017-05-17 PROCEDURE — 25000003 PHARM REV CODE 250

## 2017-05-17 PROCEDURE — 1160F RVW MEDS BY RX/DR IN RCRD: CPT | Mod: S$GLB,,, | Performed by: INTERNAL MEDICINE

## 2017-05-17 PROCEDURE — 96375 TX/PRO/DX INJ NEW DRUG ADDON: CPT

## 2017-05-17 PROCEDURE — 96367 TX/PROPH/DG ADDL SEQ IV INF: CPT

## 2017-05-17 PROCEDURE — 96413 CHEMO IV INFUSION 1 HR: CPT

## 2017-05-17 PROCEDURE — 99215 OFFICE O/P EST HI 40 MIN: CPT | Mod: S$GLB,,, | Performed by: INTERNAL MEDICINE

## 2017-05-17 RX ORDER — HEPARIN 100 UNIT/ML
500 SYRINGE INTRAVENOUS
Status: DISCONTINUED | OUTPATIENT
Start: 2017-05-17 | End: 2017-05-17 | Stop reason: HOSPADM

## 2017-05-17 RX ORDER — SODIUM CHLORIDE 0.9 % (FLUSH) 0.9 %
10 SYRINGE (ML) INJECTION
Status: DISCONTINUED | OUTPATIENT
Start: 2017-05-17 | End: 2017-05-17 | Stop reason: HOSPADM

## 2017-05-17 RX ORDER — FAMOTIDINE 10 MG/ML
INJECTION INTRAVENOUS
Status: COMPLETED
Start: 2017-05-17 | End: 2017-05-17

## 2017-05-17 RX ORDER — FAMOTIDINE 10 MG/ML
20 INJECTION INTRAVENOUS
Status: COMPLETED | OUTPATIENT
Start: 2017-05-17 | End: 2017-05-17

## 2017-05-17 RX ORDER — DIPHENHYDRAMINE HYDROCHLORIDE 50 MG/ML
50 INJECTION INTRAMUSCULAR; INTRAVENOUS ONCE AS NEEDED
Status: DISCONTINUED | OUTPATIENT
Start: 2017-05-17 | End: 2017-05-17 | Stop reason: HOSPADM

## 2017-05-17 RX ORDER — HEPARIN 100 UNIT/ML
SYRINGE INTRAVENOUS
Status: COMPLETED
Start: 2017-05-17 | End: 2017-05-17

## 2017-05-17 RX ORDER — EPINEPHRINE 0.3 MG/.3ML
0.3 INJECTION SUBCUTANEOUS ONCE AS NEEDED
Status: DISCONTINUED | OUTPATIENT
Start: 2017-05-17 | End: 2017-05-17 | Stop reason: HOSPADM

## 2017-05-17 RX ADMIN — DIPHENHYDRAMINE HYDROCHLORIDE 50 MG: 50 INJECTION, SOLUTION INTRAMUSCULAR; INTRAVENOUS at 10:05

## 2017-05-17 RX ADMIN — SODIUM CHLORIDE: 0.9 INJECTION, SOLUTION INTRAVENOUS at 09:05

## 2017-05-17 RX ADMIN — FAMOTIDINE 20 MG: 10 INJECTION INTRAVENOUS at 10:05

## 2017-05-17 RX ADMIN — Medication 20 MG: at 10:05

## 2017-05-17 RX ADMIN — PACLITAXEL 126 MG: 6 INJECTION, SOLUTION, CONCENTRATE INTRAVENOUS at 11:05

## 2017-05-17 RX ADMIN — SODIUM CHLORIDE, PRESERVATIVE FREE 10 ML: 5 INJECTION INTRAVENOUS at 12:05

## 2017-05-17 RX ADMIN — HEPARIN 500 UNITS: 100 SYRINGE at 12:05

## 2017-05-17 RX ADMIN — Medication 1 MG: at 09:05

## 2017-05-17 NOTE — PROGRESS NOTES
Subjective:       Patient ID: Avis Graves is a 42 y.o. female.    Chief Complaint: No chief complaint on file.    HPI   Ms. Graves returns today to continue her adjuvant chemotherapy with weekly taxol.  She tolerated the alst two taxol infusions without any significant side effects. .  Her CBC yesterday showed a WBC count of 7,300 /mm3, Hg 11.3 gr/dl, Ht 34.9 5 and platelets 230,000 /mm3.  Her bilirubin is 0.3 mg/dl.    Briefly, she is a 42-year-old -American female with a history of cardiomyopathy and an EF of less than 30%, who was recently diagnosed with a carcinoma of the right breast, that was ER strongly positive, [place] negative, and HER-2 negative.  On 2/07/2017 she underwent a right modified radical mastectomy with sentinel lymph node biopsy and insertion of tissue expanders.  The pathology report from the 02/07/2017 procedure indicates that she had a 4 cm carcinoma; three of the four sentinel lymph nodes were positive with macrometastases.  The patient was referred for further evaluation.  It was recommended to her to proceed with twelve weekly cycles of taxol, possibly with the addition of CMF upon completion.  She is here for Cycle 4 of weekly taxol.    Review of Systems  Overall she feels OK.  She tolerated last week's chemotherapy well.  She had a low grade temperature (100.1) and came to the ED for evaluation a couple of days ago, but was eventually discharged home.   She denies any depression, vomiting, diarrhea, constipation, diplopia, blurred vision, headaches, chest pain, palpitations, shortness of breath, left breast pain, abdominal pain, extremity pain, or difficulty ambulating.  The remainder of the ten-point ROS, including general, skin, lymph, H/N, cardiorespiratory, GI, , Neuro, Endocrine, and psychiatric is negative.     Objective:      Physical Exam  She is alert, oriented to time, place, person, pleasant, well      nourished, in no acute physical distress.  She is here with  a female friend.                                  VITAL SIGNS:  Reviewed                                      HEENT:  Alopecia is noted.  There are no nasal, oral, lip, gingival, auricular, lid,    or conjunctival lesions.  Mucosae are moist and pink, and there is no        thrush.  Pupils are equal, reactive to light and accommodation.              Extraocular muscle movements are intact.  Dentition is good.  There is no frontal or maxillary tenderness.                                     NECK:  Supple without JVD, adenopathy, or thyromegaly.                       LUNGS:  Clear to auscultation without wheezing, rales, or rhonchi.           CARDIOVASCULAR:  Reveals an S1, S2, no murmurs, no rubs, no gallops.         ABDOMEN:  Soft, nontender, without organomegaly.  Bowel sounds are    present.                                                                     EXTREMITIES:  No cyanosis, clubbing, or edema.                               BREASTS:  She is status post right mastectomy with a tissue expander in place and a well-healed incision.    There are no masses in the left breast.     An AICD is palpated in the left infraclavicular area.   A left sided port is seen on her arm, medially.                                   LYMPHATIC:  There is no cervical, axillary, or supraclavicular adenopathy.   SKIN:  Warm and moist, without petechiae, rashes, induration, or ecchymoses.           NEUROLOGIC:  DTRs are 0-1+ bilaterally, symmetrical, motor function is 5/5,  and cranial nerves are  within normal limits.  A portacath is seen in the distal right arm, medially.    Assessment:       1. 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 Encounter for antineoplastic chemotherapy    3. Cardiomyopathy, idiopathic    4. Anemia associated with chemotherapy        Plan:        I had a long discussion with her; she will proceed with the fourth cycle of chemotherapy today.  Her dose of dexamethasone will be 20 mg and the dose of  benadryl will be 50 mg prior to her infusion. Her questions were answered to her satisfaction.  I will see her with repeat labs in 1 week.  She will need to remain on weekly taxol with weekly CBC and CMP.  Upon completion of 12 weeks of taxol, we will discuss the option of 3 additional cycles of CMF with her.  Her questions were answered to her satisfaction.

## 2017-05-17 NOTE — PLAN OF CARE
"Problem: Patient Care Overview (Adult)  Goal: Plan of Care Review  Outcome: Ongoing (interventions implemented as appropriate)  Patient independently ambulated into chemo infusion clinic today accompanied by a friend. Patient skin warm and dry, RR even and unlabored. Patient in NAD and appears comfortable. Patient denies any pain or discomfort and tolerated Taxol treatment well. Pt reports "my hair started coming out a lot, so I went ahead and shaved it. I expected to be sad about it, but I'm fine."  Patient follow up discussed and patient verbally expressed understanding.           "

## 2017-05-19 LAB
BACTERIA BLD CULT: NORMAL
BACTERIA BLD CULT: NORMAL

## 2017-05-19 RX ORDER — CARVEDILOL PHOSPHATE 80 MG/1
CAPSULE, EXTENDED RELEASE ORAL
Qty: 30 CAPSULE | Refills: 3 | Status: SHIPPED | OUTPATIENT
Start: 2017-05-19 | End: 2017-08-31 | Stop reason: SDUPTHER

## 2017-05-23 ENCOUNTER — TELEPHONE (OUTPATIENT)
Dept: INFUSION THERAPY | Facility: HOSPITAL | Age: 42
End: 2017-05-23

## 2017-05-23 ENCOUNTER — INFUSION (OUTPATIENT)
Dept: INFUSION THERAPY | Facility: HOSPITAL | Age: 42
End: 2017-05-23
Attending: INTERNAL MEDICINE
Payer: COMMERCIAL

## 2017-05-23 VITALS
HEART RATE: 75 BPM | DIASTOLIC BLOOD PRESSURE: 71 MMHG | TEMPERATURE: 98 F | SYSTOLIC BLOOD PRESSURE: 104 MMHG | RESPIRATION RATE: 16 BRPM

## 2017-05-23 DIAGNOSIS — Z51.11 ENCOUNTER FOR ANTINEOPLASTIC CHEMOTHERAPY: ICD-10-CM

## 2017-05-23 LAB
ALBUMIN SERPL BCP-MCNC: 3.4 G/DL
ALP SERPL-CCNC: 51 U/L
ALT SERPL W/O P-5'-P-CCNC: 12 U/L
ANION GAP SERPL CALC-SCNC: 10 MMOL/L
AST SERPL-CCNC: 12 U/L
BASOPHILS # BLD AUTO: 0.01 K/UL
BASOPHILS NFR BLD: 0.2 %
BILIRUB SERPL-MCNC: 0.3 MG/DL
BUN SERPL-MCNC: 13 MG/DL
CALCIUM SERPL-MCNC: 9.2 MG/DL
CHLORIDE SERPL-SCNC: 107 MMOL/L
CO2 SERPL-SCNC: 24 MMOL/L
CREAT SERPL-MCNC: 0.9 MG/DL
DIFFERENTIAL METHOD: ABNORMAL
EOSINOPHIL # BLD AUTO: 0.1 K/UL
EOSINOPHIL NFR BLD: 0.8 %
ERYTHROCYTE [DISTWIDTH] IN BLOOD BY AUTOMATED COUNT: 12.5 %
EST. GFR  (AFRICAN AMERICAN): >60 ML/MIN/1.73 M^2
EST. GFR  (NON AFRICAN AMERICAN): >60 ML/MIN/1.73 M^2
GLUCOSE SERPL-MCNC: 112 MG/DL
HCT VFR BLD AUTO: 35.9 %
HGB BLD-MCNC: 12 G/DL
LYMPHOCYTES # BLD AUTO: 2.2 K/UL
LYMPHOCYTES NFR BLD: 35.4 %
MCH RBC QN AUTO: 30.5 PG
MCHC RBC AUTO-ENTMCNC: 33.4 %
MCV RBC AUTO: 91 FL
MONOCYTES # BLD AUTO: 0.2 K/UL
MONOCYTES NFR BLD: 3.5 %
NEUTROPHILS # BLD AUTO: 3.7 K/UL
NEUTROPHILS NFR BLD: 59.3 %
PLATELET # BLD AUTO: 268 K/UL
PMV BLD AUTO: 10 FL
POTASSIUM SERPL-SCNC: 4.3 MMOL/L
PROT SERPL-MCNC: 6.2 G/DL
RBC # BLD AUTO: 3.94 M/UL
SODIUM SERPL-SCNC: 141 MMOL/L
WBC # BLD AUTO: 6.27 K/UL

## 2017-05-23 PROCEDURE — 36591 DRAW BLOOD OFF VENOUS DEVICE: CPT

## 2017-05-23 PROCEDURE — 80053 COMPREHEN METABOLIC PANEL: CPT

## 2017-05-23 PROCEDURE — 85025 COMPLETE CBC W/AUTO DIFF WBC: CPT

## 2017-05-23 PROCEDURE — 25000003 PHARM REV CODE 250: Performed by: INTERNAL MEDICINE

## 2017-05-23 RX ORDER — HEPARIN 100 UNIT/ML
500 SYRINGE INTRAVENOUS
Status: COMPLETED | OUTPATIENT
Start: 2017-05-23 | End: 2017-05-23

## 2017-05-23 RX ORDER — EPINEPHRINE 0.3 MG/.3ML
0.3 INJECTION SUBCUTANEOUS ONCE AS NEEDED
Status: CANCELLED | OUTPATIENT
Start: 2017-05-24 | End: 2017-05-24

## 2017-05-23 RX ORDER — FAMOTIDINE 10 MG/ML
20 INJECTION INTRAVENOUS
Status: CANCELLED | OUTPATIENT
Start: 2017-05-24

## 2017-05-23 RX ORDER — SODIUM CHLORIDE 0.9 % (FLUSH) 0.9 %
10 SYRINGE (ML) INJECTION
Status: DISCONTINUED | OUTPATIENT
Start: 2017-05-23 | End: 2017-05-23 | Stop reason: HOSPADM

## 2017-05-23 RX ORDER — DIPHENHYDRAMINE HYDROCHLORIDE 50 MG/ML
50 INJECTION INTRAMUSCULAR; INTRAVENOUS ONCE AS NEEDED
Status: CANCELLED | OUTPATIENT
Start: 2017-05-24 | End: 2017-05-24

## 2017-05-23 RX ORDER — SODIUM CHLORIDE 0.9 % (FLUSH) 0.9 %
10 SYRINGE (ML) INJECTION
Status: CANCELLED | OUTPATIENT
Start: 2017-05-24

## 2017-05-23 RX ORDER — HEPARIN 100 UNIT/ML
500 SYRINGE INTRAVENOUS
Status: CANCELLED | OUTPATIENT
Start: 2017-05-24

## 2017-05-23 RX ORDER — HEPARIN 100 UNIT/ML
SYRINGE INTRAVENOUS
Status: DISPENSED
Start: 2017-05-23 | End: 2017-05-23

## 2017-05-23 RX ADMIN — HEPARIN 500 UNITS: 100 SYRINGE at 10:05

## 2017-05-23 RX ADMIN — SODIUM CHLORIDE, PRESERVATIVE FREE 10 ML: 5 INJECTION INTRAVENOUS at 10:05

## 2017-05-23 NOTE — PLAN OF CARE
Problem: Patient Care Overview (Adult)  Goal: Plan of Care Review  Outcome: Ongoing (interventions implemented as appropriate)  Patient independently ambulated into chemo infusion clinic today unaaompanied. Patient skin warm and dry, RR even and unlabored. Patient in NAD and appears comfortable. Patient denies any pain or discomfort and tolerated lab draw from port well. Patient follow up discussed and patient verbally expressed understanding.

## 2017-05-24 ENCOUNTER — OFFICE VISIT (OUTPATIENT)
Dept: HEMATOLOGY/ONCOLOGY | Facility: CLINIC | Age: 42
End: 2017-05-24
Attending: INTERNAL MEDICINE
Payer: COMMERCIAL

## 2017-05-24 ENCOUNTER — INFUSION (OUTPATIENT)
Dept: INFUSION THERAPY | Facility: HOSPITAL | Age: 42
End: 2017-05-24
Attending: FAMILY MEDICINE
Payer: COMMERCIAL

## 2017-05-24 VITALS
TEMPERATURE: 98 F | HEIGHT: 61 IN | WEIGHT: 123 LBS | SYSTOLIC BLOOD PRESSURE: 122 MMHG | RESPIRATION RATE: 18 BRPM | HEART RATE: 82 BPM | OXYGEN SATURATION: 99 % | BODY MASS INDEX: 23.22 KG/M2 | DIASTOLIC BLOOD PRESSURE: 74 MMHG

## 2017-05-24 VITALS
HEART RATE: 81 BPM | TEMPERATURE: 98 F | SYSTOLIC BLOOD PRESSURE: 112 MMHG | RESPIRATION RATE: 16 BRPM | DIASTOLIC BLOOD PRESSURE: 80 MMHG

## 2017-05-24 DIAGNOSIS — C50.611 CANCER OF AXILLARY TAIL OF RIGHT BREAST: Primary | ICD-10-CM

## 2017-05-24 DIAGNOSIS — D64.81 ANEMIA ASSOCIATED WITH CHEMOTHERAPY: ICD-10-CM

## 2017-05-24 DIAGNOSIS — T45.1X5A ANEMIA ASSOCIATED WITH CHEMOTHERAPY: ICD-10-CM

## 2017-05-24 DIAGNOSIS — Z51.11 ENCOUNTER FOR ANTINEOPLASTIC CHEMOTHERAPY: ICD-10-CM

## 2017-05-24 PROCEDURE — 96413 CHEMO IV INFUSION 1 HR: CPT

## 2017-05-24 PROCEDURE — 25000003 PHARM REV CODE 250: Performed by: INTERNAL MEDICINE

## 2017-05-24 PROCEDURE — 96375 TX/PRO/DX INJ NEW DRUG ADDON: CPT

## 2017-05-24 PROCEDURE — 96367 TX/PROPH/DG ADDL SEQ IV INF: CPT

## 2017-05-24 PROCEDURE — 99999 PR PBB SHADOW E&M-EST. PATIENT-LVL I: CPT | Mod: PBBFAC,,, | Performed by: INTERNAL MEDICINE

## 2017-05-24 PROCEDURE — 63600175 PHARM REV CODE 636 W HCPCS: Performed by: INTERNAL MEDICINE

## 2017-05-24 PROCEDURE — 99215 OFFICE O/P EST HI 40 MIN: CPT | Mod: S$GLB,,, | Performed by: INTERNAL MEDICINE

## 2017-05-24 PROCEDURE — 1160F RVW MEDS BY RX/DR IN RCRD: CPT | Mod: S$GLB,,, | Performed by: INTERNAL MEDICINE

## 2017-05-24 RX ORDER — FAMOTIDINE 10 MG/ML
20 INJECTION INTRAVENOUS
Status: COMPLETED | OUTPATIENT
Start: 2017-05-24 | End: 2017-05-24

## 2017-05-24 RX ORDER — HEPARIN 100 UNIT/ML
SYRINGE INTRAVENOUS
Status: DISPENSED
Start: 2017-05-24 | End: 2017-05-24

## 2017-05-24 RX ORDER — EPINEPHRINE 0.3 MG/.3ML
0.3 INJECTION SUBCUTANEOUS ONCE AS NEEDED
Status: DISCONTINUED | OUTPATIENT
Start: 2017-05-24 | End: 2017-05-24 | Stop reason: HOSPADM

## 2017-05-24 RX ORDER — HEPARIN 100 UNIT/ML
500 SYRINGE INTRAVENOUS
Status: DISCONTINUED | OUTPATIENT
Start: 2017-05-24 | End: 2017-05-24 | Stop reason: HOSPADM

## 2017-05-24 RX ORDER — FAMOTIDINE 10 MG/ML
INJECTION INTRAVENOUS
Status: DISPENSED
Start: 2017-05-24 | End: 2017-05-24

## 2017-05-24 RX ORDER — SODIUM CHLORIDE 0.9 % (FLUSH) 0.9 %
10 SYRINGE (ML) INJECTION
Status: DISCONTINUED | OUTPATIENT
Start: 2017-05-24 | End: 2017-05-24 | Stop reason: HOSPADM

## 2017-05-24 RX ORDER — DIPHENHYDRAMINE HYDROCHLORIDE 50 MG/ML
50 INJECTION INTRAMUSCULAR; INTRAVENOUS ONCE AS NEEDED
Status: DISCONTINUED | OUTPATIENT
Start: 2017-05-24 | End: 2017-05-24 | Stop reason: HOSPADM

## 2017-05-24 RX ADMIN — HEPARIN 500 UNITS: 100 SYRINGE at 12:05

## 2017-05-24 RX ADMIN — PACLITAXEL 114 MG: 6 INJECTION, SOLUTION INTRAVENOUS at 11:05

## 2017-05-24 RX ADMIN — FAMOTIDINE 20 MG: 10 INJECTION INTRAVENOUS at 10:05

## 2017-05-24 RX ADMIN — Medication 20 MG: at 10:05

## 2017-05-24 RX ADMIN — SODIUM CHLORIDE: 0.9 INJECTION, SOLUTION INTRAVENOUS at 09:05

## 2017-05-24 RX ADMIN — SODIUM CHLORIDE, PRESERVATIVE FREE 10 ML: 5 INJECTION INTRAVENOUS at 12:05

## 2017-05-24 RX ADMIN — Medication 1 MG: at 09:05

## 2017-05-24 RX ADMIN — DIPHENHYDRAMINE HYDROCHLORIDE 50 MG: 50 INJECTION, SOLUTION INTRAMUSCULAR; INTRAVENOUS at 11:05

## 2017-05-24 NOTE — PROGRESS NOTES
Subjective:       Patient ID: Avis Graves is a 42 y.o. female.    Chief Complaint: No chief complaint on file.    HPI   Ms. Graves returns today to continue her adjuvant chemotherapy with weekly taxol.  She tolerated the last two taxol infusions without any significant side effects. .  Her CBC from yesterday shows a WBC count of 6,200 /mm3, Hg 12 gr/dl, Ht 35.9 % and platelets 268,000 /mm3.  Her bilirubin is 0.3 mg/dl.    Briefly, she is a 42-year-old -American female with a history of cardiomyopathy and an EF of less than 30%, who was recently diagnosed with a carcinoma of the right breast, that was ER strongly positive, [place] negative, and HER-2 negative.  On 2/07/2017 she underwent a right modified radical mastectomy with sentinel lymph node biopsy and insertion of a tissue expander.  The pathology report from the 02/07/2017 procedure indicates that she had a 4 cm carcinoma; three of the four sentinel lymph nodes were positive with macrometastases.  The patient was referred for further evaluation.  It was recommended to her to proceed with twelve weekly cycles of taxol, possibly with the addition of CMF upon completion.  She is here for Cycle 5 of weekly taxol.    Review of Systems  Overall she feels OK.  She tolerated last week's chemotherapy well.  She denies any depression, vomiting, diarrhea, constipation, diplopia, blurred vision, headaches, chest pain, palpitations, shortness of breath, left breast pain, abdominal pain, extremity pain, or difficulty ambulating.  The remainder of the ten-point ROS, including general, skin, lymph, H/N, cardiorespiratory, GI, , Neuro, Endocrine, and psychiatric is negative.     Objective:      Physical Exam  She is alert, oriented to time, place, person, pleasant, well      nourished, in no acute physical distress.  She is here with her .                                  VITAL SIGNS:  Reviewed                                      HEENT:  Alopecia is  noted.  There are no nasal, oral, lip, gingival, auricular, lid,    or conjunctival lesions.  Mucosae are moist and pink, and there is no        thrush.  Pupils are equal, reactive to light and accommodation.              Extraocular muscle movements are intact.  Dentition is good.  There is no frontal or maxillary tenderness.                                     NECK:  Supple without JVD, adenopathy, or thyromegaly.                       LUNGS:  Clear to auscultation without wheezing, rales, or rhonchi.           CARDIOVASCULAR:  Reveals an S1, S2, no murmurs, no rubs, no gallops.         ABDOMEN:  Soft, nontender, without organomegaly.  Bowel sounds are    present.                                                                     EXTREMITIES:  No cyanosis, clubbing, or edema.                               BREASTS:  She is status post right mastectomy with a tissue expander in place and a well-healed incision.    There are no masses in the left breast.     An AICD is palpated in the left infraclavicular area.   A left sided port is seen on her arm, medially.                                   LYMPHATIC:  There is no cervical, axillary, or supraclavicular adenopathy.   SKIN:  Warm and moist, without petechiae, rashes, induration, or ecchymoses.           NEUROLOGIC:  DTRs are 0-1+ bilaterally, symmetrical, motor function is 5/5,  and cranial nerves are  within normal limits.  A portacath is seen in the distal right arm, medially.    Assessment:       1. 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 Anemia associated with chemotherapy    3. Encounter for antineoplastic chemotherapy        Plan:        I had a long discussion with her; she will proceed with the fifth cycle of chemotherapy today.  Her dose of dexamethasone will remain at 20 mg and the dose of benadryl will remain at 50 mg prior to her infusion.   I will see her with repeat labs in 2 weeks.  She will need to remain on weekly taxol with weekly CBC and  CMP.  Upon completion of 12 weeks of taxol, we will discuss the option of 3 additional cycles of CMF with her.  Her questions were answered to her satisfaction.

## 2017-05-24 NOTE — PLAN OF CARE
Problem: Patient Care Overview (Adult)  Goal: Plan of Care Review  Outcome: Ongoing (interventions implemented as appropriate)  Patient independently ambulated into chemo infusion clinic today accompanied by spouse. Patient skin warm and dry, RR even and unlabored. Patient in NAD and appears comfortable. Patient denies any pain or discomfort and tolerated Taxol treatment well. Patient follow up discussed and patient verbally expressed understanding.

## 2017-05-30 ENCOUNTER — INFUSION (OUTPATIENT)
Dept: INFUSION THERAPY | Facility: HOSPITAL | Age: 42
End: 2017-05-30
Attending: OBSTETRICS & GYNECOLOGY
Payer: COMMERCIAL

## 2017-05-30 VITALS
SYSTOLIC BLOOD PRESSURE: 104 MMHG | DIASTOLIC BLOOD PRESSURE: 68 MMHG | RESPIRATION RATE: 16 BRPM | HEART RATE: 90 BPM | TEMPERATURE: 98 F

## 2017-05-30 DIAGNOSIS — R30.0 DYSURIA: ICD-10-CM

## 2017-05-30 DIAGNOSIS — Z51.11 ENCOUNTER FOR ANTINEOPLASTIC CHEMOTHERAPY: ICD-10-CM

## 2017-05-30 LAB
ALBUMIN SERPL BCP-MCNC: 3.3 G/DL
ALP SERPL-CCNC: 53 U/L
ALT SERPL W/O P-5'-P-CCNC: 9 U/L
ANION GAP SERPL CALC-SCNC: 6 MMOL/L
AST SERPL-CCNC: 14 U/L
BASOPHILS # BLD AUTO: 0.01 K/UL
BASOPHILS NFR BLD: 0.1 %
BILIRUB SERPL-MCNC: 0.3 MG/DL
BUN SERPL-MCNC: 10 MG/DL
CALCIUM SERPL-MCNC: 9.4 MG/DL
CHLORIDE SERPL-SCNC: 106 MMOL/L
CO2 SERPL-SCNC: 29 MMOL/L
CREAT SERPL-MCNC: 0.8 MG/DL
DIFFERENTIAL METHOD: ABNORMAL
EOSINOPHIL # BLD AUTO: 0.1 K/UL
EOSINOPHIL NFR BLD: 0.9 %
ERYTHROCYTE [DISTWIDTH] IN BLOOD BY AUTOMATED COUNT: 13.4 %
EST. GFR  (AFRICAN AMERICAN): >60 ML/MIN/1.73 M^2
EST. GFR  (NON AFRICAN AMERICAN): >60 ML/MIN/1.73 M^2
GLUCOSE SERPL-MCNC: 150 MG/DL
HCT VFR BLD AUTO: 34.7 %
HGB BLD-MCNC: 11.2 G/DL
LYMPHOCYTES # BLD AUTO: 2.4 K/UL
LYMPHOCYTES NFR BLD: 30.7 %
MCH RBC QN AUTO: 30.1 PG
MCHC RBC AUTO-ENTMCNC: 32.3 %
MCV RBC AUTO: 93 FL
MONOCYTES # BLD AUTO: 0.3 K/UL
MONOCYTES NFR BLD: 4.3 %
NEUTROPHILS # BLD AUTO: 4.8 K/UL
NEUTROPHILS NFR BLD: 63.2 %
PLATELET # BLD AUTO: 283 K/UL
PMV BLD AUTO: 10.2 FL
POTASSIUM SERPL-SCNC: 3.9 MMOL/L
PROT SERPL-MCNC: 6.5 G/DL
RBC # BLD AUTO: 3.72 M/UL
SODIUM SERPL-SCNC: 141 MMOL/L
WBC # BLD AUTO: 7.65 K/UL

## 2017-05-30 PROCEDURE — 36592 COLLECT BLOOD FROM PICC: CPT

## 2017-05-30 PROCEDURE — 85025 COMPLETE CBC W/AUTO DIFF WBC: CPT

## 2017-05-30 PROCEDURE — 80053 COMPREHEN METABOLIC PANEL: CPT

## 2017-05-30 PROCEDURE — 25000003 PHARM REV CODE 250: Performed by: INTERNAL MEDICINE

## 2017-05-30 RX ORDER — HEPARIN 100 UNIT/ML
SYRINGE INTRAVENOUS
Status: DISPENSED
Start: 2017-05-30 | End: 2017-05-31

## 2017-05-30 RX ORDER — FAMOTIDINE 10 MG/ML
20 INJECTION INTRAVENOUS
Status: CANCELLED | OUTPATIENT
Start: 2017-05-31

## 2017-05-30 RX ORDER — HEPARIN 100 UNIT/ML
500 SYRINGE INTRAVENOUS
Status: CANCELLED | OUTPATIENT
Start: 2017-05-31

## 2017-05-30 RX ORDER — DIPHENHYDRAMINE HYDROCHLORIDE 50 MG/ML
50 INJECTION INTRAMUSCULAR; INTRAVENOUS ONCE AS NEEDED
Status: CANCELLED | OUTPATIENT
Start: 2017-05-31 | End: 2017-05-31

## 2017-05-30 RX ORDER — EPINEPHRINE 0.3 MG/.3ML
0.3 INJECTION SUBCUTANEOUS ONCE AS NEEDED
Status: CANCELLED | OUTPATIENT
Start: 2017-05-31 | End: 2017-05-31

## 2017-05-30 RX ORDER — SODIUM CHLORIDE 0.9 % (FLUSH) 0.9 %
10 SYRINGE (ML) INJECTION
Status: DISCONTINUED | OUTPATIENT
Start: 2017-05-30 | End: 2017-05-30 | Stop reason: HOSPADM

## 2017-05-30 RX ORDER — SODIUM CHLORIDE 0.9 % (FLUSH) 0.9 %
10 SYRINGE (ML) INJECTION
Status: CANCELLED | OUTPATIENT
Start: 2017-05-31

## 2017-05-30 RX ORDER — HEPARIN 100 UNIT/ML
500 SYRINGE INTRAVENOUS
Status: COMPLETED | OUTPATIENT
Start: 2017-05-30 | End: 2017-05-30

## 2017-05-30 RX ADMIN — SODIUM CHLORIDE, PRESERVATIVE FREE 10 ML: 5 INJECTION INTRAVENOUS at 01:05

## 2017-05-30 RX ADMIN — HEPARIN 500 UNITS: 100 SYRINGE at 01:05

## 2017-05-30 NOTE — PLAN OF CARE
Problem: Patient Care Overview (Adult)  Goal: Plan of Care Review  Outcome: Ongoing (interventions implemented as appropriate)  Patient independently ambulated into chemo infusion clinic today unaccompanied. Patient skin warm and dry, RR even and unlabored. Patient in NAD and appears comfortable. Patient denies any pain and tolerated labs from port well. Patient follow up discussed and patient verbally expressed understanding.

## 2017-05-31 ENCOUNTER — INFUSION (OUTPATIENT)
Dept: INFUSION THERAPY | Facility: HOSPITAL | Age: 42
End: 2017-05-31
Attending: FAMILY MEDICINE
Payer: COMMERCIAL

## 2017-05-31 VITALS — HEIGHT: 61 IN | BODY MASS INDEX: 23.22 KG/M2 | WEIGHT: 123 LBS

## 2017-05-31 VITALS
DIASTOLIC BLOOD PRESSURE: 78 MMHG | SYSTOLIC BLOOD PRESSURE: 104 MMHG | HEART RATE: 92 BPM | RESPIRATION RATE: 16 BRPM | TEMPERATURE: 98 F

## 2017-05-31 DIAGNOSIS — N39.0 URINARY TRACT INFECTION WITHOUT HEMATURIA, SITE UNSPECIFIED: Primary | ICD-10-CM

## 2017-05-31 DIAGNOSIS — R30.0 DYSURIA: Primary | ICD-10-CM

## 2017-05-31 DIAGNOSIS — C50.611 CANCER OF AXILLARY TAIL OF RIGHT BREAST: Primary | ICD-10-CM

## 2017-05-31 LAB
BACTERIA #/AREA URNS HPF: ABNORMAL /HPF
BILIRUB UR QL STRIP: NEGATIVE
CLARITY UR: ABNORMAL
COLOR UR: YELLOW
GLUCOSE UR QL STRIP: NEGATIVE
HGB UR QL STRIP: ABNORMAL
KETONES UR QL STRIP: NEGATIVE
LEUKOCYTE ESTERASE UR QL STRIP: ABNORMAL
MICROSCOPIC COMMENT: ABNORMAL
NITRITE UR QL STRIP: NEGATIVE
PH UR STRIP: 7 [PH] (ref 5–8)
PROT UR QL STRIP: NEGATIVE
RBC #/AREA URNS HPF: 80 /HPF (ref 0–4)
SP GR UR STRIP: 1.01 (ref 1–1.03)
SQUAMOUS #/AREA URNS HPF: 4 /HPF
URN SPEC COLLECT METH UR: ABNORMAL
UROBILINOGEN UR STRIP-ACNC: NEGATIVE EU/DL
WBC #/AREA URNS HPF: 60 /HPF (ref 0–5)
WBC CLUMPS URNS QL MICRO: ABNORMAL

## 2017-05-31 PROCEDURE — 96367 TX/PROPH/DG ADDL SEQ IV INF: CPT

## 2017-05-31 PROCEDURE — 87086 URINE CULTURE/COLONY COUNT: CPT

## 2017-05-31 PROCEDURE — 87186 SC STD MICRODIL/AGAR DIL: CPT

## 2017-05-31 PROCEDURE — 96413 CHEMO IV INFUSION 1 HR: CPT

## 2017-05-31 PROCEDURE — 25000003 PHARM REV CODE 250: Performed by: INTERNAL MEDICINE

## 2017-05-31 PROCEDURE — 63600175 PHARM REV CODE 636 W HCPCS: Performed by: INTERNAL MEDICINE

## 2017-05-31 PROCEDURE — 87088 URINE BACTERIA CULTURE: CPT

## 2017-05-31 PROCEDURE — 96375 TX/PRO/DX INJ NEW DRUG ADDON: CPT

## 2017-05-31 PROCEDURE — 87077 CULTURE AEROBIC IDENTIFY: CPT

## 2017-05-31 PROCEDURE — 81000 URINALYSIS NONAUTO W/SCOPE: CPT

## 2017-05-31 RX ORDER — FAMOTIDINE 10 MG/ML
INJECTION INTRAVENOUS
Status: DISPENSED
Start: 2017-05-31 | End: 2017-05-31

## 2017-05-31 RX ORDER — FAMOTIDINE 10 MG/ML
20 INJECTION INTRAVENOUS
Status: COMPLETED | OUTPATIENT
Start: 2017-05-31 | End: 2017-05-31

## 2017-05-31 RX ORDER — DIPHENHYDRAMINE HYDROCHLORIDE 50 MG/ML
50 INJECTION INTRAMUSCULAR; INTRAVENOUS ONCE AS NEEDED
Status: DISCONTINUED | OUTPATIENT
Start: 2017-05-31 | End: 2017-05-31 | Stop reason: HOSPADM

## 2017-05-31 RX ORDER — SULFAMETHOXAZOLE AND TRIMETHOPRIM 400; 80 MG/1; MG/1
1 TABLET ORAL 2 TIMES DAILY
Qty: 14 TABLET | Refills: 0 | Status: SHIPPED | OUTPATIENT
Start: 2017-05-31 | End: 2017-06-07

## 2017-05-31 RX ORDER — HEPARIN 100 UNIT/ML
500 SYRINGE INTRAVENOUS
Status: DISCONTINUED | OUTPATIENT
Start: 2017-05-31 | End: 2017-05-31 | Stop reason: HOSPADM

## 2017-05-31 RX ORDER — EPINEPHRINE 0.3 MG/.3ML
0.3 INJECTION SUBCUTANEOUS ONCE AS NEEDED
Status: DISCONTINUED | OUTPATIENT
Start: 2017-05-31 | End: 2017-05-31 | Stop reason: HOSPADM

## 2017-05-31 RX ORDER — HEPARIN 100 UNIT/ML
SYRINGE INTRAVENOUS
Status: DISPENSED
Start: 2017-05-31 | End: 2017-05-31

## 2017-05-31 RX ORDER — SODIUM CHLORIDE 0.9 % (FLUSH) 0.9 %
10 SYRINGE (ML) INJECTION
Status: DISCONTINUED | OUTPATIENT
Start: 2017-05-31 | End: 2017-05-31 | Stop reason: HOSPADM

## 2017-05-31 RX ADMIN — Medication 1 MG: at 09:05

## 2017-05-31 RX ADMIN — Medication 20 MG: at 10:05

## 2017-05-31 RX ADMIN — DIPHENHYDRAMINE HYDROCHLORIDE 50 MG: 50 INJECTION, SOLUTION INTRAMUSCULAR; INTRAVENOUS at 10:05

## 2017-05-31 RX ADMIN — SODIUM CHLORIDE: 0.9 INJECTION, SOLUTION INTRAVENOUS at 10:05

## 2017-05-31 RX ADMIN — HEPARIN 500 UNITS: 100 SYRINGE at 12:05

## 2017-05-31 RX ADMIN — SODIUM CHLORIDE, PRESERVATIVE FREE 10 ML: 5 INJECTION INTRAVENOUS at 12:05

## 2017-05-31 RX ADMIN — PACLITAXEL 114 MG: 6 INJECTION, SOLUTION INTRAVENOUS at 11:05

## 2017-05-31 RX ADMIN — FAMOTIDINE 20 MG: 10 INJECTION INTRAVENOUS at 10:05

## 2017-05-31 NOTE — PLAN OF CARE
Problem: Patient Care Overview (Adult)  Goal: Plan of Care Review  Outcome: Ongoing (interventions implemented as appropriate)  Patient independently ambulated into chemo infusion clinic today accompanied by her brother. Patient skin warm and dry, RR even and unlabored. Patient in NAD and appears comfortable. Patient denies any pain or discomfort and tolerated Taxol treatment well.  Pt reports urinary frequency, urgency and dysuria that began last night. Pt denies fever, chill or body aches. Sarah, notified and UA ordered. Pt spoke to Sarah after results for UA returned and pt called in antibiotics to pharmacy listed on file. Pt to follow up with Dr. Lizarraga at next appt regarding symptoms. Patient follow up discussed and patient verbally expressed understanding.

## 2017-05-31 NOTE — PROGRESS NOTES
"Patient with complaints of urinary frequency and "burning urination" X 2 days.   No hematuria.  No fever/chills.    UA shows 2+ leukocytes and occult blood 2+.      Will treat for symptomatic UTI given that she is on chemotherapy.      RX for Bactrim sent to her pharmacy. (Patient is allergic to Cipro)    I encouraged patient to hydrate aggressively.  She knows to call in interim if any issues or infectious signs/symptoms.   "

## 2017-06-02 LAB — BACTERIA UR CULT: NORMAL

## 2017-06-06 ENCOUNTER — INFUSION (OUTPATIENT)
Dept: INFUSION THERAPY | Facility: HOSPITAL | Age: 42
End: 2017-06-06
Attending: INTERNAL MEDICINE
Payer: COMMERCIAL

## 2017-06-06 VITALS
RESPIRATION RATE: 16 BRPM | DIASTOLIC BLOOD PRESSURE: 77 MMHG | TEMPERATURE: 98 F | SYSTOLIC BLOOD PRESSURE: 114 MMHG | HEART RATE: 104 BPM

## 2017-06-06 DIAGNOSIS — C50.919 BREAST CANCER: Primary | ICD-10-CM

## 2017-06-06 DIAGNOSIS — I42.9 CARDIOMYOPATHY, IDIOPATHIC: ICD-10-CM

## 2017-06-06 DIAGNOSIS — Z51.11 ENCOUNTER FOR ANTINEOPLASTIC CHEMOTHERAPY: ICD-10-CM

## 2017-06-06 LAB
ALBUMIN SERPL BCP-MCNC: 3.4 G/DL
ALP SERPL-CCNC: 47 U/L
ALT SERPL W/O P-5'-P-CCNC: 13 U/L
ANION GAP SERPL CALC-SCNC: 7 MMOL/L
AST SERPL-CCNC: 15 U/L
BILIRUB SERPL-MCNC: 0.4 MG/DL
BUN SERPL-MCNC: 17 MG/DL
CALCIUM SERPL-MCNC: 9 MG/DL
CHLORIDE SERPL-SCNC: 108 MMOL/L
CO2 SERPL-SCNC: 25 MMOL/L
CREAT SERPL-MCNC: 0.8 MG/DL
ERYTHROCYTE [DISTWIDTH] IN BLOOD BY AUTOMATED COUNT: 13.5 %
EST. GFR  (AFRICAN AMERICAN): >60 ML/MIN/1.73 M^2
EST. GFR  (NON AFRICAN AMERICAN): >60 ML/MIN/1.73 M^2
GLUCOSE SERPL-MCNC: 100 MG/DL
HCT VFR BLD AUTO: 34.3 %
HGB BLD-MCNC: 11.2 G/DL
MCH RBC QN AUTO: 30.3 PG
MCHC RBC AUTO-ENTMCNC: 32.7 %
MCV RBC AUTO: 93 FL
NEUTROPHILS # BLD AUTO: 3 K/UL
PLATELET # BLD AUTO: 258 K/UL
PMV BLD AUTO: 10.2 FL
POTASSIUM SERPL-SCNC: 4.2 MMOL/L
PROT SERPL-MCNC: 6.2 G/DL
RBC # BLD AUTO: 3.7 M/UL
SODIUM SERPL-SCNC: 140 MMOL/L
WBC # BLD AUTO: 5.69 K/UL

## 2017-06-06 PROCEDURE — 80053 COMPREHEN METABOLIC PANEL: CPT

## 2017-06-06 PROCEDURE — 25000003 PHARM REV CODE 250: Performed by: INTERNAL MEDICINE

## 2017-06-06 PROCEDURE — 85027 COMPLETE CBC AUTOMATED: CPT

## 2017-06-06 PROCEDURE — 36591 DRAW BLOOD OFF VENOUS DEVICE: CPT

## 2017-06-06 RX ORDER — SODIUM CHLORIDE 0.9 % (FLUSH) 0.9 %
10 SYRINGE (ML) INJECTION
Status: CANCELLED | OUTPATIENT
Start: 2017-06-07

## 2017-06-06 RX ORDER — DIPHENHYDRAMINE HYDROCHLORIDE 50 MG/ML
50 INJECTION INTRAMUSCULAR; INTRAVENOUS ONCE AS NEEDED
Status: CANCELLED | OUTPATIENT
Start: 2017-06-07 | End: 2017-06-07

## 2017-06-06 RX ORDER — SODIUM CHLORIDE 0.9 % (FLUSH) 0.9 %
10 SYRINGE (ML) INJECTION
Status: DISCONTINUED | OUTPATIENT
Start: 2017-06-06 | End: 2017-06-06 | Stop reason: HOSPADM

## 2017-06-06 RX ORDER — HEPARIN 100 UNIT/ML
500 SYRINGE INTRAVENOUS
Status: COMPLETED | OUTPATIENT
Start: 2017-06-06 | End: 2017-06-06

## 2017-06-06 RX ORDER — EPINEPHRINE 0.3 MG/.3ML
0.3 INJECTION SUBCUTANEOUS ONCE AS NEEDED
Status: CANCELLED | OUTPATIENT
Start: 2017-06-07 | End: 2017-06-07

## 2017-06-06 RX ORDER — FAMOTIDINE 10 MG/ML
20 INJECTION INTRAVENOUS
Status: CANCELLED | OUTPATIENT
Start: 2017-06-07

## 2017-06-06 RX ORDER — HEPARIN 100 UNIT/ML
SYRINGE INTRAVENOUS
Status: DISPENSED
Start: 2017-06-06 | End: 2017-06-06

## 2017-06-06 RX ORDER — HEPARIN 100 UNIT/ML
500 SYRINGE INTRAVENOUS
Status: CANCELLED | OUTPATIENT
Start: 2017-06-07

## 2017-06-06 RX ADMIN — HEPARIN 500 UNITS: 100 SYRINGE at 10:06

## 2017-06-06 RX ADMIN — SODIUM CHLORIDE, PRESERVATIVE FREE 10 ML: 5 INJECTION INTRAVENOUS at 10:06

## 2017-06-06 NOTE — NURSING
Pt reports resolution of UTI symptoms states last cycle. Pt denies urinary frequency, urgency or dysuria. Pt reports she is still completing PO antibiotics as directed.

## 2017-06-06 NOTE — PLAN OF CARE
Problem: Patient Care Overview (Adult)  Goal: Plan of Care Review  Outcome: Ongoing (interventions implemented as appropriate)  Patient independently ambulated into chemo infusion clinic today unaccompanied. Patient skin warm and dry, RR even and unlabored. Patient in NAD and appears comfortable. Patient denies any pain or discomfort and tolerated lab draw from port well. Pt continues to reports fatigue approx 3-4 days post chemo infusion. Pt continues to remain extremely positive and optimistic regarding treatment. Patient follow up discussed and patient verbally expressed understanding.

## 2017-06-07 ENCOUNTER — OFFICE VISIT (OUTPATIENT)
Dept: HEMATOLOGY/ONCOLOGY | Facility: CLINIC | Age: 42
End: 2017-06-07
Attending: INTERNAL MEDICINE
Payer: COMMERCIAL

## 2017-06-07 ENCOUNTER — INFUSION (OUTPATIENT)
Dept: INFUSION THERAPY | Facility: HOSPITAL | Age: 42
End: 2017-06-07
Attending: INTERNAL MEDICINE
Payer: COMMERCIAL

## 2017-06-07 VITALS
HEART RATE: 77 BPM | DIASTOLIC BLOOD PRESSURE: 53 MMHG | SYSTOLIC BLOOD PRESSURE: 110 MMHG | RESPIRATION RATE: 16 BRPM | TEMPERATURE: 97 F

## 2017-06-07 VITALS — WEIGHT: 123 LBS | HEIGHT: 61 IN | BODY MASS INDEX: 23.22 KG/M2

## 2017-06-07 DIAGNOSIS — D64.81 ANEMIA ASSOCIATED WITH CHEMOTHERAPY: ICD-10-CM

## 2017-06-07 DIAGNOSIS — I42.9 CARDIOMYOPATHY, IDIOPATHIC: ICD-10-CM

## 2017-06-07 DIAGNOSIS — T45.1X5A ANEMIA ASSOCIATED WITH CHEMOTHERAPY: ICD-10-CM

## 2017-06-07 DIAGNOSIS — C50.611 CANCER OF AXILLARY TAIL OF RIGHT BREAST: Primary | ICD-10-CM

## 2017-06-07 PROCEDURE — 96375 TX/PRO/DX INJ NEW DRUG ADDON: CPT

## 2017-06-07 PROCEDURE — 25000003 PHARM REV CODE 250: Performed by: INTERNAL MEDICINE

## 2017-06-07 PROCEDURE — 99215 OFFICE O/P EST HI 40 MIN: CPT | Mod: S$GLB,,, | Performed by: INTERNAL MEDICINE

## 2017-06-07 PROCEDURE — 96413 CHEMO IV INFUSION 1 HR: CPT

## 2017-06-07 PROCEDURE — 96367 TX/PROPH/DG ADDL SEQ IV INF: CPT

## 2017-06-07 PROCEDURE — 63600175 PHARM REV CODE 636 W HCPCS: Performed by: INTERNAL MEDICINE

## 2017-06-07 PROCEDURE — 25000003 PHARM REV CODE 250

## 2017-06-07 RX ORDER — FAMOTIDINE 10 MG/ML
INJECTION INTRAVENOUS
Status: DISCONTINUED
Start: 2017-06-07 | End: 2017-06-07 | Stop reason: HOSPADM

## 2017-06-07 RX ORDER — FAMOTIDINE 10 MG/ML
20 INJECTION INTRAVENOUS
Status: COMPLETED | OUTPATIENT
Start: 2017-06-07 | End: 2017-06-07

## 2017-06-07 RX ORDER — HEPARIN 100 UNIT/ML
SYRINGE INTRAVENOUS
Status: COMPLETED
Start: 2017-06-07 | End: 2017-06-07

## 2017-06-07 RX ORDER — DIPHENHYDRAMINE HYDROCHLORIDE 50 MG/ML
50 INJECTION INTRAMUSCULAR; INTRAVENOUS ONCE AS NEEDED
Status: DISCONTINUED | OUTPATIENT
Start: 2017-06-07 | End: 2017-06-07 | Stop reason: HOSPADM

## 2017-06-07 RX ORDER — HEPARIN 100 UNIT/ML
500 SYRINGE INTRAVENOUS
Status: DISCONTINUED | OUTPATIENT
Start: 2017-06-07 | End: 2017-06-07 | Stop reason: HOSPADM

## 2017-06-07 RX ORDER — SODIUM CHLORIDE 0.9 % (FLUSH) 0.9 %
10 SYRINGE (ML) INJECTION
Status: DISCONTINUED | OUTPATIENT
Start: 2017-06-07 | End: 2017-06-07 | Stop reason: HOSPADM

## 2017-06-07 RX ORDER — EPINEPHRINE 0.3 MG/.3ML
0.3 INJECTION SUBCUTANEOUS ONCE AS NEEDED
Status: DISCONTINUED | OUTPATIENT
Start: 2017-06-07 | End: 2017-06-07 | Stop reason: HOSPADM

## 2017-06-07 RX ADMIN — SODIUM CHLORIDE, PRESERVATIVE FREE 10 ML: 5 INJECTION INTRAVENOUS at 01:06

## 2017-06-07 RX ADMIN — DIPHENHYDRAMINE HYDROCHLORIDE 50 MG: 50 INJECTION, SOLUTION INTRAMUSCULAR; INTRAVENOUS at 11:06

## 2017-06-07 RX ADMIN — PACLITAXEL 114 MG: 6 INJECTION, SOLUTION INTRAVENOUS at 12:06

## 2017-06-07 RX ADMIN — FAMOTIDINE 20 MG: 10 INJECTION INTRAVENOUS at 10:06

## 2017-06-07 RX ADMIN — HEPARIN 500 UNITS: 100 SYRINGE at 01:06

## 2017-06-07 RX ADMIN — Medication 20 MG: at 10:06

## 2017-06-07 RX ADMIN — Medication 1 MG: at 09:06

## 2017-06-07 RX ADMIN — SODIUM CHLORIDE: 0.9 INJECTION, SOLUTION INTRAVENOUS at 10:06

## 2017-06-07 NOTE — PLAN OF CARE
Problem: Patient Care Overview (Adult)  Goal: Plan of Care Review  Outcome: Ongoing (interventions implemented as appropriate)  Patient independently ambulated into chemo infusion clinic today accompanied by spouse. Patient skin warm and dry, RR even and unlabored. Patient in NAD and appears comfortable. Patient denies any pain or discomfort and tolerated Taxol treatment well. Pt continues to reports feeling fatigued approx 4-5 days after chemo. Patient follow up discussed and patient verbally expressed understanding.

## 2017-06-07 NOTE — PROGRESS NOTES
Subjective:       Patient ID: Avis Graves is a 42 y.o. female.    Chief Complaint: No chief complaint on file.    HPI   Ms. Graves returns today to continue her adjuvant chemotherapy with weekly taxol.  She tolerated the last two taxol infusions without any significant side effects.  She is due for her 7th infusion today.  Her CBC from yesterday shows a WBC count of 5,600 /mm3, Hg 11.2 gr/dl, Ht 34.3 % and platelets 258,000 /mm3.  Her bilirubin is 0.4 mg/dl.    Briefly, she is a 42-year-old -American female with a history of cardiomyopathy and an EF of less than 30%, who was recently diagnosed with a carcinoma of the right breast, that was ER strongly positive, [place] negative, and HER-2 negative.  On 2/07/2017 she underwent a right modified radical mastectomy with sentinel lymph node biopsy and insertion of a tissue expander.  The pathology report from the 02/07/2017 procedure indicates that she had a 4 cm carcinoma; three of the four sentinel lymph nodes were positive with macrometastases.  The patient was referred for further evaluation.  It was recommended to her to proceed with twelve weekly cycles of taxol, possibly with the addition of CMF upon completion.  She is here for Cycle 7 of weekly taxol.    Review of Systems  Overall she feels OK.  She has tolerated her chemotherapy well so far.  She denies any depression, vomiting, diarrhea, constipation, diplopia, blurred vision, headaches, chest pain, palpitations, shortness of breath, left breast pain, abdominal pain, extremity pain, or difficulty ambulating.  The remainder of the ten-point ROS, including general, skin, lymph, H/N, cardiorespiratory, GI, , Neuro, Endocrine, and psychiatric is negative.     Objective:      Physical Exam  She is alert, oriented to time, place, person, pleasant, well      nourished, in no acute physical distress.  She is here with her .                                  VITAL SIGNS:  Reviewed                                       HEENT:  Alopecia is again noted.  There are no nasal, oral, lip, gingival, auricular, lid,    or conjunctival lesions.  Mucosae are moist and pink, and there is no        thrush.  Pupils are equal, reactive to light and accommodation.              Extraocular muscle movements are intact.  Dentition is good.  There is no frontal or maxillary tenderness.                                     NECK:  Supple without JVD, adenopathy, or thyromegaly.                       LUNGS:  Clear to auscultation without wheezing, rales, or rhonchi.           CARDIOVASCULAR:  Reveals an S1, S2, no murmurs, no rubs, no gallops.         ABDOMEN:  Soft, nontender, without organomegaly.  Bowel sounds are    present.                                                                     EXTREMITIES:  No cyanosis, clubbing, or edema.                               BREASTS:  She is status post right mastectomy with a tissue expander in place and a well-healed incision.    There are no masses in the left breast.     An AICD is palpated in the left infraclavicular area.   A left sided port is seen on her arm, medially.                                   LYMPHATIC:  There is no cervical, axillary, or supraclavicular adenopathy.   SKIN:  Warm and moist, without petechiae, rashes, induration, or ecchymoses.           NEUROLOGIC:  DTRs are 0-1+ bilaterally, symmetrical, motor function is 5/5,  and cranial nerves are  within normal limits.  A portacath is seen in the distal right arm, medially.    Assessment:       1. 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 Anemia associated with chemotherapy    3. Cardiomyopathy, idiopathic        Plan:        I had a long discussion with her; she will proceed with the 7th cycle of chemotherapy today.  Her dose of dexamethasone will remain at 20 mg and the dose of benadryl will remain at 50 mg prior to her infusion.   I will see her with repeat labs in 2 weeks.  She will need to remain on weekly  taxol with weekly CBC and CMP.  I again explained to her today that upon completion of 12 weeks of taxol, we will discuss the option of 3 additional cycles of CMF with her.  Her questions were answered to her satisfaction.

## 2017-06-08 DIAGNOSIS — E87.6 HYPOKALEMIA: ICD-10-CM

## 2017-06-08 RX ORDER — POTASSIUM CHLORIDE 750 MG/1
CAPSULE, EXTENDED RELEASE ORAL
Qty: 60 CAPSULE | Refills: 0 | Status: SHIPPED | OUTPATIENT
Start: 2017-06-08 | End: 2017-07-21 | Stop reason: SDUPTHER

## 2017-06-13 ENCOUNTER — INFUSION (OUTPATIENT)
Dept: INFUSION THERAPY | Facility: HOSPITAL | Age: 42
End: 2017-06-13
Attending: INTERNAL MEDICINE
Payer: COMMERCIAL

## 2017-06-13 VITALS
RESPIRATION RATE: 16 BRPM | DIASTOLIC BLOOD PRESSURE: 73 MMHG | TEMPERATURE: 98 F | SYSTOLIC BLOOD PRESSURE: 114 MMHG | HEART RATE: 73 BPM

## 2017-06-13 DIAGNOSIS — C50.919 BREAST CANCER: ICD-10-CM

## 2017-06-13 DIAGNOSIS — Z51.11 ENCOUNTER FOR ANTINEOPLASTIC CHEMOTHERAPY: ICD-10-CM

## 2017-06-13 LAB
ALBUMIN SERPL BCP-MCNC: 3.3 G/DL
ALP SERPL-CCNC: 44 U/L
ALT SERPL W/O P-5'-P-CCNC: 11 U/L
ANION GAP SERPL CALC-SCNC: 8 MMOL/L
AST SERPL-CCNC: 15 U/L
BILIRUB SERPL-MCNC: 0.4 MG/DL
BUN SERPL-MCNC: 13 MG/DL
CALCIUM SERPL-MCNC: 9 MG/DL
CHLORIDE SERPL-SCNC: 107 MMOL/L
CO2 SERPL-SCNC: 26 MMOL/L
CREAT SERPL-MCNC: 0.7 MG/DL
ERYTHROCYTE [DISTWIDTH] IN BLOOD BY AUTOMATED COUNT: 14 %
EST. GFR  (AFRICAN AMERICAN): >60 ML/MIN/1.73 M^2
EST. GFR  (NON AFRICAN AMERICAN): >60 ML/MIN/1.73 M^2
GLUCOSE SERPL-MCNC: 95 MG/DL
HCT VFR BLD AUTO: 32.9 %
HGB BLD-MCNC: 10.6 G/DL
MCH RBC QN AUTO: 30.1 PG
MCHC RBC AUTO-ENTMCNC: 32.2 %
MCV RBC AUTO: 94 FL
NEUTROPHILS # BLD AUTO: 3.8 K/UL
PLATELET # BLD AUTO: 258 K/UL
PMV BLD AUTO: 10.2 FL
POTASSIUM SERPL-SCNC: 4 MMOL/L
PROT SERPL-MCNC: 6.2 G/DL
RBC # BLD AUTO: 3.52 M/UL
SODIUM SERPL-SCNC: 141 MMOL/L
WBC # BLD AUTO: 6.49 K/UL

## 2017-06-13 PROCEDURE — 85027 COMPLETE CBC AUTOMATED: CPT

## 2017-06-13 PROCEDURE — 36591 DRAW BLOOD OFF VENOUS DEVICE: CPT

## 2017-06-13 PROCEDURE — 80053 COMPREHEN METABOLIC PANEL: CPT

## 2017-06-13 PROCEDURE — 25000003 PHARM REV CODE 250: Performed by: INTERNAL MEDICINE

## 2017-06-13 RX ORDER — HEPARIN 100 UNIT/ML
500 SYRINGE INTRAVENOUS
Status: CANCELLED | OUTPATIENT
Start: 2017-06-14

## 2017-06-13 RX ORDER — DIPHENHYDRAMINE HYDROCHLORIDE 50 MG/ML
50 INJECTION INTRAMUSCULAR; INTRAVENOUS ONCE AS NEEDED
Status: CANCELLED | OUTPATIENT
Start: 2017-06-14 | End: 2017-06-14

## 2017-06-13 RX ORDER — HEPARIN 100 UNIT/ML
SYRINGE INTRAVENOUS
Status: DISPENSED
Start: 2017-06-13 | End: 2017-06-13

## 2017-06-13 RX ORDER — SODIUM CHLORIDE 0.9 % (FLUSH) 0.9 %
10 SYRINGE (ML) INJECTION
Status: DISCONTINUED | OUTPATIENT
Start: 2017-06-13 | End: 2017-06-13 | Stop reason: HOSPADM

## 2017-06-13 RX ORDER — SODIUM CHLORIDE 0.9 % (FLUSH) 0.9 %
10 SYRINGE (ML) INJECTION
Status: CANCELLED | OUTPATIENT
Start: 2017-06-14

## 2017-06-13 RX ORDER — HEPARIN 100 UNIT/ML
500 SYRINGE INTRAVENOUS
Status: COMPLETED | OUTPATIENT
Start: 2017-06-13 | End: 2017-06-13

## 2017-06-13 RX ORDER — EPINEPHRINE 0.3 MG/.3ML
0.3 INJECTION SUBCUTANEOUS ONCE AS NEEDED
Status: CANCELLED | OUTPATIENT
Start: 2017-06-14 | End: 2017-06-14

## 2017-06-13 RX ORDER — FAMOTIDINE 10 MG/ML
20 INJECTION INTRAVENOUS
Status: CANCELLED | OUTPATIENT
Start: 2017-06-14

## 2017-06-13 RX ADMIN — SODIUM CHLORIDE, PRESERVATIVE FREE 10 ML: 5 INJECTION INTRAVENOUS at 11:06

## 2017-06-13 RX ADMIN — HEPARIN 500 UNITS: 100 SYRINGE at 11:06

## 2017-06-13 NOTE — PLAN OF CARE
Problem: Patient Care Overview (Adult)  Goal: Plan of Care Review  Outcome: Ongoing (interventions implemented as appropriate)  Patient independently ambulated into chemo infusion clinic today unaccompanied. Patiett skin warm and dry, RR even and unlabored. Patient in NAD and appears comfortable. Patient denies any pain or discomfort and tolerated labs from port well. Patient follow up discussed and patient verbally expressed understanding.

## 2017-06-14 ENCOUNTER — INFUSION (OUTPATIENT)
Dept: INFUSION THERAPY | Facility: HOSPITAL | Age: 42
End: 2017-06-14
Attending: INTERNAL MEDICINE
Payer: COMMERCIAL

## 2017-06-14 VITALS
HEIGHT: 61 IN | HEART RATE: 85 BPM | SYSTOLIC BLOOD PRESSURE: 114 MMHG | DIASTOLIC BLOOD PRESSURE: 72 MMHG | WEIGHT: 124.31 LBS | TEMPERATURE: 98 F | BODY MASS INDEX: 23.47 KG/M2 | OXYGEN SATURATION: 99 % | RESPIRATION RATE: 16 BRPM

## 2017-06-14 VITALS — BODY MASS INDEX: 23.22 KG/M2 | HEIGHT: 61 IN | WEIGHT: 123 LBS

## 2017-06-14 DIAGNOSIS — C50.611 CANCER OF AXILLARY TAIL OF RIGHT BREAST: Primary | ICD-10-CM

## 2017-06-14 PROCEDURE — 96367 TX/PROPH/DG ADDL SEQ IV INF: CPT

## 2017-06-14 PROCEDURE — 25000003 PHARM REV CODE 250

## 2017-06-14 PROCEDURE — 96375 TX/PRO/DX INJ NEW DRUG ADDON: CPT

## 2017-06-14 PROCEDURE — 63600175 PHARM REV CODE 636 W HCPCS: Performed by: INTERNAL MEDICINE

## 2017-06-14 PROCEDURE — 96413 CHEMO IV INFUSION 1 HR: CPT

## 2017-06-14 PROCEDURE — 25000003 PHARM REV CODE 250: Performed by: INTERNAL MEDICINE

## 2017-06-14 RX ORDER — FAMOTIDINE 10 MG/ML
20 INJECTION INTRAVENOUS
Status: COMPLETED | OUTPATIENT
Start: 2017-06-14 | End: 2017-06-14

## 2017-06-14 RX ORDER — FAMOTIDINE 10 MG/ML
INJECTION INTRAVENOUS
Status: DISCONTINUED
Start: 2017-06-14 | End: 2017-06-14 | Stop reason: HOSPADM

## 2017-06-14 RX ORDER — HEPARIN 100 UNIT/ML
500 SYRINGE INTRAVENOUS
Status: DISCONTINUED | OUTPATIENT
Start: 2017-06-14 | End: 2017-06-14 | Stop reason: HOSPADM

## 2017-06-14 RX ORDER — EPINEPHRINE 0.3 MG/.3ML
0.3 INJECTION SUBCUTANEOUS ONCE AS NEEDED
Status: DISCONTINUED | OUTPATIENT
Start: 2017-06-14 | End: 2017-06-14 | Stop reason: HOSPADM

## 2017-06-14 RX ORDER — DIPHENHYDRAMINE HYDROCHLORIDE 50 MG/ML
50 INJECTION INTRAMUSCULAR; INTRAVENOUS ONCE AS NEEDED
Status: DISCONTINUED | OUTPATIENT
Start: 2017-06-14 | End: 2017-06-14 | Stop reason: HOSPADM

## 2017-06-14 RX ORDER — SODIUM CHLORIDE 0.9 % (FLUSH) 0.9 %
10 SYRINGE (ML) INJECTION
Status: DISCONTINUED | OUTPATIENT
Start: 2017-06-14 | End: 2017-06-14 | Stop reason: HOSPADM

## 2017-06-14 RX ORDER — HEPARIN 100 UNIT/ML
SYRINGE INTRAVENOUS
Status: COMPLETED
Start: 2017-06-14 | End: 2017-06-14

## 2017-06-14 RX ADMIN — HEPARIN 500 UNITS: 100 SYRINGE at 12:06

## 2017-06-14 RX ADMIN — ONDANSETRON 8 MG: 2 INJECTION INTRAMUSCULAR; INTRAVENOUS at 10:06

## 2017-06-14 RX ADMIN — PACLITAXEL 114 MG: 6 INJECTION, SOLUTION, CONCENTRATE INTRAVENOUS at 11:06

## 2017-06-14 RX ADMIN — DIPHENHYDRAMINE HYDROCHLORIDE 50 MG: 50 INJECTION, SOLUTION INTRAMUSCULAR; INTRAVENOUS at 11:06

## 2017-06-14 RX ADMIN — FAMOTIDINE 20 MG: 10 INJECTION INTRAVENOUS at 11:06

## 2017-06-14 RX ADMIN — Medication 20 MG: at 11:06

## 2017-06-14 RX ADMIN — SODIUM CHLORIDE, PRESERVATIVE FREE 10 ML: 5 INJECTION INTRAVENOUS at 12:06

## 2017-06-14 RX ADMIN — SODIUM CHLORIDE: 0.9 INJECTION, SOLUTION INTRAVENOUS at 10:06

## 2017-06-14 NOTE — PLAN OF CARE
Problem: Patient Care Overview (Adult)  Goal: Plan of Care Review  Outcome: Ongoing (interventions implemented as appropriate)  Patient independently ambulated into chemo infusion clinic today unaccompanied. Patient skin warm and dry, RR even and unlabored. Patient in NAD and appears comfortable. Patient denies any pain or discomfort and tolerated Taxol  treatment well.  Pt reports full resolution of UTI symptoms. Patient follow up discussed and patient verbally expressed understanding.

## 2017-06-15 RX ORDER — FUROSEMIDE 20 MG/1
TABLET ORAL
Qty: 30 TABLET | Refills: 2 | Status: SHIPPED | OUTPATIENT
Start: 2017-06-15 | End: 2017-08-31 | Stop reason: SDUPTHER

## 2017-06-20 ENCOUNTER — OFFICE VISIT (OUTPATIENT)
Dept: SURGERY | Facility: CLINIC | Age: 42
End: 2017-06-20
Payer: COMMERCIAL

## 2017-06-20 ENCOUNTER — INFUSION (OUTPATIENT)
Dept: INFUSION THERAPY | Facility: HOSPITAL | Age: 42
End: 2017-06-20
Attending: INTERNAL MEDICINE
Payer: COMMERCIAL

## 2017-06-20 VITALS
SYSTOLIC BLOOD PRESSURE: 100 MMHG | TEMPERATURE: 98 F | DIASTOLIC BLOOD PRESSURE: 74 MMHG | HEART RATE: 78 BPM | RESPIRATION RATE: 16 BRPM

## 2017-06-20 VITALS
WEIGHT: 123 LBS | HEART RATE: 88 BPM | TEMPERATURE: 98 F | DIASTOLIC BLOOD PRESSURE: 59 MMHG | SYSTOLIC BLOOD PRESSURE: 110 MMHG | HEIGHT: 61 IN | BODY MASS INDEX: 23.22 KG/M2

## 2017-06-20 DIAGNOSIS — Z51.11 ENCOUNTER FOR ANTINEOPLASTIC CHEMOTHERAPY: ICD-10-CM

## 2017-06-20 DIAGNOSIS — C50.811 CANCER OF OVERLAPPING SITES OF RIGHT FEMALE BREAST: Primary | ICD-10-CM

## 2017-06-20 DIAGNOSIS — C50.919 BREAST CANCER: ICD-10-CM

## 2017-06-20 LAB
ALBUMIN SERPL BCP-MCNC: 3.4 G/DL
ALP SERPL-CCNC: 45 U/L
ALT SERPL W/O P-5'-P-CCNC: 13 U/L
ANION GAP SERPL CALC-SCNC: 6 MMOL/L
AST SERPL-CCNC: 13 U/L
BILIRUB SERPL-MCNC: 0.4 MG/DL
BUN SERPL-MCNC: 13 MG/DL
CALCIUM SERPL-MCNC: 9 MG/DL
CHLORIDE SERPL-SCNC: 108 MMOL/L
CO2 SERPL-SCNC: 26 MMOL/L
CREAT SERPL-MCNC: 0.7 MG/DL
ERYTHROCYTE [DISTWIDTH] IN BLOOD BY AUTOMATED COUNT: 14.1 %
EST. GFR  (AFRICAN AMERICAN): >60 ML/MIN/1.73 M^2
EST. GFR  (NON AFRICAN AMERICAN): >60 ML/MIN/1.73 M^2
GLUCOSE SERPL-MCNC: 98 MG/DL
HCT VFR BLD AUTO: 33.5 %
HGB BLD-MCNC: 10.8 G/DL
MCH RBC QN AUTO: 30.3 PG
MCHC RBC AUTO-ENTMCNC: 32.2 %
MCV RBC AUTO: 94 FL
NEUTROPHILS # BLD AUTO: 4.3 K/UL
PLATELET # BLD AUTO: 248 K/UL
PMV BLD AUTO: 9.9 FL
POTASSIUM SERPL-SCNC: 4.1 MMOL/L
PROT SERPL-MCNC: 6.2 G/DL
RBC # BLD AUTO: 3.57 M/UL
SODIUM SERPL-SCNC: 140 MMOL/L
WBC # BLD AUTO: 7.8 K/UL

## 2017-06-20 PROCEDURE — 85027 COMPLETE CBC AUTOMATED: CPT

## 2017-06-20 PROCEDURE — 25000003 PHARM REV CODE 250: Performed by: INTERNAL MEDICINE

## 2017-06-20 PROCEDURE — 80053 COMPREHEN METABOLIC PANEL: CPT

## 2017-06-20 PROCEDURE — 36591 DRAW BLOOD OFF VENOUS DEVICE: CPT

## 2017-06-20 PROCEDURE — 99999 PR PBB SHADOW E&M-EST. PATIENT-LVL III: CPT | Mod: PBBFAC,,, | Performed by: SURGERY

## 2017-06-20 PROCEDURE — 25000003 PHARM REV CODE 250

## 2017-06-20 PROCEDURE — 99214 OFFICE O/P EST MOD 30 MIN: CPT | Mod: S$GLB,,, | Performed by: SURGERY

## 2017-06-20 RX ORDER — SODIUM CHLORIDE 0.9 % (FLUSH) 0.9 %
10 SYRINGE (ML) INJECTION
Status: DISCONTINUED | OUTPATIENT
Start: 2017-06-20 | End: 2017-06-20 | Stop reason: HOSPADM

## 2017-06-20 RX ORDER — HEPARIN 100 UNIT/ML
500 SYRINGE INTRAVENOUS
Status: COMPLETED | OUTPATIENT
Start: 2017-06-20 | End: 2017-06-20

## 2017-06-20 RX ORDER — FAMOTIDINE 10 MG/ML
20 INJECTION INTRAVENOUS
Status: CANCELLED | OUTPATIENT
Start: 2017-06-21

## 2017-06-20 RX ORDER — HEPARIN 100 UNIT/ML
SYRINGE INTRAVENOUS
Status: COMPLETED
Start: 2017-06-20 | End: 2017-06-20

## 2017-06-20 RX ORDER — HEPARIN 100 UNIT/ML
500 SYRINGE INTRAVENOUS
Status: CANCELLED | OUTPATIENT
Start: 2017-06-21

## 2017-06-20 RX ORDER — EPINEPHRINE 0.3 MG/.3ML
0.3 INJECTION SUBCUTANEOUS ONCE AS NEEDED
Status: CANCELLED | OUTPATIENT
Start: 2017-06-21 | End: 2017-06-21

## 2017-06-20 RX ORDER — SODIUM CHLORIDE 0.9 % (FLUSH) 0.9 %
10 SYRINGE (ML) INJECTION
Status: CANCELLED | OUTPATIENT
Start: 2017-06-21

## 2017-06-20 RX ORDER — DIPHENHYDRAMINE HYDROCHLORIDE 50 MG/ML
50 INJECTION INTRAMUSCULAR; INTRAVENOUS ONCE AS NEEDED
Status: CANCELLED | OUTPATIENT
Start: 2017-06-21 | End: 2017-06-21

## 2017-06-20 RX ADMIN — HEPARIN 500 UNITS: 100 SYRINGE at 12:06

## 2017-06-20 RX ADMIN — SODIUM CHLORIDE, PRESERVATIVE FREE 10 ML: 5 INJECTION INTRAVENOUS at 12:06

## 2017-06-20 NOTE — PROGRESS NOTES
Patient ID: Avis Graves is a 42 y.o. female.     Chief Complaint: Right breast cancer     HPI   Ms. Graves returns today with her  for routine surgical F/U.  Her R side tissue expander is in place.  Still receiving adjuvant Taxol per Dr. Birmingham.  She just received her 9th of 12 weekly doses, due to be completed on July 12th, 2017.     Briefly, she is a 42-year-old -American female with a history of cardiomyopathy and an EF of less than 30%, who was recently diagnosed with a carcinoma of the right breast, that was ER strongly positive, [place] negative, and HER-2 negative.  On 2/07/2017 she underwent a right modified radical mastectomy with sentinel lymph node biopsy and insertion of a tissue expander.  The pathology report from the 02/07/2017 procedure indicates that she had a 4 cm carcinoma; three of the four sentinel lymph nodes were positive with macrometastases.  The patient was referred for further evaluation.  It was recommended to her to proceed with twelve weekly cycles of taxol, possibly with the addition of CMF upon completion.  She is here for Cycle 7 of weekly taxol.     Review of Systems  Overall she feels OK.  She has tolerated her chemotherapy well so far.  She denies any depression, vomiting, diarrhea, constipation, diplopia, blurred vision, headaches, chest pain, palpitations, shortness of breath, left breast pain, abdominal pain, extremity pain, or difficulty ambulating.  The remainder of the ten-point ROS, including general, skin, lymph, H/N, cardiorespiratory, GI, , Neuro, Endocrine, and psychiatric is negative.      Objective:      Physical Exam  She is alert, oriented to time, place, person, pleasant, well      nourished, in no acute physical distress.  She is here with her .                                  VITAL SIGNS:  Reviewed                                      HEENT:  Alopecia is again noted.  There are no nasal, oral, lip, gingival, auricular, lid,    or  conjunctival lesions.  Mucosae are moist and pink, and there is no        thrush.  Pupils are equal, reactive to light and accommodation.              Extraocular muscle movements are intact.  Dentition is good.  There is no frontal or maxillary tenderness.                                     NECK:  Supple without JVD, adenopathy, or thyromegaly.                       LUNGS:  Clear to auscultation without wheezing, rales, or rhonchi.           CARDIOVASCULAR:  Reveals an S1, S2, no murmurs, no rubs, no gallops.         ABDOMEN:  Soft, nontender, without organomegaly.  Bowel sounds are    present.                                                                     EXTREMITIES:  No cyanosis, clubbing, or edema.                               BREASTS:  She is status post right mastectomy with a tissue expander in place and a well-healed incision.    There are no masses in the left breast.     An AICD is palpated in the left infraclavicular area.   A left sided port is seen on her arm, medially.                                   LYMPHATIC:  There is no cervical, axillary, or supraclavicular adenopathy.   SKIN:  Warm and moist, without petechiae, rashes, induration, or ecchymoses.           NEUROLOGIC:  DTRs are 0-1+ bilaterally, symmetrical, motor function is 5/5,  and cranial nerves are  within normal limits.  A portacath is seen in the distal right arm, medially.     Assessment:       1. 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 Anemia associated with chemotherapy    3. Cardiomyopathy, idiopathic        Plan:       Pt is LAUREN clinically at this time.  May receive 3 added cycles of CMF after Taxol per discussion with Dr. Lizarraga.  Pt does not really want adjuvant XRT but I discussed the importance of this since she was recommended for post-mastectomy XRT and did not have completion ALND.  Will set up appt for visit with Dr. Tompkins in a month from now for further planning and discussion as she nears completion of  CTX.  F/U with me in 6 months.  Pt will likely have TE exchange for implant and contralateral left breast mastopexy after the New Year at least 3 months after XRT.  Should have left [facility] prior to left side plastic surgery in about 6 months.

## 2017-06-20 NOTE — PLAN OF CARE
Problem: Patient Care Overview (Adult)  Goal: Plan of Care Review  Outcome: Ongoing (interventions implemented as appropriate)  Patient independently ambulated into chemo infusion clinic today accompanied by spouse. Patient skin warm and dry, RR even and unlabored. Patient in NAD and appears comfortable. Patient denies any pain or discomfort and tolerated lab draw from port well. Patient follow up discussed and patient/spouse verbally expressed understanding.

## 2017-06-20 NOTE — LETTER
[facility]  [address]  [facility] [address]  Phone: [phone]  Fax: [phone] June 20, 2017      [place]  [id number] [address]  [facility] [address]    Patient: Avis Graves   MR Number: [id number]   YOB: 1975   Date of Visit: 6/20/2017     Dear Dr. Rivas:    I saw your patient Avis Graves in my Breast Surgery Clinic.     Patient is LAUREN clinically at this time.  May receive 3 added cycles of CMF after Taxol per discussion with Dr. Lizarraga.  Patient does not really want adjuvant XRT but I discussed the importance of this since she was recommended for post-mastectomy XRT and did not have completion ALND.    Will set up appointment for visit with Dr. Tompkins in a month from now for further planning and discussion as she nears completion of CTX. F/U with me in 6 months.    Patient will likely have TE exchange for implant and contralateral left breast mastopexy after the New Year at least 3 months after XRT. Should have left MMG prior to left side plastic surgery in about 6 months.    If you have questions, please do not hesitate to call me. I look forward to following Avis Graves along with you.    Sincerely,      Orestes Santana MD  Medical Director, [facility]  Section of General and Oncologic Surgery  [facility]    [facility]

## 2017-06-21 ENCOUNTER — INFUSION (OUTPATIENT)
Dept: INFUSION THERAPY | Facility: HOSPITAL | Age: 42
End: 2017-06-21
Attending: INTERNAL MEDICINE
Payer: COMMERCIAL

## 2017-06-21 ENCOUNTER — OFFICE VISIT (OUTPATIENT)
Dept: HEMATOLOGY/ONCOLOGY | Facility: CLINIC | Age: 42
End: 2017-06-21
Attending: INTERNAL MEDICINE
Payer: COMMERCIAL

## 2017-06-21 VITALS
HEIGHT: 61 IN | RESPIRATION RATE: 16 BRPM | HEART RATE: 84 BPM | BODY MASS INDEX: 23.22 KG/M2 | TEMPERATURE: 98 F | DIASTOLIC BLOOD PRESSURE: 65 MMHG | SYSTOLIC BLOOD PRESSURE: 98 MMHG | WEIGHT: 123 LBS

## 2017-06-21 VITALS
BODY MASS INDEX: 25.82 KG/M2 | WEIGHT: 123 LBS | RESPIRATION RATE: 18 BRPM | HEIGHT: 58 IN | SYSTOLIC BLOOD PRESSURE: 103 MMHG | TEMPERATURE: 98 F | DIASTOLIC BLOOD PRESSURE: 60 MMHG | HEART RATE: 84 BPM | OXYGEN SATURATION: 99 %

## 2017-06-21 DIAGNOSIS — D64.81 ANEMIA ASSOCIATED WITH CHEMOTHERAPY: ICD-10-CM

## 2017-06-21 DIAGNOSIS — I42.9 CARDIOMYOPATHY, IDIOPATHIC: ICD-10-CM

## 2017-06-21 DIAGNOSIS — Z51.11 ENCOUNTER FOR ANTINEOPLASTIC CHEMOTHERAPY: Primary | ICD-10-CM

## 2017-06-21 DIAGNOSIS — C50.911 MALIGNANT NEOPLASM OF RIGHT FEMALE BREAST, UNSPECIFIED SITE OF BREAST: Primary | ICD-10-CM

## 2017-06-21 DIAGNOSIS — C50.611 CANCER OF AXILLARY TAIL OF RIGHT BREAST: ICD-10-CM

## 2017-06-21 DIAGNOSIS — C50.611 CANCER OF AXILLARY TAIL OF RIGHT BREAST: Primary | ICD-10-CM

## 2017-06-21 DIAGNOSIS — T45.1X5A ANEMIA ASSOCIATED WITH CHEMOTHERAPY: ICD-10-CM

## 2017-06-21 PROCEDURE — 99999 PR PBB SHADOW E&M-EST. PATIENT-LVL III: CPT | Mod: PBBFAC,,, | Performed by: INTERNAL MEDICINE

## 2017-06-21 PROCEDURE — 25000003 PHARM REV CODE 250: Performed by: INTERNAL MEDICINE

## 2017-06-21 PROCEDURE — 96413 CHEMO IV INFUSION 1 HR: CPT

## 2017-06-21 PROCEDURE — 96367 TX/PROPH/DG ADDL SEQ IV INF: CPT

## 2017-06-21 PROCEDURE — 96375 TX/PRO/DX INJ NEW DRUG ADDON: CPT

## 2017-06-21 PROCEDURE — 99215 OFFICE O/P EST HI 40 MIN: CPT | Mod: S$GLB,,, | Performed by: INTERNAL MEDICINE

## 2017-06-21 PROCEDURE — 63600175 PHARM REV CODE 636 W HCPCS: Performed by: INTERNAL MEDICINE

## 2017-06-21 RX ORDER — FAMOTIDINE 10 MG/ML
20 INJECTION INTRAVENOUS
Status: COMPLETED | OUTPATIENT
Start: 2017-06-21 | End: 2017-06-21

## 2017-06-21 RX ORDER — DIPHENHYDRAMINE HYDROCHLORIDE 50 MG/ML
50 INJECTION INTRAMUSCULAR; INTRAVENOUS ONCE AS NEEDED
Status: DISCONTINUED | OUTPATIENT
Start: 2017-06-21 | End: 2017-06-21 | Stop reason: HOSPADM

## 2017-06-21 RX ORDER — SODIUM CHLORIDE 0.9 % (FLUSH) 0.9 %
10 SYRINGE (ML) INJECTION
Status: DISCONTINUED | OUTPATIENT
Start: 2017-06-21 | End: 2017-06-21 | Stop reason: HOSPADM

## 2017-06-21 RX ORDER — EPINEPHRINE 0.3 MG/.3ML
0.3 INJECTION SUBCUTANEOUS ONCE AS NEEDED
Status: DISCONTINUED | OUTPATIENT
Start: 2017-06-21 | End: 2017-06-21 | Stop reason: HOSPADM

## 2017-06-21 RX ORDER — HEPARIN 100 UNIT/ML
500 SYRINGE INTRAVENOUS
Status: DISCONTINUED | OUTPATIENT
Start: 2017-06-21 | End: 2017-06-21 | Stop reason: HOSPADM

## 2017-06-21 RX ADMIN — HEPARIN 500 UNITS: 100 SYRINGE at 12:06

## 2017-06-21 RX ADMIN — Medication 1 MG: at 10:06

## 2017-06-21 RX ADMIN — SODIUM CHLORIDE 20 MG: 9 INJECTION, SOLUTION INTRAVENOUS at 10:06

## 2017-06-21 RX ADMIN — FAMOTIDINE 20 MG: 10 INJECTION INTRAVENOUS at 10:06

## 2017-06-21 RX ADMIN — PACLITAXEL 114 MG: 6 INJECTION, SOLUTION INTRAVENOUS at 11:06

## 2017-06-21 RX ADMIN — SODIUM CHLORIDE, PRESERVATIVE FREE 10 ML: 5 INJECTION INTRAVENOUS at 12:06

## 2017-06-21 RX ADMIN — SODIUM CHLORIDE: 0.9 INJECTION, SOLUTION INTRAVENOUS at 09:06

## 2017-06-21 RX ADMIN — DIPHENHYDRAMINE HYDROCHLORIDE 50 MG: 50 INJECTION, SOLUTION INTRAMUSCULAR; INTRAVENOUS at 11:06

## 2017-06-21 NOTE — PLAN OF CARE
Problem: Patient Care Overview (Adult)  Goal: Plan of Care Review  Outcome: Ongoing (interventions implemented as appropriate)  Patient independently ambulated into chemo infusion clinic today accompanied by spouse. Patient skin warm and dry, RR even and unlabored. Patient in NAD and appears comfortable. Patient denies any pain or discomfort and tolerated Taxol treatment well. Pt continues to report fatigue approx 4-5 days after chemo, but denies any additional symptoms.Patient follow up discussed and patient/spouse verbally expressed understanding.

## 2017-06-21 NOTE — PROGRESS NOTES
Subjective:       Patient ID: Avis Graves is a 42 y.o. female.    Chief Complaint: No chief complaint on file.    HPI   Ms. Graves returns today to continue her adjuvant chemotherapy with weekly taxol.  She tolerated the last two taxol infusions without any significant side effects.  She is due for her 9th infusion today.  Her CBC from yesterday shows a WBC count of 7,800 /mm3, Hg 10.8 gr/dl, Ht 33.5 % and platelets 248,000 /mm3.  Her bilirubin is 0.4 mg/dl.    Briefly, she is a 42-year-old -American female with a history of cardiomyopathy and an EF of less than 30%, who was recently diagnosed with a carcinoma of the right breast, that was ER strongly positive, [place] negative, and HER-2 negative.  On 2/07/2017 she underwent a right modified radical mastectomy with sentinel lymph node biopsy and insertion of a tissue expander.  The pathology report from the 02/07/2017 procedure indicates that she had a 4 cm carcinoma; three of the four sentinel lymph nodes were positive with macrometastases.  The patient was referred for further evaluation.  It was recommended to her to proceed with twelve weekly cycles of taxol, possibly with the addition of CMF upon completion.  She is here for Cycle 7 of weekly taxol.    Review of Systems  Overall she feels OK, except for mild erythema of the left lower eyelid that she noticed yesterday morning.  She has tolerated her chemotherapy well so far.  She denies any depression, vomiting, diarrhea, constipation, diplopia, blurred vision, headaches, chest pain, palpitations, shortness of breath, left breast pain, abdominal pain, extremity pain, or difficulty ambulating.  The remainder of the ten-point ROS, including general, skin, lymph, H/N, cardiorespiratory, GI, , Neuro, Endocrine, and psychiatric is negative.     Objective:      Physical Exam  She is alert, oriented to time, place, person, pleasant, well      nourished, in no acute physical distress.  She is here with  her .                                  VITAL SIGNS:  Reviewed                                      HEENT:  Alopecia is again noted.  There are no nasal, oral, lip, gingival, auricular, or lid     lesions.  Mucosae are moist and pink, and there is no        thrush.  Pupils are equal, reactive to light and accommodation.              Extraocular muscle movements are intact.  Dentition is good.   There is mild erythema and swelling of the left lower eyelid, but there is no stye formation at this point.                                     NECK:  Supple without JVD, adenopathy, or thyromegaly.                       LUNGS:  Clear to auscultation without wheezing, rales, or rhonchi.           CARDIOVASCULAR:  Reveals an S1, S2, no murmurs, no rubs, no gallops.         ABDOMEN:  Soft, nontender, without organomegaly.  Bowel sounds are    present.                                                                     EXTREMITIES:  No cyanosis, clubbing, or edema.                               BREASTS:  She is status post right mastectomy with a tissue expander in place and a well-healed incision.    There are no masses in the left breast.     An AICD is palpated in the left infraclavicular area.   A left sided port is seen on her arm, medially.                                   LYMPHATIC:  There is no cervical, axillary, or supraclavicular adenopathy.   SKIN:  Warm and moist, without petechiae, rashes, induration, or ecchymoses.           NEUROLOGIC:  DTRs are 0-1+ bilaterally, symmetrical, motor function is 5/5,  and cranial nerves are  within normal limits.      Assessment:       1. Encounter for antineoplastic chemotherapy    2. Anemia associated with chemotherapy    3. Cancer of axillary tail of right breast    4. Cardiomyopathy, idiopathic     5.     Mild erythema, left lower eyelid with no stye formation as of this point   Plan:        I had a long discussion with her; she will proceed with the 9h cycle of  chemotherapy today.  Her dose of dexamethasone will remain at 20 mg and the dose of benadryl will remain at 50 mg prior to her infusion.   I will see her with repeat labs in 2 weeks.  She will need to remain on weekly taxol with weekly CBC and CMP.  After she completes her 12 weeks of taxol, we will discuss the option of 3 additional cycles of CMF.  In regards to her ocular symptoms, I advised her to use warm compresses and see an ophthalmologist should symptoms worsen.  Her questions were answered to her satisfaction.

## 2017-06-27 ENCOUNTER — INFUSION (OUTPATIENT)
Dept: INFUSION THERAPY | Facility: HOSPITAL | Age: 42
End: 2017-06-27
Attending: INTERNAL MEDICINE
Payer: COMMERCIAL

## 2017-06-27 VITALS
HEART RATE: 98 BPM | SYSTOLIC BLOOD PRESSURE: 102 MMHG | RESPIRATION RATE: 16 BRPM | TEMPERATURE: 98 F | DIASTOLIC BLOOD PRESSURE: 70 MMHG

## 2017-06-27 DIAGNOSIS — C50.919 BREAST CANCER: ICD-10-CM

## 2017-06-27 DIAGNOSIS — Z51.11 ENCOUNTER FOR ANTINEOPLASTIC CHEMOTHERAPY: ICD-10-CM

## 2017-06-27 LAB
ALBUMIN SERPL BCP-MCNC: 3.3 G/DL
ALP SERPL-CCNC: 47 U/L
ALT SERPL W/O P-5'-P-CCNC: 15 U/L
ANION GAP SERPL CALC-SCNC: 8 MMOL/L
AST SERPL-CCNC: 16 U/L
BILIRUB SERPL-MCNC: 0.3 MG/DL
BUN SERPL-MCNC: 13 MG/DL
CALCIUM SERPL-MCNC: 9 MG/DL
CHLORIDE SERPL-SCNC: 107 MMOL/L
CO2 SERPL-SCNC: 26 MMOL/L
CREAT SERPL-MCNC: 0.7 MG/DL
ERYTHROCYTE [DISTWIDTH] IN BLOOD BY AUTOMATED COUNT: 14.6 %
EST. GFR  (AFRICAN AMERICAN): >60 ML/MIN/1.73 M^2
EST. GFR  (NON AFRICAN AMERICAN): >60 ML/MIN/1.73 M^2
GLUCOSE SERPL-MCNC: 137 MG/DL
HCT VFR BLD AUTO: 33.4 %
HGB BLD-MCNC: 10.7 G/DL
MCH RBC QN AUTO: 30.5 PG
MCHC RBC AUTO-ENTMCNC: 32 %
MCV RBC AUTO: 95 FL
NEUTROPHILS # BLD AUTO: 4.9 K/UL
PLATELET # BLD AUTO: 268 K/UL
PMV BLD AUTO: 10.2 FL
POTASSIUM SERPL-SCNC: 4.3 MMOL/L
PROT SERPL-MCNC: 6.2 G/DL
RBC # BLD AUTO: 3.51 M/UL
SODIUM SERPL-SCNC: 141 MMOL/L
WBC # BLD AUTO: 7.69 K/UL

## 2017-06-27 PROCEDURE — 36591 DRAW BLOOD OFF VENOUS DEVICE: CPT

## 2017-06-27 PROCEDURE — 85027 COMPLETE CBC AUTOMATED: CPT

## 2017-06-27 PROCEDURE — 80053 COMPREHEN METABOLIC PANEL: CPT

## 2017-06-27 PROCEDURE — 25000003 PHARM REV CODE 250: Performed by: INTERNAL MEDICINE

## 2017-06-27 RX ORDER — HEPARIN 100 UNIT/ML
500 SYRINGE INTRAVENOUS
Status: CANCELLED | OUTPATIENT
Start: 2017-06-28

## 2017-06-27 RX ORDER — FAMOTIDINE 10 MG/ML
20 INJECTION INTRAVENOUS
Status: CANCELLED | OUTPATIENT
Start: 2017-06-28

## 2017-06-27 RX ORDER — EPINEPHRINE 0.3 MG/.3ML
0.3 INJECTION SUBCUTANEOUS ONCE AS NEEDED
Status: CANCELLED | OUTPATIENT
Start: 2017-06-28 | End: 2017-06-28

## 2017-06-27 RX ORDER — DIPHENHYDRAMINE HYDROCHLORIDE 50 MG/ML
50 INJECTION INTRAMUSCULAR; INTRAVENOUS ONCE AS NEEDED
Status: CANCELLED | OUTPATIENT
Start: 2017-06-28 | End: 2017-06-28

## 2017-06-27 RX ORDER — SODIUM CHLORIDE 0.9 % (FLUSH) 0.9 %
10 SYRINGE (ML) INJECTION
Status: DISCONTINUED | OUTPATIENT
Start: 2017-06-27 | End: 2017-06-27 | Stop reason: HOSPADM

## 2017-06-27 RX ORDER — HEPARIN 100 UNIT/ML
500 SYRINGE INTRAVENOUS
Status: COMPLETED | OUTPATIENT
Start: 2017-06-27 | End: 2017-06-27

## 2017-06-27 RX ORDER — SODIUM CHLORIDE 0.9 % (FLUSH) 0.9 %
10 SYRINGE (ML) INJECTION
Status: CANCELLED | OUTPATIENT
Start: 2017-06-28

## 2017-06-27 RX ORDER — HEPARIN 100 UNIT/ML
SYRINGE INTRAVENOUS
Status: DISCONTINUED
Start: 2017-06-27 | End: 2017-06-27 | Stop reason: HOSPADM

## 2017-06-27 RX ADMIN — HEPARIN 500 UNITS: 100 SYRINGE at 10:06

## 2017-06-27 RX ADMIN — SODIUM CHLORIDE, PRESERVATIVE FREE 10 ML: 5 INJECTION INTRAVENOUS at 10:06

## 2017-06-27 NOTE — PLAN OF CARE
"Problem: Patient Care Overview (Adult)  Goal: Plan of Care Review  Outcome: Ongoing (interventions implemented as appropriate)  Patient independently ambulated into chemo infusion clinic today accompanied by friend. Patient skin warm and dry, RR even and unlabored. Patient in NAD and appears comfortable. Pt has small reddened swollen area noted to L lower eyelid that she denies as being painful, but reports "I know it's there." Pt denies visual disturbances. Patient denies any pain or discomfort and tolerated labs from port well. Pt remained accessed for chemo tomorrow and education performed regarding port  remaining accessed. Patient follow up discussed and patient verbally expressed understanding.         "

## 2017-06-28 ENCOUNTER — INFUSION (OUTPATIENT)
Dept: INFUSION THERAPY | Facility: HOSPITAL | Age: 42
End: 2017-06-28
Attending: INTERNAL MEDICINE
Payer: COMMERCIAL

## 2017-06-28 VITALS
DIASTOLIC BLOOD PRESSURE: 89 MMHG | RESPIRATION RATE: 16 BRPM | BODY MASS INDEX: 26.7 KG/M2 | WEIGHT: 127.19 LBS | OXYGEN SATURATION: 100 % | HEART RATE: 96 BPM | HEIGHT: 58 IN | SYSTOLIC BLOOD PRESSURE: 136 MMHG | TEMPERATURE: 98 F

## 2017-06-28 DIAGNOSIS — C50.611 CANCER OF AXILLARY TAIL OF RIGHT BREAST: Primary | ICD-10-CM

## 2017-06-28 PROCEDURE — 63600175 PHARM REV CODE 636 W HCPCS

## 2017-06-28 PROCEDURE — 63600175 PHARM REV CODE 636 W HCPCS: Performed by: INTERNAL MEDICINE

## 2017-06-28 PROCEDURE — 96413 CHEMO IV INFUSION 1 HR: CPT

## 2017-06-28 PROCEDURE — 96375 TX/PRO/DX INJ NEW DRUG ADDON: CPT

## 2017-06-28 PROCEDURE — 25000003 PHARM REV CODE 250

## 2017-06-28 PROCEDURE — 25000003 PHARM REV CODE 250: Performed by: INTERNAL MEDICINE

## 2017-06-28 PROCEDURE — 96367 TX/PROPH/DG ADDL SEQ IV INF: CPT

## 2017-06-28 RX ORDER — EPINEPHRINE 0.3 MG/.3ML
0.3 INJECTION SUBCUTANEOUS ONCE AS NEEDED
Status: DISCONTINUED | OUTPATIENT
Start: 2017-06-28 | End: 2017-06-28 | Stop reason: HOSPADM

## 2017-06-28 RX ORDER — FAMOTIDINE 10 MG/ML
20 INJECTION INTRAVENOUS
Status: COMPLETED | OUTPATIENT
Start: 2017-06-28 | End: 2017-06-28

## 2017-06-28 RX ORDER — SODIUM CHLORIDE 0.9 % (FLUSH) 0.9 %
10 SYRINGE (ML) INJECTION
Status: DISCONTINUED | OUTPATIENT
Start: 2017-06-28 | End: 2017-06-28 | Stop reason: HOSPADM

## 2017-06-28 RX ORDER — HEPARIN 100 UNIT/ML
500 SYRINGE INTRAVENOUS
Status: DISCONTINUED | OUTPATIENT
Start: 2017-06-28 | End: 2017-06-28 | Stop reason: HOSPADM

## 2017-06-28 RX ORDER — DIPHENHYDRAMINE HYDROCHLORIDE 50 MG/ML
50 INJECTION INTRAMUSCULAR; INTRAVENOUS ONCE AS NEEDED
Status: DISCONTINUED | OUTPATIENT
Start: 2017-06-28 | End: 2017-06-28 | Stop reason: HOSPADM

## 2017-06-28 RX ORDER — FAMOTIDINE 10 MG/ML
INJECTION INTRAVENOUS
Status: COMPLETED
Start: 2017-06-28 | End: 2017-06-28

## 2017-06-28 RX ORDER — HEPARIN 100 UNIT/ML
SYRINGE INTRAVENOUS
Status: COMPLETED
Start: 2017-06-28 | End: 2017-06-28

## 2017-06-28 RX ADMIN — GRANISETRON HYDROCHLORIDE 1 MG: 0.1 INJECTION, SOLUTION INTRAVENOUS at 09:06

## 2017-06-28 RX ADMIN — HEPARIN 500 UNITS: 100 SYRINGE at 12:06

## 2017-06-28 RX ADMIN — DIPHENHYDRAMINE HYDROCHLORIDE 50 MG: 50 INJECTION, SOLUTION INTRAMUSCULAR; INTRAVENOUS at 10:06

## 2017-06-28 RX ADMIN — SODIUM CHLORIDE: 0.9 INJECTION, SOLUTION INTRAVENOUS at 09:06

## 2017-06-28 RX ADMIN — FAMOTIDINE 20 MG: 10 INJECTION INTRAVENOUS at 09:06

## 2017-06-28 RX ADMIN — PACLITAXEL 114 MG: 6 INJECTION, SOLUTION INTRAVENOUS at 10:06

## 2017-06-28 RX ADMIN — SODIUM CHLORIDE 20 MG: 9 INJECTION, SOLUTION INTRAVENOUS at 09:06

## 2017-06-28 RX ADMIN — SODIUM CHLORIDE, PRESERVATIVE FREE 10 ML: 5 INJECTION INTRAVENOUS at 11:06

## 2017-06-28 NOTE — PLAN OF CARE
Problem: Patient Care Overview (Adult)  Goal: Plan of Care Review  Outcome: Ongoing (interventions implemented as appropriate)  Patient independently ambulated into chemo infusion clinic today accompanied by father. Patient skin warm and dry, RR even and unlabored. Patient in NAD and appears comfortable. Patient reports sensitivity to nail beds and fatigue approx 4-5 days after chemo. Pt tolerated Taxol treatment well. Patient follow up discussed and patient verbally expressed understanding.       Problem: Chemotherapy Effects (Adult)  Goal: Signs and Symptoms of Listed Potential Problems Will be Absent, Minimized or Managed (Chemotherapy Effects)  Signs and symptoms of listed potential problems will be absent, minimized or managed by discharge/transition of care (reference Chemotherapy Effects (Adult) CPG).   Outcome: Ongoing (interventions implemented as appropriate)  Pt reports nailbed sensitivity and discoloration of thumb nails; pt denies neuropathy to fingers or finger tips.

## 2017-07-03 ENCOUNTER — INFUSION (OUTPATIENT)
Dept: INFUSION THERAPY | Facility: HOSPITAL | Age: 42
End: 2017-07-03
Attending: INTERNAL MEDICINE
Payer: COMMERCIAL

## 2017-07-03 VITALS
SYSTOLIC BLOOD PRESSURE: 126 MMHG | HEART RATE: 80 BPM | RESPIRATION RATE: 18 BRPM | TEMPERATURE: 98 F | DIASTOLIC BLOOD PRESSURE: 81 MMHG

## 2017-07-03 DIAGNOSIS — T45.1X5A ANEMIA ASSOCIATED WITH CHEMOTHERAPY: ICD-10-CM

## 2017-07-03 DIAGNOSIS — D64.81 ANEMIA ASSOCIATED WITH CHEMOTHERAPY: ICD-10-CM

## 2017-07-03 DIAGNOSIS — Z51.11 ENCOUNTER FOR ANTINEOPLASTIC CHEMOTHERAPY: ICD-10-CM

## 2017-07-03 LAB
ALBUMIN SERPL BCP-MCNC: 3.2 G/DL
ALP SERPL-CCNC: 47 U/L
ALT SERPL W/O P-5'-P-CCNC: 13 U/L
ANION GAP SERPL CALC-SCNC: 8 MMOL/L
AST SERPL-CCNC: 12 U/L
BILIRUB SERPL-MCNC: 0.3 MG/DL
BUN SERPL-MCNC: 19 MG/DL
CALCIUM SERPL-MCNC: 9 MG/DL
CHLORIDE SERPL-SCNC: 105 MMOL/L
CO2 SERPL-SCNC: 25 MMOL/L
CREAT SERPL-MCNC: 0.7 MG/DL
ERYTHROCYTE [DISTWIDTH] IN BLOOD BY AUTOMATED COUNT: 14.6 %
EST. GFR  (AFRICAN AMERICAN): >60 ML/MIN/1.73 M^2
EST. GFR  (NON AFRICAN AMERICAN): >60 ML/MIN/1.73 M^2
GLUCOSE SERPL-MCNC: 137 MG/DL
HCT VFR BLD AUTO: 31.6 %
HGB BLD-MCNC: 10.4 G/DL
MCH RBC QN AUTO: 31.5 PG
MCHC RBC AUTO-ENTMCNC: 32.9 %
MCV RBC AUTO: 96 FL
NEUTROPHILS # BLD AUTO: 4.5 K/UL
PLATELET # BLD AUTO: 243 K/UL
PMV BLD AUTO: 9.6 FL
POTASSIUM SERPL-SCNC: 4.3 MMOL/L
PROT SERPL-MCNC: 6.2 G/DL
RBC # BLD AUTO: 3.3 M/UL
SODIUM SERPL-SCNC: 138 MMOL/L
WBC # BLD AUTO: 7.5 K/UL

## 2017-07-03 PROCEDURE — 80053 COMPREHEN METABOLIC PANEL: CPT

## 2017-07-03 PROCEDURE — A4216 STERILE WATER/SALINE, 10 ML: HCPCS | Performed by: INTERNAL MEDICINE

## 2017-07-03 PROCEDURE — 85027 COMPLETE CBC AUTOMATED: CPT

## 2017-07-03 PROCEDURE — 63600175 PHARM REV CODE 636 W HCPCS: Performed by: INTERNAL MEDICINE

## 2017-07-03 PROCEDURE — 25000003 PHARM REV CODE 250: Performed by: INTERNAL MEDICINE

## 2017-07-03 PROCEDURE — 36591 DRAW BLOOD OFF VENOUS DEVICE: CPT

## 2017-07-03 RX ORDER — HEPARIN 100 UNIT/ML
500 SYRINGE INTRAVENOUS
Status: COMPLETED | OUTPATIENT
Start: 2017-07-03 | End: 2017-07-03

## 2017-07-03 RX ORDER — HEPARIN 100 UNIT/ML
SYRINGE INTRAVENOUS
Status: DISCONTINUED
Start: 2017-07-03 | End: 2017-07-03 | Stop reason: HOSPADM

## 2017-07-03 RX ORDER — SODIUM CHLORIDE 0.9 % (FLUSH) 0.9 %
10 SYRINGE (ML) INJECTION
Status: DISCONTINUED | OUTPATIENT
Start: 2017-07-03 | End: 2017-07-03 | Stop reason: HOSPADM

## 2017-07-03 RX ADMIN — SODIUM CHLORIDE, PRESERVATIVE FREE 10 ML: 5 INJECTION INTRAVENOUS at 12:07

## 2017-07-03 RX ADMIN — HEPARIN 500 UNITS: 100 SYRINGE at 12:07

## 2017-07-03 NOTE — PLAN OF CARE
Problem: Patient Care Overview (Adult)  Goal: Plan of Care Review  Outcome: Ongoing (interventions implemented as appropriate)  Patient independently ambulated into chemo infusion clinic today accompanied by spouse. Patient skin warm and dry, RR even and unlabored. Patient in NAD and appears comfortable. Patient denies any pain or discomfort and tolerated lab draw from port well. Pt port remained accessed for chemo Wed. Patient follow up discussed and patient verbally expressed understanding.

## 2017-07-05 ENCOUNTER — OFFICE VISIT (OUTPATIENT)
Dept: HEMATOLOGY/ONCOLOGY | Facility: CLINIC | Age: 42
End: 2017-07-05
Attending: INTERNAL MEDICINE
Payer: COMMERCIAL

## 2017-07-05 ENCOUNTER — INFUSION (OUTPATIENT)
Dept: INFUSION THERAPY | Facility: HOSPITAL | Age: 42
End: 2017-07-05
Attending: OBSTETRICS & GYNECOLOGY
Payer: COMMERCIAL

## 2017-07-05 VITALS
OXYGEN SATURATION: 98 % | HEART RATE: 84 BPM | SYSTOLIC BLOOD PRESSURE: 109 MMHG | BODY MASS INDEX: 26.87 KG/M2 | WEIGHT: 128 LBS | HEIGHT: 58 IN | TEMPERATURE: 98 F | DIASTOLIC BLOOD PRESSURE: 56 MMHG | RESPIRATION RATE: 18 BRPM

## 2017-07-05 VITALS
RESPIRATION RATE: 16 BRPM | WEIGHT: 125 LBS | SYSTOLIC BLOOD PRESSURE: 109 MMHG | BODY MASS INDEX: 26.24 KG/M2 | DIASTOLIC BLOOD PRESSURE: 56 MMHG | TEMPERATURE: 98 F | HEIGHT: 58 IN | HEART RATE: 84 BPM

## 2017-07-05 DIAGNOSIS — Z51.11 ENCOUNTER FOR ANTINEOPLASTIC CHEMOTHERAPY: Primary | ICD-10-CM

## 2017-07-05 DIAGNOSIS — C50.611 CANCER OF AXILLARY TAIL OF RIGHT BREAST: ICD-10-CM

## 2017-07-05 DIAGNOSIS — C50.611 CANCER OF AXILLARY TAIL OF RIGHT BREAST: Primary | ICD-10-CM

## 2017-07-05 DIAGNOSIS — D64.81 ANEMIA ASSOCIATED WITH CHEMOTHERAPY: ICD-10-CM

## 2017-07-05 DIAGNOSIS — T45.1X5A ANEMIA ASSOCIATED WITH CHEMOTHERAPY: ICD-10-CM

## 2017-07-05 DIAGNOSIS — I42.9 CARDIOMYOPATHY, IDIOPATHIC: ICD-10-CM

## 2017-07-05 PROCEDURE — 96367 TX/PROPH/DG ADDL SEQ IV INF: CPT

## 2017-07-05 PROCEDURE — 25000003 PHARM REV CODE 250

## 2017-07-05 PROCEDURE — 99999 PR PBB SHADOW E&M-EST. PATIENT-LVL I: CPT | Mod: PBBFAC,,, | Performed by: INTERNAL MEDICINE

## 2017-07-05 PROCEDURE — 25000003 PHARM REV CODE 250: Performed by: INTERNAL MEDICINE

## 2017-07-05 PROCEDURE — 63600175 PHARM REV CODE 636 W HCPCS: Performed by: INTERNAL MEDICINE

## 2017-07-05 PROCEDURE — S0028 INJECTION, FAMOTIDINE, 20 MG: HCPCS

## 2017-07-05 PROCEDURE — 99215 OFFICE O/P EST HI 40 MIN: CPT | Mod: S$GLB,,, | Performed by: INTERNAL MEDICINE

## 2017-07-05 PROCEDURE — A4216 STERILE WATER/SALINE, 10 ML: HCPCS | Performed by: INTERNAL MEDICINE

## 2017-07-05 PROCEDURE — 63600175 PHARM REV CODE 636 W HCPCS

## 2017-07-05 PROCEDURE — 96375 TX/PRO/DX INJ NEW DRUG ADDON: CPT

## 2017-07-05 PROCEDURE — 96413 CHEMO IV INFUSION 1 HR: CPT

## 2017-07-05 RX ORDER — FAMOTIDINE 10 MG/ML
20 INJECTION INTRAVENOUS
Status: COMPLETED | OUTPATIENT
Start: 2017-07-05 | End: 2017-07-05

## 2017-07-05 RX ORDER — FAMOTIDINE 10 MG/ML
INJECTION INTRAVENOUS
Status: COMPLETED
Start: 2017-07-05 | End: 2017-07-05

## 2017-07-05 RX ORDER — HEPARIN 100 UNIT/ML
500 SYRINGE INTRAVENOUS
Status: DISCONTINUED | OUTPATIENT
Start: 2017-07-05 | End: 2017-07-05 | Stop reason: HOSPADM

## 2017-07-05 RX ORDER — DIPHENHYDRAMINE HYDROCHLORIDE 50 MG/ML
50 INJECTION INTRAMUSCULAR; INTRAVENOUS ONCE AS NEEDED
Status: CANCELLED | OUTPATIENT
Start: 2017-07-05 | End: 2017-07-05

## 2017-07-05 RX ORDER — EPINEPHRINE 0.3 MG/.3ML
0.3 INJECTION SUBCUTANEOUS ONCE AS NEEDED
Status: DISCONTINUED | OUTPATIENT
Start: 2017-07-05 | End: 2017-07-05 | Stop reason: HOSPADM

## 2017-07-05 RX ORDER — FAMOTIDINE 10 MG/ML
20 INJECTION INTRAVENOUS
Status: CANCELLED | OUTPATIENT
Start: 2017-07-05

## 2017-07-05 RX ORDER — SODIUM CHLORIDE 0.9 % (FLUSH) 0.9 %
10 SYRINGE (ML) INJECTION
Status: DISCONTINUED | OUTPATIENT
Start: 2017-07-05 | End: 2017-07-05 | Stop reason: HOSPADM

## 2017-07-05 RX ORDER — DIPHENHYDRAMINE HYDROCHLORIDE 50 MG/ML
50 INJECTION INTRAMUSCULAR; INTRAVENOUS ONCE AS NEEDED
Status: DISCONTINUED | OUTPATIENT
Start: 2017-07-05 | End: 2017-07-05 | Stop reason: HOSPADM

## 2017-07-05 RX ORDER — SODIUM CHLORIDE 0.9 % (FLUSH) 0.9 %
10 SYRINGE (ML) INJECTION
Status: CANCELLED | OUTPATIENT
Start: 2017-07-05

## 2017-07-05 RX ORDER — HEPARIN 100 UNIT/ML
500 SYRINGE INTRAVENOUS
Status: CANCELLED | OUTPATIENT
Start: 2017-07-05

## 2017-07-05 RX ORDER — HEPARIN 100 UNIT/ML
SYRINGE INTRAVENOUS
Status: COMPLETED
Start: 2017-07-05 | End: 2017-07-05

## 2017-07-05 RX ORDER — EPINEPHRINE 0.3 MG/.3ML
0.3 INJECTION SUBCUTANEOUS ONCE AS NEEDED
Status: CANCELLED | OUTPATIENT
Start: 2017-07-05 | End: 2017-07-05

## 2017-07-05 RX ADMIN — FAMOTIDINE 20 MG: 10 INJECTION INTRAVENOUS at 12:07

## 2017-07-05 RX ADMIN — SODIUM CHLORIDE 20 MG: 9 INJECTION, SOLUTION INTRAVENOUS at 12:07

## 2017-07-05 RX ADMIN — HEPARIN 500 UNITS: 100 SYRINGE at 02:07

## 2017-07-05 RX ADMIN — GRANISETRON HYDROCHLORIDE: 1 INJECTION, SOLUTION INTRAVENOUS at 12:07

## 2017-07-05 RX ADMIN — DIPHENHYDRAMINE HYDROCHLORIDE 50 MG: 50 INJECTION, SOLUTION INTRAMUSCULAR; INTRAVENOUS at 01:07

## 2017-07-05 RX ADMIN — SODIUM CHLORIDE: 0.9 INJECTION, SOLUTION INTRAVENOUS at 12:07

## 2017-07-05 RX ADMIN — PACLITAXEL 114 MG: 6 INJECTION, SOLUTION, CONCENTRATE INTRAVENOUS at 01:07

## 2017-07-05 RX ADMIN — SODIUM CHLORIDE, PRESERVATIVE FREE 10 ML: 5 INJECTION INTRAVENOUS at 02:07

## 2017-07-05 NOTE — PROGRESS NOTES
Subjective:       Patient ID: Avis Graves is a 42 y.o. female.    Chief Complaint: No chief complaint on file.    HPI   Ms. Graves returns today to continue her adjuvant chemotherapy with weekly taxol.  She tolerated the last two taxol infusions without any significant side effects.  She is due for her 11th infusion today.  Her CBC from yesterday shows a WBC count of 7,500 /mm3, Hg 10.4 gr/dl, Ht 31.3 % and platelets 243,000 /mm3.  Her bilirubin is 0.3 mg/dl.    Briefly, she is a 42-year-old -American female with a history of cardiomyopathy and an EF of less than 30%, who was recently diagnosed with a carcinoma of the right breast, that was ER strongly positive, [place] negative, and HER-2 negative.  On 2/07/2017 she underwent a right modified radical mastectomy with sentinel lymph node biopsy and insertion of a tissue expander.  The pathology report from the 02/07/2017 procedure indicates that she had a 4 cm carcinoma; three of the four sentinel lymph nodes were positive with macrometastases.  The patient was referred for further evaluation.  It was recommended to her to proceed with twelve weekly cycles of taxol, possibly with the addition of CMF upon completion.  She is here for Cycle 7 of weekly taxol.    Review of Systems  Overall she feels OK.  She has tolerated her chemotherapy well so far and her only complaint is pain of her fingernails.  She denies any depression, vomiting, diarrhea, constipation, diplopia, blurred vision, headaches, chest pain, palpitations, shortness of breath, left breast pain, abdominal pain, extremity pain, or difficulty ambulating.  The remainder of the ten-point ROS, including general, skin, lymph, H/N, cardiorespiratory, GI, , Neuro, Endocrine, and psychiatric is negative.     Objective:      Physical Exam  She is alert, oriented to time, place, person, pleasant, well      nourished, in no acute physical distress.  She is here with her .                                   VITAL SIGNS:  Reviewed                                      HEENT:  Alopecia is again noted.  There are no nasal, oral, lip, gingival, auricular, or lid     lesions.  Mucosae are moist and pink, and there is no        thrush.  Pupils are equal, reactive to light and accommodation.              Extraocular muscle movements are intact.  Dentition is good.                                     NECK:  Supple without JVD, adenopathy, or thyromegaly.                       LUNGS:  Clear to auscultation without wheezing, rales, or rhonchi.           CARDIOVASCULAR:  Reveals an S1, S2, no murmurs, no rubs, no gallops.         ABDOMEN:  Soft, nontender, without organomegaly.  Bowel sounds are    present.                                                                     EXTREMITIES:  No cyanosis, clubbing, or edema.                               BREASTS:  She is status post right mastectomy with a tissue expander in place and a well-healed incision.    There are no masses in the left breast.     An AICD is palpated in the left infraclavicular area.   A left sided port is seen on her arm, medially.                                   LYMPHATIC:  There is no cervical, axillary, or supraclavicular adenopathy.   SKIN:  Warm and moist, without petechiae, rashes, induration, or ecchymoses.           NEUROLOGIC:  DTRs are 0-1+ bilaterally, symmetrical, motor function is 5/5,  and cranial nerves are  within normal limits.      Assessment:       1. Encounter for antineoplastic chemotherapy    2. Anemia associated with chemotherapy    3. Cancer of axillary tail of right breast    4. Cardiomyopathy, idiopathic       Plan:        I had a long discussion with her; she will proceed with the 11h cycle of chemotherapy today.  Her dose of dexamethasone will remain at 20 mg and the dose of benadryl will remain at 50 mg prior to her infusion.   I will see her with repeat labs in 1 weeks, and she will let me knwo next week whetehr she would  be interested in receiving three cycles of CMF in the adjuvant setting. 3 additional cycles of CMF.  Her questions were answered to her satisfaction.

## 2017-07-11 ENCOUNTER — INFUSION (OUTPATIENT)
Dept: INFUSION THERAPY | Facility: HOSPITAL | Age: 42
End: 2017-07-11
Attending: INTERNAL MEDICINE
Payer: COMMERCIAL

## 2017-07-11 VITALS
SYSTOLIC BLOOD PRESSURE: 106 MMHG | RESPIRATION RATE: 16 BRPM | TEMPERATURE: 98 F | DIASTOLIC BLOOD PRESSURE: 58 MMHG | HEART RATE: 106 BPM

## 2017-07-11 DIAGNOSIS — Z51.11 ENCOUNTER FOR ANTINEOPLASTIC CHEMOTHERAPY: ICD-10-CM

## 2017-07-11 LAB
ALBUMIN SERPL BCP-MCNC: 3.2 G/DL
ALP SERPL-CCNC: 48 U/L
ALT SERPL W/O P-5'-P-CCNC: 12 U/L
ANION GAP SERPL CALC-SCNC: 8 MMOL/L
AST SERPL-CCNC: 13 U/L
BILIRUB SERPL-MCNC: 0.3 MG/DL
BUN SERPL-MCNC: 20 MG/DL
CALCIUM SERPL-MCNC: 9 MG/DL
CHLORIDE SERPL-SCNC: 106 MMOL/L
CO2 SERPL-SCNC: 25 MMOL/L
CREAT SERPL-MCNC: 0.7 MG/DL
ERYTHROCYTE [DISTWIDTH] IN BLOOD BY AUTOMATED COUNT: 14.3 %
EST. GFR  (AFRICAN AMERICAN): >60 ML/MIN/1.73 M^2
EST. GFR  (NON AFRICAN AMERICAN): >60 ML/MIN/1.73 M^2
GLUCOSE SERPL-MCNC: 119 MG/DL
HCT VFR BLD AUTO: 32.1 %
HGB BLD-MCNC: 10.4 G/DL
MCH RBC QN AUTO: 31.4 PG
MCHC RBC AUTO-ENTMCNC: 32.4 %
MCV RBC AUTO: 97 FL
NEUTROPHILS # BLD AUTO: 4.7 K/UL
PLATELET # BLD AUTO: 263 K/UL
PMV BLD AUTO: 10.1 FL
POTASSIUM SERPL-SCNC: 3.8 MMOL/L
PROT SERPL-MCNC: 6 G/DL
RBC # BLD AUTO: 3.31 M/UL
SODIUM SERPL-SCNC: 139 MMOL/L
WBC # BLD AUTO: 7.1 K/UL

## 2017-07-11 PROCEDURE — A4216 STERILE WATER/SALINE, 10 ML: HCPCS | Performed by: INTERNAL MEDICINE

## 2017-07-11 PROCEDURE — 36591 DRAW BLOOD OFF VENOUS DEVICE: CPT

## 2017-07-11 PROCEDURE — 85027 COMPLETE CBC AUTOMATED: CPT

## 2017-07-11 PROCEDURE — 25000003 PHARM REV CODE 250: Performed by: INTERNAL MEDICINE

## 2017-07-11 PROCEDURE — 63600175 PHARM REV CODE 636 W HCPCS

## 2017-07-11 PROCEDURE — 80053 COMPREHEN METABOLIC PANEL: CPT

## 2017-07-11 PROCEDURE — 25000003 PHARM REV CODE 250

## 2017-07-11 RX ORDER — FAMOTIDINE 10 MG/ML
20 INJECTION INTRAVENOUS
Status: CANCELLED | OUTPATIENT
Start: 2017-07-12

## 2017-07-11 RX ORDER — SODIUM CHLORIDE 0.9 % (FLUSH) 0.9 %
10 SYRINGE (ML) INJECTION
Status: DISCONTINUED | OUTPATIENT
Start: 2017-07-11 | End: 2017-07-11 | Stop reason: HOSPADM

## 2017-07-11 RX ORDER — HEPARIN 100 UNIT/ML
SYRINGE INTRAVENOUS
Status: COMPLETED
Start: 2017-07-11 | End: 2017-07-11

## 2017-07-11 RX ORDER — DIPHENHYDRAMINE HYDROCHLORIDE 50 MG/ML
50 INJECTION INTRAMUSCULAR; INTRAVENOUS ONCE AS NEEDED
Status: CANCELLED | OUTPATIENT
Start: 2017-07-12 | End: 2017-07-12

## 2017-07-11 RX ORDER — HEPARIN 100 UNIT/ML
500 SYRINGE INTRAVENOUS
Status: COMPLETED | OUTPATIENT
Start: 2017-07-11 | End: 2017-07-11

## 2017-07-11 RX ORDER — SODIUM CHLORIDE 0.9 % (FLUSH) 0.9 %
10 SYRINGE (ML) INJECTION
Status: CANCELLED | OUTPATIENT
Start: 2017-07-12

## 2017-07-11 RX ORDER — HEPARIN 100 UNIT/ML
500 SYRINGE INTRAVENOUS
Status: CANCELLED | OUTPATIENT
Start: 2017-07-12

## 2017-07-11 RX ORDER — EPINEPHRINE 0.3 MG/.3ML
0.3 INJECTION SUBCUTANEOUS ONCE AS NEEDED
Status: CANCELLED | OUTPATIENT
Start: 2017-07-12 | End: 2017-07-12

## 2017-07-11 RX ADMIN — SODIUM CHLORIDE, PRESERVATIVE FREE 10 ML: 5 INJECTION INTRAVENOUS at 12:07

## 2017-07-11 RX ADMIN — HEPARIN 500 UNITS: 100 SYRINGE at 12:07

## 2017-07-11 NOTE — PLAN OF CARE
"Problem: Patient Care Overview (Adult)  Goal: Plan of Care Review  Outcome: Ongoing (interventions implemented as appropriate)  Patient independently ambulated into chemo infusion clinic today accompanied by "friend". Patient skin warm and dry, RR even and unlabored. Patient in NAD and appears comfortable. Patient continues to report minimal pain to nail beds. Pt tolerated lab draw from port well. Patient follow up discussed and patient verbally expressed understanding.         "

## 2017-07-12 ENCOUNTER — OFFICE VISIT (OUTPATIENT)
Dept: HEMATOLOGY/ONCOLOGY | Facility: CLINIC | Age: 42
End: 2017-07-12
Attending: INTERNAL MEDICINE
Payer: COMMERCIAL

## 2017-07-12 ENCOUNTER — INFUSION (OUTPATIENT)
Dept: INFUSION THERAPY | Facility: HOSPITAL | Age: 42
End: 2017-07-12
Attending: INTERNAL MEDICINE
Payer: COMMERCIAL

## 2017-07-12 VITALS
DIASTOLIC BLOOD PRESSURE: 71 MMHG | SYSTOLIC BLOOD PRESSURE: 110 MMHG | TEMPERATURE: 98 F | RESPIRATION RATE: 16 BRPM | HEART RATE: 87 BPM | BODY MASS INDEX: 26.87 KG/M2 | WEIGHT: 128 LBS | HEIGHT: 58 IN

## 2017-07-12 VITALS — HEIGHT: 57 IN | BODY MASS INDEX: 27.61 KG/M2 | RESPIRATION RATE: 18 BRPM | WEIGHT: 128 LBS

## 2017-07-12 DIAGNOSIS — D64.81 ANEMIA ASSOCIATED WITH CHEMOTHERAPY: ICD-10-CM

## 2017-07-12 DIAGNOSIS — T45.1X5A ANEMIA ASSOCIATED WITH CHEMOTHERAPY: ICD-10-CM

## 2017-07-12 DIAGNOSIS — C50.611 CANCER OF AXILLARY TAIL OF RIGHT BREAST: Primary | ICD-10-CM

## 2017-07-12 DIAGNOSIS — Z51.11 ENCOUNTER FOR ANTINEOPLASTIC CHEMOTHERAPY: ICD-10-CM

## 2017-07-12 PROCEDURE — 96375 TX/PRO/DX INJ NEW DRUG ADDON: CPT

## 2017-07-12 PROCEDURE — 96367 TX/PROPH/DG ADDL SEQ IV INF: CPT

## 2017-07-12 PROCEDURE — A4216 STERILE WATER/SALINE, 10 ML: HCPCS | Performed by: INTERNAL MEDICINE

## 2017-07-12 PROCEDURE — S0028 INJECTION, FAMOTIDINE, 20 MG: HCPCS

## 2017-07-12 PROCEDURE — 25000003 PHARM REV CODE 250

## 2017-07-12 PROCEDURE — 99999 PR PBB SHADOW E&M-EST. PATIENT-LVL I: CPT | Mod: PBBFAC,,, | Performed by: INTERNAL MEDICINE

## 2017-07-12 PROCEDURE — 96413 CHEMO IV INFUSION 1 HR: CPT

## 2017-07-12 PROCEDURE — 63600175 PHARM REV CODE 636 W HCPCS: Performed by: INTERNAL MEDICINE

## 2017-07-12 PROCEDURE — 99215 OFFICE O/P EST HI 40 MIN: CPT | Mod: S$GLB,,, | Performed by: INTERNAL MEDICINE

## 2017-07-12 PROCEDURE — 25000003 PHARM REV CODE 250: Performed by: INTERNAL MEDICINE

## 2017-07-12 PROCEDURE — 63600175 PHARM REV CODE 636 W HCPCS

## 2017-07-12 RX ORDER — DIPHENHYDRAMINE HYDROCHLORIDE 50 MG/ML
50 INJECTION INTRAMUSCULAR; INTRAVENOUS ONCE AS NEEDED
Status: DISCONTINUED | OUTPATIENT
Start: 2017-07-12 | End: 2017-07-12 | Stop reason: HOSPADM

## 2017-07-12 RX ORDER — FAMOTIDINE 10 MG/ML
20 INJECTION INTRAVENOUS
Status: COMPLETED | OUTPATIENT
Start: 2017-07-12 | End: 2017-07-12

## 2017-07-12 RX ORDER — FAMOTIDINE 10 MG/ML
INJECTION INTRAVENOUS
Status: COMPLETED
Start: 2017-07-12 | End: 2017-07-12

## 2017-07-12 RX ORDER — HEPARIN 100 UNIT/ML
500 SYRINGE INTRAVENOUS
Status: DISCONTINUED | OUTPATIENT
Start: 2017-07-12 | End: 2017-07-12 | Stop reason: HOSPADM

## 2017-07-12 RX ORDER — HEPARIN 100 UNIT/ML
SYRINGE INTRAVENOUS
Status: DISCONTINUED
Start: 2017-07-12 | End: 2017-07-12 | Stop reason: HOSPADM

## 2017-07-12 RX ORDER — EPINEPHRINE 0.3 MG/.3ML
0.3 INJECTION SUBCUTANEOUS ONCE AS NEEDED
Status: DISCONTINUED | OUTPATIENT
Start: 2017-07-12 | End: 2017-07-12 | Stop reason: HOSPADM

## 2017-07-12 RX ORDER — SODIUM CHLORIDE 0.9 % (FLUSH) 0.9 %
10 SYRINGE (ML) INJECTION
Status: DISCONTINUED | OUTPATIENT
Start: 2017-07-12 | End: 2017-07-12 | Stop reason: HOSPADM

## 2017-07-12 RX ADMIN — SODIUM CHLORIDE, PRESERVATIVE FREE 10 ML: 5 INJECTION INTRAVENOUS at 02:07

## 2017-07-12 RX ADMIN — SODIUM CHLORIDE: 0.9 INJECTION, SOLUTION INTRAVENOUS at 11:07

## 2017-07-12 RX ADMIN — PACLITAXEL 114 MG: 6 INJECTION, SOLUTION INTRAVENOUS at 01:07

## 2017-07-12 RX ADMIN — FAMOTIDINE 20 MG: 10 INJECTION INTRAVENOUS at 12:07

## 2017-07-12 RX ADMIN — SODIUM CHLORIDE 20 MG: 9 INJECTION, SOLUTION INTRAVENOUS at 12:07

## 2017-07-12 RX ADMIN — HEPARIN 500 UNITS: 100 SYRINGE at 02:07

## 2017-07-12 RX ADMIN — DIPHENHYDRAMINE HYDROCHLORIDE 50 MG: 50 INJECTION, SOLUTION INTRAMUSCULAR; INTRAVENOUS at 12:07

## 2017-07-12 RX ADMIN — GRANISETRON HYDROCHLORIDE 1 MG: 0.1 INJECTION, SOLUTION INTRAVENOUS at 11:07

## 2017-07-12 NOTE — PROGRESS NOTES
Subjective:       Patient ID: Avis Graves is a 42 y.o. female.    Chief Complaint: No chief complaint on file.    HPI   Ms. Graves returns today to continue her adjuvant chemotherapy with weekly taxol.  She tolerated the last infusion without any significant side effects.  She is due for her 12th infusion today.  Her CBC from yesterday shows a WBC count of 7,100 /mm3, Hg 10.4 gr/dl, Ht 32.1 % and platelets 263,000 /mm3.  Her bilirubin is 0.3 mg/dl.    Briefly, she is a 42-year-old -American female with a history of cardiomyopathy and an EF of less than 30%, who was recently diagnosed with a carcinoma of the right breast, that was ER strongly positive, UT negative, and HER-2 negative.  On 2/07/2017 she underwent a right modified radical mastectomy with sentinel lymph node biopsy and insertion of a tissue expander.  The pathology report from the 02/07/2017 procedure indicates that she had a 4 cm carcinoma; three of the four sentinel lymph nodes were positive with macrometastases.  The patient was referred for further evaluation.  It was recommended to her to proceed with twelve weekly cycles of taxol, possibly with the addition of CMF upon completion.  She is here for Cycle 7 of weekly taxol.    Review of Systems  Overall she feels OK.  She has tolerated her chemotherapy well so far and her only complaint is pain of her fingernails.  She denies any depression, vomiting, diarrhea, constipation, diplopia, blurred vision, headaches, chest pain, palpitations, shortness of breath, left breast pain, abdominal pain, extremity pain, or difficulty ambulating.  The remainder of the ten-point ROS, including general, skin, lymph, H/N, cardiorespiratory, GI, , Neuro, Endocrine, and psychiatric is negative.     Objective:      Physical Exam  She is alert, oriented to time, place, person, pleasant, well      nourished, in no acute physical distress.  She is here with her .                                   VITAL SIGNS:  Reviewed                                      HEENT:  Alopecia is again noted.  There are no nasal, oral, lip, gingival, auricular, or lid     lesions.  Mucosae are moist and pink, and there is no        thrush.  Pupils are equal, reactive to light and accommodation.              Extraocular muscle movements are intact.  Dentition is good.                                     NECK:  Supple without JVD, adenopathy, or thyromegaly.                       LUNGS:  Clear to auscultation without wheezing, rales, or rhonchi.           CARDIOVASCULAR:  Reveals an S1, S2, no murmurs, no rubs, no gallops.         ABDOMEN:  Soft, nontender, without organomegaly.  Bowel sounds are    present.                                                                     EXTREMITIES:  No cyanosis, clubbing, or edema.                               BREASTS:  She is status post right mastectomy with a tissue expander in place and a well-healed incision.    There are no masses in the left breast.     An AICD is palpated in the left infraclavicular area.   A left sided port is seen on her arm, medially.                                   LYMPHATIC:  There is no cervical, axillary, or supraclavicular adenopathy.   SKIN:  Warm and moist, without petechiae, rashes, induration, or ecchymoses.           NEUROLOGIC:  DTRs are 0-1+ bilaterally, symmetrical, motor function is 5/5,  and cranial nerves are  within normal limits.      Assessment:       1. 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 Anemia associated with chemotherapy    3. Encounter for antineoplastic chemotherapy       Plan:        I had a long discussion with her; she will proceed with the 12th cycle of chemotherapy today.  Her dose of dexamethasone will remain at 20 mg and the dose of benadryl will remain at 50 mg prior to her infusion.   She stated that she does NOT want to receive the three cycles of CMF in the adjuvant setting we had recommended.  We will ask IR to  remove her port in late July, and she will be seeing Dr. Tompkins later this month.  I will se her again in 2 months  Her questions were answered to her satisfaction.

## 2017-07-12 NOTE — PLAN OF CARE
Problem: Patient Care Overview (Adult)  Goal: Plan of Care Review  Outcome: Ongoing (interventions implemented as appropriate)  Patient independently ambulated into chemo infusion clinic today accompanied by spouse and family. Patient skin warm and dry, RR even and unlabored. Patient in NAD and appears comfortable. Patient reports minimal pain to nail beds and tolerated final Taxol treatment well. Patient follow up discussed and patient verbally expressed understanding.

## 2017-07-13 DIAGNOSIS — C50.611 CANCER OF AXILLARY TAIL OF RIGHT BREAST: Primary | ICD-10-CM

## 2017-07-19 ENCOUNTER — TELEPHONE (OUTPATIENT)
Dept: INTERVENTIONAL RADIOLOGY/VASCULAR | Facility: HOSPITAL | Age: 42
End: 2017-07-19

## 2017-07-19 DIAGNOSIS — C50.611 CANCER OF AXILLARY TAIL OF RIGHT BREAST: Primary | ICD-10-CM

## 2017-07-20 DIAGNOSIS — I50.9 CONGESTIVE HEART FAILURE, UNSPECIFIED CONGESTIVE HEART FAILURE CHRONICITY, UNSPECIFIED CONGESTIVE HEART FAILURE TYPE: Primary | ICD-10-CM

## 2017-07-21 DIAGNOSIS — E87.6 HYPOKALEMIA: ICD-10-CM

## 2017-07-21 RX ORDER — POTASSIUM CHLORIDE 750 MG/1
CAPSULE, EXTENDED RELEASE ORAL
Qty: 60 CAPSULE | Refills: 0 | Status: SHIPPED | OUTPATIENT
Start: 2017-07-21 | End: 2017-08-31 | Stop reason: SDUPTHER

## 2017-08-14 ENCOUNTER — OFFICE VISIT (OUTPATIENT)
Dept: TRANSPLANT | Facility: CLINIC | Age: 42
End: 2017-08-14
Payer: COMMERCIAL

## 2017-08-14 ENCOUNTER — HOSPITAL ENCOUNTER (OUTPATIENT)
Dept: CARDIOLOGY | Facility: CLINIC | Age: 42
Discharge: HOME | End: 2017-08-14
Payer: COMMERCIAL

## 2017-08-14 VITALS
SYSTOLIC BLOOD PRESSURE: 101 MMHG | BODY MASS INDEX: 26 KG/M2 | WEIGHT: 123.88 LBS | HEART RATE: 77 BPM | HEIGHT: 58 IN | DIASTOLIC BLOOD PRESSURE: 62 MMHG

## 2017-08-14 DIAGNOSIS — C50.611 CANCER OF AXILLARY TAIL OF RIGHT BREAST: ICD-10-CM

## 2017-08-14 DIAGNOSIS — Z95.810 AUTOMATIC IMPLANTABLE CARDIOVERTER-DEFIBRILLATOR IN SITU: ICD-10-CM

## 2017-08-14 DIAGNOSIS — I47.20 VENTRICULAR TACHYCARDIA: ICD-10-CM

## 2017-08-14 DIAGNOSIS — I50.22 HEART FAILURE, SYSTOLIC, CHRONIC: Primary | ICD-10-CM

## 2017-08-14 DIAGNOSIS — I42.8 NICM (NONISCHEMIC CARDIOMYOPATHY): ICD-10-CM

## 2017-08-14 DIAGNOSIS — Z51.11 ENCOUNTER FOR ANTINEOPLASTIC CHEMOTHERAPY: ICD-10-CM

## 2017-08-14 DIAGNOSIS — I50.9 CONGESTIVE HEART FAILURE, UNSPECIFIED CONGESTIVE HEART FAILURE CHRONICITY, UNSPECIFIED CONGESTIVE HEART FAILURE TYPE: ICD-10-CM

## 2017-08-14 LAB
DIASTOLIC DYSFUNCTION: YES
ESTIMATED PA SYSTOLIC PRESSURE: 19.65
RETIRED EF AND QEF - SEE NOTES: 30 (ref 55–65)
TRICUSPID VALVE REGURGITATION: ABNORMAL

## 2017-08-14 PROCEDURE — 99999 PR PBB SHADOW E&M-EST. PATIENT-LVL III: CPT | Mod: PBBFAC,,, | Performed by: INTERNAL MEDICINE

## 2017-08-14 PROCEDURE — 93306 TTE W/DOPPLER COMPLETE: CPT | Mod: S$GLB,,, | Performed by: INTERNAL MEDICINE

## 2017-08-14 PROCEDURE — 3008F BODY MASS INDEX DOCD: CPT | Mod: S$GLB,,, | Performed by: INTERNAL MEDICINE

## 2017-08-14 PROCEDURE — 99215 OFFICE O/P EST HI 40 MIN: CPT | Mod: S$GLB,,, | Performed by: INTERNAL MEDICINE

## 2017-08-14 NOTE — PROGRESS NOTES
Subjective:    Patient ID:  Avis Graves is a 42 y.o. female who presents for follow-up of Congestive Heart Failure (7mo visit w/ ECHO done this morning)      Congestive Heart Failure   Pertinent negatives include no abdominal pain, chest pain, chills, coughing, diaphoresis, fever or weakness.     42 year old with NICM with history of LV thrombus(10/18/10 echo revealed no thrombus EF of 20%),and chronic systolic heart failure she is here for preop clearance. Asymptomatic    CONCLUSIONS     1 - Eccentric hypertrophy.     2 - Moderately depressed left ventricular systolic function (EF 30-35%).     3 - Low normal right ventricular systolic function .     4 - Impaired LV relaxation, normal LAP (grade 1 diastolic dysfunction).     5 - The estimated PA systolic pressure is 20 mmHg.     Review of Systems   Constitution: Negative for chills, decreased appetite, diaphoresis, fever, weakness, malaise/fatigue, night sweats, weight gain and weight loss.   Cardiovascular: Negative for chest pain, claudication, cyanosis, dyspnea on exertion, irregular heartbeat, leg swelling, near-syncope, orthopnea and palpitations.   Respiratory: Negative for cough, hemoptysis, shortness of breath, sleep disturbances due to breathing, snoring, sputum production and wheezing.    Gastrointestinal: Negative for abdominal pain and diarrhea.        Objective:    Physical Exam   Constitutional: She is oriented to person, place, and time. She appears well-developed and well-nourished.   HENT:   Head: Normocephalic and atraumatic.   Eyes: Conjunctivae and EOM are normal. Pupils are equal, round, and reactive to light.   Neck: Neck supple. No JVD present. No tracheal deviation present. No thyromegaly present.   Cardiovascular: Normal rate, regular rhythm and normal heart sounds.  PMI is displaced.  Exam reveals no gallop and no friction rub.    No murmur heard.  Pulmonary/Chest: Effort normal and breath sounds normal. No respiratory distress.  She has no wheezes. She has no rales. She exhibits no tenderness.   Abdominal: Soft. Bowel sounds are normal. She exhibits no distension and no mass. There is no tenderness. There is no rebound and no guarding.   Musculoskeletal: Normal range of motion. She exhibits no edema or tenderness.   Lymphadenopathy:     She has no cervical adenopathy.   Neurological: She is alert and oriented to person, place, and time. She has normal reflexes. No cranial nerve deficit. She exhibits normal muscle tone. Coordination normal.   Skin: Skin is warm and dry.   Psychiatric: She has a normal mood and affect. Her behavior is normal. Thought content normal.         Assessment:       1. Heart failure, systolic, chronic    2. Ventricular tachycardia    3. Automatic implantable cardioverter-defibrillator in situ    4. NICM (nonischemic cardiomyopathy)    5. Encounter for antineoplastic chemotherapy    6. Cancer of axillary tail of right breast         Plan:       Stable. Stable EF

## 2017-08-20 ENCOUNTER — PATIENT MESSAGE (OUTPATIENT)
Dept: HEMATOLOGY/ONCOLOGY | Facility: CLINIC | Age: 42
End: 2017-08-20

## 2017-08-20 ENCOUNTER — PATIENT MESSAGE (OUTPATIENT)
Dept: PLASTIC SURGERY | Facility: CLINIC | Age: 42
End: 2017-08-20

## 2017-08-21 ENCOUNTER — CLINICAL SUPPORT (OUTPATIENT)
Dept: ELECTROPHYSIOLOGY | Facility: CLINIC | Age: 42
End: 2017-08-21
Payer: COMMERCIAL

## 2017-08-21 DIAGNOSIS — I42.8 NICM (NONISCHEMIC CARDIOMYOPATHY): ICD-10-CM

## 2017-08-21 DIAGNOSIS — Z95.810 ICD (IMPLANTABLE CARDIOVERTER-DEFIBRILLATOR) IN PLACE: ICD-10-CM

## 2017-08-21 DIAGNOSIS — I50.22 CHRONIC SYSTOLIC HEART FAILURE: ICD-10-CM

## 2017-08-23 ENCOUNTER — PATIENT MESSAGE (OUTPATIENT)
Dept: HEMATOLOGY/ONCOLOGY | Facility: CLINIC | Age: 42
End: 2017-08-23

## 2017-08-31 ENCOUNTER — OFFICE VISIT (OUTPATIENT)
Dept: ELECTROPHYSIOLOGY | Facility: CLINIC | Age: 42
End: 2017-08-31
Payer: COMMERCIAL

## 2017-08-31 ENCOUNTER — TELEPHONE (OUTPATIENT)
Dept: ELECTROPHYSIOLOGY | Facility: CLINIC | Age: 42
End: 2017-08-31

## 2017-08-31 ENCOUNTER — HOSPITAL ENCOUNTER (OUTPATIENT)
Dept: CARDIOLOGY | Facility: CLINIC | Age: 42
Discharge: HOME | End: 2017-08-31
Payer: COMMERCIAL

## 2017-08-31 VITALS
HEART RATE: 79 BPM | BODY MASS INDEX: 25.15 KG/M2 | WEIGHT: 124.75 LBS | DIASTOLIC BLOOD PRESSURE: 60 MMHG | SYSTOLIC BLOOD PRESSURE: 80 MMHG | HEIGHT: 59 IN

## 2017-08-31 DIAGNOSIS — I50.9 HEART FAILURE, UNSPECIFIED HEART FAILURE CHRONICITY, UNSPECIFIED HEART FAILURE TYPE: ICD-10-CM

## 2017-08-31 DIAGNOSIS — I50.9 HEART FAILURE, UNSPECIFIED HEART FAILURE CHRONICITY, UNSPECIFIED HEART FAILURE TYPE: Primary | ICD-10-CM

## 2017-08-31 DIAGNOSIS — Z95.810 AUTOMATIC IMPLANTABLE CARDIOVERTER-DEFIBRILLATOR IN SITU: ICD-10-CM

## 2017-08-31 DIAGNOSIS — I50.42 CHRONIC COMBINED SYSTOLIC AND DIASTOLIC CONGESTIVE HEART FAILURE: ICD-10-CM

## 2017-08-31 DIAGNOSIS — I42.8 NICM (NONISCHEMIC CARDIOMYOPATHY): Primary | ICD-10-CM

## 2017-08-31 DIAGNOSIS — J45.30 MILD PERSISTENT ASTHMA WITHOUT COMPLICATION: ICD-10-CM

## 2017-08-31 DIAGNOSIS — I47.20 VENTRICULAR TACHYCARDIA: ICD-10-CM

## 2017-08-31 DIAGNOSIS — C50.611 CANCER OF AXILLARY TAIL OF RIGHT BREAST: ICD-10-CM

## 2017-08-31 DIAGNOSIS — I50.22 HEART FAILURE, SYSTOLIC, CHRONIC: ICD-10-CM

## 2017-08-31 DIAGNOSIS — E87.6 HYPOKALEMIA: ICD-10-CM

## 2017-08-31 PROCEDURE — 93000 ELECTROCARDIOGRAM COMPLETE: CPT | Mod: S$GLB,,, | Performed by: INTERNAL MEDICINE

## 2017-08-31 PROCEDURE — 99214 OFFICE O/P EST MOD 30 MIN: CPT | Mod: S$GLB,,, | Performed by: NURSE PRACTITIONER

## 2017-08-31 PROCEDURE — 3008F BODY MASS INDEX DOCD: CPT | Mod: S$GLB,,, | Performed by: NURSE PRACTITIONER

## 2017-08-31 PROCEDURE — 99999 PR PBB SHADOW E&M-EST. PATIENT-LVL III: CPT | Mod: PBBFAC,,, | Performed by: NURSE PRACTITIONER

## 2017-08-31 RX ORDER — POTASSIUM CHLORIDE 750 MG/1
CAPSULE, EXTENDED RELEASE ORAL
Qty: 60 CAPSULE | Refills: 5 | Status: SHIPPED | OUTPATIENT
Start: 2017-08-31 | End: 2017-09-05 | Stop reason: SDUPTHER

## 2017-08-31 RX ORDER — ENALAPRIL MALEATE 2.5 MG/1
TABLET ORAL
Qty: 60 TABLET | Refills: 5 | Status: SHIPPED | OUTPATIENT
Start: 2017-08-31 | End: 2017-09-05 | Stop reason: SDUPTHER

## 2017-08-31 RX ORDER — FUROSEMIDE 20 MG/1
TABLET ORAL
Qty: 30 TABLET | Refills: 5 | Status: SHIPPED | OUTPATIENT
Start: 2017-08-31 | End: 2017-09-05 | Stop reason: SDUPTHER

## 2017-08-31 RX ORDER — CARVEDILOL PHOSPHATE 80 MG/1
80 CAPSULE, EXTENDED RELEASE ORAL DAILY
Qty: 30 CAPSULE | Refills: 5 | Status: SHIPPED | OUTPATIENT
Start: 2017-08-31 | End: 2017-09-05 | Stop reason: SDUPTHER

## 2017-08-31 NOTE — TELEPHONE ENCOUNTER
----- Message from Betzaida Singh LPN sent at 8/31/2017  2:43 PM CDT -----      ----- Message -----  From: GEORGETTE Kirkland  Sent: 8/31/2017   2:24 PM  To: Shawn RUBALCAVA Staff    Hi,     I saw Ms. Graves today in EP clinic. She is requesting refills on all of her cardiac medications. As they are managed by Dr. Escobar's office, please refill these so that she does not run out;  at least enough to get her to her next follow-up visit w/Cardiology.   Thanks!    Mariam Kerr, MN, APRN, FNP-C

## 2017-08-31 NOTE — PROGRESS NOTES
Subjective:    Patient ID:  Avis Graves is a 42 y.o. female who presents for ICD Check.     Avis Graves is a patient of Dr. Mckeon.    HPI     Ms. Graves is a 42 y.o. female with NICM, HFrEF (EF 30-35%), a hx of VT s/p SC ICD (2009; generator change 2013), mild-persistent asthma, and a hx of breast cancer. She has a remote hx MMVT (single episode), asymptomatic, pace terminated. No recurrence.    Since her last office visit (08/23/16), Ms. Graves reports experiencing occasional SOB over the last 2 days; she denies experiencing weight gain, LE swelling, orthopnea or PND; she notes that this is improved w/symbicort. She denies chest pain, dizziness, palpitations, or syncope. She recently underwent her last chemo treatment for right-sided breast cancer. She states that radiation was discussed, but that she is against the option at this time.     Recent cardiac studies:  Echo (08/14/17) revealed an EF of 30-35%, eccentric hypertrophy, low-normal right ventricular systolic function, grade 1 diastolic dysfunction, and a PASP of 20 mmHg.   Device Interrogation (08/21/17) reveals stable lead and device function. NSVT x 3 noted. She paces <1% in the RV. Estimated battery longevity >8 years.     I reviewed today's ECG which demonstrated NSR at 79 bpm; , , and QTc 408.    Review of Systems   Constitution: Positive for malaise/fatigue. Negative for diaphoresis.   Eyes: Negative for double vision.   Cardiovascular: Negative for chest pain, dyspnea on exertion, irregular heartbeat, leg swelling, near-syncope, orthopnea, palpitations, paroxysmal nocturnal dyspnea and syncope.   Respiratory: Positive for shortness of breath.    Skin: Negative.    Musculoskeletal: Negative.    Neurological: Negative for dizziness and light-headedness.   Psychiatric/Behavioral: Negative for altered mental status.        Objective:    Physical Exam   Constitutional: She is oriented to person, place, and time. She appears  well-developed and well-nourished.   HENT:   Head: Normocephalic and atraumatic.   Eyes: Pupils are equal, round, and reactive to light.   Cardiovascular: Normal rate, regular rhythm and normal heart sounds.    Pulmonary/Chest: Effort normal and breath sounds normal.   Neurological: She is alert and oriented to person, place, and time.   Skin: She is not diaphoretic.   Vitals reviewed.        Assessment:       1. NICM (nonischemic cardiomyopathy)    2. Heart failure, systolic, chronic    3. Ventricular tachycardia    4. Automatic implantable cardioverter-defibrillator in situ    5. Mild persistent asthma without complication    6. Cancer of axillary tail of right breast         Plan:       In summary, Ms. Graves is a 42 y.o. female with NICM, HFrEF (EF 30-35%), a hx of VT s/p SC ICD, mild-persistent asthma, and a hx of breast cancer. Ms. Graves is doing well from a device perspective with stable lead and device function without sustained ventricular arrhythmia noted.     Continue current medication regimen and device settings.   Follow up in device clinic as scheduled.   Follow up in EP clinic in 1 year, sooner as needed.     Mariam Kerr, MN, APRN, FNP-C        (A copy of today's note was sent to Dr. Mckeon.)

## 2017-09-05 DIAGNOSIS — E87.6 HYPOKALEMIA: ICD-10-CM

## 2017-09-05 DIAGNOSIS — I50.42 CHRONIC COMBINED SYSTOLIC AND DIASTOLIC CONGESTIVE HEART FAILURE: ICD-10-CM

## 2017-09-05 RX ORDER — ENALAPRIL MALEATE 2.5 MG/1
TABLET ORAL
Qty: 60 TABLET | Refills: 11 | Status: SHIPPED | OUTPATIENT
Start: 2017-09-05 | End: 2018-07-10 | Stop reason: SDUPTHER

## 2017-09-05 RX ORDER — POTASSIUM CHLORIDE 750 MG/1
CAPSULE, EXTENDED RELEASE ORAL
Qty: 60 CAPSULE | Refills: 11 | Status: SHIPPED | OUTPATIENT
Start: 2017-09-05 | End: 2018-06-29 | Stop reason: SDUPTHER

## 2017-09-05 RX ORDER — FUROSEMIDE 20 MG/1
TABLET ORAL
Qty: 30 TABLET | Refills: 11 | Status: SHIPPED | OUTPATIENT
Start: 2017-09-05 | End: 2018-04-05 | Stop reason: SDUPTHER

## 2017-09-05 RX ORDER — CARVEDILOL PHOSPHATE 80 MG/1
80 CAPSULE, EXTENDED RELEASE ORAL DAILY
Qty: 30 CAPSULE | Refills: 11 | Status: SHIPPED | OUTPATIENT
Start: 2017-09-05 | End: 2018-03-29 | Stop reason: SDUPTHER

## 2017-09-06 ENCOUNTER — PATIENT MESSAGE (OUTPATIENT)
Dept: PLASTIC SURGERY | Facility: CLINIC | Age: 42
End: 2017-09-06

## 2017-09-06 ENCOUNTER — PATIENT MESSAGE (OUTPATIENT)
Dept: HEMATOLOGY/ONCOLOGY | Facility: CLINIC | Age: 42
End: 2017-09-06

## 2017-09-07 DIAGNOSIS — Z00.6 EXAMINATION OF PARTICIPANT IN CLINICAL TRIAL: ICD-10-CM

## 2017-09-07 DIAGNOSIS — C50.919 MALIGNANT NEOPLASM OF FEMALE BREAST, UNSPECIFIED ESTROGEN RECEPTOR STATUS, UNSPECIFIED LATERALITY, UNSPECIFIED SITE OF BREAST: Primary | ICD-10-CM

## 2017-09-11 ENCOUNTER — LAB VISIT (OUTPATIENT)
Dept: LAB | Facility: OTHER | Age: 42
End: 2017-09-11
Attending: INTERNAL MEDICINE
Payer: COMMERCIAL

## 2017-09-11 DIAGNOSIS — Z00.6 EXAMINATION OF PARTICIPANT IN CLINICAL TRIAL: ICD-10-CM

## 2017-09-11 DIAGNOSIS — C50.611 CANCER OF AXILLARY TAIL OF RIGHT BREAST: ICD-10-CM

## 2017-09-11 DIAGNOSIS — T45.1X5A ANEMIA ASSOCIATED WITH CHEMOTHERAPY: ICD-10-CM

## 2017-09-11 DIAGNOSIS — C50.919 MALIGNANT NEOPLASM OF FEMALE BREAST, UNSPECIFIED ESTROGEN RECEPTOR STATUS, UNSPECIFIED LATERALITY, UNSPECIFIED SITE OF BREAST: ICD-10-CM

## 2017-09-11 DIAGNOSIS — D64.81 ANEMIA ASSOCIATED WITH CHEMOTHERAPY: ICD-10-CM

## 2017-09-11 LAB
ALBUMIN SERPL BCP-MCNC: 3.7 G/DL
ALP SERPL-CCNC: 58 U/L
ALT SERPL W/O P-5'-P-CCNC: 12 U/L
ANION GAP SERPL CALC-SCNC: 9 MMOL/L
AST SERPL-CCNC: 18 U/L
BILIRUB SERPL-MCNC: 0.3 MG/DL
BUN SERPL-MCNC: 12 MG/DL
CALCIUM SERPL-MCNC: 9.9 MG/DL
CHLORIDE SERPL-SCNC: 104 MMOL/L
CO2 SERPL-SCNC: 29 MMOL/L
CREAT SERPL-MCNC: 0.8 MG/DL
ERYTHROCYTE [DISTWIDTH] IN BLOOD BY AUTOMATED COUNT: 11.8 %
EST. GFR  (AFRICAN AMERICAN): >60 ML/MIN/1.73 M^2
EST. GFR  (NON AFRICAN AMERICAN): >60 ML/MIN/1.73 M^2
GLUCOSE SERPL-MCNC: 82 MG/DL
HCT VFR BLD AUTO: 44.9 %
HGB BLD-MCNC: 14.6 G/DL
MCH RBC QN AUTO: 30.3 PG
MCHC RBC AUTO-ENTMCNC: 32.5 G/DL
MCV RBC AUTO: 93 FL
NEUTROPHILS # BLD AUTO: 2.9 K/UL
PLATELET # BLD AUTO: 201 K/UL
PMV BLD AUTO: 10.9 FL
POTASSIUM SERPL-SCNC: 4 MMOL/L
PROT SERPL-MCNC: 7.1 G/DL
RBC # BLD AUTO: 4.82 M/UL
SODIUM SERPL-SCNC: 142 MMOL/L
WBC # BLD AUTO: 5.69 K/UL

## 2017-09-11 PROCEDURE — 80053 COMPREHEN METABOLIC PANEL: CPT

## 2017-09-11 PROCEDURE — 36415 COLL VENOUS BLD VENIPUNCTURE: CPT

## 2017-09-11 PROCEDURE — 85027 COMPLETE CBC AUTOMATED: CPT

## 2017-09-13 ENCOUNTER — OFFICE VISIT (OUTPATIENT)
Dept: PLASTIC SURGERY | Facility: CLINIC | Age: 42
End: 2017-09-13
Payer: COMMERCIAL

## 2017-09-13 ENCOUNTER — TELEPHONE (OUTPATIENT)
Dept: HEMATOLOGY/ONCOLOGY | Facility: CLINIC | Age: 42
End: 2017-09-13

## 2017-09-13 ENCOUNTER — OFFICE VISIT (OUTPATIENT)
Dept: HEMATOLOGY/ONCOLOGY | Facility: CLINIC | Age: 42
End: 2017-09-13
Attending: INTERNAL MEDICINE
Payer: COMMERCIAL

## 2017-09-13 VITALS
TEMPERATURE: 98 F | DIASTOLIC BLOOD PRESSURE: 98 MMHG | BODY MASS INDEX: 24.91 KG/M2 | WEIGHT: 123.38 LBS | HEART RATE: 74 BPM | SYSTOLIC BLOOD PRESSURE: 149 MMHG

## 2017-09-13 VITALS
SYSTOLIC BLOOD PRESSURE: 121 MMHG | BODY MASS INDEX: 24.8 KG/M2 | HEIGHT: 59 IN | HEART RATE: 81 BPM | OXYGEN SATURATION: 99 % | RESPIRATION RATE: 18 BRPM | TEMPERATURE: 98 F | DIASTOLIC BLOOD PRESSURE: 78 MMHG | WEIGHT: 123 LBS

## 2017-09-13 DIAGNOSIS — Z85.3 PERSONAL HISTORY OF BREAST CANCER: ICD-10-CM

## 2017-09-13 DIAGNOSIS — I42.9 CARDIOMYOPATHY, IDIOPATHIC: ICD-10-CM

## 2017-09-13 DIAGNOSIS — I42.9 CARDIOMYOPATHY, IDIOPATHIC: Primary | ICD-10-CM

## 2017-09-13 DIAGNOSIS — C50.611 CANCER OF AXILLARY TAIL OF RIGHT BREAST: Primary | ICD-10-CM

## 2017-09-13 PROCEDURE — 3008F BODY MASS INDEX DOCD: CPT | Mod: S$GLB,,, | Performed by: SURGERY

## 2017-09-13 PROCEDURE — 99215 OFFICE O/P EST HI 40 MIN: CPT | Mod: S$GLB,,, | Performed by: INTERNAL MEDICINE

## 2017-09-13 PROCEDURE — 99212 OFFICE O/P EST SF 10 MIN: CPT | Mod: S$GLB,,, | Performed by: SURGERY

## 2017-09-13 PROCEDURE — 99999 PR PBB SHADOW E&M-EST. PATIENT-LVL III: CPT | Mod: PBBFAC,,, | Performed by: INTERNAL MEDICINE

## 2017-09-13 PROCEDURE — 3008F BODY MASS INDEX DOCD: CPT | Mod: S$GLB,,, | Performed by: INTERNAL MEDICINE

## 2017-09-13 PROCEDURE — 99999 PR PBB SHADOW E&M-EST. PATIENT-LVL II: CPT | Mod: PBBFAC,,, | Performed by: SURGERY

## 2017-09-13 RX ORDER — ANASTROZOLE 1 MG/1
1 TABLET ORAL DAILY
Qty: 90 TABLET | Refills: 2 | Status: SHIPPED | OUTPATIENT
Start: 2017-09-13 | End: 2018-06-29 | Stop reason: SDUPTHER

## 2017-09-13 NOTE — PROGRESS NOTES
S/p right mastectomy with TE placement    She completed chemotherapy about 2 months ago.  She is doing well and feels that she is ready for implant exchange.    Vitals:    09/13/17 1203   BP: (!) 149/98   Pulse: 74   Temp: 98.1 °F (36.7 °C)     NAD  RRR  Non-labored breathing    A/P: s/p above  Will schedule for right breast implant exchange  Offered mastopexy to left breast, but patient would like to think about this

## 2017-09-13 NOTE — PROGRESS NOTES
Subjective:       Patient ID: Avis Graves is a 42 y.o. female.    Chief Complaint: No chief complaint on file.    HPI   Ms. Graves returns today for follow up after completing her adjuvant chemotherapy with weekly taxol.   Her CBC from yesterday shows a WBC count of 5,600 /mm3, Hg 14.6 gr/dl, Ht 44.9 % and platelets 201,000 /mm3.  LFTs are normal.    Briefly, she is a 42-year-old -American female with a history of cardiomyopathy and an EF of less than 30%, who was recently diagnosed with a carcinoma of the right breast, that was ER strongly positive, CT negative, and HER-2 negative.  On 2/07/2017 she underwent a right modified radical mastectomy with sentinel lymph node biopsy and insertion of a tissue expander.  The pathology report from the 02/07/2017 procedure indicates that she had a 4 cm carcinoma; three of the four sentinel lymph nodes were positive with macrometastases.  The patient was referred for further evaluation.  It was recommended to her to proceed with twelve weekly cycles of taxol, possibly with the addition of CMF upon completion.  She completed 12 cycles of weekly taxol, but decided against CMF and also against XRT.     Review of Systems  Overall she feels OK.  She states that her neuropathy is improving.  She denies any depression, vomiting, diarrhea, constipation, diplopia, blurred vision, headaches, chest pain, palpitations, shortness of breath, left breast pain, abdominal pain, extremity pain, or difficulty ambulating.  The remainder of the ten-point ROS, including general, skin, lymph, H/N, cardiorespiratory, GI, , Neuro, Endocrine, and psychiatric is negative.     Objective:      Physical Exam  She is alert, oriented to time, place, person, pleasant, well      nourished, in no acute physical distress.  She is here with her .                                  VITAL SIGNS:  Reviewed                                      HEENT:  Alopecia no longer  noted.  There are no  nasal, oral, lip, gingival, auricular, or lid     lesions.  Mucosae are moist and pink, and there is no        thrush.  Pupils are equal, reactive to light and accommodation.              Extraocular muscle movements are intact.  Dentition is good.                                     NECK:  Supple without JVD, adenopathy, or thyromegaly.                       LUNGS:  Clear to auscultation without wheezing, rales, or rhonchi.           CARDIOVASCULAR:  Reveals an S1, S2, no murmurs, no rubs, no gallops.         ABDOMEN:  Soft, nontender, without organomegaly.  Bowel sounds are    present.                                                                     EXTREMITIES:  No cyanosis, clubbing, or edema.                               BREASTS:  She is status post right mastectomy with a tissue expander in place and a well-healed incision.    There are no masses in the left breast.     An AICD is palpated in the left infraclavicular area.   A left sided port is seen on her arm, medially.                                   LYMPHATIC:  There is no cervical, axillary, or supraclavicular adenopathy.   SKIN:  Warm and moist, without petechiae, rashes, induration, or ecchymoses.           NEUROLOGIC:  DTRs are 0-1+ bilaterally, symmetrical, motor function is 5/5,  and cranial nerves are  within normal limits.      Assessment:       1. 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 Cardiomyopathy, idiopathic       Plan:         I had a long discussion with her .  She had undergone a complete hysterectomy (including the ovaries) in [place] several years ago for endometriosis.   The pros and cons of adjuvant hormonal therapy with anastrazole were discussed in detail; she was given a prescription for anastrazole 1 mg tablets with instructions to take one per day, and she will return to see me in one month with a DXA scan.    Of note, she informed me that she is getting vitamin C infusions by Dr. Roberts.  I advised her to discontinue  those and pointed out that there is no evidence that they have any bearing on her condition.  Finally, we will ask IR to remove her port.  Her multiple questions were answered to her satisfaction.  .

## 2017-09-14 DIAGNOSIS — C50.611 CANCER OF AXILLARY TAIL OF RIGHT BREAST: Primary | ICD-10-CM

## 2017-09-21 DIAGNOSIS — C50.611 CANCER OF AXILLARY TAIL OF RIGHT BREAST: Primary | ICD-10-CM

## 2017-10-07 ENCOUNTER — PATIENT MESSAGE (OUTPATIENT)
Dept: HEMATOLOGY/ONCOLOGY | Facility: CLINIC | Age: 42
End: 2017-10-07

## 2017-10-09 ENCOUNTER — DOCUMENTATION ONLY (OUTPATIENT)
Dept: HEMATOLOGY/ONCOLOGY | Facility: CLINIC | Age: 42
End: 2017-10-09

## 2017-10-09 NOTE — PROGRESS NOTES
Mrs Star CARTER mailed me today that she checked with her [facility] physician and confirmed that at the time of her hysterectomy her ovaries were also taken out.

## 2017-10-10 ENCOUNTER — TELEPHONE (OUTPATIENT)
Dept: PLASTIC SURGERY | Facility: CLINIC | Age: 42
End: 2017-10-10

## 2017-10-10 NOTE — TELEPHONE ENCOUNTER
Called pt to let her know that is she still wanted to have surgery done Dr. Nunez would like a Cardiac Clearance. Left message for pt to return call so that we can go over what is needed.

## 2017-10-12 ENCOUNTER — PATIENT MESSAGE (OUTPATIENT)
Dept: HEMATOLOGY/ONCOLOGY | Facility: CLINIC | Age: 42
End: 2017-10-12

## 2017-10-12 ENCOUNTER — PATIENT MESSAGE (OUTPATIENT)
Dept: GASTROENTEROLOGY | Facility: CLINIC | Age: 42
End: 2017-10-12

## 2017-10-13 ENCOUNTER — TELEPHONE (OUTPATIENT)
Dept: PLASTIC SURGERY | Facility: CLINIC | Age: 42
End: 2017-10-13

## 2017-10-13 NOTE — TELEPHONE ENCOUNTER
Called Mrs. Graves to schedule her surgery and to also see if she had gotten her Cardiac Clearance. I have reached out to pt several times and have not spoken with her yet. Pt went to [facility] to see about getting a surgery date, but Dr. Nunez will be out at the end of that week. Will see if he still would like to schedule her for 11/6 at pt's request.

## 2017-10-16 ENCOUNTER — HOSPITAL ENCOUNTER (OUTPATIENT)
Dept: RADIOLOGY | Facility: OTHER | Age: 42
Discharge: HOME | End: 2017-10-16
Attending: INTERNAL MEDICINE
Payer: COMMERCIAL

## 2017-10-16 DIAGNOSIS — I42.9 CARDIOMYOPATHY, IDIOPATHIC: ICD-10-CM

## 2017-10-16 DIAGNOSIS — C50.611 CANCER OF AXILLARY TAIL OF RIGHT BREAST: ICD-10-CM

## 2017-10-16 PROCEDURE — 77080 DXA BONE DENSITY AXIAL: CPT | Mod: TC

## 2017-10-16 PROCEDURE — 77080 DXA BONE DENSITY AXIAL: CPT | Mod: 26,,, | Performed by: RADIOLOGY

## 2017-10-18 ENCOUNTER — OFFICE VISIT (OUTPATIENT)
Dept: HEMATOLOGY/ONCOLOGY | Facility: CLINIC | Age: 42
End: 2017-10-18
Attending: INTERNAL MEDICINE
Payer: COMMERCIAL

## 2017-10-18 ENCOUNTER — TELEPHONE (OUTPATIENT)
Dept: GASTROENTEROLOGY | Facility: CLINIC | Age: 42
End: 2017-10-18

## 2017-10-18 ENCOUNTER — PATIENT MESSAGE (OUTPATIENT)
Dept: HEMATOLOGY/ONCOLOGY | Facility: CLINIC | Age: 42
End: 2017-10-18

## 2017-10-18 VITALS
SYSTOLIC BLOOD PRESSURE: 99 MMHG | DIASTOLIC BLOOD PRESSURE: 60 MMHG | BODY MASS INDEX: 24.8 KG/M2 | TEMPERATURE: 98 F | WEIGHT: 123 LBS | HEART RATE: 101 BPM | RESPIRATION RATE: 18 BRPM | HEIGHT: 59 IN | OXYGEN SATURATION: 99 %

## 2017-10-18 DIAGNOSIS — C50.611 CANCER OF AXILLARY TAIL OF RIGHT BREAST: Primary | ICD-10-CM

## 2017-10-18 DIAGNOSIS — I42.9 CARDIOMYOPATHY, IDIOPATHIC: ICD-10-CM

## 2017-10-18 DIAGNOSIS — Z79.811 USE OF AROMATASE INHIBITORS: ICD-10-CM

## 2017-10-18 PROCEDURE — 99214 OFFICE O/P EST MOD 30 MIN: CPT | Mod: 25,S$GLB,, | Performed by: INTERNAL MEDICINE

## 2017-10-18 PROCEDURE — 99999 PR PBB SHADOW E&M-EST. PATIENT-LVL I: CPT | Mod: PBBFAC,,, | Performed by: INTERNAL MEDICINE

## 2017-10-18 NOTE — PROGRESS NOTES
Subjective:       Patient ID: Avis Graves is a 42 y.o. female.    Chief Complaint: No chief complaint on file.    HPI   Ms. Graves returns today for follow up.    Her CBC from yesterday was normal.   LFTs are normal.    Briefly, she is a 42-year-old -American female with a history of cardiomyopathy and an EF of less than 30%, who was recently diagnosed with a carcinoma of the right breast, that was ER strongly positive, CT negative, and HER-2 negative.  On 2/07/2017 she underwent a right modified radical mastectomy with sentinel lymph node biopsy and insertion of a tissue expander.  The pathology report from the 02/07/2017 procedure indicates that she had a 4 cm carcinoma; three of the four sentinel lymph nodes were positive with macrometastases.  The patient was referred for further evaluation.  It was recommended to her to proceed with twelve weekly cycles of taxol, possibly with the addition of CMF upon completion.  She completed 12 cycles of weekly taxol, but decided against CMF and also against XRT.  She was subsequently started on anastrazole.    She is scheduled to undergo her reconstruction in the near future.      Review of Systems  Overall she feels OK.  ECOG PS is 1.  She complains of mild muscle stiffness and pain, but she denies any depression, vomiting, diarrhea, constipation, diplopia, blurred vision, headaches, chest pain, palpitations, shortness of breath, left breast pain, abdominal pain, extremity pain, or difficulty ambulating.  The remainder of the ten-point ROS, including general, skin, lymph, H/N, cardiorespiratory, GI, , Neuro, Endocrine, and psychiatric is negative.     Objective:      Physical Exam  She is alert, oriented to time, place, person, pleasant, well      nourished, in no acute physical distress.  She is here with her father.                                  VITAL SIGNS:  Reviewed                                      HEENT:  Alopecia no longer  noted.  There are  "no nasal, oral, lip, gingival, auricular, or lid     lesions.  Mucosae are moist and pink, and there is no        thrush.  Pupils are equal, reactive to light and accommodation.              Extraocular muscle movements are intact.  Dentition is good.                                     NECK:  Supple without JVD, adenopathy, or thyromegaly.                       LUNGS:  Clear to auscultation without wheezing, rales, or rhonchi.           CARDIOVASCULAR:  Reveals an S1, S2, no murmurs, no rubs, no gallops.         ABDOMEN:  Soft, nontender, without organomegaly.  Bowel sounds are    present.                                                                     EXTREMITIES:  No cyanosis, clubbing, or edema.                               BREASTS:  She is status post right mastectomy with a tissue expander in place and a well-healed incision.    There are no masses in the left breast.     An AICD is palpated in the left infraclavicular area.   A left sided port is seen on her arm, medially.                                   LYMPHATIC:  There is no cervical, axillary, or supraclavicular adenopathy.   SKIN:  Warm and moist, without petechiae, rashes, induration, or ecchymoses.           NEUROLOGIC:  DTRs are 0-1+ bilaterally, symmetrical, motor function is 5/5,  and cranial nerves are  within normal limits.      Assessment:       1. 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 Cardiomyopathy, idiopathic     3.      Use of AIs.   Plan:          I have asked her to remain on nastrazole, which she will take for 5 years.  I against advised her to have her port removed, and I told her that I am very uncomfortable with her port being used by another physicain for "vitamin C infusions".  I have explained to her the risks of not removing the port and leaving it where it is, including the risk of infection and thrombosis.  She voiced understanding.  I will see her again in three months with a left sided mammogram.  Her multiple " questions were answered to her satisfaction.  .

## 2017-10-18 NOTE — TELEPHONE ENCOUNTER
At this time, she is not due for a colonoscopy.  She had a colonoscopy in April 2015 and no polyps were found.  However, she has a history of an adenomatous polyp removed in 2014.  Due to this, she will have to have another colonoscopy 5 years after her last, which will be April 2020.  Please apologize for the confusion.

## 2017-10-18 NOTE — TELEPHONE ENCOUNTER
Returned patient's call.     Patient received a letter in the mail, stating it was time to schedule her repeat colonoscopy. Patient called our endoscopy department to scheduled her procedure and  was told it is not time to schedule her repeat colonoscopy.    Patient would like to know, when should she schedule her repeat colonoscopy.?    Message routed to Dr. Napier.

## 2017-10-18 NOTE — TELEPHONE ENCOUNTER
----- Message from Lucho Hastings sent at 10/18/2017 12:49 PM CDT -----  Contact: Pt:[phone]  Pt called and states she would like to speak with 's nurse in regards to knowing if she really needs a colonoscopy.

## 2017-11-16 ENCOUNTER — TELEPHONE (OUTPATIENT)
Dept: PLASTIC SURGERY | Facility: CLINIC | Age: 42
End: 2017-11-16

## 2017-11-16 NOTE — TELEPHONE ENCOUNTER
----- Message from Neville Sorenson sent at 11/16/2017  1:31 PM CST -----  Pt is returning call to schedule surgery. Please call pt at [phone]

## 2017-11-16 NOTE — TELEPHONE ENCOUNTER
Spoke with patient regarding scheduling of surgery, pt has not has cardiac clearance, pt to follow up with cardiologist at this time for clearance, pt states that she may want to wait for surgery until 01/2018, all questions answered at this time

## 2017-11-17 ENCOUNTER — TELEPHONE (OUTPATIENT)
Dept: TRANSPLANT | Facility: CLINIC | Age: 42
End: 2017-11-17

## 2017-11-17 DIAGNOSIS — I50.22 CHRONIC SYSTOLIC CONGESTIVE HEART FAILURE: Primary | ICD-10-CM

## 2017-12-14 ENCOUNTER — LAB VISIT (OUTPATIENT)
Dept: LAB | Facility: HOSPITAL | Age: 42
End: 2017-12-14
Attending: INTERNAL MEDICINE
Payer: COMMERCIAL

## 2017-12-14 ENCOUNTER — OFFICE VISIT (OUTPATIENT)
Dept: TRANSPLANT | Facility: CLINIC | Age: 42
End: 2017-12-14
Payer: COMMERCIAL

## 2017-12-14 VITALS
WEIGHT: 117.94 LBS | DIASTOLIC BLOOD PRESSURE: 57 MMHG | HEART RATE: 90 BPM | HEIGHT: 59 IN | BODY MASS INDEX: 23.78 KG/M2 | SYSTOLIC BLOOD PRESSURE: 99 MMHG

## 2017-12-14 DIAGNOSIS — C50.611 CANCER OF AXILLARY TAIL OF RIGHT BREAST: ICD-10-CM

## 2017-12-14 DIAGNOSIS — I50.22 CHRONIC SYSTOLIC CONGESTIVE HEART FAILURE: ICD-10-CM

## 2017-12-14 DIAGNOSIS — I47.20 VENTRICULAR TACHYCARDIA: ICD-10-CM

## 2017-12-14 DIAGNOSIS — Z95.810 AUTOMATIC IMPLANTABLE CARDIOVERTER-DEFIBRILLATOR IN SITU: ICD-10-CM

## 2017-12-14 DIAGNOSIS — I42.8 NICM (NONISCHEMIC CARDIOMYOPATHY): ICD-10-CM

## 2017-12-14 DIAGNOSIS — I42.9 CARDIOMYOPATHY, IDIOPATHIC: Primary | ICD-10-CM

## 2017-12-14 LAB
ANION GAP SERPL CALC-SCNC: 8 MMOL/L
BNP SERPL-MCNC: 29 PG/ML
BUN SERPL-MCNC: 12 MG/DL
CALCIUM SERPL-MCNC: 9.6 MG/DL
CHLORIDE SERPL-SCNC: 106 MMOL/L
CO2 SERPL-SCNC: 29 MMOL/L
CREAT SERPL-MCNC: 0.8 MG/DL
EST. GFR  (AFRICAN AMERICAN): >60 ML/MIN/1.73 M^2
EST. GFR  (NON AFRICAN AMERICAN): >60 ML/MIN/1.73 M^2
GLUCOSE SERPL-MCNC: 97 MG/DL
POTASSIUM SERPL-SCNC: 4.5 MMOL/L
SODIUM SERPL-SCNC: 143 MMOL/L

## 2017-12-14 PROCEDURE — 99999 PR PBB SHADOW E&M-EST. PATIENT-LVL II: CPT | Mod: PBBFAC,,, | Performed by: INTERNAL MEDICINE

## 2017-12-14 PROCEDURE — 99214 OFFICE O/P EST MOD 30 MIN: CPT | Mod: S$GLB,,, | Performed by: INTERNAL MEDICINE

## 2017-12-14 PROCEDURE — 80048 BASIC METABOLIC PNL TOTAL CA: CPT

## 2017-12-14 PROCEDURE — 83880 ASSAY OF NATRIURETIC PEPTIDE: CPT

## 2017-12-14 PROCEDURE — 36415 COLL VENOUS BLD VENIPUNCTURE: CPT

## 2017-12-14 NOTE — PROGRESS NOTES
Subjective:    Patient ID:  Avis Graves is a 42 y.o. female who presents for follow-up of No chief complaint on file.      Congestive Heart Failure   Pertinent negatives include no abdominal pain, chest pain, chills, coughing, diaphoresis, fever or weakness.     42 year old with NICM with history of LV thrombus(10/18/10 echo revealed no thrombus EF of 20%),and chronic systolic heart failure she is here for follow up and clearance for breast resconstractions. Asymptomatic    CONCLUSIONS     1 - Eccentric hypertrophy.     2 - Moderately depressed left ventricular systolic function (EF 30-35%).     3 - Low normal right ventricular systolic function .     4 - Impaired LV relaxation, normal LAP (grade 1 diastolic dysfunction).     5 - The estimated PA systolic pressure is 20 mmHg.     Review of Systems   Constitution: Negative for chills, decreased appetite, diaphoresis, fever, weakness, malaise/fatigue, night sweats, weight gain and weight loss.   Cardiovascular: Negative for chest pain, claudication, cyanosis, dyspnea on exertion, irregular heartbeat, leg swelling, near-syncope, orthopnea and palpitations.   Respiratory: Negative for cough, hemoptysis, shortness of breath, sleep disturbances due to breathing, snoring, sputum production and wheezing.    Gastrointestinal: Negative for abdominal pain and diarrhea.        Objective:    Physical Exam   Constitutional: She is oriented to person, place, and time. She appears well-developed and well-nourished.   HENT:   Head: Normocephalic and atraumatic.   Eyes: Conjunctivae and EOM are normal. Pupils are equal, round, and reactive to light.   Neck: Neck supple. No JVD present. No tracheal deviation present. No thyromegaly present.   Cardiovascular: Normal rate, regular rhythm and normal heart sounds.  PMI is displaced.  Exam reveals no gallop and no friction rub.    No murmur heard.  Pulmonary/Chest: Effort normal and breath sounds normal. No respiratory distress.  She has no wheezes. She has no rales. She exhibits no tenderness.   Abdominal: Soft. Bowel sounds are normal. She exhibits no distension and no mass. There is no tenderness. There is no rebound and no guarding.   Musculoskeletal: Normal range of motion. She exhibits no edema or tenderness.   Lymphadenopathy:     She has no cervical adenopathy.   Neurological: She is alert and oriented to person, place, and time. She has normal reflexes. No cranial nerve deficit. She exhibits normal muscle tone. Coordination normal.   Skin: Skin is warm and dry.   Psychiatric: She has a normal mood and affect. Her behavior is normal. Thought content normal.         Assessment:       1. Cardiomyopathy, idiopathic    2. Automatic implantable cardioverter-defibrillator in situ    3. NICM (nonischemic cardiomyopathy)    4. Ventricular tachycardia    5. Cancer of axillary tail of right breast         Plan:       Stable. Stable EF     Low to medium risk

## 2018-01-05 ENCOUNTER — PATIENT MESSAGE (OUTPATIENT)
Dept: PLASTIC SURGERY | Facility: CLINIC | Age: 43
End: 2018-01-05

## 2018-01-14 ENCOUNTER — PATIENT MESSAGE (OUTPATIENT)
Dept: PLASTIC SURGERY | Facility: CLINIC | Age: 43
End: 2018-01-14

## 2018-01-15 ENCOUNTER — HOSPITAL ENCOUNTER (OUTPATIENT)
Dept: RADIOLOGY | Facility: OTHER | Age: 43
Discharge: HOME | End: 2018-01-15
Attending: INTERNAL MEDICINE
Payer: COMMERCIAL

## 2018-01-15 DIAGNOSIS — Z85.3 PERSONAL HISTORY OF MALIGNANT NEOPLASM OF BREAST: Primary | ICD-10-CM

## 2018-01-15 DIAGNOSIS — C50.611 CANCER OF AXILLARY TAIL OF RIGHT BREAST: ICD-10-CM

## 2018-01-15 PROCEDURE — 77065 DX MAMMO INCL CAD UNI: CPT | Mod: TC,LT

## 2018-01-15 PROCEDURE — 77061 BREAST TOMOSYNTHESIS UNI: CPT | Mod: TC,LT

## 2018-01-15 PROCEDURE — 77061 BREAST TOMOSYNTHESIS UNI: CPT | Mod: 26,LT,, | Performed by: RADIOLOGY

## 2018-01-15 PROCEDURE — 77065 DX MAMMO INCL CAD UNI: CPT | Mod: 26,LT,, | Performed by: RADIOLOGY

## 2018-01-16 ENCOUNTER — PATIENT MESSAGE (OUTPATIENT)
Dept: HEMATOLOGY/ONCOLOGY | Facility: CLINIC | Age: 43
End: 2018-01-16

## 2018-01-19 ENCOUNTER — TELEPHONE (OUTPATIENT)
Dept: HEMATOLOGY/ONCOLOGY | Facility: CLINIC | Age: 43
End: 2018-01-19

## 2018-01-19 NOTE — TELEPHONE ENCOUNTER
Called and spoke with Ms Graves. Calling to rescheduled her missed appointment from 1/17/18. Patient has been rescheduled on 2/21/18 @ 9:30am. Appointment information also mailed to Ms Graves.

## 2018-02-19 ENCOUNTER — ANESTHESIA EVENT (OUTPATIENT)
Dept: SURGERY | Facility: HOSPITAL | Age: 43
End: 2018-02-19
Payer: COMMERCIAL

## 2018-02-19 DIAGNOSIS — Z01.818 PREOPERATIVE TESTING: Primary | ICD-10-CM

## 2018-02-19 NOTE — ANESTHESIA PREPROCEDURE EVALUATION
Pre-operative evaluation for EXCHANGE IMPLANT-BREAST (Right), MASTOPEXY (Left), REMOVAL-PORT-A-CATH (Left)    ID:   Avis Graves is a 43 y.o. female with HFrEF (25%) 2/2 NICM s/p single RV lead AICD, Hx of VT and LV Thrombus, and Moderate Asthma      Chief Complaint: SCC of the R Breast. Seen by Cardiologist (Dr. Escobar) 1/25/17 and given clearance for procedure but had repeat 2D echo 1/26/17 with concern for possible LV thrombus and recommendation for f/u limited contrast study.No follow-up noted in the chart and patient not currently on OAC. Several runs of NSVT and possible SVT on most recent AICD interrogation. No issues with previous anesthesia.    Previous Airway:  - None on file      Past Surgical History:   Procedure Laterality Date    CARDIAC DEFIBRILLATOR PLACEMENT      X 2 Medtronic     CARDIAC DEFIBRILLATOR PLACEMENT      medtronic    HYSTERECTOMY      MASTECTOMY Right 02/07/2017    TONSILLECTOMY           Vital Signs Range (Last 24H):  Temp:  [36.9 °C (98.5 °F)]   Pulse:  [73]   Resp:  [16]   BP: (97)/(56)   SpO2:  [100 %]       CBC:   No results for input(s): WBC, RBC, HGB, HCT, PLT, MCV, MCH, MCHC in the last 720 hours.    CMP: No results for input(s): NA, K, CL, CO2, BUN, CREATININE, GLU, MG, PHOS, CALCIUM, ALBUMIN, PROT, ALKPHOS, ALT, AST, BILITOT in the last 720 hours.    INR:  No results for input(s): PT, INR, PROTIME, APTT in the last 720 hours.      Diagnostic Studies:      EKG 1/26/17:  - NSR    2D Echo 1/26/17:  CONCLUSIONS     1 - Severely depressed left ventricular systolic function (EF 25-30%). Prominent trabeculation in the LV apex vs. thrombus, recommend  limited contrast study r/o LV thrombus. Prior studies could not be retrieved for comparison, but apical thrombus or   prominent trabeculation was not reported.     2 - Normal right ventricular systolic function .     3 - Normal left ventricular diastolic function.     4 - The estimated PA systolic pressure is 17 mmHg.      5 - Trivial aortic regurgitation.     6 - Trivial to mild mitral regurgitation.     7 - Mild tricuspid regurgitation.     8 - Trivial pulmonic regurgitation.       [facility] Anesthesia Evaluation    I have reviewed the Patient Summary Reports.    I have reviewed the Nursing Notes.   I have reviewed the Medications.     Review of Systems  Anesthesia Hx:  No problems with previous Anesthesia Denies Hx of Anesthetic complications  History of prior surgery of interest to airway management or planning: Denies Family Hx of Anesthesia complications.   Denies Personal Hx of Anesthesia complications.   Social:  Non-Smoker, No Alcohol Use    Cardiovascular:   Exercise tolerance: good Pacemaker Past MI Denies CAD.    Denies CABG/stent.   Denies Angina. CHF      Functional Capacity good / => 4 METS, walks/jogs  Congestive Heart Failure (CHF) , on chronic Rx, no recent change Cardiomyopathy, Non-Ischemic Cardiomyopathy Meeting with Dr. Escobar next week.   Pulmonary:   Asthma Denies Shortness of breath.  Denies Recent URI.  Asthma:  last episode was 1 - 12 months ago. Emergency visits this year is none.  Inhaler use is maintenance inhaler daily and rescue inhaler PRN.    Renal/:   Denies Chronic Renal Disease.     Hepatic/GI:   GERD, well controlled Denies Liver Disease. Denies Hepatitis.    Musculoskeletal:   Denies Arthritis.   Denies Spine Disorders    Neurological:   Denies CVA. Denies Seizures.    Endocrine:   Denies Diabetes. Denies Hypothyroidism. Denies Hyperthyroidism.    Psych:   denies anxiety denies depression          Physical Exam  General:  Well nourished    Airway/Jaw/Neck:  Airway Findings: Mouth Opening: Normal Tongue: Normal  General Airway Assessment: Adult  Mallampati: II  TM Distance: Normal, at least 6 cm  Jaw/Neck Findings:  Neck ROM: Normal ROM      Dental:  Dental Findings: In tact   Chest/Lungs:  Chest/Lungs Findings: Clear to auscultation, Normal Respiratory Rate     Heart/Vascular:  Heart Findings:  Rate: Normal  Rhythm: Regular Rhythm  Sounds: Normal        Mental Status:  Mental Status Findings:  Cooperative, Alert and Oriented         Addendum 1/30/17 1043: pt cleared by Dr Escobar on 1/26/17: Low to medium risk for surgery No issues   /Tami Hercules MD        Anesthesia Plan  Type of Anesthesia, risks & benefits discussed:  Anesthesia Type:  general, regional  Patient's Preference:   Intra-op Monitoring Plan: standard ASA monitors  Intra-op Monitoring Plan Comments:   Post Op Pain Control Plan:   Post Op Pain Control Plan Comments:   Induction:   IV  Beta Blocker:  Patient is on a Beta-Blocker and has received one dose within the past 24 hours (No further documentation required).       Informed Consent: Patient understands risks and agrees with Anesthesia plan.  Questions answered. Anesthesia consent signed with patient.  ASA Score: 3     Day of Surgery Review of History & Physical:    H&P update referred to the surgeon.     Anesthesia Plan Notes: Spoke with Dr. Escobar this am. He stated OK to proceed with surgery, no further testing needed. Plan for GETA. Possible supplementation with Remifentanyl/nitrous oxide if hemodynamics don't tolerate volatile agent. Will need deactivation of AICD.         Ready For Surgery From Anesthesia Perspective.        Pre Admission Screening  Yolette Thompson RN      []Hide copied text  Anesthesia Assessment: Preoperative EQUATION     Planned Procedure: Procedure(s) (LRB):  EXCHANGE IMPLANT-BREAST (Right)  Requested Anesthesia Type:General  Surgeon: Bam Nunez MD  Service: Plastics  Known or anticipated Date of Surgery:3/5/2018     Surgeon notes: reviewed     Electronic QUestionnaire Assessment completed via nurse interview with patient.         NO AQ     Triage considerations:      The patient has no apparent active cardiac condition (No unstable coronary Syndrome such as severe unstable angina or recent [<1 month] myocardial infarction, decompensated CHF,  severe valvular   disease or significant arrhythmia)     Previous anesthesia records:GETA, MAC and No problems 02/07/17; Placement Time: 0814; Method of Intubation: Direct laryngoscopy; Inserted by: Anesthesia Resident; Airway Device: Endotracheal Tube; Mask Ventilation: Easy - oral; Intubated: Postinduction; Blade: Kylie #3; Airway Device Size: 7.0; Style: Cuffed; Cuff Inflation: Minimal occlusive pressure; Placement Verified By: Auscultation, ETT Condensation, Capnometry; Grade: Grade I; Complicating Factors: None; Intubation Findings: Positive EtCO2, Bilateral breath sounds, Atraumatic/Condition of teeth unchanged;  Depth of Insertion: 20; Securment: Lips; Complications: None; Breath Sounds: Equal Bilateral; Insertion Attempts: 1;  Airway/Jaw/Neck:  Airway Findings: Mouth Opening: Normal Tongue: Normal  General Airway Assessment: Adult  Mallampati: II  TM Distance: Normal, at least 6 cm  Jaw/Neck Findings:  Neck ROM: Normal ROM      Dental:  Dental Findings: In tact      Last PCP note: outside [facility]   Subspecialty notes: Cardiology: General, Hematology/Oncology     Other important co-morbidities: HX CHF/idiopathic myopathy,Hx V tac,  S/P Medtronic AICD, HX breast cancer     Tests already available:  Available tests,  within 3 months , within [facility] .12/14/17 BMP. 10/16/17 CBC. 8/2017 EKG and echo.                            Instructions given. (See in Nurse's note)     Optimization:  Anesthesia Preop Clinic Assessment  Indicated-not required for this procedure    Medical Opinion Indicated-pt was cleared by cards on 12/14/17.   Dr Escobar note: Stable. Stable EF      Low to medium risk                                           Plan:    Testing:  Hemoglobin                              Consultation:completed by Dr Escobar                           Patient  has previously scheduled Medical Appointment: 2/21 Heme/Onc, 2/22 preop with surgeon.     Navigation: Tests Scheduled. 2/22                                     Results will be tracked by Preop Clinic.                           Straight Line to surgery.                               Electronically signed by Yolette Thompson RN at 2/19/2018 12:02 PM        Pre-admit on 3/5/2018            Detailed Report        2/22/18-pt did not go to lab appt-will get Hgb am of surgery                                                                                                              02/19/2018  Avis Graves is a 42 y.o., female.    Anesthesia Evaluation         Review of Systems  Anesthesia Hx:  No problems with previous Anesthesia History of prior surgery of interest to airway management or planning: Previous anesthesia: General 2/7/17 mastectomy with general anesthesia.  Procedure performed at an [facility]. Denies Family Hx of Anesthesia complications.   Denies Personal Hx of Anesthesia complications.   Hematology/Oncology:  Hematology Normal       -- Cancer in past history:  Breast chemotherapy and surgery  Oncology Comments: S/P chemo & Mastectomy 2/2017.     EENT/Dental:EENT/Dental Normal   Cardiovascular:   Pacemaker (Medtronic AICD) CHF HX Vtac.    EF 30% Functional Capacity good / => 4 METS  Congestive Heart Failure (CHF) , on chronic Rx, no recent change Cardiomyopathy, Non-Ischemic Cardiomyopathy EF 30-35%, stable per cardiology.   Pulmonary:   Asthma  Asthma: (typically uses inhaler with weather changes.)  last episode was < 1 month ago. Emergency visits this year is none.  Inhaler use is rescue inhaler PRN. Current breathing status is optimal, free of wheezing.    Renal/:  Renal/ Normal     Hepatic/GI:  Hepatic/GI Normal    Musculoskeletal:  Musculoskeletal Normal    Neurological:  Neurology Normal    Endocrine:  Endocrine Normal    Psych:  Psychiatric Normal           Physical Exam  General:  Well nourished    Airway/Jaw/Neck:  Airway Findings: Mouth Opening: Normal Tongue: Normal  General Airway Assessment: Adult  Mallampati: I  Improves to I  with phonation.  TM Distance: Normal, at least 6 cm  Jaw/Neck Findings:  Neck ROM: Normal ROM      Dental:  Dental Findings: In tact   Chest/Lungs:  Chest/Lungs Findings: Clear to auscultation         Mental Status:  Mental Status Findings:  Cooperative         Anesthesia Plan  Type of Anesthesia, risks & benefits discussed:  Anesthesia Type:  general  Patient's Preference:   Intra-op Monitoring Plan: standard ASA monitors  Intra-op Monitoring Plan Comments:   Post Op Pain Control Plan: multimodal analgesia, IV/PO Opioids PRN and per primary service following discharge from PACU  Post Op Pain Control Plan Comments:   Induction:   IV  Beta Blocker:  Patient is on a Beta-Blocker and has received one dose within the past 24 hours (No further documentation required).       Informed Consent: Patient understands risks and agrees with Anesthesia plan.  Questions answered. Anesthesia consent signed with patient.  ASA Score: 3     Day of Surgery Review of History & Physical:    H&P update referred to the surgeon.         Ready For Surgery From Anesthesia Perspective.

## 2018-02-19 NOTE — PRE ADMISSION SCREENING
Anesthesia Assessment: Preoperative EQUATION    Planned Procedure: Procedure(s) (LRB):  EXCHANGE IMPLANT-BREAST (Right)  Requested Anesthesia Type:General  Surgeon: Bam Nunez MD  Service: Plastics  Known or anticipated Date of Surgery:3/5/2018    Surgeon notes: reviewed    Electronic QUestionnaire Assessment completed via nurse interview with patient.        NO AQ    Triage considerations:     The patient has no apparent active cardiac condition (No unstable coronary Syndrome such as severe unstable angina or recent [<1 month] myocardial infarction, decompensated CHF, severe valvular   disease or significant arrhythmia)    Previous anesthesia records:GETA, MAC and No problems 02/07/17; Placement Time: 0814; Method of Intubation: Direct laryngoscopy; Inserted by: Anesthesia Resident; Airway Device: Endotracheal Tube; Mask Ventilation: Easy - oral; Intubated: Postinduction; Blade: Kylie #3; Airway Device Size: 7.0; Style: Cuffed; Cuff Inflation: Minimal occlusive pressure; Placement Verified By: Auscultation, ETT Condensation, Capnometry; Grade: Grade I; Complicating Factors: None; Intubation Findings: Positive EtCO2, Bilateral breath sounds, Atraumatic/Condition of teeth unchanged;  Depth of Insertion: 20; Securment: Lips; Complications: None; Breath Sounds: Equal Bilateral; Insertion Attempts: 1;  Airway/Jaw/Neck:  Airway Findings: Mouth Opening: Normal Tongue: Normal  General Airway Assessment: Adult  Mallampati: II  TM Distance: Normal, at least 6 cm  Jaw/Neck Findings:  Neck ROM: Normal ROM      Dental:  Dental Findings: In tact     Last PCP note: outside [facility]   Subspecialty notes: Cardiology: General, Hematology/Oncology    Other important co-morbidities: HX CHF/idiopathic myopathy,Hx V tac,  S/P Medtronic AICD, HX breast cancer     Tests already available:  Available tests,  within 3 months , within [facility] .12/14/17 BMP. 10/16/17 CBC. 8/2017 EKG and echo.            Instructions given.  (See in Nurse's note)    Optimization:  Anesthesia Preop Clinic Assessment  Indicated-not required for this procedure    Medical Opinion Indicated-pt was cleared by cards on 12/14/17.   Dr Escobar note: Stable. Stable EF      Low to medium risk             Plan:    Testing:  Hemoglobin       Consultation:completed by Dr Escobar     Patient  has previously scheduled Medical Appointment: 2/21 Heme/Onc, 2/22 preop with surgeon.    Navigation: Tests Scheduled. 2/22                      Results will be tracked by Preop Clinic.                 Straight Line to surgery.

## 2018-02-21 ENCOUNTER — OFFICE VISIT (OUTPATIENT)
Dept: HEMATOLOGY/ONCOLOGY | Facility: CLINIC | Age: 43
End: 2018-02-21
Attending: INTERNAL MEDICINE
Payer: COMMERCIAL

## 2018-02-21 VITALS
HEART RATE: 85 BPM | RESPIRATION RATE: 16 BRPM | HEIGHT: 59 IN | BODY MASS INDEX: 24.09 KG/M2 | DIASTOLIC BLOOD PRESSURE: 76 MMHG | TEMPERATURE: 97 F | WEIGHT: 119.5 LBS | SYSTOLIC BLOOD PRESSURE: 112 MMHG | OXYGEN SATURATION: 98 %

## 2018-02-21 DIAGNOSIS — C50.611 CANCER OF AXILLARY TAIL OF RIGHT BREAST: Primary | ICD-10-CM

## 2018-02-21 DIAGNOSIS — Z79.811 USE OF AROMATASE INHIBITORS: ICD-10-CM

## 2018-02-21 PROBLEM — Z51.11 ENCOUNTER FOR ANTINEOPLASTIC CHEMOTHERAPY: Status: RESOLVED | Noted: 2017-04-26 | Resolved: 2018-02-21

## 2018-02-21 PROCEDURE — 99213 OFFICE O/P EST LOW 20 MIN: CPT | Mod: S$GLB,,, | Performed by: INTERNAL MEDICINE

## 2018-02-21 NOTE — PROGRESS NOTES
Subjective:       Patient ID: Avis Graves is a 42 y.o. female.    Chief Complaint: No chief complaint on file.    HPI   Ms. Graves returns today for follow up.      Briefly, she is a 42-year-old -American female with a history of cardiomyopathy and an EF of less than 30%, who was diagnosed last year with a carcinoma of the right breast, that was ER strongly positive, MI negative, and HER-2 negative.  On 2/07/2017 she underwent a right modified radical mastectomy with sentinel lymph node biopsy and insertion of a tissue expander.  The pathology report from the 02/07/2017 procedure indicates that she had a 4 cm carcinoma; three of the four sentinel lymph nodes were positive with macrometastases.  The patient was referred for further evaluation.  It was recommended to her to proceed with twelve weekly cycles of taxol, possibly with the addition of CMF upon completion.  She completed 12 cycles of weekly taxol, but decided against CMF and also against XRT.  She was subsequently started on anastrazole.    She is scheduled to undergo her reconstruction ion March 5, 2018.     A mammogram last month was read as BIRADS 1 and a one year follow up was recommended.     Review of Systems  Overall she feels OK.  ECOG PS is 1.  She denies any depression, vomiting, diarrhea, constipation, diplopia, blurred vision, headaches, chest pain, palpitations, shortness of breath, left breast pain, abdominal pain, extremity pain, or difficulty ambulating.  The remainder of the ten-point ROS, including general, skin, lymph, H/N, cardiorespiratory, GI, , Neuro, Endocrine, and psychiatric is negative.     Objective:      Physical Exam  She is alert, oriented to time, place, person, pleasant, well      nourished, in no acute physical distress.                                   VITAL SIGNS:  Reviewed                                      HEENT:  Normal.  There are no nasal, oral, lip, gingival, auricular, or lid     lesions.   Mucosae are moist and pink, and there is no        thrush.  Pupils are equal, reactive to light and accommodation.              Extraocular muscle movements are intact.  Dentition is good.                                     NECK:  Supple without JVD, adenopathy, or thyromegaly.                       LUNGS:  Clear to auscultation without wheezing, rales, or rhonchi.           CARDIOVASCULAR:  Reveals an S1, S2, no murmurs, no rubs, no gallops.         ABDOMEN:  Soft, nontender, without organomegaly.  Bowel sounds are    present.                                                                     EXTREMITIES:  No cyanosis, clubbing, or edema.                               BREASTS:  She is status post right mastectomy with a tissue expander in place and a well-healed incision.    There are no masses in the left breast.     An AICD is palpated in the left infraclavicular area.   A left sided port is seen on her arm, medially.                                   LYMPHATIC:  There is no cervical, axillary, or supraclavicular adenopathy.   SKIN:  Warm and moist, without petechiae, rashes, induration, or ecchymoses.           NEUROLOGIC:  DTRs are 0-1+ bilaterally, symmetrical, motor function is 5/5,  and cranial nerves are  within normal limits.      Assessment:       1. 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 Use of aromatase inhibitors      Plan:          I have asked her to remain on anastrazole, which she will take for 5 years.  She informed me that she will have her port removed during her upcoming reconstruction. I will see her again in three months , and her mammogram will be repeated a year from now.  .  Her multiple questions were answered to her satisfaction.  .

## 2018-02-22 ENCOUNTER — CLINICAL SUPPORT (OUTPATIENT)
Dept: PLASTIC SURGERY | Facility: CLINIC | Age: 43
End: 2018-02-22
Payer: COMMERCIAL

## 2018-02-22 DIAGNOSIS — Z01.818 PREOPERATIVE TESTING: Primary | ICD-10-CM

## 2018-03-01 ENCOUNTER — TELEPHONE (OUTPATIENT)
Dept: SURGERY | Facility: CLINIC | Age: 43
End: 2018-03-01

## 2018-03-01 NOTE — TELEPHONE ENCOUNTER
----- Message from Lory De La Cruz sent at 2/28/2018  4:26 PM CST -----  Contact: self   Avis States that she needs a call returned by a nurse in reference to what exactly is being done for her surgery , Please call Avis @ [phone] . Thanks :)

## 2018-03-01 NOTE — TELEPHONE ENCOUNTER
Returned call to patient, pt states that she would like to proceed with left mastopexy, right implant exchange, and port removal, will notify MD of patients requests, will reach out to Dr Lizarraga regarding port removal at this time

## 2018-03-02 ENCOUNTER — TELEPHONE (OUTPATIENT)
Dept: PLASTIC SURGERY | Facility: CLINIC | Age: 43
End: 2018-03-02

## 2018-03-05 ENCOUNTER — ANESTHESIA (OUTPATIENT)
Dept: SURGERY | Facility: HOSPITAL | Age: 43
End: 2018-03-05
Payer: COMMERCIAL

## 2018-03-05 ENCOUNTER — HOSPITAL ENCOUNTER (OUTPATIENT)
Facility: HOSPITAL | Age: 43
Discharge: HOME | End: 2018-03-05
Attending: SURGERY | Admitting: SURGERY
Payer: COMMERCIAL

## 2018-03-05 ENCOUNTER — SURGERY (OUTPATIENT)
Age: 43
End: 2018-03-05

## 2018-03-05 VITALS
OXYGEN SATURATION: 99 % | HEART RATE: 71 BPM | BODY MASS INDEX: 23.18 KG/M2 | SYSTOLIC BLOOD PRESSURE: 108 MMHG | RESPIRATION RATE: 18 BRPM | HEIGHT: 59 IN | DIASTOLIC BLOOD PRESSURE: 54 MMHG | WEIGHT: 115 LBS | TEMPERATURE: 98 F

## 2018-03-05 DIAGNOSIS — C50.919 BREAST CANCER: ICD-10-CM

## 2018-03-05 DIAGNOSIS — I47.20 VENTRICULAR TACHYCARDIA: ICD-10-CM

## 2018-03-05 DIAGNOSIS — Z95.810 PRESENCE OF AUTOMATIC IMPLANTABLE CARDIOVERTER-DEFIBRILLATOR: ICD-10-CM

## 2018-03-05 DIAGNOSIS — Z95.810 ICD (IMPLANTABLE CARDIOVERTER-DEFIBRILLATOR) IN PLACE: Primary | ICD-10-CM

## 2018-03-05 DIAGNOSIS — C50.611 CANCER OF AXILLARY TAIL OF RIGHT BREAST: Primary | ICD-10-CM

## 2018-03-05 PROCEDURE — 37000008 HC ANESTHESIA 1ST 15 MINUTES: Performed by: SURGERY

## 2018-03-05 PROCEDURE — D9220A PRA ANESTHESIA: Mod: CRNA,,, | Performed by: NURSE ANESTHETIST, CERTIFIED REGISTERED

## 2018-03-05 PROCEDURE — 11970 RPLCMT TISS XPNDR PERM IMPLT: CPT | Mod: 59,RT,, | Performed by: SURGERY

## 2018-03-05 PROCEDURE — 71000016 HC POSTOP RECOV ADDL HR: Performed by: SURGERY

## 2018-03-05 PROCEDURE — S0077 INJECTION, CLINDAMYCIN PHOSP: HCPCS | Performed by: SURGERY

## 2018-03-05 PROCEDURE — 63600175 PHARM REV CODE 636 W HCPCS: Performed by: NURSE ANESTHETIST, CERTIFIED REGISTERED

## 2018-03-05 PROCEDURE — 88305 TISSUE EXAM BY PATHOLOGIST: CPT | Performed by: PATHOLOGY

## 2018-03-05 PROCEDURE — 27000221 HC OXYGEN, UP TO 24 HOURS

## 2018-03-05 PROCEDURE — 93287 PERI-PX DEVICE EVAL & PRGR: CPT | Mod: 26,,, | Performed by: INTERNAL MEDICINE

## 2018-03-05 PROCEDURE — 25000003 PHARM REV CODE 250: Performed by: NURSE ANESTHETIST, CERTIFIED REGISTERED

## 2018-03-05 PROCEDURE — 25000003 PHARM REV CODE 250: Performed by: SURGERY

## 2018-03-05 PROCEDURE — 88305 TISSUE EXAM BY PATHOLOGIST: CPT | Mod: 26,,, | Performed by: PATHOLOGY

## 2018-03-05 PROCEDURE — D9220A PRA ANESTHESIA: Mod: ANES,,, | Performed by: ANESTHESIOLOGY

## 2018-03-05 PROCEDURE — 19316 MASTOPEXY: CPT | Mod: LT,,, | Performed by: SURGERY

## 2018-03-05 PROCEDURE — 88300 SURGICAL PATH GROSS: CPT | Mod: 26,,, | Performed by: PATHOLOGY

## 2018-03-05 PROCEDURE — 25000003 PHARM REV CODE 250: Performed by: STUDENT IN AN ORGANIZED HEALTH CARE EDUCATION/TRAINING PROGRAM

## 2018-03-05 PROCEDURE — 36000706: Performed by: SURGERY

## 2018-03-05 PROCEDURE — 37000009 HC ANESTHESIA EA ADD 15 MINS: Performed by: SURGERY

## 2018-03-05 PROCEDURE — C1789 PROSTHESIS, BREAST, IMP: HCPCS | Performed by: SURGERY

## 2018-03-05 PROCEDURE — 94761 N-INVAS EAR/PLS OXIMETRY MLT: CPT

## 2018-03-05 PROCEDURE — 93287 PERI-PX DEVICE EVAL & PRGR: CPT

## 2018-03-05 PROCEDURE — 36000707: Performed by: SURGERY

## 2018-03-05 PROCEDURE — 19370 REVJ PERI-IMPLT CAPSULE BRST: CPT | Mod: 51,RT,, | Performed by: SURGERY

## 2018-03-05 PROCEDURE — 63600175 PHARM REV CODE 636 W HCPCS: Performed by: ANESTHESIOLOGY

## 2018-03-05 PROCEDURE — 36590 REMOVAL TUNNELED CV CATH: CPT | Mod: 51,,, | Performed by: SURGERY

## 2018-03-05 PROCEDURE — 71000039 HC RECOVERY, EACH ADD'L HOUR: Performed by: SURGERY

## 2018-03-05 PROCEDURE — 71000015 HC POSTOP RECOV 1ST HR: Performed by: SURGERY

## 2018-03-05 PROCEDURE — C1729 CATH, DRAINAGE: HCPCS | Performed by: SURGERY

## 2018-03-05 PROCEDURE — 63600175 PHARM REV CODE 636 W HCPCS: Performed by: SURGERY

## 2018-03-05 PROCEDURE — 71000033 HC RECOVERY, INTIAL HOUR: Performed by: SURGERY

## 2018-03-05 PROCEDURE — 63600175 PHARM REV CODE 636 W HCPCS

## 2018-03-05 DEVICE — IMPLANTABLE DEVICE: Type: IMPLANTABLE DEVICE | Site: BREAST | Status: FUNCTIONAL

## 2018-03-05 RX ORDER — SODIUM CHLORIDE 0.9 % (FLUSH) 0.9 %
3 SYRINGE (ML) INJECTION
Status: DISCONTINUED | OUTPATIENT
Start: 2018-03-05 | End: 2018-03-05 | Stop reason: HOSPADM

## 2018-03-05 RX ORDER — AMOXICILLIN 250 MG
1 CAPSULE ORAL 2 TIMES DAILY
COMMUNITY
Start: 2018-03-05 | End: 2020-01-02

## 2018-03-05 RX ORDER — LIDOCAINE HYDROCHLORIDE 10 MG/ML
1 INJECTION, SOLUTION EPIDURAL; INFILTRATION; INTRACAUDAL; PERINEURAL ONCE
Status: DISCONTINUED | OUTPATIENT
Start: 2018-03-05 | End: 2018-03-05 | Stop reason: HOSPADM

## 2018-03-05 RX ORDER — BACITRACIN 50000 [IU]/1
INJECTION, POWDER, FOR SOLUTION INTRAMUSCULAR
Status: DISCONTINUED | OUTPATIENT
Start: 2018-03-05 | End: 2018-03-05 | Stop reason: HOSPADM

## 2018-03-05 RX ORDER — PHENYLEPHRINE HYDROCHLORIDE 10 MG/ML
INJECTION INTRAVENOUS
Status: DISCONTINUED | OUTPATIENT
Start: 2018-03-05 | End: 2018-03-05

## 2018-03-05 RX ORDER — FENTANYL CITRATE 50 UG/ML
INJECTION, SOLUTION INTRAMUSCULAR; INTRAVENOUS
Status: COMPLETED
Start: 2018-03-05 | End: 2018-03-05

## 2018-03-05 RX ORDER — FENTANYL CITRATE 50 UG/ML
25 INJECTION, SOLUTION INTRAMUSCULAR; INTRAVENOUS EVERY 5 MIN PRN
Status: COMPLETED | OUTPATIENT
Start: 2018-03-05 | End: 2018-03-05

## 2018-03-05 RX ORDER — OXYCODONE AND ACETAMINOPHEN 10; 325 MG/1; MG/1
1 TABLET ORAL EVERY 4 HOURS PRN
Qty: 28 TABLET | Refills: 0 | Status: SHIPPED | OUTPATIENT
Start: 2018-03-05 | End: 2018-05-30

## 2018-03-05 RX ORDER — CLINDAMYCIN PHOSPHATE 900 MG/50ML
900 INJECTION, SOLUTION INTRAVENOUS
Status: COMPLETED | OUTPATIENT
Start: 2018-03-05 | End: 2018-03-05

## 2018-03-05 RX ORDER — ACETAMINOPHEN 10 MG/ML
INJECTION, SOLUTION INTRAVENOUS
Status: DISCONTINUED | OUTPATIENT
Start: 2018-03-05 | End: 2018-03-05

## 2018-03-05 RX ORDER — SODIUM CHLORIDE 9 MG/ML
INJECTION, SOLUTION INTRAVENOUS CONTINUOUS
Status: DISCONTINUED | OUTPATIENT
Start: 2018-03-05 | End: 2018-03-05 | Stop reason: HOSPADM

## 2018-03-05 RX ORDER — ROCURONIUM BROMIDE 10 MG/ML
INJECTION, SOLUTION INTRAVENOUS
Status: DISCONTINUED | OUTPATIENT
Start: 2018-03-05 | End: 2018-03-05

## 2018-03-05 RX ORDER — FENTANYL CITRATE 50 UG/ML
INJECTION, SOLUTION INTRAMUSCULAR; INTRAVENOUS
Status: DISCONTINUED | OUTPATIENT
Start: 2018-03-05 | End: 2018-03-05

## 2018-03-05 RX ORDER — DEXAMETHASONE SODIUM PHOSPHATE 4 MG/ML
INJECTION, SOLUTION INTRA-ARTICULAR; INTRALESIONAL; INTRAMUSCULAR; INTRAVENOUS; SOFT TISSUE
Status: DISCONTINUED | OUTPATIENT
Start: 2018-03-05 | End: 2018-03-05

## 2018-03-05 RX ORDER — CLINDAMYCIN HYDROCHLORIDE 300 MG/1
300 CAPSULE ORAL EVERY 8 HOURS
Qty: 30 CAPSULE | Refills: 0 | Status: SHIPPED | OUTPATIENT
Start: 2018-03-05 | End: 2018-03-15

## 2018-03-05 RX ORDER — LIDOCAINE HCL/PF 100 MG/5ML
SYRINGE (ML) INTRAVENOUS
Status: DISCONTINUED | OUTPATIENT
Start: 2018-03-05 | End: 2018-03-05

## 2018-03-05 RX ORDER — OXYCODONE AND ACETAMINOPHEN 5; 325 MG/1; MG/1
1 TABLET ORAL EVERY 4 HOURS PRN
Qty: 28 TABLET | Refills: 0 | Status: SHIPPED | OUTPATIENT
Start: 2018-03-05 | End: 2018-03-05 | Stop reason: HOSPADM

## 2018-03-05 RX ORDER — NEOSTIGMINE METHYLSULFATE 1 MG/ML
INJECTION, SOLUTION INTRAVENOUS
Status: DISCONTINUED | OUTPATIENT
Start: 2018-03-05 | End: 2018-03-05

## 2018-03-05 RX ORDER — HYDROCODONE BITARTRATE AND ACETAMINOPHEN 5; 325 MG/1; MG/1
1 TABLET ORAL EVERY 4 HOURS PRN
Status: DISCONTINUED | OUTPATIENT
Start: 2018-03-05 | End: 2018-03-05 | Stop reason: HOSPADM

## 2018-03-05 RX ORDER — HEPARIN SODIUM 5000 [USP'U]/ML
5000 INJECTION, SOLUTION INTRAVENOUS; SUBCUTANEOUS ONCE
Status: COMPLETED | OUTPATIENT
Start: 2018-03-05 | End: 2018-03-05

## 2018-03-05 RX ORDER — ETOMIDATE 2 MG/ML
INJECTION INTRAVENOUS
Status: DISCONTINUED | OUTPATIENT
Start: 2018-03-05 | End: 2018-03-05

## 2018-03-05 RX ORDER — GLYCOPYRROLATE 0.2 MG/ML
INJECTION INTRAMUSCULAR; INTRAVENOUS
Status: DISCONTINUED | OUTPATIENT
Start: 2018-03-05 | End: 2018-03-05

## 2018-03-05 RX ORDER — BACITRACIN 500 [USP'U]/G
OINTMENT TOPICAL 3 TIMES DAILY
Refills: 0 | COMMUNITY
Start: 2018-03-05 | End: 2018-05-30

## 2018-03-05 RX ADMIN — ETOMIDATE 18 MG: 2 INJECTION, SOLUTION INTRAVENOUS at 07:03

## 2018-03-05 RX ADMIN — GLYCOPYRROLATE 0.2 MG: 0.2 INJECTION, SOLUTION INTRAMUSCULAR; INTRAVENOUS at 09:03

## 2018-03-05 RX ADMIN — PHENYLEPHRINE HYDROCHLORIDE 100 MCG: 10 INJECTION INTRAVENOUS at 08:03

## 2018-03-05 RX ADMIN — LIDOCAINE HYDROCHLORIDE 50 MG: 20 INJECTION, SOLUTION INTRAVENOUS at 07:03

## 2018-03-05 RX ADMIN — HYDROCODONE BITARTRATE AND ACETAMINOPHEN 1 TABLET: 5; 325 TABLET ORAL at 10:03

## 2018-03-05 RX ADMIN — ROCURONIUM BROMIDE 10 MG: 10 INJECTION, SOLUTION INTRAVENOUS at 08:03

## 2018-03-05 RX ADMIN — CLINDAMYCIN PHOSPHATE 900 MG: 150 INJECTION, SOLUTION, CONCENTRATE INTRAVENOUS at 08:03

## 2018-03-05 RX ADMIN — SODIUM CHLORIDE: 0.9 INJECTION, SOLUTION INTRAVENOUS at 06:03

## 2018-03-05 RX ADMIN — FENTANYL CITRATE 25 MCG: 50 INJECTION, SOLUTION INTRAMUSCULAR; INTRAVENOUS at 10:03

## 2018-03-05 RX ADMIN — FENTANYL CITRATE 50 MCG: 50 INJECTION, SOLUTION INTRAMUSCULAR; INTRAVENOUS at 07:03

## 2018-03-05 RX ADMIN — HEPARIN SODIUM 5000 UNITS: 5000 INJECTION, SOLUTION INTRAVENOUS; SUBCUTANEOUS at 07:03

## 2018-03-05 RX ADMIN — NEOSTIGMINE METHYLSULFATE 2 MG: 1 INJECTION INTRAVENOUS at 09:03

## 2018-03-05 RX ADMIN — PHENYLEPHRINE HYDROCHLORIDE 100 MCG: 10 INJECTION INTRAVENOUS at 09:03

## 2018-03-05 RX ADMIN — FENTANYL CITRATE 25 MCG: 50 INJECTION, SOLUTION INTRAMUSCULAR; INTRAVENOUS at 08:03

## 2018-03-05 RX ADMIN — FENTANYL CITRATE 25 MCG: 50 INJECTION, SOLUTION INTRAMUSCULAR; INTRAVENOUS at 09:03

## 2018-03-05 RX ADMIN — ROCURONIUM BROMIDE 30 MG: 10 INJECTION, SOLUTION INTRAVENOUS at 07:03

## 2018-03-05 RX ADMIN — BACITRACIN 50000 UNITS: 50000 INJECTION, POWDER, LYOPHILIZED, FOR SOLUTION INTRAMUSCULAR at 08:03

## 2018-03-05 RX ADMIN — CLINDAMYCIN PHOSPHATE 900 MG: 18 INJECTION, SOLUTION INTRAVENOUS at 07:03

## 2018-03-05 RX ADMIN — DEXAMETHASONE SODIUM PHOSPHATE 4 MG: 4 INJECTION, SOLUTION INTRAMUSCULAR; INTRAVENOUS at 08:03

## 2018-03-05 RX ADMIN — ACETAMINOPHEN 1000 MG: 10 INJECTION, SOLUTION INTRAVENOUS at 09:03

## 2018-03-05 RX ADMIN — SODIUM CHLORIDE: 0.9 INJECTION, SOLUTION INTRAVENOUS at 07:03

## 2018-03-05 NOTE — PROGRESS NOTES
Dr. Nunez at bedside and aware that pt has no orders and still needs H&P, admission order order and consent.

## 2018-03-05 NOTE — ANESTHESIA POSTPROCEDURE EVALUATION
"Anesthesia Post Evaluation    Patient: Avis Graves    Procedure(s) Performed: Procedure(s) (LRB):  EXCHANGE IMPLANT-BREAST (Right)  MASTOPEXY (Left)  REMOVAL-PORT-A-CATH (Left)    Final Anesthesia Type: general  Patient location during evaluation: PACU  Patient participation: Yes- Able to Participate  Level of consciousness: awake and alert  Post-procedure vital signs: reviewed and stable  Pain management: adequate  Airway patency: patent  PONV status at discharge: No PONV  Anesthetic complications: no      Cardiovascular status: blood pressure returned to baseline  Respiratory status: unassisted  Hydration status: euvolemic  Follow-up not needed.        Visit Vitals  BP (!) 108/54 (BP Location: Left leg, Patient Position: Lying)   Pulse 71   Temp 36.6 °C (97.9 °F) (Temporal)   Resp 18   Ht 4' 11" (1.499 m)   Wt 52.2 kg (115 lb)   SpO2 99%   Breastfeeding? No   BMI 23.23 kg/m²       Pain/Vamshi Score: Pain Assessment Performed: Yes (3/5/2018 12:30 PM)  Presence of Pain: complains of pain/discomfort (3/5/2018 12:30 PM)  Pain Rating Prior to Med Admin: 6 (3/5/2018 10:20 AM)  Pain Rating Post Med Admin: 4 (3/5/2018 10:40 AM)  Vamshi Score: 10 (3/5/2018 12:30 PM)      "

## 2018-03-05 NOTE — PROGRESS NOTES
Dr. Brown at bedside, aware pt has no orders and needs updated H&P and admission order.  Dr. Jared Mantilla also paged and notified

## 2018-03-05 NOTE — PROGRESS NOTES
Post op ICD interrogation and reprogramming performed.  Device fxn WNL, ENABLED tachy detections and therapy.

## 2018-03-05 NOTE — PROGRESS NOTES
Pt seen in DOSC #7 prior to surgery.  AICD tachy therapy disabled at bedside.  Please call device clinic at [phone] after surgery for device reprogramming.

## 2018-03-05 NOTE — PLAN OF CARE
Problem: Patient Care Overview  Goal: Plan of Care Review  Outcome: Ongoing (interventions implemented as appropriate)  Vital signs stable. Afebrile. Drowsy, oriented and following commands. Pain controlled with PRN pain meds. Denies nausea. Surgical dressing remains CDI

## 2018-03-05 NOTE — BRIEF OP NOTE
[facility]  Brief Operative Note     SUMMARY     Surgery Date: 3/5/2018     Surgeon(s) and Role:     * Tristin Brown MD - Resident - Assisting     * Bam Nunez MD - Primary     * Jared Mantilla MD - Fellow        Pre-op Diagnosis:  Personal history of malignant neoplasm of breast [Z85.3]    Post-op Diagnosis:  Post-Op Diagnosis Codes:     * Personal history of malignant neoplasm of breast [Z85.3]    Procedure(s) (LRB):  EXCHANGE IMPLANT-BREAST (Right)  MASTOPEXY (Left)  REMOVAL-PORT-A-CATH (Left)    Anesthesia: General    Description of the findings of the procedure: Left breast  mastopexy, right breast tissue expander removed and permanent implant placed, left upper extremity portacath removed    Findings/Key Components:  Left breast  mastopexy, right breast tissue expander removed and permanent implant placed, left upper extremity portacath removed    Estimated Blood Loss: * No values recorded between 3/5/2018  8:06 AM and 3/5/2018  9:44 AM *         Specimens:   Specimen (12h ago through future)    Start     Ordered    03/05/18 0920  Specimen to Pathology - Surgery  Once     Comments:  1.) Right breast implant- Gross only.2.) Left breast skin- Permanent.3.) Left [id number]- Gross only.      03/05/18 0919          Discharge Note    SUMMARY     Admit Date: 3/5/2018    Discharge Date and Time:  03/05/2018 9:57 AM    Hospital Course (synopsis of major diagnoses, care, treatment, and services provided during the course of the hospital stay): Patient arrived for scheduled procedure. Patient tolerated the procedure well. Patient was discharged after recovery with po clinda for 10 days, and instructions to follow up with Dr. Nnuez in 1 week.     Final Diagnosis: Post-Op Diagnosis Codes:     * Personal history of malignant neoplasm of breast [Z85.3]    Disposition: Home or Self Care    Follow Up/Patient Instructions:     Medications:  Reconciled Home Medications:   Current  Discharge Medication List      START taking these medications    Details   bacitracin 500 unit/gram ointment Apply topically 3 (three) times daily. To nipple starting 3/6/18  Refills: 0      clindamycin (CLEOCIN) 300 MG capsule Take 1 capsule (300 mg total) by mouth every 8 (eight) hours.  Qty: 30 capsule, Refills: 0      oxyCODONE-acetaminophen (PERCOCET) 5-325 mg per tablet Take 1 tablet by mouth every 4 (four) hours as needed for Pain.  Qty: 28 tablet, Refills: 0      !! senna-docusate 8.6-50 mg (PERICOLACE) 8.6-50 mg per tablet Take 1 tablet by mouth 2 (two) times daily.       !! - Potential duplicate medications found. Please discuss with provider.      CONTINUE these medications which have NOT CHANGED    Details   anastrozole (ARIMIDEX) 1 mg Tab Take 1 tablet (1 mg total) by mouth once daily.  Qty: 90 tablet, Refills: 2    Associated Diagnoses: Cancer of axillary tail of right breast      budesonide-formoterol 160-4.5 mcg (SYMBICORT) 160-4.5 mcg/actuation HFAA Inhale 1 puff into the lungs every 12 (twelve) hours.  Qty: 10.2 g, Refills: 0      carvedilol (COREG CR) 80 MG 24 hr capsule Take 1 capsule (80 mg total) by mouth once daily.  Qty: 30 capsule, Refills: 11      cetirizine (ZYRTEC) 10 MG tablet Take 10 mg by mouth daily as needed.       enalapril (VASOTEC) 2.5 MG tablet TAKE 1 TABLET (2.5 MG TOTAL) BY MOUTH 2 (TWO) TIMES DAILY.  Qty: 60 tablet, Refills: 11    Associated Diagnoses: Chronic combined systolic and diastolic congestive heart failure      furosemide (LASIX) 20 MG tablet TAKE 1 TABLET (20 MG TOTAL) BY MOUTH ONCE DAILY.  Qty: 30 tablet, Refills: 11      LACTOBACILLUS ACIDOPHILUS (PROBIOTIC ORAL) Take by mouth every evening.      mometasone (NASONEX) 50 mcg/actuation nasal spray 2 sprays by Nasal route once daily.  Qty: 1 each, Refills: 3      potassium chloride (MICRO-K) 10 MEQ CpSR TAKE 1 CAPSULE (10 MEQ TOTAL) BY MOUTH 2 (TWO) TIMES DAILY.  Qty: 60 capsule, Refills: 11    Associated Diagnoses:  Hypokalemia      !! senna-docusate 8.6-50 mg (PERICOLACE) 8.6-50 mg per tablet Take 1 tablet by mouth once daily. While taking prescription pain medications.  Qty: 30 tablet, Refills: 0      UNABLE TO FIND Bioclens 1 tablet every AM for bowels (OTC)       !! - Potential duplicate medications found. Please discuss with provider.          Discharge Procedure Orders  Diet general     Lifting restrictions   Order Comments: No lifting more than 5 lbs for 2 weeks  No lifting more than 10 lbs for following 2 weeks  No lifting more than 15 lbs for following 2 weeks (6 weeks total)     Call MD for:  temperature >100.4     Call MD for:  persistent nausea and vomiting     Call MD for:  severe uncontrolled pain     Call MD for:  redness, tenderness, or signs of infection (pain, swelling, redness, odor or green/yellow discharge around incision site)     Wound care routine (specify)   Order Comments: Keep tight bra on at all times unless in shower  May remove bandages tomrrow  May shower in 48 hours with warm soap and water  Place bacitracin on nipple three times a day starting 3/6/18       Follow-up Information     Bam Nunez MD On 3/14/2018.    Specialty:  Plastic Surgery  Contact information:  Sean Gerardo alfonso  [facility] [id number] [phone]

## 2018-03-05 NOTE — ANESTHESIA RELEASE NOTE
"Anesthesia Release from PACU Note    Patient: Avis Graves    Procedure(s) Performed: Procedure(s) (LRB):  EXCHANGE IMPLANT-BREAST (Right)  MASTOPEXY (Left)  REMOVAL-PORT-A-CATH (Left)    Anesthesia type: general    Post pain: Adequate analgesia    Post assessment: no apparent anesthetic complications    Last Vitals:   Visit Vitals  BP (!) 108/54 (BP Location: Left leg, Patient Position: Lying)   Pulse 71   Temp 36.6 °C (97.9 °F) (Temporal)   Resp 18   Ht 4' 11" (1.499 m)   Wt 52.2 kg (115 lb)   SpO2 99%   Breastfeeding? No   BMI 23.23 kg/m²       Post vital signs: stable    Level of consciousness: awake, alert  and oriented    Nausea/Vomiting: no nausea/no vomiting    Complications: none    Airway Patency: patent    Respiratory: unassisted    Cardiovascular: stable and blood pressure at baseline    Hydration: euvolemic  "

## 2018-03-05 NOTE — H&P
Plastic Surgery History and Physical    HPI:  Avis Graves 43 y.o. female with breast cancer s/p tissue expander reconstruction.  Here today for second stage reconstruction.       PMH:  Past Medical History:   Diagnosis Date    Allergy     Anemia associated with chemotherapy 5/17/2017    Asthma     Cancer     Breast     Cardiomyopathy     CHF (congestive heart failure)     Diverticulitis        PSH:  Past Surgical History:   Procedure Laterality Date    CARDIAC DEFIBRILLATOR PLACEMENT      X 2 Medtronic     CARDIAC DEFIBRILLATOR PLACEMENT      medtronic    HYSTERECTOMY      MASTECTOMY Right 02/07/2017    TONSILLECTOMY         SH:  Tobacco:   History   Smoking Status    Never Smoker   Smokeless Tobacco    Never Used     ETOH:   History   Alcohol Use No     Illicit Drugs:   History   Drug Use No       Medications    Current Facility-Administered Medications:     0.9%  NaCl infusion, , Intravenous, Continuous, Bam Nunez MD, Last Rate: 10 mL/hr at 03/05/18 0607    clindamycin 900 MG/50 ML D5W 900 mg/50 mL IVPB 900 mg, 900 mg, Intravenous, On Call Procedure, Jared Mantilla MD    heparin (porcine) injection 5,000 Units, 5,000 Units, Subcutaneous, Once, Jared Mantilla MD    lidocaine (PF) 10 mg/ml (1%) injection 10 mg, 1 mL, Intradermal, Once, Bam Nunez MD    lidocaine (PF) 10 mg/ml (1%) injection 10 mg, 1 mL, Intradermal, Once, Jared Mantilla MD    Physical Exam  General: NAD, Resting comfortably in bed  Chest: CTA Bl  Heart: RRR  Abd: Soft, NT, ND      Diagnostics:  Lab Results   Component Value Date    WBC 5.71 10/16/2017    HGB 13.9 10/16/2017    HCT 42.9 10/16/2017    MCV 92 10/16/2017     10/16/2017     Lab Results   Component Value Date    CREATININE 0.8 12/14/2017    BUN 12 12/14/2017     12/14/2017    K 4.5 12/14/2017     12/14/2017    CO2 29 12/14/2017     Lab Results   Component Value Date    ALT 16 10/16/2017    AST 17  10/16/2017    ALKPHOS 56 10/16/2017    BILITOT 0.4 10/16/2017     Lab Results   Component Value Date    ALBUMIN 3.6 10/16/2017         Assessment/Plan  Avis Graves 43 y.o. female with breast cancer    1. Or today for second stage.

## 2018-03-05 NOTE — TRANSFER OF CARE
"Anesthesia Transfer of Care Note    Patient: Avis Graves    Procedure(s) Performed: Procedure(s) (LRB):  EXCHANGE IMPLANT-BREAST (Right)  MASTOPEXY (Left)  REMOVAL-PORT-A-CATH (Left)    Patient location: PACU    Anesthesia Type: general    Transport from OR: Transported from OR on room air with adequate spontaneous ventilation    Post pain: adequate analgesia    Post assessment: no apparent anesthetic complications    Post vital signs: stable    Level of consciousness: awake    Nausea/Vomiting: no nausea/vomiting    Complications: none    Transfer of care protocol was followed      Last vitals:   Visit Vitals  /85 (BP Location: Left leg, Patient Position: Lying)   Pulse (!) 57   Temp 36.6 °C (97.9 °F) (Temporal)   Resp 15   Ht 4' 11" (1.499 m)   Wt 52.2 kg (115 lb)   SpO2 95%   Breastfeeding? No   BMI 23.23 kg/m²     "

## 2018-03-05 NOTE — DISCHARGE INSTRUCTIONS
GENERAL POST-OPERATIVE  PATIENT INSTRUCTIONS      FOLLOW-UP:  Please make an appointment with your physician as scheduled.  Call your physician immediately if you have any fevers greater than 102.5, drainage from you wound that is not clear or looks infected, persistent bleeding, increasing abdominal pain, problems urinating, or persistent nausea/vomiting.      WOUND CARE INSTRUCTIONS:  Keep tight bra on at all times unless in shower  May remove bandages tomrrow  May shower in 48 hours with warm soap and water  Place bacitracin on nipple three times a day starting 3/6/18    DIET:  You may eat any foods that you can tolerate.  It is a good idea to eat a high fiber diet and take in plenty of fluids to prevent constipation.  If you do become constipated you may want to take a mild laxative or take ducolax tablets on a daily basis until your bowel habits are regular.  Constipation can be very uncomfortable, along with straining, after recent surgery.    ACTIVITY:   No lifting more than 5 lbs for 2 weeks  No lifting more than 10 lbs for following 2 weeks  No lifting more than 15 lbs for following 2 weeks (6 weeks total)    MEDICATIONS:  Try to take narcotic medications and anti-inflammatory medications, such as tylenol, ibuprofen, naprosyn, etc., with food.  This will minimize stomach upset from the medication.  Should you develop nausea and vomiting from the pain medication, or develop a rash, please discontinue the medication and contact your physician.  You should not drive, make important decisions, or operate machinery when taking narcotic pain medication.    QUESTIONS:  Please feel free to call your physician or the hospital  if you have any questions, and they will be glad to assist you.

## 2018-03-05 NOTE — PLAN OF CARE
AVS reviewed with pt. Pt verbalizes understanding of all instructions, medications, and follow-up appointments. Pt stable, tolerating fluids, no complaints noted. Pt urinated 100 cc. Pt appropriate for discharge at this time.

## 2018-03-06 NOTE — OP NOTE
DATE OF PROCEDURE:  03/05/2018.    PREOPERATIVE DIAGNOSIS:  History of breast cancer.    POSTOPERATIVE DIAGNOSIS:  History of breast cancer.    PROCEDURES PERFORMED:  1.  Right implant exchange.  2.  Left mastopexy.  3.  Right breast medial extensive capsulotomy.  4.  Removal of Port-A-Cath Left Arm.    SURGEON:  Bam Nunez M.D., F.A.C.S.    ANESTHESIA:  General.    COMPLICATIONS:  None.    BLOOD LOSS:  Approximately 100 mL.    DESCRIPTION OF PROCEDURE:  The patient was placed in the supine position after   adequate general endotracheal anesthesia and was prepped and draped in a normal   sterile fashion.  Previous mastectomy on the right side was reexcised.  The   capsule was entered at a higher level.  The tissue expander was removed.  An   extensive medial capsulotomy was then performed.  Temporary sizer was placed on   the left side.  A modified Patel pattern was drawn using an inferior pedicle.    Incisions were made.  The inferior pedicle was deepithelialized.  Flaps were   elevated.  No breast tissue was removed.  The incisions were closed then using   interrupted 3-0 Monocryl, followed by a running 4-0 Monocryl subcuticular suture   for the inframammary incision.  Vertical limb and nipple-areolar complex were   closed using interrupted 4-0 Monocryl, followed by a running 4-0 Monocryl   subcuticular suture.  Next, the patient was placed in the upright position.  The   appropriate implant size was then determined.  The patient was placed in the   supine position.  Chest wall was then reprepped and redraped.  Gloves were   changed.  A smooth silicone implant was opened on the back table, soaked in   triple antibiotic solution, and placed in the pocket.  Capsule was closed using   2-0 Vicryl and the skin using 3-0 Monocryl, followed by a running 4-0 Monocryl   subcuticular suture.  Next, the left arm Port-A-Cath had been previously prepped   and draped.  An incision overlying the previous incision was  made.  The port   was identified and elevated.  The sutures were clipped.  A looped suture was   placed under the vessel.  The catheter was removed and the silk suture tied.    The incision was closed using interrupted 3-0 Monocryl, followed by a running   4-0 Monocryl subcuticular suture.  There were no complications.      CECILE/ALEXEI  dd: 03/06/2018 14:49:27 (CST)  td: 03/06/2018 17:21:53 (CST)  Doc ID   #[id number]  Job ID #[id number]    CC:

## 2018-03-14 ENCOUNTER — OFFICE VISIT (OUTPATIENT)
Dept: PLASTIC SURGERY | Facility: CLINIC | Age: 43
End: 2018-03-14
Payer: COMMERCIAL

## 2018-03-14 VITALS
TEMPERATURE: 98 F | WEIGHT: 115.06 LBS | BODY MASS INDEX: 23.2 KG/M2 | HEART RATE: 79 BPM | SYSTOLIC BLOOD PRESSURE: 93 MMHG | DIASTOLIC BLOOD PRESSURE: 62 MMHG | HEIGHT: 59 IN

## 2018-03-14 DIAGNOSIS — Z09 SURGERY FOLLOW-UP EXAMINATION: Primary | ICD-10-CM

## 2018-03-14 PROCEDURE — 99024 POSTOP FOLLOW-UP VISIT: CPT | Mod: S$GLB,,, | Performed by: SURGERY

## 2018-03-14 PROCEDURE — 99999 PR PBB SHADOW E&M-EST. PATIENT-LVL III: CPT | Mod: PBBFAC,,, | Performed by: SURGERY

## 2018-03-14 NOTE — PROGRESS NOTES
[facility]vis [facility] after having a mastopexy and   implant exchange.  She has done well.  She is very happy.  Everything looks   great.  We will see her back in several weeks.      CECILE/ALEXEI  dd: 03/14/2018 14:05:35 (CDT)  td: 03/15/2018 07:25:04 (CDT)  Doc ID   #[id number]  Job ID #[id number]    CC:

## 2018-03-28 ENCOUNTER — OFFICE VISIT (OUTPATIENT)
Dept: PLASTIC SURGERY | Facility: CLINIC | Age: 43
End: 2018-03-28
Payer: COMMERCIAL

## 2018-03-28 VITALS
HEART RATE: 76 BPM | DIASTOLIC BLOOD PRESSURE: 58 MMHG | BODY MASS INDEX: 23.18 KG/M2 | SYSTOLIC BLOOD PRESSURE: 86 MMHG | HEIGHT: 59 IN | WEIGHT: 115 LBS

## 2018-03-28 DIAGNOSIS — Z09 SURGERY FOLLOW-UP EXAMINATION: Primary | ICD-10-CM

## 2018-03-28 PROCEDURE — 99024 POSTOP FOLLOW-UP VISIT: CPT | Mod: S$GLB,,, | Performed by: SURGERY

## 2018-03-28 PROCEDURE — 99999 PR PBB SHADOW E&M-EST. PATIENT-LVL II: CPT | Mod: PBBFAC,,, | Performed by: SURGERY

## 2018-03-28 NOTE — PROGRESS NOTES
44 y/o F s/p R implant exchange and L mastopexy.  She is doing well with no complaints.     Vitals:    03/28/18 1019   BP: (!) 86/58   Pulse: 76     Breast: good symmetry, healing well, L nipple sensation intact    Plan;  Follow up in 6 weeks.

## 2018-03-28 NOTE — PROGRESS NOTES
I personally have examined the patient and agree with the resident's findings and plan of action.  ADDENDUM    Ms. Graves is doing very, very well.  The right breast is slightly smaller than   is the left.  She does not have a conical projection as does the normal left   breast.  I discussed with the  and the patient that we will wait probably   six to eight weeks.  She probably would benefit from a little bit more free fat   transfer to the right breast on the left side, maybe just a tad little   liposuction under the nipple areolar complex.  I think that would achieve the   best symmetry.      CECILE/ALEXEI  dd: 03/28/2018 11:06:09 (CDT)  td: 03/29/2018 11:31:07 (CDT)  Doc ID   #[id number]  Job ID #[id number]    CC:

## 2018-03-29 RX ORDER — CARVEDILOL PHOSPHATE 80 MG/1
CAPSULE, EXTENDED RELEASE ORAL
Qty: 30 CAPSULE | Refills: 5 | Status: SHIPPED | OUTPATIENT
Start: 2018-03-29 | End: 2018-07-10 | Stop reason: SDUPTHER

## 2018-04-05 RX ORDER — FUROSEMIDE 20 MG/1
TABLET ORAL
Qty: 30 TABLET | Refills: 5 | Status: SHIPPED | OUTPATIENT
Start: 2018-04-05 | End: 2018-07-10 | Stop reason: SDUPTHER

## 2018-05-09 ENCOUNTER — OFFICE VISIT (OUTPATIENT)
Dept: PLASTIC SURGERY | Facility: CLINIC | Age: 43
End: 2018-05-09
Payer: COMMERCIAL

## 2018-05-09 VITALS
HEART RATE: 73 BPM | SYSTOLIC BLOOD PRESSURE: 115 MMHG | HEIGHT: 55 IN | WEIGHT: 113.56 LBS | BODY MASS INDEX: 26.28 KG/M2 | DIASTOLIC BLOOD PRESSURE: 80 MMHG | TEMPERATURE: 98 F

## 2018-05-09 DIAGNOSIS — Z09 SURGERY FOLLOW-UP EXAMINATION: Primary | ICD-10-CM

## 2018-05-09 PROCEDURE — 99024 POSTOP FOLLOW-UP VISIT: CPT | Mod: S$GLB,,, | Performed by: SURGERY

## 2018-05-09 PROCEDURE — 99999 PR PBB SHADOW E&M-EST. PATIENT-LVL II: CPT | Mod: PBBFAC,,, | Performed by: SURGERY

## 2018-05-09 NOTE — PROGRESS NOTES
POST OP VISIT    SUBJECTIVE:  Avis Graves is a 43 y.o. female now 6 weeks s/p left implant exchange and mastopexy. Doing very well, she does report that the lateral and medial portions have started to have some shooting pain and increase in scar size. She has a h/o keloids      PHYSICAL EXAM:    A&O, NAD  No Respiratory Distress  Incision well healing without erythema or drainage, mildly hypertrophic scar at the medial-most portion of the left breast    INTERVAL PATHOLOGY:  None    ASSESSMENT & PLAN:  Avis Graves is a 43 y.o. female s/p Right breast implant exchange and Left mastopexy     - RTC in 2 mo  - She will let us know if she wants nipple reconstruction or fat grafting at that time        Vivek Souza MD   [phone]  General Surgery   [facility]

## 2018-05-23 ENCOUNTER — OFFICE VISIT (OUTPATIENT)
Dept: HEMATOLOGY/ONCOLOGY | Facility: CLINIC | Age: 43
End: 2018-05-23
Attending: INTERNAL MEDICINE
Payer: COMMERCIAL

## 2018-05-23 VITALS
BODY MASS INDEX: 30.27 KG/M2 | WEIGHT: 112 LBS | RESPIRATION RATE: 17 BRPM | HEART RATE: 76 BPM | SYSTOLIC BLOOD PRESSURE: 97 MMHG | DIASTOLIC BLOOD PRESSURE: 63 MMHG | TEMPERATURE: 98 F | OXYGEN SATURATION: 99 %

## 2018-05-23 DIAGNOSIS — Z79.811 USE OF AROMATASE INHIBITORS: ICD-10-CM

## 2018-05-23 DIAGNOSIS — C50.611 CANCER OF AXILLARY TAIL OF RIGHT BREAST: Primary | ICD-10-CM

## 2018-05-23 PROCEDURE — 3008F BODY MASS INDEX DOCD: CPT | Mod: CPTII,S$GLB,, | Performed by: INTERNAL MEDICINE

## 2018-05-23 PROCEDURE — 99999 PR PBB SHADOW E&M-EST. PATIENT-LVL III: CPT | Mod: PBBFAC,,, | Performed by: INTERNAL MEDICINE

## 2018-05-23 PROCEDURE — 99214 OFFICE O/P EST MOD 30 MIN: CPT | Mod: S$GLB,,, | Performed by: INTERNAL MEDICINE

## 2018-05-23 NOTE — PROGRESS NOTES
Subjective:       Patient ID: Avis Graves is a 43 y.o. female.    Chief Complaint: No chief complaint on file.    HPI   Ms. Graves returns today for follow up.      Briefly, she is a 43-year-old -American female with a history of cardiomyopathy and an EF of less than 30%, who was diagnosed last year with a carcinoma of the right breast, that was ER strongly positive, CO negative, and HER-2 negative.  On 2/07/2017 she underwent a right modified radical mastectomy with sentinel lymph node biopsy and insertion of a tissue expander.  The pathology report from the 02/07/2017 procedure indicates that she had a 4 cm carcinoma; three of the four sentinel lymph nodes were positive with macrometastases.  The patient was referred for further evaluation.  It was recommended to her to proceed with twelve weekly cycles of taxol, possibly with the addition of CMF upon completion.  She completed 12 cycles of weekly taxol, but decided against CMF and also against XRT.  She was subsequently started on anastrazole.    She is scheduled to undergo her reconstruction ion March 5, 2018.     A mammogram last January was read as BIRADS 1 and a one year follow up was recommended.     Review of Systems  Overall she feels OK.  ECOG PS is 1.  She denies any depression, vomiting, diarrhea, constipation, diplopia, blurred vision, headaches, chest pain, palpitations, shortness of breath, left breast pain, abdominal pain, extremity pain, or difficulty ambulating.  The remainder of the ten-point ROS, including general, skin, lymph, H/N, cardiorespiratory, GI, , Neuro, Endocrine, and psychiatric is negative.     Objective:      Physical Exam  She is alert, oriented to time, place, person, pleasant, well      nourished, in no acute physical distress.                                   VITAL SIGNS:  Reviewed                                      HEENT:  Normal.  There are no nasal, oral, lip, gingival, auricular, or lid     lesions.   Mucosae are moist and pink, and there is no        thrush.  Pupils are equal, reactive to light and accommodation.              Extraocular muscle movements are intact.  Dentition is good.   There is a pea sized lymph node in the right occipital area.                                    NECK:  Supple without JVD, adenopathy, or thyromegaly.                       LUNGS:  Clear to auscultation without wheezing, rales, or rhonchi.           CARDIOVASCULAR:  Reveals an S1, S2, no murmurs, no rubs, no gallops.         ABDOMEN:  Soft, nontender, without organomegaly.  Bowel sounds are    present.                                                                     EXTREMITIES:  No cyanosis, clubbing, or edema.                               BREASTS:  She is status post right mastectomy with a tissue expander in place and a well-healed incision.    There are no masses in the left breast.     An AICD is palpated in the left infraclavicular area.                                    LYMPHATIC:  There is no cervical, axillary, or supraclavicular adenopathy.   SKIN:  Warm and moist, without petechiae, rashes, induration, or ecchymoses.           NEUROLOGIC:  DTRs are 0-1+ bilaterally, symmetrical, motor function is 5/5,  and cranial nerves are  within normal limits.      Assessment:       1. 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 Use of aromatase inhibitors    3.      Occipital node as described above  Plan:          I have asked her to remain on anastrazole, which she will take for 5 years.  I will see her again in three months, and her mammogram will be repeated in January 2019.  In regards to her occipital node, I have asked to let me or her PCP know if it gets bigger or if it gets tender.  I do not see the point of a biopsy at this point, however, a biopsy may be considered if it does not regress, and I told her so.   Her multiple questions were answered to her satisfaction.  .

## 2018-05-29 ENCOUNTER — PATIENT MESSAGE (OUTPATIENT)
Dept: TRANSPLANT | Facility: CLINIC | Age: 43
End: 2018-05-29

## 2018-05-29 ENCOUNTER — PATIENT MESSAGE (OUTPATIENT)
Dept: HEMATOLOGY/ONCOLOGY | Facility: CLINIC | Age: 43
End: 2018-05-29

## 2018-05-29 DIAGNOSIS — C50.919 MALIGNANT NEOPLASM OF FEMALE BREAST, UNSPECIFIED ESTROGEN RECEPTOR STATUS, UNSPECIFIED LATERALITY, UNSPECIFIED SITE OF BREAST: Primary | ICD-10-CM

## 2018-05-30 ENCOUNTER — OFFICE VISIT (OUTPATIENT)
Dept: HEMATOLOGY/ONCOLOGY | Facility: CLINIC | Age: 43
End: 2018-05-30
Attending: INTERNAL MEDICINE
Payer: COMMERCIAL

## 2018-05-30 ENCOUNTER — HOSPITAL ENCOUNTER (OUTPATIENT)
Dept: RADIOLOGY | Facility: HOSPITAL | Age: 43
Discharge: HOME | End: 2018-05-30
Attending: INTERNAL MEDICINE
Payer: COMMERCIAL

## 2018-05-30 VITALS
SYSTOLIC BLOOD PRESSURE: 95 MMHG | DIASTOLIC BLOOD PRESSURE: 61 MMHG | WEIGHT: 115.75 LBS | HEART RATE: 80 BPM | HEIGHT: 59 IN | RESPIRATION RATE: 16 BRPM | OXYGEN SATURATION: 98 % | TEMPERATURE: 98 F | BODY MASS INDEX: 23.33 KG/M2

## 2018-05-30 DIAGNOSIS — N63.0 BREAST NODULE: ICD-10-CM

## 2018-05-30 DIAGNOSIS — Z79.811 USE OF AROMATASE INHIBITORS: ICD-10-CM

## 2018-05-30 DIAGNOSIS — I42.9 CARDIOMYOPATHY, IDIOPATHIC: ICD-10-CM

## 2018-05-30 DIAGNOSIS — C50.611 CANCER OF AXILLARY TAIL OF RIGHT BREAST: Primary | ICD-10-CM

## 2018-05-30 DIAGNOSIS — C50.611 CANCER OF AXILLARY TAIL OF RIGHT BREAST: ICD-10-CM

## 2018-05-30 PROCEDURE — 77065 DX MAMMO INCL CAD UNI: CPT | Mod: 26,LT,, | Performed by: RADIOLOGY

## 2018-05-30 PROCEDURE — 77061 BREAST TOMOSYNTHESIS UNI: CPT | Mod: TC,PO,LT

## 2018-05-30 PROCEDURE — 77061 BREAST TOMOSYNTHESIS UNI: CPT | Mod: 26,LT,, | Performed by: RADIOLOGY

## 2018-05-30 PROCEDURE — 76642 ULTRASOUND BREAST LIMITED: CPT | Mod: TC,PO,LT

## 2018-05-30 PROCEDURE — 99999 PR PBB SHADOW E&M-EST. PATIENT-LVL IV: CPT | Mod: PBBFAC,,, | Performed by: INTERNAL MEDICINE

## 2018-05-30 PROCEDURE — 99214 OFFICE O/P EST MOD 30 MIN: CPT | Mod: S$GLB,,, | Performed by: INTERNAL MEDICINE

## 2018-05-30 PROCEDURE — 3008F BODY MASS INDEX DOCD: CPT | Mod: CPTII,S$GLB,, | Performed by: INTERNAL MEDICINE

## 2018-05-30 PROCEDURE — 76642 ULTRASOUND BREAST LIMITED: CPT | Mod: 26,LT,, | Performed by: RADIOLOGY

## 2018-05-30 PROCEDURE — 77065 DX MAMMO INCL CAD UNI: CPT | Mod: TC,PO,LT

## 2018-05-30 NOTE — PROGRESS NOTES
Subjective:       Patient ID: Avis Graves is a 43 y.o. female.    Chief Complaint: No chief complaint on file.    HPI   Ms. Graves presents today to the Clinic, requesting to be seen.  Apparently three days ago she felt a nodule under the nipple in her left breast, hence the visit here today..      Briefly, she is a 43-year-old -American female with a history of cardiomyopathy and an EF of less than 30%, who was diagnosed last year with a carcinoma of the right breast, that was ER strongly positive, MT negative, and HER-2 negative.  On 2/07/2017 she underwent a right modified radical mastectomy with sentinel lymph node biopsy and insertion of a tissue expander.  The pathology report from the 02/07/2017 procedure indicates that she had a 4 cm carcinoma; three of the four sentinel lymph nodes were positive with macrometastases.  The patient was referred for further evaluation.  It was recommended to her to proceed with twelve weekly cycles of taxol, possibly with the addition of CMF upon completion.  She completed 12 cycles of weekly taxol, but decided against CMF and also against XRT.  She was subsequently started on anastrazole.    A mammogram last January was read as BIRADS 1 and a one year follow up was recommended.     Review of Systems  Overall she feels OK.  ECOG PS is 1.  She is somewhat anxious with the recent developments and states that her left areola has been somewhat tender.   Shedenies any depression, vomiting, diarrhea, constipation, diplopia, blurred vision, headaches, chest pain, palpitations, shortness of breath, left breast pain, abdominal pain, extremity pain, or difficulty ambulating.  The remainder of the ten-point ROS, including general, skin, lymph, H/N, cardiorespiratory, GI, , Neuro, Endocrine, and psychiatric is negative.     Objective:      Physical Exam  She is alert, oriented to time, place, person, pleasant, well      nourished, in no acute physical distress.                                    VITAL SIGNS:  Reviewed                                      HEENT:  Normal.  There are no nasal, oral, lip, gingival, auricular, or lid     lesions.  Mucosae are moist and pink, and there is no        thrush.  Pupils are equal, reactive to light and accommodation.              Extraocular muscle movements are intact.  Dentition is good.   There is a pea sized lymph node in the right occipital area.                                    NECK:  Supple without JVD, adenopathy, or thyromegaly.                       LUNGS:  Clear to auscultation without wheezing, rales, or rhonchi.           CARDIOVASCULAR:  Reveals an S1, S2, no murmurs, no rubs, no gallops.         ABDOMEN:  Soft, nontender, without organomegaly.  Bowel sounds are    present.                                                                     EXTREMITIES:  No cyanosis, clubbing, or edema.                               BREASTS:  She is status post right mastectomy with a tissue expander in place and a well-healed incision.    There are is an ill defined nodule underneath the areola in the left breast extending from the 10 o' clock position to the one o' clock position.       An AICD is palpated in the left infraclavicular area.                                    LYMPHATIC:  There is no cervical, axillary, or supraclavicular adenopathy.   SKIN:  Warm and moist, without petechiae, rashes, induration, or ecchymoses.           NEUROLOGIC:  DTRs are 0-1+ bilaterally, symmetrical, motor function is 5/5,  and cranial nerves are  within normal limits.      Assessment:       1. 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 Cardiomyopathy, idiopathic    3. Use of aromatase inhibitors    4. Breast nodule      Plan:          I have asked her to remain on anastrazole for now.  We will arrange for her to undergo a diagnostic mammogram today at the [facility] and I call her with the results.  Assuming her diagnostic workup is negative, I  will see her again in three months, and her mammogram will be repeated in January 2019.    Her multiple questions were answered to her satisfaction.  .

## 2018-06-05 ENCOUNTER — CLINICAL SUPPORT (OUTPATIENT)
Dept: ELECTROPHYSIOLOGY | Facility: CLINIC | Age: 43
End: 2018-06-05
Payer: COMMERCIAL

## 2018-06-05 ENCOUNTER — OFFICE VISIT (OUTPATIENT)
Dept: SURGERY | Facility: CLINIC | Age: 43
End: 2018-06-05
Payer: COMMERCIAL

## 2018-06-05 VITALS
HEIGHT: 59 IN | HEART RATE: 85 BPM | BODY MASS INDEX: 22.58 KG/M2 | TEMPERATURE: 98 F | SYSTOLIC BLOOD PRESSURE: 86 MMHG | WEIGHT: 112 LBS | DIASTOLIC BLOOD PRESSURE: 51 MMHG

## 2018-06-05 DIAGNOSIS — N63.0 BREAST MASS: Primary | ICD-10-CM

## 2018-06-05 DIAGNOSIS — I47.20 VENTRICULAR TACHYCARDIA: ICD-10-CM

## 2018-06-05 DIAGNOSIS — Z95.810 ICD (IMPLANTABLE CARDIOVERTER-DEFIBRILLATOR) IN PLACE: ICD-10-CM

## 2018-06-05 PROCEDURE — 93295 DEV INTERROG REMOTE 1/2/MLT: CPT | Mod: S$GLB,,, | Performed by: INTERNAL MEDICINE

## 2018-06-05 PROCEDURE — 99214 OFFICE O/P EST MOD 30 MIN: CPT | Mod: S$GLB,,, | Performed by: SURGERY

## 2018-06-05 PROCEDURE — 93296 REM INTERROG EVL PM/IDS: CPT | Mod: S$GLB,,, | Performed by: INTERNAL MEDICINE

## 2018-06-05 PROCEDURE — 3008F BODY MASS INDEX DOCD: CPT | Mod: CPTII,S$GLB,, | Performed by: SURGERY

## 2018-06-05 PROCEDURE — 99999 PR PBB SHADOW E&M-EST. PATIENT-LVL III: CPT | Mod: PBBFAC,,, | Performed by: SURGERY

## 2018-06-05 NOTE — LETTER
[facility]  [address]  [facility] [address]  Phone: [phone]  Fax: [phone] June 5, 2018      [place]  [address] [facility] [address]  [facility] [address]    Patient: Avis Graves   MR Number: [id number]   YOB: 1975   Date of Visit: 6/5/2018     Dear Dr. Rivas:    Thank you for referring Avis Graves to me for evaluation.     Density along 12 o'clock subareolar region likely clinically consistent with fat necrosis and remains unchanged subjectively and objectively. Area of origianl concer for patient at 4 o'clock subareolar region is no longer palpable.  Possibly related to dissolution of oil cyst with manipulation and imaging.  Nonetheless, no suspicious clinical findings on CBE or imaging.    Unfortunately, patient cannot have a breast MRI due to presence of cardiac defibrillator.  F/U in 6 months for CBE only.    If you have questions, please do not hesitate to call me. I look forward to following Avis Graves along with you.    Sincerely,    Orestes Santana MD  Medical Director, [facility]  Staff Attending Surgeon - Department of Surgery  [facility]  Associate Professor of Surgery  [employer]  [employer]    [facility]/hcr

## 2018-06-05 NOTE — PROGRESS NOTES
Patient ID: Avis Graves is a 42 y.o. female.     Chief Complaint: Hx right breast cancer, new left breast mass     HPI   Ms. Graves returns today for a new palpable L subareolar mass    Briefly, she is a 42-year-old -American female with a history of cardiomyopathy and an EF of less than 30%, who wasdiagnosed with a carcinoma of the right breast, that was ER strongly positive, UT negative, and HER-2 negative.  On 2/07/2017 she underwent a right modified radical mastectomy with sentinel lymph node biopsy and insertion of a tissue expander.  The pathology report from the 02/07/2017 procedure indicates that she had a 4 cm carcinoma; three of the four sentinel lymph nodes were positive with macrometastases.  She has since completed 12/12 cycles of weekly taxol.  She has declined CMF, as well as adjuvant radiotherapy.       Interval hx: Palpated a new subareolar mass on her left.  Presented to Dr. Lizarraga, who ordered L MMG and US.  Both of these are negative for any new lesions, but she presents to us for clinical exam.    Personal hx as above  Family history: No ovarian or breast cancer  G0  Menarche: 9  Menopause status: Hysterectomy at age 37  OCP use:  HRT use: None    Review of Systems  Overall she feels OK.  She denies any depression, vomiting, diarrhea, constipation, diplopia, blurred vision, headaches, chest pain, palpitations, shortness of breath, left breast pain, abdominal pain, extremity pain, or difficulty ambulating.  The remainder of the ten-point ROS, including general, skin, lymph, H/N, cardiorespiratory, GI, , Neuro, Endocrine, and psychiatric is negative.      Objective:      Physical Exam  She is alert, oriented to time, place, person, pleasant, well      nourished, in no acute physical distress.    VITAL SIGNS:  Reviewed                                      HEENT:  There are no nasal, oral, lip, gingival, auricular, lid,    or conjunctival lesions.  Mucosae are moist and pink, and  there is no        thrush.  Pupils are equal, reactive to light and accommodation.              Extraocular muscle movements are intact.  Dentition is good.  There is no frontal or maxillary tenderness.                                     NECK:  Supple without JVD, adenopathy, or thyromegaly.                       LUNGS:  Clear to auscultation without wheezing, rales, or rhonchi.           CARDIOVASCULAR:  Reveals an S1, S2, no murmurs, no rubs, no gallops.         ABDOMEN:  Soft, nontender, without organomegaly.  Bowel sounds are    present.                                                                     EXTREMITIES:  No cyanosis, clubbing, or edema.                               BREASTS:  She is status post right mastectomy with a tissue expander in place and a well-healed incision.    There is a 3 cm density at 12 o clock on the left breast.   The previously palpated 4 o clock mass is no longer present.  There is mild redness to the left breast, but this is not tender.  An AICD is in the left infraclavicular area.   LYMPHATIC:  There is no cervical, axillary, or supraclavicular adenopathy.   SKIN:  Warm and moist, without petechiae, rashes, induration, or ecchymoses.           NEUROLOGIC:  DTRs are 0-1+ bilaterally, symmetrical, motor function is 5/5,  and cranial nerves are  within normal limits.  A portacath is seen in the distal right arm, medially.     Assessment:       1. 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 Anemia associated with chemotherapy    3. Cardiomyopathy, idiopathic        Plan:       No mass palpable at this time  F/u with us in 6 mo for CBE  Unfortunately cannot have MRI, with her defibrillator  Next MMG due 5/2019    Jamar Head MD  General Surgery, PGY-4  [phone]     I have personally taken the history and examined this patient and agree with the resident's note as stated above.  Density along 12 o'clock subareolar region likely clinically consistent with fat necrosis and remains  unchanged subjectively and objectively.  Area of origianl concer for patient at 4 o'clock subareolar region is no longer palpable.  Possibly related to dissolution of oil cyst with manipulation and imaging.  Nonetheless, no suspicious clinical findings on CBE or imaging.  Unfortunately, patient cannot have a breast MRI due to presence of cardiac defibrillator.  F/U in 6 months for CBE only.

## 2018-06-29 DIAGNOSIS — E87.6 HYPOKALEMIA: ICD-10-CM

## 2018-06-29 DIAGNOSIS — C50.611 CANCER OF AXILLARY TAIL OF RIGHT BREAST: ICD-10-CM

## 2018-06-29 RX ORDER — POTASSIUM CHLORIDE 750 MG/1
CAPSULE, EXTENDED RELEASE ORAL
Qty: 60 CAPSULE | Refills: 5 | Status: SHIPPED | OUTPATIENT
Start: 2018-06-29 | End: 2019-05-01 | Stop reason: SDUPTHER

## 2018-06-29 RX ORDER — ANASTROZOLE 1 MG/1
1 TABLET ORAL DAILY
Qty: 90 TABLET | Refills: 2 | Status: SHIPPED | OUTPATIENT
Start: 2018-06-29 | End: 2019-01-10 | Stop reason: SDUPTHER

## 2018-07-10 ENCOUNTER — OFFICE VISIT (OUTPATIENT)
Dept: TRANSPLANT | Facility: CLINIC | Age: 43
End: 2018-07-10
Payer: COMMERCIAL

## 2018-07-10 VITALS
HEIGHT: 59 IN | SYSTOLIC BLOOD PRESSURE: 96 MMHG | HEART RATE: 80 BPM | BODY MASS INDEX: 23.07 KG/M2 | DIASTOLIC BLOOD PRESSURE: 61 MMHG | WEIGHT: 114.44 LBS

## 2018-07-10 DIAGNOSIS — Z95.810 AUTOMATIC IMPLANTABLE CARDIOVERTER-DEFIBRILLATOR IN SITU: ICD-10-CM

## 2018-07-10 DIAGNOSIS — I50.22 HEART FAILURE, SYSTOLIC, CHRONIC: Primary | ICD-10-CM

## 2018-07-10 DIAGNOSIS — I47.20 VENTRICULAR TACHYCARDIA: ICD-10-CM

## 2018-07-10 DIAGNOSIS — I50.42 CHRONIC COMBINED SYSTOLIC AND DIASTOLIC CONGESTIVE HEART FAILURE: ICD-10-CM

## 2018-07-10 DIAGNOSIS — I42.8 NICM (NONISCHEMIC CARDIOMYOPATHY): ICD-10-CM

## 2018-07-10 DIAGNOSIS — C50.611 CANCER OF AXILLARY TAIL OF RIGHT BREAST: ICD-10-CM

## 2018-07-10 PROCEDURE — 3008F BODY MASS INDEX DOCD: CPT | Mod: CPTII,S$GLB,, | Performed by: INTERNAL MEDICINE

## 2018-07-10 PROCEDURE — 99999 PR PBB SHADOW E&M-EST. PATIENT-LVL III: CPT | Mod: PBBFAC,,, | Performed by: INTERNAL MEDICINE

## 2018-07-10 PROCEDURE — 99215 OFFICE O/P EST HI 40 MIN: CPT | Mod: S$GLB,,, | Performed by: INTERNAL MEDICINE

## 2018-07-10 RX ORDER — CARVEDILOL PHOSPHATE 80 MG/1
80 CAPSULE, EXTENDED RELEASE ORAL DAILY
Qty: 30 CAPSULE | Refills: 5 | Status: SHIPPED | OUTPATIENT
Start: 2018-07-10 | End: 2019-01-10 | Stop reason: SDUPTHER

## 2018-07-10 RX ORDER — FUROSEMIDE 20 MG/1
20 TABLET ORAL DAILY
Qty: 30 TABLET | Refills: 5 | Status: SHIPPED | OUTPATIENT
Start: 2018-07-10 | End: 2019-02-15 | Stop reason: SDUPTHER

## 2018-07-10 RX ORDER — ENALAPRIL MALEATE 2.5 MG/1
TABLET ORAL
Qty: 60 TABLET | Refills: 11 | Status: SHIPPED | OUTPATIENT
Start: 2018-07-10 | End: 2019-12-16

## 2018-07-10 NOTE — PROGRESS NOTES
Subjective:    Patient ID:  Avis Graves is a 43 y.o. female who presents for follow-up of No chief complaint on file.      Congestive Heart Failure   Pertinent negatives include no abdominal pain, chest pain, chills, coughing, diaphoresis, fever or weakness.     43 year old with NICM with history of LV thrombus(10/18/10 echo revealed no thrombus EF of 20%),and chronic systolic heart failure she is here for follow up and clearance for breast recunstractions. Some shortness of breath in the last week    Review of Systems   Constitution: Negative for chills, decreased appetite, diaphoresis, fever, weakness, malaise/fatigue, night sweats, weight gain and weight loss.   Cardiovascular: Negative for chest pain, claudication, cyanosis, dyspnea on exertion, irregular heartbeat, leg swelling, near-syncope, orthopnea and palpitations.   Respiratory: Negative for cough, hemoptysis, shortness of breath, sleep disturbances due to breathing, snoring, sputum production and wheezing.    Gastrointestinal: Negative for abdominal pain and diarrhea.        Objective:    Physical Exam   Constitutional: She is oriented to person, place, and time. She appears well-developed and well-nourished.   HENT:   Head: Normocephalic and atraumatic.   Eyes: Conjunctivae and EOM are normal. Pupils are equal, round, and reactive to light.   Neck: Neck supple. No JVD present. No tracheal deviation present. No thyromegaly present.   Cardiovascular: Normal rate, regular rhythm and normal heart sounds.  PMI is displaced.  Exam reveals no gallop and no friction rub.    No murmur heard.  Pulmonary/Chest: Effort normal and breath sounds normal. No respiratory distress. She has no wheezes. She has no rales. She exhibits no tenderness.   Abdominal: Soft. Bowel sounds are normal. She exhibits no distension and no mass. There is no tenderness. There is no rebound and no guarding.   Musculoskeletal: Normal range of motion. She exhibits no edema or  tenderness.   Lymphadenopathy:     She has no cervical adenopathy.   Neurological: She is alert and oriented to person, place, and time. She has normal reflexes. No cranial nerve deficit. She exhibits normal muscle tone. Coordination normal.   Skin: Skin is warm and dry.   Psychiatric: She has a normal mood and affect. Her behavior is normal. Thought content normal.         Assessment:       1. Heart failure, systolic, chronic    2. Automatic implantable cardioverter-defibrillator in situ    3. NICM (nonischemic cardiomyopathy)    4. Ventricular tachycardia    5. Cancer of axillary tail of right breast         Plan:       Has increased lasix and felt better    RTC 6 months

## 2018-08-22 ENCOUNTER — OFFICE VISIT (OUTPATIENT)
Dept: HEMATOLOGY/ONCOLOGY | Facility: CLINIC | Age: 43
End: 2018-08-22
Attending: INTERNAL MEDICINE
Payer: COMMERCIAL

## 2018-08-22 VITALS
BODY MASS INDEX: 22.84 KG/M2 | OXYGEN SATURATION: 98 % | RESPIRATION RATE: 12 BRPM | HEART RATE: 78 BPM | SYSTOLIC BLOOD PRESSURE: 102 MMHG | WEIGHT: 113.31 LBS | HEIGHT: 59 IN | DIASTOLIC BLOOD PRESSURE: 65 MMHG

## 2018-08-22 DIAGNOSIS — C50.611 CANCER OF AXILLARY TAIL OF RIGHT BREAST: Primary | ICD-10-CM

## 2018-08-22 DIAGNOSIS — Z79.811 USE OF AROMATASE INHIBITORS: ICD-10-CM

## 2018-08-22 PROCEDURE — 99213 OFFICE O/P EST LOW 20 MIN: CPT | Mod: S$GLB,,, | Performed by: INTERNAL MEDICINE

## 2018-08-22 PROCEDURE — 3008F BODY MASS INDEX DOCD: CPT | Mod: CPTII,S$GLB,, | Performed by: INTERNAL MEDICINE

## 2018-08-22 PROCEDURE — 99999 PR PBB SHADOW E&M-EST. PATIENT-LVL III: CPT | Mod: PBBFAC,,, | Performed by: INTERNAL MEDICINE

## 2018-08-22 NOTE — PROGRESS NOTES
Subjective:       Patient ID: Avis Graves is a 43 y.o. female.    Chief Complaint: No chief complaint on file.    HPI   Ms. Graves presents today for a scheduled follow up appointment.    Briefly, she is a 43-year-old -American female with a history of cardiomyopathy and an EF of less than 30%, who was diagnosed last year with a carcinoma of the right breast, that was ER strongly positive, UT negative, and HER-2 negative.  On 2/07/2017 she underwent a right modified radical mastectomy with sentinel lymph node biopsy and insertion of a tissue expander.  The pathology report from the 02/07/2017 procedure indicates that she had a 4 cm carcinoma; three of the four sentinel lymph nodes were positive with macrometastases.  The patient was referred for further evaluation.  It was recommended to her to proceed with twelve weekly cycles of taxol, possibly with the addition of CMF upon completion.  She completed 12 cycles of weekly taxol, but decided against CMF and also against XRT.  She was subsequently started on anastrazole.    A mammogram last January was read as BIRADS 1 and a one year follow up was recommended.     Review of Systems  Overall she feels OK.  ECOG PS is 1.  She denies any depression, vomiting, diarrhea, constipation, diplopia, blurred vision, headaches, chest pain, palpitations, shortness of breath, left breast pain, abdominal pain, extremity pain, or difficulty ambulating.  The remainder of the ten-point ROS, including general, skin, lymph, H/N, cardiorespiratory, GI, , Neuro, Endocrine, and psychiatric is negative.     Objective:      Physical Exam  She is alert, oriented to time, place, person, pleasant, well      nourished, in no acute physical distress.                                   VITAL SIGNS:  Reviewed                                      HEENT:  Normal.  There are no nasal, oral, lip, gingival, auricular, or lid     lesions.  Mucosae are moist and pink, and there is no         thrush.  Pupils are equal, reactive to light and accommodation.              Extraocular muscle movements are intact.  Dentition is good.   There is a pea sized lymph node in the right occipital area.                                    NECK:  Supple without JVD, adenopathy, or thyromegaly.                       LUNGS:  Clear to auscultation without wheezing, rales, or rhonchi.           CARDIOVASCULAR:  Reveals an S1, S2, no murmurs, no rubs, no gallops.         ABDOMEN:  Soft, nontender, without organomegaly.  Bowel sounds are    present.                                                                     EXTREMITIES:  No cyanosis, clubbing, or edema.                               BREASTS:  She is status post right mastectomy with a tissue expander in place and a well-healed incision.     There are no masses in her left breast.   An AICD is palpated in the left infraclavicular area.                                    LYMPHATIC:  There is no cervical, axillary, or supraclavicular adenopathy.   SKIN:  Warm and moist, without petechiae, rashes, induration, or ecchymoses.           NEUROLOGIC:  DTRs are 0-1+ bilaterally, symmetrical, motor function is 5/5,  and cranial nerves are  within normal limits.      Assessment:       1. 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 Use of aromatase inhibitors      Plan:          I have asked her to remain on anastrazole for now.   I will see her again in 4 months, and her mammogram will be repeated in May 2019.  Her multiple questions were answered to her satisfaction.

## 2018-09-04 ENCOUNTER — CLINICAL SUPPORT (OUTPATIENT)
Dept: ELECTROPHYSIOLOGY | Facility: CLINIC | Age: 43
End: 2018-09-04
Attending: INTERNAL MEDICINE
Payer: COMMERCIAL

## 2018-09-04 DIAGNOSIS — I47.20 VENTRICULAR TACHYCARDIA: ICD-10-CM

## 2018-09-04 DIAGNOSIS — Z95.810 ICD (IMPLANTABLE CARDIOVERTER-DEFIBRILLATOR) IN PLACE: ICD-10-CM

## 2018-09-04 PROCEDURE — 93296 REM INTERROG EVL PM/IDS: CPT | Mod: ,,, | Performed by: INTERNAL MEDICINE

## 2018-09-04 PROCEDURE — 93295 DEV INTERROG REMOTE 1/2/MLT: CPT | Mod: ,,, | Performed by: INTERNAL MEDICINE

## 2018-10-11 NOTE — PROGRESS NOTES
Ms. Graves is a patient of Dr. Mckeon and was last seen in clinic 8/31/2017.      Subjective:   Patient ID:  Avis Graves is a 43 y.o. female who presents for follow-up of Cardiomyopathy  .     HPI:    Ms. Graves is a 43 y.o. female with NICM, HFrEF (EF 30-35%), a hx of VT s/p SC ICD (2009; generator change 2013), mild-persistent asthma, and a hx of breast cancer here for annual follow up.     Background:    Single chamber ICD implanted 2/2009  Remote history of one episode of monomorphic VT, asymptomatic, pace terminated. No recurrence.  Device reached CAREY >> generator change 10/28/2013.  Device interrogations reveals stable function of lead, no significant arrhythmias. Has some SVT, the longer episodes are appropriately classified.    Update (10/12/2018):    Today she says she feels well. Ms. Graves denies chest pain with exertion or at rest, palpitations, SOB, DE LA PAZ, dizziness, or syncope. No CHF hospitalizations.     Remote Device Interrogation (9/4/2018) reveals an intrinsic sinus rhythm with stable lead and device function. No ventricular arrhythmias or treated episodes were noted.  She paces 0% in the RV. Estimated battery longevity 7 years.     I have personally reviewed the patient's EKG today, which shows sinus rhythm at 73bpm. NY interval is 168. QTc is 431.    Recent Cardiac Tests:    2D Echo (8/14/2017):  CONCLUSIONS     1 - Eccentric hypertrophy.     2 - Moderately depressed left ventricular systolic function (EF 30-35%).     3 - Low normal right ventricular systolic function .     4 - Impaired LV relaxation, normal LAP (grade 1 diastolic dysfunction).     5 - The estimated PA systolic pressure is 20 mmHg.     Current Outpatient Medications   Medication Sig    anastrozole (ARIMIDEX) 1 mg Tab TAKE 1 TABLET (1 MG TOTAL) BY MOUTH ONCE DAILY.    budesonide-formoterol 160-4.5 mcg (SYMBICORT) 160-4.5 mcg/actuation HFAA Inhale 1 puff into the lungs every 12 (twelve) hours.    carvedilol (COREG CR) 80  "MG 24 hr capsule Take 1 capsule (80 mg total) by mouth once daily.    cetirizine (ZYRTEC) 10 MG tablet Take 10 mg by mouth daily as needed.     enalapril (VASOTEC) 2.5 MG tablet TAKE 1 TABLET (2.5 MG TOTAL) BY MOUTH 2 (TWO) TIMES DAILY.    furosemide (LASIX) 20 MG tablet Take 1 tablet (20 mg total) by mouth once daily.    LACTOBACILLUS ACIDOPHILUS (PROBIOTIC ORAL) Take by mouth every evening.    mometasone (NASONEX) 50 mcg/actuation nasal spray 2 sprays by Nasal route once daily. (Patient taking differently: 2 sprays by Nasal route daily as needed. )    potassium chloride (MICRO-K) 10 MEQ CpSR TAKE ONE CAPSULE BY MOUTH TWICE A DAY    senna-docusate 8.6-50 mg (PERICOLACE) 8.6-50 mg per tablet Take 1 tablet by mouth 2 (two) times daily.    UNABLE TO FIND Bioclens 1 tablet every AM for bowels (OTC)     No current facility-administered medications for this visit.      Review of Systems   Constitution: Negative for malaise/fatigue.   Cardiovascular: Negative for chest pain, dyspnea on exertion, irregular heartbeat, leg swelling and palpitations.   Respiratory: Negative for shortness of breath.    Hematologic/Lymphatic: Negative for bleeding problem.   Skin: Negative for rash.   Musculoskeletal: Negative for myalgias.   Gastrointestinal: Negative for hematemesis, hematochezia and nausea.   Genitourinary: Negative for hematuria.   Neurological: Negative for light-headedness.   Psychiatric/Behavioral: Negative for altered mental status.   Allergic/Immunologic: Negative for persistent infections.     Objective:        BP (!) 83/56   Pulse 73   Ht 4' 11" (1.499 m)   Wt 52.6 kg (115 lb 15.4 oz)   BMI 23.42 kg/m²     Physical Exam   Constitutional: She is oriented to person, place, and time. She appears well-developed and well-nourished.   HENT:   Head: Normocephalic.   Nose: Nose normal.   Eyes: Pupils are equal, round, and reactive to light.   Cardiovascular: Normal rate, regular rhythm, S1 normal and S2 normal. "   No murmur heard.  Pulses:       Radial pulses are 2+ on the right side, and 2+ on the left side.   Pulmonary/Chest: Breath sounds normal. No respiratory distress.   Device to LUCW.   Abdominal: Normal appearance.   Musculoskeletal: Normal range of motion. She exhibits no edema.   Neurological: She is alert and oriented to person, place, and time.   Skin: Skin is warm and dry. No erythema.   Psychiatric: She has a normal mood and affect. Her speech is normal and behavior is normal.   Nursing note and vitals reviewed.    Lab Results   Component Value Date     12/14/2017    K 4.5 12/14/2017    MG 2.1 02/08/2017    BUN 12 12/14/2017    CREATININE 0.8 12/14/2017    ALT 16 10/16/2017    AST 17 10/16/2017    HGB 13.9 10/16/2017    HCT 42.9 10/16/2017    TSH 1.463 08/08/2014    LDLCALC 225.6 (H) 09/30/2014       Recent Labs   Lab  03/29/17   1000  05/13/17   2114   INR  1.2  1.2       Assessment:     1. Automatic implantable cardioverter-defibrillator in situ    2. Cardiomyopathy, idiopathic    3. Ventricular tachycardia      Plan:     In summary, Ms. Graves is a 43 y.o. female with NICM, HFrEF (EF 30-35%), a hx of VT s/p SC ICD (2009; generator change 2013), mild-persistent asthma, and a hx of breast cancer here for annual follow up.   Ms. Graves is doing well from a device perspective with stable lead and device function. No sustained arrhythmia noted. No CHF, but she would like an updated echocardiogram. She remains on carvedilol and enalapril. Unable to up titrate these meds due to hypotension. She assures me today that she is not light-headed.    Echo (patient preference).  Continue current medication regimen and device settings.   Follow up in device clinic as scheduled.   Follow up in EP clinic in 1 year, sooner as needed.     *A copy of this note has been sent to Dr. Mckeon*    Follow-up in about 1 year (around 10/12/2019).    ------------------------------------------------------------------    Cecilia BURKS  AB Patel, NP-C  [facility]

## 2018-10-12 ENCOUNTER — OFFICE VISIT (OUTPATIENT)
Dept: ELECTROPHYSIOLOGY | Facility: CLINIC | Age: 43
End: 2018-10-12
Payer: COMMERCIAL

## 2018-10-12 ENCOUNTER — HOSPITAL ENCOUNTER (OUTPATIENT)
Dept: CARDIOLOGY | Facility: CLINIC | Age: 43
Discharge: HOME | End: 2018-10-12
Payer: COMMERCIAL

## 2018-10-12 VITALS
DIASTOLIC BLOOD PRESSURE: 56 MMHG | BODY MASS INDEX: 23.37 KG/M2 | HEART RATE: 73 BPM | HEIGHT: 59 IN | WEIGHT: 115.94 LBS | SYSTOLIC BLOOD PRESSURE: 83 MMHG

## 2018-10-12 DIAGNOSIS — I49.9 CARDIAC ARRHYTHMIA, UNSPECIFIED CARDIAC ARRHYTHMIA TYPE: Primary | ICD-10-CM

## 2018-10-12 DIAGNOSIS — Z95.810 AUTOMATIC IMPLANTABLE CARDIOVERTER-DEFIBRILLATOR IN SITU: Primary | ICD-10-CM

## 2018-10-12 DIAGNOSIS — I47.20 VENTRICULAR TACHYCARDIA: ICD-10-CM

## 2018-10-12 DIAGNOSIS — I42.9 CARDIOMYOPATHY, IDIOPATHIC: ICD-10-CM

## 2018-10-12 DIAGNOSIS — I49.9 CARDIAC ARRHYTHMIA, UNSPECIFIED CARDIAC ARRHYTHMIA TYPE: ICD-10-CM

## 2018-10-12 PROCEDURE — 3008F BODY MASS INDEX DOCD: CPT | Mod: CPTII,S$GLB,, | Performed by: NURSE PRACTITIONER

## 2018-10-12 PROCEDURE — 99999 PR PBB SHADOW E&M-EST. PATIENT-LVL III: CPT | Mod: PBBFAC,,, | Performed by: NURSE PRACTITIONER

## 2018-10-12 PROCEDURE — 93000 ELECTROCARDIOGRAM COMPLETE: CPT | Mod: S$GLB,,, | Performed by: INTERNAL MEDICINE

## 2018-10-12 PROCEDURE — 99214 OFFICE O/P EST MOD 30 MIN: CPT | Mod: S$GLB,,, | Performed by: NURSE PRACTITIONER

## 2018-10-30 ENCOUNTER — HOSPITAL ENCOUNTER (OUTPATIENT)
Dept: CARDIOLOGY | Facility: CLINIC | Age: 43
Discharge: HOME | End: 2018-10-30
Attending: NURSE PRACTITIONER
Payer: COMMERCIAL

## 2018-10-30 DIAGNOSIS — I42.9 CARDIOMYOPATHY, IDIOPATHIC: ICD-10-CM

## 2018-10-30 LAB
DIASTOLIC DYSFUNCTION: YES
ESTIMATED PA SYSTOLIC PRESSURE: 22.18
MITRAL VALVE MOBILITY: NORMAL
MITRAL VALVE REGURGITATION: ABNORMAL
RETIRED EF AND QEF - SEE NOTES: 35 (ref 55–65)
TRICUSPID VALVE REGURGITATION: ABNORMAL

## 2018-10-30 PROCEDURE — 93306 TTE W/DOPPLER COMPLETE: CPT | Mod: S$GLB,,, | Performed by: INTERNAL MEDICINE

## 2019-01-09 NOTE — PROGRESS NOTES
Subjective:       Patient ID: Avis Graves is a 43 y.o. female.    Chief Complaint: Cancer of axillary tail of right breast    HPI   Ms. Graves presents today for a follow up.    Briefly, she is a 43-year-old -American female with a history of cardiomyopathy and an EF of less than 30%, who was diagnosed last year with a carcinoma of the right breast, that was ER strongly positive, WI negative, and HER-2 negative.  On 2/07/2017 she underwent a right modified radical mastectomy with sentinel lymph node biopsy and insertion of a tissue expander.  The pathology report from the 02/07/2017 procedure indicates that she had a 4 cm carcinoma; three of the four sentinel lymph nodes were positive with macrometastases.  The patient was referred for further evaluation.  It was recommended to her to proceed with twelve weekly cycles of taxol, possibly with the addition of CMF upon completion.  She completed 12 cycles of weekly taxol, but decided against CMF and also against XRT.  She was subsequently started on anastrazole.    A mammogram in May 2018 was read as BIRADS 1 and a one year follow up was recommended.     Review of Systems  Overall she feels OK.  She gets anxious about not having scans done. She reports scar tissue (post lift) under left breast bothering her. She would like to be referred to [facility]. No other complaints. ECOG PS is 1.  She denies any depression, vomiting, diarrhea, constipation, diplopia, blurred vision, headaches, chest pain, palpitations, shortness of breath, left breast pain, abdominal pain, extremity pain, or difficulty ambulating.  The remainder of the ten-point ROS, including general, skin, lymph, H/N, cardiorespiratory, GI, , Neuro, Endocrine, and psychiatric is negative.     Objective:      Physical Exam  She is alert, oriented to time, place, person, pleasant, well      nourished, in no acute physical distress.                                   VITAL SIGNS:  Reviewed                                       HEENT:  Normal.  There are no nasal, oral, lip, gingival, auricular, or lid     lesions.  Mucosae are moist and pink, and there is no        thrush.  Pupils are equal, reactive to light and accommodation.              Extraocular muscle movements are intact.  Dentition is good.   There is a pea sized lymph node in the right occipital area.                                    NECK:  Supple without JVD, adenopathy, or thyromegaly.                       LUNGS:  Clear to auscultation without wheezing, rales, or rhonchi.           CARDIOVASCULAR:  Reveals an S1, S2, no murmurs, no rubs, no gallops.         ABDOMEN:  Soft, nontender, without organomegaly.  Bowel sounds are    present.                                                                     EXTREMITIES:  No cyanosis, clubbing, or edema.                               BREASTS:  She is status post right mastectomy with a tissue expander in place and a well-healed incision.     There are no masses in her left breast. Left breast incisions well healed. Scar tissue noted.   An AICD is palpated in the left infraclavicular area.                                    LYMPHATIC:  There is no cervical, axillary, or supraclavicular adenopathy.   SKIN:  Warm and moist, without petechiae, rashes, induration, or ecchymoses.           NEUROLOGIC:  DTRs are 0-1+ bilaterally, symmetrical, motor function is 5/5,  and cranial nerves are  within normal limits.      Assessment:       1. 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 Use of aromatase inhibitors    3. S/P breast reconstruction      Plan:          Doing well, LAUREN.   Continue anastrazole.   Continue Calcium and Vit D.   Encouraged to see PCP yearly for labs/screening tests  RTC in 4 months to see Dr. Lizarraga with a left mammogram.      Next BDS 10/2019  Referral to PT at  per patient request for scar tissue in left breast causing slight decrease in ROM.     Patient is in agreement with the proposed  treatment plan. All questions were answered to the patient's satisfaction. Pt knows to call clinic for any new or worsening symptoms and if anything is needed before the next clinic visit.      CHANG Nguyen  Hematology & Oncology  [facility] [place] [address]  ph. [phone]  Fax. [phone]     I spent 30 minutes (face to face) with the patient, more than 50% was in counseling and coordination of care as detailed above.       Distress Screening Results: Psychosocial Distress screening score of Distress Score: 0 noted and reviewed. No intervention indicated.

## 2019-01-10 ENCOUNTER — OFFICE VISIT (OUTPATIENT)
Dept: TRANSPLANT | Facility: CLINIC | Age: 44
End: 2019-01-10
Payer: COMMERCIAL

## 2019-01-10 ENCOUNTER — OFFICE VISIT (OUTPATIENT)
Dept: HEMATOLOGY/ONCOLOGY | Facility: CLINIC | Age: 44
End: 2019-01-10
Payer: COMMERCIAL

## 2019-01-10 ENCOUNTER — TELEPHONE (OUTPATIENT)
Dept: HEMATOLOGY/ONCOLOGY | Facility: CLINIC | Age: 44
End: 2019-01-10

## 2019-01-10 VITALS
OXYGEN SATURATION: 98 % | RESPIRATION RATE: 16 BRPM | SYSTOLIC BLOOD PRESSURE: 102 MMHG | BODY MASS INDEX: 22.8 KG/M2 | WEIGHT: 113.13 LBS | TEMPERATURE: 98 F | HEART RATE: 71 BPM | DIASTOLIC BLOOD PRESSURE: 53 MMHG | HEIGHT: 59 IN

## 2019-01-10 VITALS
WEIGHT: 113.13 LBS | SYSTOLIC BLOOD PRESSURE: 103 MMHG | BODY MASS INDEX: 22.8 KG/M2 | HEART RATE: 76 BPM | HEIGHT: 59 IN | DIASTOLIC BLOOD PRESSURE: 57 MMHG

## 2019-01-10 DIAGNOSIS — I50.22 HEART FAILURE, SYSTOLIC, CHRONIC: ICD-10-CM

## 2019-01-10 DIAGNOSIS — Z95.810 AUTOMATIC IMPLANTABLE CARDIOVERTER-DEFIBRILLATOR IN SITU: ICD-10-CM

## 2019-01-10 DIAGNOSIS — I89.0 ACQUIRED LYMPHEDEMA: ICD-10-CM

## 2019-01-10 DIAGNOSIS — Z98.890 S/P BREAST RECONSTRUCTION: ICD-10-CM

## 2019-01-10 DIAGNOSIS — Z79.811 USE OF AROMATASE INHIBITORS: ICD-10-CM

## 2019-01-10 DIAGNOSIS — I42.9 CARDIOMYOPATHY, IDIOPATHIC: Primary | ICD-10-CM

## 2019-01-10 DIAGNOSIS — C50.611 CANCER OF AXILLARY TAIL OF RIGHT BREAST: Primary | ICD-10-CM

## 2019-01-10 DIAGNOSIS — I42.8 NICM (NONISCHEMIC CARDIOMYOPATHY): ICD-10-CM

## 2019-01-10 DIAGNOSIS — I47.20 VENTRICULAR TACHYCARDIA: ICD-10-CM

## 2019-01-10 PROCEDURE — 3008F PR BODY MASS INDEX (BMI) DOCUMENTED: ICD-10-PCS | Mod: CPTII,S$GLB,, | Performed by: INTERNAL MEDICINE

## 2019-01-10 PROCEDURE — 99999 PR PBB SHADOW E&M-EST. PATIENT-LVL III: CPT | Mod: PBBFAC,,, | Performed by: INTERNAL MEDICINE

## 2019-01-10 PROCEDURE — 3008F PR BODY MASS INDEX (BMI) DOCUMENTED: ICD-10-PCS | Mod: CPTII,S$GLB,, | Performed by: NURSE PRACTITIONER

## 2019-01-10 PROCEDURE — 99999 PR PBB SHADOW E&M-EST. PATIENT-LVL III: ICD-10-PCS | Mod: PBBFAC,,, | Performed by: INTERNAL MEDICINE

## 2019-01-10 PROCEDURE — 3008F BODY MASS INDEX DOCD: CPT | Mod: CPTII,S$GLB,, | Performed by: NURSE PRACTITIONER

## 2019-01-10 PROCEDURE — 99214 PR OFFICE/OUTPT VISIT, EST, LEVL IV, 30-39 MIN: ICD-10-PCS | Mod: S$GLB,,, | Performed by: NURSE PRACTITIONER

## 2019-01-10 PROCEDURE — 99999 PR PBB SHADOW E&M-EST. PATIENT-LVL IV: CPT | Mod: PBBFAC,,, | Performed by: NURSE PRACTITIONER

## 2019-01-10 PROCEDURE — 99999 PR PBB SHADOW E&M-EST. PATIENT-LVL IV: ICD-10-PCS | Mod: PBBFAC,,, | Performed by: NURSE PRACTITIONER

## 2019-01-10 PROCEDURE — 99214 OFFICE O/P EST MOD 30 MIN: CPT | Mod: S$GLB,,, | Performed by: NURSE PRACTITIONER

## 2019-01-10 PROCEDURE — 3008F BODY MASS INDEX DOCD: CPT | Mod: CPTII,S$GLB,, | Performed by: INTERNAL MEDICINE

## 2019-01-10 PROCEDURE — 99215 OFFICE O/P EST HI 40 MIN: CPT | Mod: S$GLB,,, | Performed by: INTERNAL MEDICINE

## 2019-01-10 PROCEDURE — 99215 PR OFFICE/OUTPT VISIT, EST, LEVL V, 40-54 MIN: ICD-10-PCS | Mod: S$GLB,,, | Performed by: INTERNAL MEDICINE

## 2019-01-10 RX ORDER — CARVEDILOL PHOSPHATE 80 MG/1
80 CAPSULE, EXTENDED RELEASE ORAL DAILY
Qty: 30 CAPSULE | Refills: 5 | Status: SHIPPED | OUTPATIENT
Start: 2019-01-10 | End: 2020-05-21 | Stop reason: SDUPTHER

## 2019-01-10 RX ORDER — ANASTROZOLE 1 MG/1
1 TABLET ORAL DAILY
Qty: 90 TABLET | Refills: 2 | Status: SHIPPED | OUTPATIENT
Start: 2019-01-10 | End: 2020-01-10

## 2019-01-10 NOTE — TELEPHONE ENCOUNTER
----- Message from Eliz Pineda NP sent at 1/10/2019  4:00 PM CST -----  Contact: yves from [facility]  See below.   Please see what is needed.   Thanks, PJ  ----- Message -----  From: Bon Monge RN  Sent: 1/10/2019   3:53 PM  To: Eliz Pineda NP        ----- Message -----  From: Dacia Bergeron  Sent: 1/10/2019   3:51 PM  To: , #    Needs order re written with a code.     She can be reached [phone]    Thanks  KB

## 2019-01-10 NOTE — PROGRESS NOTES
Subjective:    Patient ID:  Avis Graves is a 43 y.o. female who presents for follow-up of Congestive Heart Failure      Congestive Heart Failure   Pertinent negatives include no abdominal pain, chest pain, chills, coughing, diaphoresis, fever or weakness.     43 year old with NICM with history of LV thrombus(10/18/10 echo revealed no thrombus EF of 20%),and chronic systolic heart failure she is here for follow up and clearance for breast recunstractions. Asymptomatic and improvement in LV EF    CONCLUSIONS  (OCtober 30, 2018)        1 - Moderately depressed left ventricular systolic function (EF 35-40%).     2 - Quantitatively measured LV function is 41%.     3 - Eccentric hypertrophy.     4 - Mildly depressed right ventricular systolic function .     5 - The estimated PA systolic pressure is 22 mmHg.     Review of Systems   Constitution: Negative for chills, decreased appetite, diaphoresis, fever, weakness, malaise/fatigue, night sweats, weight gain and weight loss.   Cardiovascular: Negative for chest pain, claudication, cyanosis, dyspnea on exertion, irregular heartbeat, leg swelling, near-syncope, orthopnea and palpitations.   Respiratory: Negative for cough, hemoptysis, shortness of breath, sleep disturbances due to breathing, snoring, sputum production and wheezing.    Gastrointestinal: Negative for abdominal pain and diarrhea.        Objective:    Physical Exam   Constitutional: She is oriented to person, place, and time. She appears well-developed and well-nourished.   HENT:   Head: Normocephalic and atraumatic.   Eyes: Conjunctivae and EOM are normal. Pupils are equal, round, and reactive to light.   Neck: Neck supple. No JVD present. No tracheal deviation present. No thyromegaly present.   Cardiovascular: Normal rate, regular rhythm and normal heart sounds. PMI is displaced. Exam reveals no gallop and no friction rub.   No murmur heard.  Pulmonary/Chest: Effort normal and breath sounds normal. No  respiratory distress. She has no wheezes. She has no rales. She exhibits no tenderness.   Abdominal: Soft. Bowel sounds are normal. She exhibits no distension and no mass. There is no tenderness. There is no rebound and no guarding.   Musculoskeletal: Normal range of motion. She exhibits no edema or tenderness.   Lymphadenopathy:     She has no cervical adenopathy.   Neurological: She is alert and oriented to person, place, and time. She has normal reflexes. No cranial nerve deficit. She exhibits normal muscle tone. Coordination normal.   Skin: Skin is warm and dry.   Psychiatric: She has a normal mood and affect. Her behavior is normal. Thought content normal.         Assessment:       1. Cardiomyopathy, idiopathic    2. Automatic implantable cardioverter-defibrillator in situ    3. Heart failure, systolic, chronic    4. NICM (nonischemic cardiomyopathy)    5. Ventricular tachycardia         Plan:       Doing very well Asymptomatic. Echo EF is improved    RTC 6 months

## 2019-01-10 NOTE — TELEPHONE ENCOUNTER
Returned call to speak with Yesi, with [facility] on order that was faxed to confirm what additional code is needed so that could be completed and re-faxed. Awaiting return call for further instructions.

## 2019-01-11 DIAGNOSIS — Z98.82 STATUS POST BILATERAL BREAST IMPLANTS: ICD-10-CM

## 2019-01-11 DIAGNOSIS — C50.611 MALIGNANT NEOPLASM OF AXILLARY TAIL OF RIGHT FEMALE BREAST, UNSPECIFIED ESTROGEN RECEPTOR STATUS: Primary | ICD-10-CM

## 2019-01-11 DIAGNOSIS — Z98.890 S/P BREAST RECONSTRUCTION: ICD-10-CM

## 2019-01-11 DIAGNOSIS — I89.0 ACQUIRED LYMPHEDEMA: ICD-10-CM

## 2019-01-28 ENCOUNTER — CLINICAL SUPPORT (OUTPATIENT)
Dept: REHABILITATION | Facility: HOSPITAL | Age: 44
End: 2019-01-28
Attending: INTERNAL MEDICINE
Payer: COMMERCIAL

## 2019-01-28 DIAGNOSIS — I97.2 POST-MASTECTOMY LYMPHEDEMA SYNDROME: ICD-10-CM

## 2019-01-28 DIAGNOSIS — M25.619 DECREASED RANGE OF MOTION OF SHOULDER, UNSPECIFIED LATERALITY: ICD-10-CM

## 2019-01-28 PROCEDURE — 97140 MANUAL THERAPY 1/> REGIONS: CPT | Mod: PO

## 2019-01-28 PROCEDURE — 97162 PT EVAL MOD COMPLEX 30 MIN: CPT | Mod: PO

## 2019-01-28 NOTE — PLAN OF CARE
"Patient: Avis Graves  [facility] #: [id number]    Date: 01/28/2019  Physician: Jerry Birmingham MD   Diagnosis:   Encounter Diagnoses   Name Primary?    Post-mastectomy lymphedema syndrome     Decreased range of motion of shoulder, unspecified laterality      Patient is a 43 y.o..     History of Present Illness: notes limitations for L breast tightness at site of scarring from L breast lift- no CA to L breast  Pt was managed for R UE lymphedema risk and treated at [facility] in past.  Pt notes she has evaluation scheduled this week- discussed unable to follow in therapy for same diagnosis at different locations- she will discuss/decide.   Pt has used 'bouncing" on mini tramp, dry brushing, and home pump- she does not use compression- to manage her lymphedema.   Had home lymphedema pump for R UE - 'it is broken"- called vendor and will need to discuss- "won't power on"- gave vendor contact #  Was using ~ 3 x weekly - not following with compression  Also has used a private therapist- "one visit about 2 weeks ago- had a "home type visit"  Some private in Feb 2018  [facility] therapy 2017 for R UE lymphedema - recommended home pump at that time- used all of 2018 then broke   Scar pain and tightness on L breast- feels tight and trapping fluid, needs to break scar tissues    Onset: R breast CA Jan 9, 2017   Surgery: mastectomy SLNB 3+/4 nodes, added tissue expanders- Feb 6, 2017, now has permanent implant R- March 2018  Had lift on L breast -"biggest mistake of my life"  March 2018 Dr. Nunez- now offered to release scars with surgery- pt declines  Radiation: no   Chemotherapy: completed July 2017  Hormonal medications:anaastrozole - 5 years planned  LAUREN and monitored by oncology  Pt denies  KF and DM.  Does have CHF due to cardio myopathy- has defibrillator- discussed precautions and medical following  Previous Lymphedema Treatment: 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    budesonide-formoterol 160-4.5 mcg (SYMBICORT) 160-4.5 mcg/actuation HFAA Inhale 1 puff into the lungs every 12 (twelve) hours.    carvedilol (COREG CR) 80 MG 24 hr capsule Take 1 capsule (80 mg total) by mouth once daily.    cetirizine (ZYRTEC) 10 MG tablet Take 10 mg by mouth daily as needed.     coenzyme Q10 (CO Q-10) 300 mg Cap Take by mouth once daily.    enalapril (VASOTEC) 2.5 MG tablet TAKE 1 TABLET (2.5 MG TOTAL) BY MOUTH 2 (TWO) TIMES DAILY.    furosemide (LASIX) 20 MG tablet Take 1 tablet (20 mg total) by mouth once daily.    LACTOBACILLUS ACIDOPHILUS (PROBIOTIC ORAL) Take by mouth every evening.    mometasone (NASONEX) 50 mcg/actuation nasal spray 2 sprays by Nasal route once daily. (Patient taking differently: 2 sprays by Nasal route daily as needed. )    potassium chloride (MICRO-K) 10 MEQ CpSR TAKE ONE CAPSULE BY MOUTH TWICE A DAY    senna-docusate 8.6-50 mg (PERICOLACE) 8.6-50 mg per tablet Take 1 tablet by mouth 2 (two) times daily.     No current facility-administered medications for this visit.      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 Notes in past some "breaking of scar tissue" in both arms.   Pt has mild pulling in R arm with end range - no true pain.  Pt notes pain under L breast at site of scars, some above and around L nipple.  Pt rates pain at a 4/10 where 0 = no pain and 10 = maximal pain.- at rest presently,  8/10 worst- triggers- seems to be worse when "Dry",  Eases some with added lotion  Worse with motion or stretch, added pain at night with turning in bed and awakes due to pain  Alleviates- lubricants, does not use pain meds  Patient's goals are as follows: to help relieve L breast pain, system draining properly.    Objective:   Female amb to dept I  No compression  R breast with permanent implant no nipple - midline incisional line well healed - soft and mobile scar  2 prior drain sites lateral chest wall   R breast with implant   L ant chest - defibrillator present- scar over defibrillator wide and pink not elevated  L breast with incisional lines around nipple-  Sup lines slightly thickened  Incisional lines under length of L breast with hypertrophic scarring/keloid medial, less in center and then again moving laterally,  Most pain and tightness along medial segment  Addendum: Nodular firm density in L subareolar area palpated L breast - not tender- chart review per Dr. Santana 6/5/2018 negative Mammogram,  Negative US, no MRI due to AICD- likely fat necrosis per chart  Scar elevated and pink with added width mostly medial segment  Soft mobile L breast  Amount of Swelling:Location of Swelling: no noted size or appearance difference R to L UE  Good contours and bony prominences, good visualization of veins and tendons  Skin Integrity: WFL- breast and scars as above  Palpation/Texture: R UE soft and mobile, breast and scars as above  Circulation: intact at wrist B    Posture:sits slouched - corrects with cues    Range of Motion - UE  (R) wfl and mild pulling in axilla with full flex at " "shoulder  Wrist, elbow and shoulder WFL  (L) full shoulder ROM noted with c/o pain and pulling in inf breast at site of scarring   Full flex, abd, and er - noted end range pulling in breast, ant chest, lateral chest wall   Noted soft tissue tightness and mild puckering under breast with LTR away from L side and rib mobility    Strength: wfl B UE    Current Functional Status:  Gait: I  Transfers: I  Bed Mobility: I    Girth Measurements (in centimeters)    LANDMARK RIGHT UE 1/28/19 LEFT UE DIFFERENCE   E + 8" 29.5 cm 30.0 cm 0.5 cm   E + 6" 27.5 cm 28.0 cm 0.5 cm   E + 4" 25.5 cm 25.5 cm 0 cm   E + 2" 23.0 cm 24.0 cm 1.0 cm   Elbow 22.5 cm 23.5 cm 1.0 cm   W+ 8" 22.5 cm 23.0 cm 0.5 cm   W +  6" 21.5 cm 21.0 cm 0.5 cm   W + 4" 17.5 cm 17.0 cm 0.5 cm   Wrist 14.3 cm 14.5 cm 0.2 cm   DPC 18.5 cm 18.0 cm 0.5 cm   IP Thumb 5.7 cm 6.0 cm 0.3 cm     Sensation: mild numbness in R ant chest to axilla-intact to lt touch, altered sensation along scarred lines and around implant R breast    Treatment Evaluation  Pt was educated in lymphedema etiology and management plans.  Pt was provided with written  risk reductions and precautions for managing lymphedema.    Pt should continue her means of managing lymphedema risk to her R upper quadrant.  Pt has vendor contact for pump questions.   Pt was instructed in and performed therapeutic exercise for postural correction and alignment, stretching and soft tissue mobility, and strengthening.    Exercises included: snow fan style motions with LTR  Butterfly style motions with LTR  [place] and arrow style motions  Side reach plus and retraction squeeze  Pt was instructed and performed stm to ant chest wall, lateral chest wall and axilla L  Skin and tissue mobility  stm and scar massage to L inf breast scar lines- scar massage surrounding nipple as tolerated.  Discussed postural correction and alignment.   Monitor cardiac status and defibrillator placement.   Discussed scar remodeling and " length of time post surgery.  Pt was able to demonstrate and report understanding and performance.      This patient is in agreement to participate in Lymphedema treatment.      Assessment    This patient presents s/p  R breast CA with mastectomy SLNB 3+/4 and chemotherapy 2017 as well as L breast plastic surgery for lift with resultant scarring, tissue tension, and pain with mobility surrounding L breast.  Her R UE with no true signs of lymphedema at present- pt notes some fullness and congestion at times and she will continue her self management to address.  Post surgical scarring of L breast resulting in:  increased pain, increased stiffness in the L chest wall, breast, axilla, as well as difficulty performing full reach and carry-  R UE / quadrant lymphedema risk and placing the pt at higher risk of infection. This pt would benefit from skilled therapy services to address the above impairments.  Good /Fair Poor expected outcomes with therapy intervention.     History  Co-morbidities and personal factors that may impact the plan of care Examination  Body Structures and Functions, activity limitations and participation restrictions that may impact the plan of care Clinical Presentation   Decision Making/ Complexity Score   Co-morbidities:       Chf, cardiac with defibrillator  Breast CA R  Risk for lymphedema  Hypertrophic scarring    Personal Factors:   Past PT at other locations- will decide on offerings- clinic closer to her home   Body Regions: R upper quadrant  L shoulder  L breast  L chest wall    Body Systems:   Scarring  ROM  Posture  Strength  pain    Activity limitations:   Reach  Lift  Carry  Painful motions  Sleep altered      Participation Restrictions:   Location  Therapy choice         Stable with multiple considerations and medical status Low/mod    FOTO 79/100       The following goals were discussed with the patient and patient is in agreement with them as to be addressed in the treatment plan.      Short Term Goals: ( 4-6 weeks)  Decrease pain reported to 3 or less / 10 with L UE motions and reach overhead  Patient will demonstrate 100% knowledge of lymphedema precautions and signs of infection.   Patient will perform self-bandaging techniques as required- R UE- use of compression as needed R.  Patient will perform self lymph drainage techniques.- perform scar massage  Patient will tolerate daily activities with compression support to L breast region.    Long Term Goals: ( 6-8 weeks)  Patient will show reduction in density to mild or less with improved contour of L breast   Patient to deyanira/doff compression garment with daily compliance.- as needed   Pt to show improved postural awareness and alignment.  Pt to be I and compliant with HEP.    Improved L shoulder and breast region ROM with min to no onset of pain  Decreased scar tissue density and improved scar mobility L breast    Plan  Patient to be seen 1-2 x per week for 4-8 weeks for the medically necessary treatments to include: decongestive massage- Manual lymphatic drainage, intermittent compression pump, multi layered bandaging, education in lymphedema precautions, self massage, self bandaging, and assistance in obtaining appropriate compression garment.  Therex, postural correction, and progression of HEP.    Focus on L breast.    I certify the need for these services furnished under this plan of treatment and while under my care.         ____________________________________     ____________    Physician/Referring Practitioner                         Date of Signature

## 2019-02-04 ENCOUNTER — CLINICAL SUPPORT (OUTPATIENT)
Dept: REHABILITATION | Facility: HOSPITAL | Age: 44
End: 2019-02-04
Attending: INTERNAL MEDICINE
Payer: COMMERCIAL

## 2019-02-04 DIAGNOSIS — I97.2 POST-MASTECTOMY LYMPHEDEMA SYNDROME: ICD-10-CM

## 2019-02-04 DIAGNOSIS — M25.612 DECREASED RANGE OF MOTION OF LEFT SHOULDER: ICD-10-CM

## 2019-02-04 PROCEDURE — 97140 MANUAL THERAPY 1/> REGIONS: CPT | Mod: PO

## 2019-02-04 PROCEDURE — 97110 THERAPEUTIC EXERCISES: CPT | Mod: PO

## 2019-02-04 NOTE — PROGRESS NOTES
Physician: Dr. Birmingham, [facility]/On[facility]  Diagnosis: R breast CA with mastectomy and implant reconstruction, SLNB 3+/4 nodes, chemo, no radiation, breast lift L for symmetry with keloid/hypertrophic scarring L breast  AICD L chest wall with cardiomyopathy  Encounter Diagnoses   Name Primary?    Post-mastectomy lymphedema syndrome     Decreased range of motion of left shoulder         Visit #: 2  Treatment: MLD, therex, compression, education    SUBJECTIVE: Pt has managed her R UE / quadrant lymphedema risk - she has home flexitouch pump system- last session was given representative contact for repair -  She has not contacted them. Pt also uses jumping and sweeping for lymphatics.  Her L breast scarring can be painful and often shoots pains in her L breast.  Defibrillator has been in place since ~ 2007 and again ~ 2009- some scarring there as well but has flattened over time and less sensitive there. Pt feels benefit of therapy - she has also worked with [facility] in past and consult pending for location.  Pain: 3/10 when it occurs in L breast  OBJECTIVE:   Petite Female amb to dept I  No compression  R breast with permanent implant no nipple - midline incisional line well healed - soft and mobile scar  2 prior drain sites lateral chest wall   R breast with implant   L ant chest - defibrillator present- scar over defibrillator wide and pink not elevated  L breast with incisional lines around nipple-  Sup lines slightly thickened  Incisional lines under length of L breast with hypertrophic scarring/keloid medial, less in center and then again moving laterally,  Most pain and tightness along medial segment  Nodular firm density in L subareolar area palpated L breast - not tender- chart review per Dr. Santana 6/5/2018 negative Mammogram,  Negative US, no MRI due to AICD- likely fat necrosis per chart  Scar elevated and pink with added width mostly medial segment  Soft mobile L breast  Amount of  Swelling:Location of Swelling: no noted size or appearance difference R to L UE  Good contours and bony prominences, good visualization of veins and tendons  Skin Integrity: WFL- breast and scars as above  Palpation/Texture: R UE soft and mobile, breast and scars as above  Circulation: intact at wrist B  Treatment:   MANUAL LYMPHATIC DRAINAGE:  While supine with arm elevated,  Drainage along neck, B subclavian regions,  Across ant chest and top of shoulder,  Axillary region, drainage of L upper chest and axilla, L breast   Scar massage and soft tissue mobility to same areas L ant chest and axilla, ROM with arm overhead for scar and STM  Deep friction scar massage inf breast line L  AAROM with skin pressures and assisted rib and scapular ROM with stm  Use of aquaphor for additional scar mobility inf L breast     Review of MLD and drainage of R Upper quadrant R UE and lymphedema risk due to SLNB  SEQUENTIAL COMPRESSION PUMP:na  MULTILAYERED BANDAGING:  na   THERAPEUTIC EXERCISES:  AAROM, passive stretching with MLD, Scar, and STM as above  Side reach and retraction  Open book horizontal abd L UE  Side abd with arm rest overhead  scaption plus trunk elongation  Butter fly  Style + LTR  Snow fan style + LTR  Side bending with arms overhead  [place] and arrow style  Continue HEP of AROM, stretching, and postural correction.   PATIENT/FAMILY Education: considerations for Mederma, Bio Oil or ScarAway Silicone Scar sheets- advised not therapy procedures or clinical orders but recommendations may be used as OTC choices/suggestions  for possible scar management - pt voiced understanding.   Continue HEP,  Beginning of self massage,  Risk reduction  ASSESSMENT: Pt with h/o R breast CA, SLNB, and chemo with mastectomy and implant reconstruction and lymphedema risk.  Pt also with L breast masto plexy for lift and subsequent keloid / hypertrophic scarring L breast causing pain and tightness to L ant chest wall, Lateral chest wall,  and axilla- slight ROM restrictions.  Some scar remodeling may be achieved weeks and months past procedure but with presence of keloids/hypertrophic scarring potential for improvement maybe limited.  Pt may choose OTC suggestions as she deems appropriate. Therapy addressing scar massage, soft tissue mobility, posture, drainage, flexibility and ROM.  Patient is making       fair/good        progress towards established goals.  PLAN: Continue PT   2x         weekly for Complete Decongestive Therapy:  Manual lymphatic drainage, Multilayered short stretch bandaging, Pneumatic compression, Therapeutic exercises, Patient education as deemed necessary to achieve stated goals.

## 2019-02-08 ENCOUNTER — CLINICAL SUPPORT (OUTPATIENT)
Dept: REHABILITATION | Facility: HOSPITAL | Age: 44
End: 2019-02-08
Attending: INTERNAL MEDICINE
Payer: COMMERCIAL

## 2019-02-08 DIAGNOSIS — M25.612 DECREASED RANGE OF MOTION OF LEFT SHOULDER: ICD-10-CM

## 2019-02-08 DIAGNOSIS — I97.2 POST-MASTECTOMY LYMPHEDEMA SYNDROME: ICD-10-CM

## 2019-02-08 PROCEDURE — 97110 THERAPEUTIC EXERCISES: CPT | Mod: PO

## 2019-02-08 PROCEDURE — 97140 MANUAL THERAPY 1/> REGIONS: CPT | Mod: PO

## 2019-02-08 NOTE — PROGRESS NOTES
"Physician: Dr. Birmingham, [facility] Hem/Onc  Diagnosis: R breast CA with mastectomy and implant reconstruction, SLNB 3+/4 nodes, chemo, no radiation, breast lift L for symmetry with keloid/hypertrophic scarring L breast  AICD L chest wall with cardiomyopathy  Encounter Diagnoses   Name Primary?    Post-mastectomy lymphedema syndrome     Decreased range of motion of left shoulder         Visit #: 3  Treatment: MLD, therex, compression, education    SUBJECTIVE: Pt feels benefit of therapy- still scarring with some pain and tightness but tolerating more. Pt has more pain when self performing scar massage -"feels good when you do it- good pain"   Pt will continue HEP- pt also notes stretching helps mild tightness in her R axilla- she did have cording in past which resolved.   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   Pain: 3/10 when it occurs in L breast  OBJECTIVE:   Petite Female amb to dept I  No compression  R breast with permanent implant no nipple - midline incisional line well healed - soft and mobile scar  2 prior drain sites lateral chest wall      L ant chest - defibrillator present- scar over defibrillator wide and pink not elevated  L breast with incisional lines around nipple-  Sup lines slightly thickened  Incisional lines under length of L breast with hypertrophic scarring/keloid medial, less in center and then again moving laterally,  Most pain and tightness along medial segment  Nodular firm density in L subareolar area palpated L breast - not tender- chart review per Dr. Santana 6/5/2018 negative Mammogram,  Negative US, no MRI due to AICD- likely fat necrosis per chart  Scar elevated and pink with added width mostly medial segment  Soft mobile L breast  Amount of Swelling:Location of Swelling: no noted size or appearance difference R to L UE  Good contours and bony prominences, good visualization of veins and tendons  Skin Integrity: WFL- breast and scars as above  Palpation/Texture: R " UE soft and mobile, breast and scars as above  Circulation: intact at wrist B  Shoulder ROM full B with mild pulling R axilla, mild pulling L breast and chest wall in full ranges of flex,abd, and ER  Treatment:   MANUAL LYMPHATIC DRAINAGE:  While supine with arm elevated,  Drainage along neck, B subclavian regions,  Across ant chest and top of shoulder,  Axillary region, drainage of L upper chest and axilla, L breast   Scar massage and soft tissue mobility to same areas L ant chest and axilla, ROM with arm overhead for scar and STM  Deep friction scar massage inf breast line L  AAROM with skin pressures and assisted rib and scapular ROM with stm  Rolled sidelying - additional stm and scar massage L breast, L ant and lateral chest wall, scapular mobility  Axillary stretch of R  And L sides  Use of aquaphor for additional scar mobility inf L breast     Review of MLD and drainage of R Upper quadrant R UE and lymphedema risk due to SLNB  SEQUENTIAL COMPRESSION PUMP:na  MULTILAYERED BANDAGING:  na   THERAPEUTIC EXERCISES:  AAROM, passive stretching with MLD, Scar, and STM as above  Side reach and retraction  Open book horizontal abd L UE  Side abd with arm rest overhead  scaption plus trunk elongation  Reviewed :   Butter fly  Style + LTR  Snow fan style + LTR  Side bending with arms overhead  [place] and arrow style  Performed therapy ball assisted ROM - ball on wall for UE reach overhead  Arms up on ball and lean to wall  Ball on wall with back resting for UE and chest stretch  Ball on floor while on knees- roll and reach forward - tilt and trunk elongation- reach and lean  Continue HEP of AROM, stretching, and postural correction.   PATIENT/FAMILY Education: considerations for Mederma, Bio Oil or ScarAway Silicone Scar sheets- advised not therapy procedures or clinical orders but recommendations may be used as OTC choices/suggestions  for possible scar management - pt voiced understanding.   Continue HEP,  Beginning of  self massage,  Risk reduction  ASSESSMENT: Pt is reporting less tension and less pain to her R axilla and L breast regions following MLD, stm, and exercise with therapy.  Pt does have thick banded scar lines in inferior aspect L breast from surgical procedure.  Mobility and pain have improved and remains unclear how much reduction of thickened scar can be achieved. Pt may consider OTC suggestions as she deems appropriate.   Pt with h/o R breast CA, SLNB, and chemo with mastectomy and implant reconstruction and lymphedema risk.  Pt also with L breast masto plexy for lift and subsequent keloid / hypertrophic scarring L breast causing pain and tightness to L ant chest wall, Lateral chest wall, and axilla- slight ROM restrictions.  Some scar remodeling may be achieved weeks and months past procedure but with presence of keloids/hypertrophic scarring potential for improvement maybe limited.    Therapy addressing scar massage, soft tissue mobility, posture, drainage, flexibility and ROM.  Patient is making       fair/good        progress towards established goals.  PLAN: Continue PT   2x         weekly for Complete Decongestive Therapy:  Manual lymphatic drainage, Multilayered short stretch bandaging, Pneumatic compression, Therapeutic exercises, Patient education as deemed necessary to achieve stated goals.

## 2019-02-15 ENCOUNTER — CLINICAL SUPPORT (OUTPATIENT)
Dept: REHABILITATION | Facility: HOSPITAL | Age: 44
End: 2019-02-15
Attending: INTERNAL MEDICINE
Payer: COMMERCIAL

## 2019-02-15 DIAGNOSIS — M25.619 DECREASED RANGE OF MOTION OF SHOULDER, UNSPECIFIED LATERALITY: ICD-10-CM

## 2019-02-15 DIAGNOSIS — I97.2 POST-MASTECTOMY LYMPHEDEMA SYNDROME: ICD-10-CM

## 2019-02-15 PROCEDURE — 97140 MANUAL THERAPY 1/> REGIONS: CPT | Mod: PO

## 2019-02-15 PROCEDURE — 97110 THERAPEUTIC EXERCISES: CPT | Mod: PO

## 2019-02-15 NOTE — PROGRESS NOTES
"Physician: Dr. Birmingham, [facility] Hem/Onc  Diagnosis: R breast CA with mastectomy and implant reconstruction, SLNB 3+/4 nodes, chemo, no radiation, breast lift L for symmetry with keloid/hypertrophic scarring L breast  AICD L chest wall with cardiomyopathy  Encounter Diagnoses   Name Primary?    Post-mastectomy lymphedema syndrome     Decreased range of motion of shoulder, unspecified laterality         Visit #: 4  Treatment: MLD, therex, compression, education    SUBJECTIVE: Pt notes "rubberband feeling to R underarm and near chest/breast. Pt has feeling of tightness in both shoulders and chest wall - feels better after stretching and management. Pt was happy to have lymphedema in past as well as education in management.   Pt requests stretching to R shoulder / trunk as well as ongoing therapy to address scar tightness of L breast.   Pt will continue HEP- pt also notes stretching helps mild tightness in her R axilla- she did have cording in past which resolved.   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  Pain: 3/10 when it occurs in L breast  OBJECTIVE:   Petite Female amb to dept I  No compression  R breast with permanent implant no nipple - midline incisional line well healed - soft and mobile scar- mild tightness reported in axilla  2 prior drain sites lateral chest wall      L ant chest - defibrillator present- scar over defibrillator wide and pink not elevated  L breast with incisional lines around nipple-  Sup lines slightly thickened  Incisional lines under length of L breast with hypertrophic scarring/keloid medial, less in center and then again moving laterally,  Most pain and tightness along medial segment  Nodular firm density in L subareolar area palpated L breast - not tender- chart review per Dr. Santana 6/5/2018 negative Mammogram,  Negative US, no MRI due to AICD- likely fat necrosis per chart  Scar elevated and pink with added width mostly medial segment  Soft mobile L "   Mild tightness in inf breast region L with shoulder and UE motions - at sites of keloid scarring    Amount of Swelling:Location of Swelling: no noted size or appearance difference R to L UE  Good contours and bony prominences, good visualization of veins and tendons  Skin Integrity: WFL- breast and scars as above  Palpation/Texture: R UE soft and mobile, breast and scars as above  Circulation: intact at wrist B  Shoulder ROM full B with mild pulling R axilla, mild pulling L breast and chest wall in full ranges of flex,abd, and ER    Treatment:   MANUAL LYMPHATIC DRAINAGE:  While supine with arm elevated,  Drainage along neck, B subclavian regions,  Across ant chest and top of shoulder,  Axillary region, drainage of L upper chest and axilla, L breast   Scar massage and soft tissue mobility to same areas L ant chest and axilla, ROM with arm overhead for scar and STM  Deep friction scar massage inf breast line L  Repeated MLD to R upper quadrant and AAROM and stretching to R     AAROM with skin pressures and assisted rib and scapular ROM with stm  Rolled sidelying - MLD for substitution pathways for R upper quadrant  stm and scar massage L breast, L ant and lateral chest wall, scapular mobility  Axillary stretch of R and L sides  Use of aquaphor for additional scar mobility inf L breast     MLD and drainage of R Upper quadrant R UE and lymphedema risk due to SLNB  SEQUENTIAL COMPRESSION PUMP:na  MULTILAYERED BANDAGING:  na   THERAPEUTIC EXERCISES:  AAROM, passive stretching with MLD, Scar, and STM as above  AAROM and PROM R and L shoulder, rib, and scapular regions  Side reach and retraction  Open book horizontal abd   Supine arms overhead  Added 1/2 roll or towel roll under spine for ant and pec stretch - add to HEP  Review of HEP and progression:  Side abd with arm rest overhead  scaption plus trunk elongation  Butter fly  Style + LTR  Snow fan style + LTR  Side bending with arms overhead  [place] and arrow  style  ball assisted ROM  Continue HEP of AROM, stretching, and postural correction.   PATIENT/FAMILY Education: considerations for Mederma, Bio Oil or ScarAway Silicone Scar sheets- advised not therapy procedures or clinical orders but recommendations may be used as OTC choices/suggestions  for possible scar management - pt voiced understanding.   Continue HEP,  Beginning of self massage,  Risk reduction  ASSESSMENT: Pt has generalized tightness in B Upper quadrants related to mastectomy with implant reconstruction R and mastoplexy with scar formation L breast region- both areas have shown some response to therapy management and HEP.  L breast with inferior line of keloid still has elevation and added width to scar- unclear if will show much response- pt does report slightly less pain, pulling and tightness with ROM and after therapy sessions.  Mobility and pain have improved and remains unclear how much reduction of thickened scar can be achieved. Pt may consider OTC suggestions as she deems appropriate.   Pt with h/o R breast CA, SLNB, and chemo with mastectomy and implant reconstruction and lymphedema risk.  Pt also with L breast masto plexy for lift and subsequent keloid / hypertrophic scarring L breast causing pain and tightness to L ant chest wall, Lateral chest wall, and axilla- slight ROM restrictions.  Some scar remodeling may be achieved weeks and months past procedure but with presence of keloids/hypertrophic scarring potential for improvement maybe limited.    Therapy addressing scar massage, soft tissue mobility, posture, drainage, flexibility and ROM.  Patient is making       fair/good        progress towards established goals.  PLAN: Continue PT   1-2x         weekly for Complete Decongestive Therapy:  Manual lymphatic drainage, Multilayered short stretch bandaging, Pneumatic compression, Therapeutic exercises, Patient education as deemed necessary to achieve stated goals.

## 2019-02-17 RX ORDER — FUROSEMIDE 20 MG/1
20 TABLET ORAL DAILY
Qty: 30 TABLET | Refills: 5 | Status: SHIPPED | OUTPATIENT
Start: 2019-02-17 | End: 2019-08-30 | Stop reason: SDUPTHER

## 2019-02-18 ENCOUNTER — CLINICAL SUPPORT (OUTPATIENT)
Dept: REHABILITATION | Facility: HOSPITAL | Age: 44
End: 2019-02-18
Attending: INTERNAL MEDICINE
Payer: COMMERCIAL

## 2019-02-18 DIAGNOSIS — I97.2 POST-MASTECTOMY LYMPHEDEMA SYNDROME: ICD-10-CM

## 2019-02-18 DIAGNOSIS — M25.619 DECREASED RANGE OF MOTION OF SHOULDER, UNSPECIFIED LATERALITY: ICD-10-CM

## 2019-02-18 PROCEDURE — 97140 MANUAL THERAPY 1/> REGIONS: CPT | Mod: PO

## 2019-02-18 PROCEDURE — 97110 THERAPEUTIC EXERCISES: CPT | Mod: PO

## 2019-02-22 ENCOUNTER — CLINICAL SUPPORT (OUTPATIENT)
Dept: REHABILITATION | Facility: HOSPITAL | Age: 44
End: 2019-02-22
Attending: INTERNAL MEDICINE
Payer: COMMERCIAL

## 2019-02-22 DIAGNOSIS — I97.2 POST-MASTECTOMY LYMPHEDEMA SYNDROME: ICD-10-CM

## 2019-02-22 DIAGNOSIS — M25.619 DECREASED RANGE OF MOTION OF SHOULDER, UNSPECIFIED LATERALITY: ICD-10-CM

## 2019-02-22 PROCEDURE — 97110 THERAPEUTIC EXERCISES: CPT | Mod: PO

## 2019-02-22 PROCEDURE — 97140 MANUAL THERAPY 1/> REGIONS: CPT | Mod: PO

## 2019-02-22 NOTE — PROGRESS NOTES
Physician: Dr. Birmingham, [facility] Hem/Onc  Diagnosis: R breast CA with mastectomy and implant reconstruction, SLNB 3+/4 nodes, chemo, no radiation, breast lift L for symmetry with keloid/hypertrophic scarring L breast  AICD L chest wall with cardiomyopathy  Encounter Diagnoses   Name Primary?    Post-mastectomy lymphedema syndrome     Decreased range of motion of shoulder, unspecified laterality         Visit #: 6  Treatment: MLD, therex, compression, education    SUBJECTIVE: Pt has only been woken up 1x /week with pain or pulling complaints from her L breast scars. This is improved and she remains compliant with recommendations. Pt notes generally weak upper body.  Pain: 3/10 when it occurs in L breast  OBJECTIVE:   Petite Female amb to dept I  No compression  R breast with permanent implant no nipple - midline incisional line well healed - soft and mobile scar- mild tightness reported in axilla  2 prior drain sites lateral chest wall      L ant chest - defibrillator/pacemaker present- scar over defibrillator wide and pink not elevated  L breast with incisional lines around nipple-  Sup lines slightly thickened  Incisional lines under length of L breast with hypertrophic scarring/keloid medial, less in center and then again moving laterally,  Most pain and tightness along medial segment  Nodular firm density in L subareolar area palpated L breast - not tender- chart review per Dr. Santana 6/5/2018 negative Mammogram,  Negative US, no MRI due to AICD- likely fat necrosis per chart  Scar elevated and pink with added width mostly medial segment  Soft mobile L   Mild tightness in inf breast region L with shoulder and UE motions - at sites of keloid scarring    Amount of Swelling:Location of Swelling: no noted size or appearance difference R to L UE  Good contours and bony prominences, good visualization of veins and tendons  Skin Integrity: WFL- breast and scars as above  Palpation/Texture: R UE soft and mobile,  breast and scars as above  Circulation: intact at wrist B  Shoulder ROM full B with mild pulling R axilla, mild pulling L breast and chest wall in full ranges of flex,abd, and ER    Treatment:   MANUAL LYMPHATIC DRAINAGE:  While supine with arm elevated,  Drainage along neck, B subclavian regions,  Across ant chest and top of shoulder,  Axillary region, drainage of L upper chest and axilla, L breast   Scar massage and soft tissue mobility to same areas L ant chest and axilla, ROM with arm overhead for scar and STM  Deep friction scar massage inf breast line L  Repeated MLD to R upper quadrant and AAROM and stretching to R     AAROM with skin pressures and assisted rib and scapular ROM with stm    stm and scar massage L breast, L ant and lateral chest wall, scapular mobility  Axillary stretch of R and L sides  Use of aquaphor for additional scar mobility inf L breast     MLD and drainage of R Upper quadrant R UE and lymphedema risk due to SLNB  SEQUENTIAL COMPRESSION PUMP:na  MULTILAYERED BANDAGING:  na   THERAPEUTIC EXERCISES:  AAROM, passive stretching with MLD, Scar, and STM as above  AAROM and PROM R and L shoulder, rib, and scapular regions  Patient performed horse stance and child's pose with additional threading of UE and trunk rotation stretch  Reach Plus  Spinal elongation with UE reach +  Advanced Scapular retraction to blue theraband with arms to thighs, to waist, and rowing to chest level  scaption with 1-2# hold at 90  Review of all prior HEP and soft tissue mob and mld- issued Theraband for home use  Sh AAROM  Gentle distraction  Relaxation  stm to axilla, lateral chest wall, ant chest wall R and L sides  Continue HEP of AROM, stretching, and postural correction.   PATIENT/FAMILY Education: considerations for Mederma, Bio Oil or ScarAway Silicone Scar sheets- advised not therapy procedures or clinical orders but recommendations may be used as OTC choices/suggestions  for possible scar management - pt  voiced understanding.   Continue HEP,  Beginning of self massage,  Risk reduction    ASSESSMENT: pt reporting less pain during night and although still has pink, elevated scar to L breast improved motion and improved awareness of management.   Pt has less pain and tightness in her B UE related to mastectomy with implant reconstruction R and mastoplexy with scar formation L breast region- both areas have shown some response to therapy management and HEP.  Pt with h/o R breast CA, SLNB, and chemo with mastectomy and implant reconstruction and lymphedema risk.  Pt also with L breast masto plexy for lift and subsequent keloid / hypertrophic scarring L breast causing pain and tightness to L ant chest wall, Lateral chest wall, and axilla- slight ROM restrictions.  Some scar remodeling may be achieved weeks and months past procedure but with presence of keloids/hypertrophic scarring potential for improvement maybe limited.    Therapy addressing scar massage, soft tissue mobility, posture, drainage, flexibility and ROM.  Discharge pending  Patient is making   good        progress towards established goals.  PLAN: Continue PT   1-2x         weekly for Complete Decongestive Therapy:  Manual lymphatic drainage, Multilayered short stretch bandaging, Pneumatic compression, Therapeutic exercises, Patient education as deemed necessary to achieve stated goals.

## 2019-02-25 ENCOUNTER — CLINICAL SUPPORT (OUTPATIENT)
Dept: REHABILITATION | Facility: HOSPITAL | Age: 44
End: 2019-02-25
Attending: INTERNAL MEDICINE
Payer: COMMERCIAL

## 2019-02-25 DIAGNOSIS — M25.619 DECREASED RANGE OF MOTION OF SHOULDER, UNSPECIFIED LATERALITY: ICD-10-CM

## 2019-02-25 DIAGNOSIS — I97.2 POST-MASTECTOMY LYMPHEDEMA SYNDROME: ICD-10-CM

## 2019-02-25 PROCEDURE — 97110 THERAPEUTIC EXERCISES: CPT | Mod: PO

## 2019-02-25 PROCEDURE — 97140 MANUAL THERAPY 1/> REGIONS: CPT | Mod: PO

## 2019-02-25 NOTE — PROGRESS NOTES
"Physician: Dr. Birmingham, [facility] Hem/Onc  Diagnosis: R breast CA with mastectomy and implant reconstruction, SLNB 3+/4 nodes, chemo, no radiation, breast lift L for symmetry with keloid/hypertrophic scarring L breast  AICD L chest wall with cardiomyopathy  Encounter Diagnoses   Name Primary?    Post-mastectomy lymphedema syndrome     Decreased range of motion of shoulder, unspecified laterality       Pt was evaluated 1/28/19 and followed for 7 sessions of complete decongestive therapy for lymphedema risk R Upper quadrant and scar management L breast, ROM and postural correction with focus on HEP and self management.   Visit #: 7  Treatment: MLD, therex, compression, education    SUBJECTIVE: Pt is in agreement with Discharge. Pt notes past medical conditions - swelling, fullness, CHF, GI issues as well as CA management- "CA has been the best thing that has happened to me"- helped get these other issues under control.  Pt has defibrillator and pacemaker.  Still uses diet, Vitamin C, saunas, bouncing, and other remedies to manage.  Pt will contact pump vendor as needed for her home lymphedema pump.  Mild feeling of fullness in R axilla and tightness in scar of L breast reduction.  Pt is pleased with progress- still some scarring thickness and elevation but less pain in L breast delores at night.  She is aware of plan and management choices.   Pain: 3/10 when it occurs in L breast  OBJECTIVE:   Girth Measurements (in centimeters)     LANDMARK RIGHT UE 1/28/19 R UE 2/25/19 LEFT UE DIFF at eval   E + 8" 29.5 cm 29.0 30.0 cm 0.5 cm   E + 6" 27.5 cm 26.5 28.0 cm 0.5 cm   E + 4" 25.5 cm 25.0 25.5 cm 0 cm   E + 2" 23.0 cm 23.5 24.0 cm 1.0 cm   Elbow 22.5 cm 22.0 23.5 cm 1.0 cm   W+ 8" 22.5 cm 21.5 23.0 cm 0.5 cm   W +  6" 21.5 cm 21.0 21.0 cm 0.5 cm   W + 4" 17.5 cm 17.0 17.0 cm 0.5 cm   Wrist 14.3 cm 14.0 14.5 cm 0.2 cm   DPC 18.5 cm 17.0 18.0 cm 0.5 cm   IP Thumb 5.7 cm 5.5 6.0 cm 0.3 cm        Petite Female amb to dept I  No " compression  R breast with permanent implant no nipple - midline incisional line well healed - soft and mobile scar- mild tightness reported in axilla  2 prior drain sites lateral chest wall      L ant chest - defibrillator/pacemaker present- scar over defibrillator wide and pink not elevated  L breast with incisional lines around nipple-  Sup lines slightly thickened  Incisional lines under length of L breast with hypertrophic scarring/keloid medial, less in center and then again moving laterally,  Most pain and tightness along medial segment  Nodular firm density in L subareolar area palpated L breast - not tender- chart review per Dr. Santana 6/5/2018 negative Mammogram,  Negative US, no MRI due to AICD- likely fat necrosis per chart  Scar elevated and pink with added width mostly medial segment  Soft mobile L   Mild tightness in inf breast region L with shoulder and UE motions - at sites of keloid scarring    Amount of Swelling:Location of Swelling: no noted size or appearance difference R to L UE  Good contours and bony prominences, good visualization of veins and tendons  Skin Integrity: WFL- breast and scars as above  Palpation/Texture: R UE soft and mobile, breast and scars as above  Circulation: intact at wrist B  Shoulder ROM full B with mild pulling R axilla, mild pulling L breast and chest wall in full ranges of flex,abd, and ER  Good postural alignment and mechanics.     Treatment:   MANUAL LYMPHATIC DRAINAGE:  While supine with arm elevated,  Drainage along neck, B subclavian regions,  Across ant chest and top of shoulder,  Axillary region, drainage of L upper chest and axilla, L breast   Scar massage and soft tissue mobility to same areas L ant chest and axilla, ROM with arm overhead for scar and STM  Deep friction scar massage inf breast line L  Repeated MLD to R upper quadrant and AAROM and stretching to R     AAROM with skin pressures and assisted rib and scapular ROM with stm    stm and scar  massage L breast, L ant and lateral chest wall, scapular mobility  Axillary stretch of R and L sides  Use of aquaphor for additional scar mobility inf L breast     MLD and drainage of R Upper quadrant R UE and lymphedema risk due to SLNB  SEQUENTIAL COMPRESSION PUMP:na  MULTILAYERED BANDAGING:  na   THERAPEUTIC EXERCISES:  AAROM, passive stretching with MLD, Scar, and STM as above  AAROM and PROM R and L shoulder, rib, and scapular regions  Review of HEP and progression:  horse stance and child's pose with additional threading of UE and trunk rotation stretch  Reach Plus  [place] and arrow style  Spinal elongation with UE reach +  Advanced Scapular retraction to blue theraband with arms to thighs, to waist, and rowing to chest level  scaption with 1-2# hold at 90    Received : Sh AAROM  Gentle distraction  Relaxation  stm to axilla, lateral chest wall, ant chest wall R and L sides  Continue HEP of AROM, stretching, and postural correction.   PATIENT/FAMILY Education: considerations for Mederma, Bio Oil or ScarAway Silicone Scar sheets- advised not therapy procedures or clinical orders but recommendations may be used as OTC choices/suggestions  for possible scar management - pt voiced understanding.   Continue HEP,  self massage,  Risk reduction    ASSESSMENT: Pt with good tolerance and progression of programs to manage her R UE lymphedema risk following CA and mastectomy and L breast skin/scar mobility in presence of keloids/hyper trophic scarring following L breast masto plexy.  Surgical procedure will be 1 year post in March and scar lines may show mild changes.  Pt is aware and will continue to address with self programs.  Pt notes less pain during night and although still has pink, elevated scar to L breast improved motion and improved awareness of management.   Pt has less pain and tightness in her B UE related to mastectomy with implant reconstruction R and L mastoplexy with scar formation L breast region- both  areas have shown some response to therapy management and HEP.    Short Term Goals: ( 4-6 weeks)  Decrease pain reported to 3 or less / 10 with L UE motions and reach overhead  Patient will demonstrate 100% knowledge of lymphedema precautions and signs of infection.   Patient will perform self-bandaging techniques as required- R UE- use of compression as needed R.  Patient will perform self lymph drainage techniques.- perform scar massage  Patient will tolerate daily activities with compression support to L breast region.     Long Term Goals: ( 6-8 weeks)  Patient will show reduction in density to mild or less with improved contour of L breast   Patient to deyanira/doff compression garment with daily compliance.- as needed   Pt to show improved postural awareness and alignment.  Pt to be I and compliant with HEP.    Improved L shoulder and breast region ROM with min to no onset of pain  Decreased scar tissue density and improved scar mobility L breast    Pt did not add compression to L breast scar lines- does wear supportive tank or bra regularly.      PLAN discharge due to achieving goals and independence with self and HEP.

## 2019-05-01 DIAGNOSIS — E87.6 HYPOKALEMIA: ICD-10-CM

## 2019-05-01 RX ORDER — CARVEDILOL PHOSPHATE 80 MG/1
CAPSULE, EXTENDED RELEASE ORAL
Qty: 30 CAPSULE | Refills: 5 | Status: SHIPPED | OUTPATIENT
Start: 2019-05-01 | End: 2019-11-18 | Stop reason: SDUPTHER

## 2019-05-01 RX ORDER — POTASSIUM CHLORIDE 750 MG/1
CAPSULE, EXTENDED RELEASE ORAL
Qty: 60 CAPSULE | Refills: 5 | Status: SHIPPED | OUTPATIENT
Start: 2019-05-01 | End: 2019-12-18 | Stop reason: SDUPTHER

## 2019-05-13 ENCOUNTER — PATIENT MESSAGE (OUTPATIENT)
Dept: HEMATOLOGY/ONCOLOGY | Facility: CLINIC | Age: 44
End: 2019-05-13

## 2019-05-22 ENCOUNTER — OFFICE VISIT (OUTPATIENT)
Dept: HEMATOLOGY/ONCOLOGY | Facility: CLINIC | Age: 44
End: 2019-05-22
Payer: COMMERCIAL

## 2019-05-22 ENCOUNTER — HOSPITAL ENCOUNTER (OUTPATIENT)
Dept: RADIOLOGY | Facility: HOSPITAL | Age: 44
Discharge: HOME | End: 2019-05-22
Attending: NURSE PRACTITIONER
Payer: COMMERCIAL

## 2019-05-22 VITALS
TEMPERATURE: 98 F | RESPIRATION RATE: 18 BRPM | SYSTOLIC BLOOD PRESSURE: 107 MMHG | WEIGHT: 113.13 LBS | BODY MASS INDEX: 22.8 KG/M2 | HEIGHT: 59 IN | HEART RATE: 75 BPM | OXYGEN SATURATION: 100 % | DIASTOLIC BLOOD PRESSURE: 64 MMHG

## 2019-05-22 DIAGNOSIS — C50.611 CANCER OF AXILLARY TAIL OF RIGHT BREAST: Primary | ICD-10-CM

## 2019-05-22 DIAGNOSIS — Z79.811 USE OF AROMATASE INHIBITORS: ICD-10-CM

## 2019-05-22 DIAGNOSIS — C50.611 CANCER OF AXILLARY TAIL OF RIGHT BREAST: ICD-10-CM

## 2019-05-22 PROCEDURE — 3008F PR BODY MASS INDEX (BMI) DOCUMENTED: ICD-10-PCS | Mod: CPTII,S$GLB,, | Performed by: INTERNAL MEDICINE

## 2019-05-22 PROCEDURE — 3008F BODY MASS INDEX DOCD: CPT | Mod: CPTII,S$GLB,, | Performed by: INTERNAL MEDICINE

## 2019-05-22 PROCEDURE — 99999 PR PBB SHADOW E&M-EST. PATIENT-LVL IV: ICD-10-PCS | Mod: PBBFAC,,, | Performed by: INTERNAL MEDICINE

## 2019-05-22 PROCEDURE — 99214 OFFICE O/P EST MOD 30 MIN: CPT | Mod: S$GLB,,, | Performed by: INTERNAL MEDICINE

## 2019-05-22 PROCEDURE — 77061 BREAST TOMOSYNTHESIS UNI: CPT | Mod: TC,PO,LT

## 2019-05-22 PROCEDURE — 99214 PR OFFICE/OUTPT VISIT, EST, LEVL IV, 30-39 MIN: ICD-10-PCS | Mod: S$GLB,,, | Performed by: INTERNAL MEDICINE

## 2019-05-22 PROCEDURE — 77065 DX MAMMO INCL CAD UNI: CPT | Mod: TC,PO,LT

## 2019-05-22 PROCEDURE — 99999 PR PBB SHADOW E&M-EST. PATIENT-LVL IV: CPT | Mod: PBBFAC,,, | Performed by: INTERNAL MEDICINE

## 2019-05-22 PROCEDURE — 77065 DX MAMMO INCL CAD UNI: CPT | Mod: 26,LT,, | Performed by: RADIOLOGY

## 2019-05-22 PROCEDURE — 77061 MAMMO DIGITAL DIAGNOSTIC LEFT WITH TOMOSYNTHESIS_CAD: ICD-10-PCS | Mod: 26,LT,, | Performed by: RADIOLOGY

## 2019-05-22 PROCEDURE — 77065 MAMMO DIGITAL DIAGNOSTIC LEFT WITH TOMOSYNTHESIS_CAD: ICD-10-PCS | Mod: 26,LT,, | Performed by: RADIOLOGY

## 2019-05-22 PROCEDURE — 77061 BREAST TOMOSYNTHESIS UNI: CPT | Mod: 26,LT,, | Performed by: RADIOLOGY

## 2019-05-22 NOTE — PROGRESS NOTES
Subjective:       Patient ID: Avis Graves is a 44 y.o. female.    Chief Complaint: No chief complaint on file.    HPI   Ms. Graves presents today for a scheduled follow up appointment.    Briefly, she is a 45-year-old -American female with a history of cardiomyopathy and an EF of less than 30%, who was diagnosed in 2017 with a carcinoma of the right breast, that was ER strongly positive, FL negative, and HER-2 negative.  On 2/07/2017 she underwent a right modified radical mastectomy with sentinel lymph node biopsy and insertion of a tissue expander.  The pathology report from the 02/07/2017 procedure indicates that she had a 4 cm carcinoma; three of the four sentinel lymph nodes were positive with macrometastases.  The patient was referred for further evaluation.  It was recommended to her to proceed with twelve weekly cycles of taxol, possibly with the addition of CMF upon completion.  She completed 12 cycles of weekly taxol, but decided against CMF and also against XRT.  She was subsequently started on anastrazole.  She also continues to receive IV infusions of vitamin C through Dr. Butcher.    A mammogram earlier today was read as BIRADS 1 and a one year follow up was recommended.     Review of Systems  Overall she feels OK.  Her main complaint is pain from her kheloid scars over the defibrillator and the left breast reconstruction.  She also complains of fullness in the right axilla.   She is asking for another referral to PT for lymphedema.   ECOG PS is 1.  She denies any depression, vomiting, diarrhea, constipation, diplopia, blurred vision, headaches, chest pain, palpitations, shortness of breath, left breast pain, abdominal pain, extremity pain, or difficulty ambulating.  The remainder of the ten-point ROS, including general, skin, lymph, H/N, cardiorespiratory, GI, , Neuro, Endocrine, and psychiatric is negative.     Objective:      Physical Exam  She is alert, oriented to time, place,  person, pleasant, well      nourished, in no acute physical distress.    She is here with her sister.                               VITAL SIGNS:  Reviewed                                      HEENT:  Normal.  There are no nasal, oral, lip, gingival, auricular, or lid     lesions.  Mucosae are moist and pink, and there is no        thrush.  Pupils are equal, reactive to light and accommodation.              Extraocular muscle movements are intact.  Dentition is good.   There is a pea sized lymph node in the right occipital area.                                    NECK:  Supple without JVD, adenopathy, or thyromegaly.                       LUNGS:  Clear to auscultation without wheezing, rales, or rhonchi.           CARDIOVASCULAR:  Reveals an S1, S2, no murmurs, no rubs, no gallops.         ABDOMEN:  Soft, nontender, without organomegaly.  Bowel sounds are    present.                                                                     EXTREMITIES:  No cyanosis, clubbing, or edema.                               BREASTS:  She is status post right mastectomy with a tissue expander in place and a well-healed incision.     There are no masses in her left breast. Kheloid scars are noted  An AICD is palpated in the left infraclavicular area.                                    LYMPHATIC:  There is no cervical, axillary, or supraclavicular adenopathy.   SKIN:  Warm and moist, without petechiae, rashes, induration, or ecchymoses.           NEUROLOGIC:  DTRs are 0-1+ bilaterally, symmetrical, motor function is 5/5,  and cranial nerves are  within normal limits.      Assessment:       1. 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 Use of aromatase inhibitors    3.      CHF.  Plan:          I have asked her to remain on anastrazole for now.   I will see her again in 4 months, and her mammogram will be repeated in a year.  She will complete her 5 years of anastrazole in September 2022.  We will initiate a referral to PT.  Her  multiple questions were answered to her satisfaction.

## 2019-08-31 RX ORDER — FUROSEMIDE 20 MG/1
TABLET ORAL
Qty: 30 TABLET | Refills: 5 | Status: SHIPPED | OUTPATIENT
Start: 2019-08-31 | End: 2020-03-16

## 2019-11-18 RX ORDER — CARVEDILOL PHOSPHATE 80 MG/1
CAPSULE, EXTENDED RELEASE ORAL
Qty: 30 CAPSULE | Refills: 5 | Status: SHIPPED | OUTPATIENT
Start: 2019-11-18 | End: 2019-12-16 | Stop reason: SDUPTHER

## 2019-11-26 ENCOUNTER — PATIENT MESSAGE (OUTPATIENT)
Dept: HEMATOLOGY/ONCOLOGY | Facility: CLINIC | Age: 44
End: 2019-11-26

## 2019-11-26 ENCOUNTER — PATIENT MESSAGE (OUTPATIENT)
Dept: TRANSPLANT | Facility: CLINIC | Age: 44
End: 2019-11-26

## 2019-11-26 ENCOUNTER — PATIENT MESSAGE (OUTPATIENT)
Dept: INTERNAL MEDICINE | Facility: CLINIC | Age: 44
End: 2019-11-26

## 2019-11-27 ENCOUNTER — TELEPHONE (OUTPATIENT)
Dept: ELECTROPHYSIOLOGY | Facility: CLINIC | Age: 44
End: 2019-11-27

## 2019-11-27 DIAGNOSIS — M81.8 OSTEOPOROSIS DUE TO AROMATASE INHIBITOR: Primary | ICD-10-CM

## 2019-11-27 DIAGNOSIS — C50.611 CANCER OF AXILLARY TAIL OF RIGHT BREAST: Primary | ICD-10-CM

## 2019-11-27 DIAGNOSIS — C50.611 MALIGNANT NEOPLASM OF AXILLARY TAIL OF RIGHT FEMALE BREAST, UNSPECIFIED ESTROGEN RECEPTOR STATUS: ICD-10-CM

## 2019-11-27 DIAGNOSIS — I49.9 CARDIAC ARRHYTHMIA, UNSPECIFIED CARDIAC ARRHYTHMIA TYPE: ICD-10-CM

## 2019-11-27 DIAGNOSIS — I42.9 CARDIOMYOPATHY, IDIOPATHIC: Primary | ICD-10-CM

## 2019-11-27 DIAGNOSIS — C50.919 MALIGNANT NEOPLASM OF FEMALE BREAST, UNSPECIFIED ESTROGEN RECEPTOR STATUS, UNSPECIFIED LATERALITY, UNSPECIFIED SITE OF BREAST: ICD-10-CM

## 2019-11-27 DIAGNOSIS — T38.6X5A OSTEOPOROSIS DUE TO AROMATASE INHIBITOR: Primary | ICD-10-CM

## 2019-11-27 DIAGNOSIS — I47.20 VENTRICULAR TACHYCARDIA: ICD-10-CM

## 2019-11-27 DIAGNOSIS — Z95.810 AUTOMATIC IMPLANTABLE CARDIOVERTER-DEFIBRILLATOR IN SITU: ICD-10-CM

## 2019-11-27 NOTE — TELEPHONE ENCOUNTER
Appointments made----- Message from Melinda Parker sent at 11/27/2019 12:26 PM CST -----  Contact: [facility]  Appointment Request From: Avis Graves    With Provider: Gonzalo Mckeon    Preferred Date Range: Any date 12/2/2019 or later    Preferred Times: Any time    Reason for visit: defibrillator check    Comments:  Defib check

## 2019-11-27 NOTE — TELEPHONE ENCOUNTER
Please call and schedule patient with first avaliable provider for an annual/est care appointment. Patient is looking for a new pcp.

## 2019-11-29 DIAGNOSIS — C50.611 MALIGNANT NEOPLASM OF AXILLARY TAIL OF RIGHT FEMALE BREAST, UNSPECIFIED ESTROGEN RECEPTOR STATUS: Primary | ICD-10-CM

## 2019-12-12 NOTE — PROGRESS NOTES
Ms. Graves is a patient of Dr. Mckeon and was last seen in clinic 10/12/2018.      Subjective:   Patient ID:  Avis Graves is a 44 y.o. female who presents for follow-up of Cardiomyopathy  .     HPI:    Ms. Graves is a 44 y.o. female with NICM, HFrEF (EF 30-35%), a hx of VT s/p SC ICD (2009; generator change 2013), mild-persistent asthma, and a hx of breast cancer here for annual follow up.     Background:    Single chamber ICD implanted 2/2009  Remote history of one episode of monomorphic VT, asymptomatic, pace terminated. No recurrence.  Device reached CAREY >> generator change 10/28/2013.  Device interrogations have revealed stable function of lead, no significant arrhythmias. Has some SVT, the longer episodes are appropriately classified.    Update (12/16/2019):    Today she says she continues to feel well. Ms. Graves denies chest pain with exertion or at rest, palpitations, SOB, DE LA PAZ, dizziness, or syncope.    Device Interrogation (12/16/2019) reveals an intrinsic SR with stable lead and device function. NSVTx7, longest 3 seconds. No treated episodes were noted.  She paces 0% in the RV. Estimated battery longevity 6 years.     I have personally reviewed the patient's EKG today, which shows sinus rhythm at 64bpm. AR interval is 164. QRS is 102. QTc is 396.    Recent Cardiac Tests:    2D Echo (10/30/2018):  CONCLUSIONS     1 - Moderately depressed left ventricular systolic function (EF 35-40%).     2 - Quantitatively measured LV function is 41%.     3 - Eccentric hypertrophy.     4 - Mildly depressed right ventricular systolic function .     5 - The estimated PA systolic pressure is 22 mmHg.     Current Outpatient Medications   Medication Sig    alendronate (FOSAMAX) 70 MG tablet Take 1 tablet (70 mg total) by mouth every 7 days.    anastrozole (ARIMIDEX) 1 mg Tab Take 1 tablet (1 mg total) by mouth once daily.    budesonide-formoterol 160-4.5 mcg (SYMBICORT) 160-4.5 mcg/actuation HFAA Inhale 1 puff into  "the lungs every 12 (twelve) hours.    carvedilol (COREG CR) 80 MG 24 hr capsule Take 1 capsule (80 mg total) by mouth once daily.    cetirizine (ZYRTEC) 10 MG tablet Take 10 mg by mouth daily as needed.     coenzyme Q10 (CO Q-10) 300 mg Cap Take by mouth once daily.    furosemide (LASIX) 20 MG tablet TAKE ONE TABLET BY MOUTH DAILY.    LACTOBACILLUS ACIDOPHILUS (PROBIOTIC ORAL) Take by mouth every evening.    mometasone (NASONEX) 50 mcg/actuation nasal spray 2 sprays by Nasal route once daily. (Patient taking differently: 2 sprays by Nasal route daily as needed. )    potassium chloride (MICRO-K) 10 MEQ CpSR TAKE ONE CAPSULE BY MOUTH TWICE A DAY    senna-docusate 8.6-50 mg (PERICOLACE) 8.6-50 mg per tablet Take 1 tablet by mouth 2 (two) times daily.     No current facility-administered medications for this visit.        Review of Systems   Constitution: Negative for malaise/fatigue.   Cardiovascular: Negative for chest pain, dyspnea on exertion, irregular heartbeat, leg swelling and palpitations.   Respiratory: Negative for shortness of breath.    Hematologic/Lymphatic: Negative for bleeding problem.   Skin: Negative for rash.   Musculoskeletal: Negative for myalgias.   Gastrointestinal: Negative for hematemesis, hematochezia and nausea.   Genitourinary: Negative for hematuria.   Neurological: Negative for light-headedness.   Psychiatric/Behavioral: Negative for altered mental status.   Allergic/Immunologic: Negative for persistent infections.         Objective:          /62   Pulse 64   Ht 4' 11" (1.499 m)   Wt 53.3 kg (117 lb 8.1 oz)   BMI 23.73 kg/m²     Physical Exam   Constitutional: She is oriented to person, place, and time. She appears well-developed and well-nourished.   HENT:   Head: Normocephalic.   Nose: Nose normal.   Eyes: Pupils are equal, round, and reactive to light.   Cardiovascular: Normal rate, regular rhythm, S1 normal and S2 normal.   No murmur heard.  Pulses:       Radial " pulses are 2+ on the right side, and 2+ on the left side.   Pulmonary/Chest: Breath sounds normal. No respiratory distress.   Device to LUCW. Pocket in good repair.   Abdominal: Normal appearance.   Musculoskeletal: Normal range of motion. She exhibits no edema.   Neurological: She is alert and oriented to person, place, and time.   Skin: Skin is warm and dry. No erythema.   Psychiatric: She has a normal mood and affect. Her speech is normal and behavior is normal.   Nursing note and vitals reviewed.    Lab Results   Component Value Date     12/13/2019    K 4.8 12/13/2019    MG 2.1 02/08/2017    BUN 12 12/13/2019    CREATININE 0.8 12/13/2019    ALT 12 12/13/2019    AST 19 12/13/2019    HGB 15.2 12/13/2019    HCT 47.7 12/13/2019    TSH 1.463 08/08/2014    LDLCALC 225.6 (H) 09/30/2014       Recent Labs   Lab 03/29/17  1000 05/13/17  2114   INR 1.2 1.2       Assessment:     1. Automatic implantable cardioverter-defibrillator in situ    2. Cardiomyopathy, idiopathic    3. NICM (nonischemic cardiomyopathy)    4. Ventricular tachycardia      Plan:     In summary, Ms. Graves is a 44 y.o. female with NICM, HFrEF (EF 30-35%), a hx of VT s/p SC ICD (2009; generator change 2013), mild-persistent asthma, and a hx of breast cancer here for annual follow up.   Ms. Graves is doing well from a device perspective with stable lead and device function. No sustained arrhythmia noted. No RV pacing. No CHF symptoms. She feels well.     Continue current medication regimen and device settings.   Follow up in device clinic as scheduled.   Follow up in EP clinic in 1 year, sooner as needed.     *A copy of this note has been sent to Dr. Mckeon*    Follow up in about 1 year (around 12/16/2020).    ------------------------------------------------------------------    AB Wilkins, NP-C  Cardiac Electrophysiology

## 2019-12-13 ENCOUNTER — HOSPITAL ENCOUNTER (OUTPATIENT)
Dept: RADIOLOGY | Facility: CLINIC | Age: 44
Discharge: HOME | End: 2019-12-13
Attending: INTERNAL MEDICINE
Payer: COMMERCIAL

## 2019-12-13 DIAGNOSIS — T38.6X5A OSTEOPOROSIS DUE TO AROMATASE INHIBITOR: ICD-10-CM

## 2019-12-13 DIAGNOSIS — M81.8 OSTEOPOROSIS DUE TO AROMATASE INHIBITOR: ICD-10-CM

## 2019-12-13 PROCEDURE — 77080 DXA BONE DENSITY AXIAL: CPT | Mod: TC

## 2019-12-13 PROCEDURE — 77080 DXA BONE DENSITY AXIAL: CPT | Mod: 26,,, | Performed by: INTERNAL MEDICINE

## 2019-12-13 PROCEDURE — 77080 DEXA BONE DENSITY SPINE HIP: ICD-10-PCS | Mod: 26,,, | Performed by: INTERNAL MEDICINE

## 2019-12-13 NOTE — PROGRESS NOTES
Subjective:       Patient ID: Avis Graves is a 44 y.o. female.    Chief Complaint: No chief complaint on file.    HPI   Ms. Graves presents today for a scheduled follow up appointment.    Briefly, she is a 44-year-old -American female with a history of cardiomyopathy and an EF of less than 30%, who was diagnosed in 2017 with a carcinoma of the right breast, that was ER strongly positive, MA negative, and HER-2 negative.  On 2/07/2017 she underwent a right modified radical mastectomy with sentinel lymph node biopsy and insertion of a tissue expander.  The pathology report from the 02/07/2017 procedure indicates that she had a 4 cm carcinoma; three of the four sentinel lymph nodes were positive with macrometastases.  The patient was referred for further evaluation.  It was recommended to her to proceed with twelve weekly cycles of taxol, possibly with the addition of CMF upon completion.  She completed 12 cycles of weekly taxol, but decided against CMF and also against XRT.  She was subsequently started on anastrazole.  She also continues to receive IV infusions of vitamin C through Dr. Butcher.    A mammogram last May was read as BIRADS 1 and a one year follow up was recommended.  A DXA scan two weeks ago showed osteopenia approaching osteoporosis on the hip, and osteoporosis on the spine.     Review of Systems  Overall she feels OK, however, she is complaining of low back pain which has been rather constant and has been present for at least 2 months.   ECOG PS is 1.  She denies any depression, vomiting, diarrhea, constipation, diplopia, blurred vision, headaches, chest pain, palpitations, shortness of breath, left breast pain, abdominal pain, extremity pain, or difficulty ambulating.  The remainder of the ten-point ROS, including general, skin, lymph, H/N, cardiorespiratory, GI, , Neuro, Endocrine, and psychiatric is negative.     Objective:      Physical Exam  She is alert, oriented to time, place,  person, pleasant, well      nourished, in no acute physical distress.    She is here with her .                               VITAL SIGNS:  Reviewed                                      HEENT:  Normal.  There are no nasal, oral, lip, gingival, auricular, or lid     lesions.  Mucosae are moist and pink, and there is no        thrush.  Pupils are equal, reactive to light and accommodation.              Extraocular muscle movements are intact.  Dentition is good.   There is a pea sized lymph node in the right occipital area.                                    NECK:  Supple without JVD, adenopathy, or thyromegaly.                       LUNGS:  Clear to auscultation without wheezing, rales, or rhonchi.           CARDIOVASCULAR:  Reveals an S1, S2, no murmurs, no rubs, no gallops.         ABDOMEN:  Soft, nontender, without organomegaly.  Bowel sounds are    present.                                                                     EXTREMITIES:  No cyanosis, clubbing, or edema.                               BREASTS:  She is status post right mastectomy with a tissue expander in place and a well-healed incision.     There are no masses in her left breast. Kheloid scars are noted  An AICD is palpated in the left infraclavicular area.                                    LYMPHATIC:  There is no cervical, axillary, or supraclavicular adenopathy.   SKIN:  Warm and moist, without petechiae, rashes, induration, or ecchymoses.           NEUROLOGIC:  DTRs are 0-1+ bilaterally, symmetrical, motor function is 5/5,  and cranial nerves are  within normal limits.      Assessment:       1. 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 Use of aromatase inhibitors    3.      CHF.  4.       Osteoporosis  5.       New onset low back pain  Plan:          I have asked her to remain on anastrazole for now.   I will see her again in 3 months, and her mammogram will be repeated in May.  She will complete her 5 years of anastrazole in  September 2022.  In regards to her osteoporosis, she will be started on fosamax.  Finally, in regards to her back pain, out of abundance of caution we will proceed with a bone scan and I will call her with the results.  Her  requested that it be scheduled after the first of the year.  We will accommodate.  We will initiate a referral to PT.  Her multiple questions were answered to her satisfaction.

## 2019-12-16 ENCOUNTER — OFFICE VISIT (OUTPATIENT)
Dept: HEMATOLOGY/ONCOLOGY | Facility: CLINIC | Age: 44
End: 2019-12-16
Payer: COMMERCIAL

## 2019-12-16 ENCOUNTER — CLINICAL SUPPORT (OUTPATIENT)
Dept: CARDIOLOGY | Facility: HOSPITAL | Age: 44
End: 2019-12-16
Attending: INTERNAL MEDICINE
Payer: COMMERCIAL

## 2019-12-16 ENCOUNTER — HOSPITAL ENCOUNTER (OUTPATIENT)
Dept: CARDIOLOGY | Facility: CLINIC | Age: 44
Discharge: HOME | End: 2019-12-16
Payer: COMMERCIAL

## 2019-12-16 ENCOUNTER — OFFICE VISIT (OUTPATIENT)
Dept: ELECTROPHYSIOLOGY | Facility: CLINIC | Age: 44
End: 2019-12-16
Payer: COMMERCIAL

## 2019-12-16 VITALS
BODY MASS INDEX: 23.69 KG/M2 | WEIGHT: 117.5 LBS | HEIGHT: 59 IN | HEART RATE: 64 BPM | SYSTOLIC BLOOD PRESSURE: 106 MMHG | DIASTOLIC BLOOD PRESSURE: 62 MMHG

## 2019-12-16 VITALS
HEART RATE: 68 BPM | OXYGEN SATURATION: 100 % | DIASTOLIC BLOOD PRESSURE: 56 MMHG | RESPIRATION RATE: 16 BRPM | TEMPERATURE: 98 F | SYSTOLIC BLOOD PRESSURE: 102 MMHG | BODY MASS INDEX: 23.59 KG/M2 | WEIGHT: 117 LBS | HEIGHT: 59 IN

## 2019-12-16 DIAGNOSIS — Z95.810 AUTOMATIC IMPLANTABLE CARDIOVERTER-DEFIBRILLATOR IN SITU: Primary | ICD-10-CM

## 2019-12-16 DIAGNOSIS — C50.611 CANCER OF AXILLARY TAIL OF RIGHT BREAST: Primary | ICD-10-CM

## 2019-12-16 DIAGNOSIS — I42.9 CARDIOMYOPATHY, IDIOPATHIC: ICD-10-CM

## 2019-12-16 DIAGNOSIS — I47.20 VENTRICULAR TACHYCARDIA: ICD-10-CM

## 2019-12-16 DIAGNOSIS — Z79.811 USE OF AROMATASE INHIBITORS: ICD-10-CM

## 2019-12-16 DIAGNOSIS — I42.8 NICM (NONISCHEMIC CARDIOMYOPATHY): ICD-10-CM

## 2019-12-16 PROCEDURE — 93010 ELECTROCARDIOGRAM REPORT: CPT | Mod: S$GLB,,, | Performed by: INTERNAL MEDICINE

## 2019-12-16 PROCEDURE — 3008F PR BODY MASS INDEX (BMI) DOCUMENTED: ICD-10-PCS | Mod: CPTII,S$GLB,, | Performed by: NURSE PRACTITIONER

## 2019-12-16 PROCEDURE — 99214 PR OFFICE/OUTPT VISIT, EST, LEVL IV, 30-39 MIN: ICD-10-PCS | Mod: S$GLB,,, | Performed by: INTERNAL MEDICINE

## 2019-12-16 PROCEDURE — 99214 OFFICE O/P EST MOD 30 MIN: CPT | Mod: S$GLB,,, | Performed by: INTERNAL MEDICINE

## 2019-12-16 PROCEDURE — 93005 ELECTROCARDIOGRAM TRACING: CPT | Mod: S$GLB,,, | Performed by: INTERNAL MEDICINE

## 2019-12-16 PROCEDURE — 99214 PR OFFICE/OUTPT VISIT, EST, LEVL IV, 30-39 MIN: ICD-10-PCS | Mod: S$GLB,,, | Performed by: NURSE PRACTITIONER

## 2019-12-16 PROCEDURE — 99214 OFFICE O/P EST MOD 30 MIN: CPT | Mod: S$GLB,,, | Performed by: NURSE PRACTITIONER

## 2019-12-16 PROCEDURE — 3008F BODY MASS INDEX DOCD: CPT | Mod: CPTII,S$GLB,, | Performed by: INTERNAL MEDICINE

## 2019-12-16 PROCEDURE — 99999 PR PBB SHADOW E&M-EST. PATIENT-LVL IV: CPT | Mod: PBBFAC,,, | Performed by: INTERNAL MEDICINE

## 2019-12-16 PROCEDURE — 99999 PR PBB SHADOW E&M-EST. PATIENT-LVL III: CPT | Mod: PBBFAC,,, | Performed by: NURSE PRACTITIONER

## 2019-12-16 PROCEDURE — 99999 PR PBB SHADOW E&M-EST. PATIENT-LVL IV: ICD-10-PCS | Mod: PBBFAC,,, | Performed by: INTERNAL MEDICINE

## 2019-12-16 PROCEDURE — 3008F PR BODY MASS INDEX (BMI) DOCUMENTED: ICD-10-PCS | Mod: CPTII,S$GLB,, | Performed by: INTERNAL MEDICINE

## 2019-12-16 PROCEDURE — 93010 RHYTHM STRIP: ICD-10-PCS | Mod: S$GLB,,, | Performed by: INTERNAL MEDICINE

## 2019-12-16 PROCEDURE — 93005 RHYTHM STRIP: ICD-10-PCS | Mod: S$GLB,,, | Performed by: INTERNAL MEDICINE

## 2019-12-16 PROCEDURE — 3008F BODY MASS INDEX DOCD: CPT | Mod: CPTII,S$GLB,, | Performed by: NURSE PRACTITIONER

## 2019-12-16 PROCEDURE — 99999 PR PBB SHADOW E&M-EST. PATIENT-LVL III: ICD-10-PCS | Mod: PBBFAC,,, | Performed by: NURSE PRACTITIONER

## 2019-12-16 PROCEDURE — 93282 PRGRMG EVAL IMPLANTABLE DFB: CPT

## 2019-12-16 RX ORDER — ALENDRONATE SODIUM 70 MG/1
70 TABLET ORAL
Qty: 25 TABLET | Refills: 4 | Status: SHIPPED | OUTPATIENT
Start: 2019-12-16 | End: 2023-09-11

## 2019-12-18 DIAGNOSIS — E87.6 HYPOKALEMIA: ICD-10-CM

## 2019-12-19 RX ORDER — POTASSIUM CHLORIDE 750 MG/1
CAPSULE, EXTENDED RELEASE ORAL
Qty: 60 CAPSULE | Refills: 5 | Status: SHIPPED | OUTPATIENT
Start: 2019-12-19 | End: 2020-07-11 | Stop reason: SDUPTHER

## 2019-12-27 NOTE — TELEPHONE ENCOUNTER
Dr. Nuno,     You do not have anything available to me until April, you may want to have Doreen check your schedule for an earlier appointment.

## 2020-01-02 ENCOUNTER — HOSPITAL ENCOUNTER (OUTPATIENT)
Dept: RADIOLOGY | Facility: HOSPITAL | Age: 45
Discharge: HOME | End: 2020-01-02
Attending: INTERNAL MEDICINE
Payer: COMMERCIAL

## 2020-01-02 ENCOUNTER — OFFICE VISIT (OUTPATIENT)
Dept: INTERNAL MEDICINE | Facility: CLINIC | Age: 45
End: 2020-01-02
Payer: COMMERCIAL

## 2020-01-02 ENCOUNTER — PATIENT MESSAGE (OUTPATIENT)
Dept: INTERNAL MEDICINE | Facility: CLINIC | Age: 45
End: 2020-01-02

## 2020-01-02 ENCOUNTER — PATIENT MESSAGE (OUTPATIENT)
Dept: HEMATOLOGY/ONCOLOGY | Facility: CLINIC | Age: 45
End: 2020-01-02

## 2020-01-02 VITALS
DIASTOLIC BLOOD PRESSURE: 60 MMHG | SYSTOLIC BLOOD PRESSURE: 100 MMHG | OXYGEN SATURATION: 99 % | BODY MASS INDEX: 24.31 KG/M2 | WEIGHT: 120.56 LBS | HEIGHT: 59 IN | HEART RATE: 66 BPM

## 2020-01-02 DIAGNOSIS — Z79.811 USE OF AROMATASE INHIBITORS: ICD-10-CM

## 2020-01-02 DIAGNOSIS — I42.8 NICM (NONISCHEMIC CARDIOMYOPATHY): ICD-10-CM

## 2020-01-02 DIAGNOSIS — M81.0 OSTEOPOROSIS, UNSPECIFIED OSTEOPOROSIS TYPE, UNSPECIFIED PATHOLOGICAL FRACTURE PRESENCE: ICD-10-CM

## 2020-01-02 DIAGNOSIS — Z00.00 ROUTINE HISTORY AND PHYSICAL EXAMINATION OF ADULT: Primary | ICD-10-CM

## 2020-01-02 DIAGNOSIS — I47.20 VENTRICULAR TACHYCARDIA: ICD-10-CM

## 2020-01-02 DIAGNOSIS — E78.2 MIXED HYPERLIPIDEMIA: Primary | ICD-10-CM

## 2020-01-02 DIAGNOSIS — C50.611 CANCER OF AXILLARY TAIL OF RIGHT BREAST: ICD-10-CM

## 2020-01-02 DIAGNOSIS — D36.9 TUBULAR ADENOMA: ICD-10-CM

## 2020-01-02 PROCEDURE — 99999 PR PBB SHADOW E&M-EST. PATIENT-LVL IV: CPT | Mod: PBBFAC,,, | Performed by: INTERNAL MEDICINE

## 2020-01-02 PROCEDURE — A9503 TC99M MEDRONATE: HCPCS

## 2020-01-02 PROCEDURE — 78306 NM BONE SCAN WHOLE BODY: ICD-10-PCS | Mod: 26,,, | Performed by: RADIOLOGY

## 2020-01-02 PROCEDURE — 99999 PR PBB SHADOW E&M-EST. PATIENT-LVL IV: ICD-10-PCS | Mod: PBBFAC,,, | Performed by: INTERNAL MEDICINE

## 2020-01-02 PROCEDURE — 99396 PREV VISIT EST AGE 40-64: CPT | Mod: S$GLB,,, | Performed by: INTERNAL MEDICINE

## 2020-01-02 PROCEDURE — 78306 BONE IMAGING WHOLE BODY: CPT | Mod: 26,,, | Performed by: RADIOLOGY

## 2020-01-02 PROCEDURE — 99396 PR PREVENTIVE VISIT,EST,40-64: ICD-10-PCS | Mod: S$GLB,,, | Performed by: INTERNAL MEDICINE

## 2020-01-02 RX ORDER — ATORVASTATIN CALCIUM 40 MG/1
40 TABLET, FILM COATED ORAL DAILY
Qty: 90 TABLET | Refills: 3 | Status: SHIPPED | OUTPATIENT
Start: 2020-01-02 | End: 2020-07-22 | Stop reason: SDUPTHER

## 2020-01-02 NOTE — PROGRESS NOTES
Subjective:       Patient ID: Avis Graves is a 44 y.o. female.    Chief Complaint: Annual Exam    HPI - Feels well.  She is a nonsmoker, nondrinker.  Sexually active with her .  No illicit substance use.  Declines flu shot.  Will take TDAP.  Due for lab work.  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    PMH:  Nonischemic Cardiomyopathy, with VT arrest and AICD implanted  Hx heart failure  Hx breast cancer s/p CTX and mastectomy.  No xrt  Osteoporosis  Hx colitis  HLP, not on a statin    Meds:  Reviewed and reconciled in EPIC with patient during visit today.    Review of Systems   Constitutional: Negative for fever.   HENT: Negative for congestion.    Respiratory: Negative for shortness of breath.    Cardiovascular: Negative for chest pain.   Gastrointestinal: Negative for abdominal pain.   Genitourinary: Negative for difficulty urinating.   Musculoskeletal: Positive for arthralgias and back pain.   Skin: Negative for rash.   Neurological: Negative for headaches.   Psychiatric/Behavioral: Negative for sleep disturbance.       Objective:      Physical Exam   Constitutional: She is oriented to person, place, and time. She appears well-developed and well-nourished.   Small, friendly, well-appearing woman.  Accompanied by  today   HENT:   Head: Normocephalic and atraumatic.   Cardiovascular: Normal rate, regular rhythm and normal heart sounds. Exam reveals no gallop and no friction rub.   No murmur heard.  Pulmonary/Chest: Effort normal and breath sounds normal. No stridor. No respiratory distress. She has no wheezes. She has no rales. She exhibits no tenderness.   Neurological: She is alert and oriented to person, place, and time.   Skin: Skin is warm and dry. No erythema.   Psychiatric: She has a normal mood and affect.   Nursing note and vitals reviewed.      Assessment:       1. Routine history and physical examination of adult    2. Osteoporosis, unspecified osteoporosis type,  unspecified pathological fracture presence    3. Tubular adenoma    4. Cancer of axillary tail of right breast    5. NICM (nonischemic cardiomyopathy)    6. Ventricular tachycardia        Plan:       Avis Epstein was seen today for annual exam.    Diagnoses and all orders for this visit:    Routine history and physical examination of adult - despite impressive PMH, she seems to be doing well.  Updating lab work  -     Lipid panel; Future  -     Vitamin D 25 hydroxy; Future  -     Hemoglobin A1c; Future    Osteoporosis, unspecified osteoporosis type, unspecified pathological fracture presence - stable on alendronate    Tubular adenoma - seen in colonoscopy results.  May be due for f/u colonoscopy.  Will ask GI opinion  -     Ambulatory consult to Gastroenterology    Cancer of axillary tail of right breast - stable, LAUREN currently    NICM (nonischemic cardiomyopathy) - stable on treatment.  Stay the course    Ventricular tachycardia    rtc prn, or in 6 months    TARA Herrera MD MPH  Staff Internist

## 2020-01-03 ENCOUNTER — PATIENT MESSAGE (OUTPATIENT)
Dept: INTERNAL MEDICINE | Facility: CLINIC | Age: 45
End: 2020-01-03

## 2020-01-22 ENCOUNTER — DOCUMENTATION ONLY (OUTPATIENT)
Dept: ELECTROPHYSIOLOGY | Facility: CLINIC | Age: 45
End: 2020-01-22

## 2020-01-22 NOTE — PROGRESS NOTES
Called patient to followup on patient requesting new HM from [employer].  Patient states after her appt, she went home and plugged in the monitor and it did not give any alerts that needed troubleshooting.  Started HM transmissions for 2/7/20.  Advised patient I will call back if we don't receive first transmission.

## 2020-03-02 ENCOUNTER — OFFICE VISIT (OUTPATIENT)
Dept: TRANSPLANT | Facility: CLINIC | Age: 45
End: 2020-03-02
Payer: COMMERCIAL

## 2020-03-02 VITALS
BODY MASS INDEX: 24.09 KG/M2 | WEIGHT: 119.5 LBS | DIASTOLIC BLOOD PRESSURE: 56 MMHG | HEIGHT: 59 IN | SYSTOLIC BLOOD PRESSURE: 94 MMHG | HEART RATE: 75 BPM

## 2020-03-02 DIAGNOSIS — Z95.810 AUTOMATIC IMPLANTABLE CARDIOVERTER-DEFIBRILLATOR IN SITU: ICD-10-CM

## 2020-03-02 DIAGNOSIS — I42.8 NICM (NONISCHEMIC CARDIOMYOPATHY): ICD-10-CM

## 2020-03-02 DIAGNOSIS — I42.9 CARDIOMYOPATHY, IDIOPATHIC: ICD-10-CM

## 2020-03-02 DIAGNOSIS — C50.611 CANCER OF AXILLARY TAIL OF RIGHT BREAST: ICD-10-CM

## 2020-03-02 DIAGNOSIS — I50.22 HEART FAILURE, SYSTOLIC, CHRONIC: ICD-10-CM

## 2020-03-02 DIAGNOSIS — I47.20 VENTRICULAR TACHYCARDIA: Primary | ICD-10-CM

## 2020-03-02 PROCEDURE — 3008F PR BODY MASS INDEX (BMI) DOCUMENTED: ICD-10-PCS | Mod: CPTII,S$GLB,, | Performed by: INTERNAL MEDICINE

## 2020-03-02 PROCEDURE — 3008F BODY MASS INDEX DOCD: CPT | Mod: CPTII,S$GLB,, | Performed by: INTERNAL MEDICINE

## 2020-03-02 PROCEDURE — 99215 OFFICE O/P EST HI 40 MIN: CPT | Mod: S$GLB,,, | Performed by: INTERNAL MEDICINE

## 2020-03-02 PROCEDURE — 99215 PR OFFICE/OUTPT VISIT, EST, LEVL V, 40-54 MIN: ICD-10-PCS | Mod: S$GLB,,, | Performed by: INTERNAL MEDICINE

## 2020-03-02 PROCEDURE — 99999 PR PBB SHADOW E&M-EST. PATIENT-LVL III: CPT | Mod: PBBFAC,,, | Performed by: INTERNAL MEDICINE

## 2020-03-02 PROCEDURE — 99999 PR PBB SHADOW E&M-EST. PATIENT-LVL III: ICD-10-PCS | Mod: PBBFAC,,, | Performed by: INTERNAL MEDICINE

## 2020-03-02 RX ORDER — ALBUTEROL SULFATE 90 UG/1
AEROSOL, METERED RESPIRATORY (INHALATION)
COMMUNITY
Start: 2020-01-21

## 2020-03-02 NOTE — PROGRESS NOTES
Subjective:    Patient ID:  Avis Graves is a 45 y.o. female who presents for follow-up of Congestive Heart Failure      Congestive Heart Failure   Pertinent negatives include no abdominal pain, chest pain, chills, coughing, diaphoresis, fever or weakness.     43 year old with NICM with history of LV thrombus(10/18/10 echo revealed no thrombus EF of 20%),and chronic systolic heart failure she is here for follow up and clearance for breast recunstractions. Asymptomatic and improvement in LV EF    Doing well    CONCLUSIONS  (OCtober 30, 2018)        1 - Moderately depressed left ventricular systolic function (EF 35-40%).     2 - Quantitatively measured LV function is 41%.     3 - Eccentric hypertrophy.     4 - Mildly depressed right ventricular systolic function .     5 - The estimated PA systolic pressure is 22 mmHg.     Review of Systems   Constitution: Negative for chills, decreased appetite, diaphoresis, fever, malaise/fatigue, night sweats, weight gain and weight loss.   Cardiovascular: Negative for chest pain, claudication, cyanosis, dyspnea on exertion, irregular heartbeat, leg swelling, near-syncope, orthopnea and palpitations.   Respiratory: Negative for cough, hemoptysis, shortness of breath, sleep disturbances due to breathing, snoring, sputum production and wheezing.    Gastrointestinal: Negative for abdominal pain and diarrhea.   Neurological: Negative for weakness.        Objective:    Physical Exam   Constitutional: She is oriented to person, place, and time. She appears well-developed and well-nourished.   HENT:   Head: Normocephalic and atraumatic.   Eyes: Pupils are equal, round, and reactive to light. Conjunctivae and EOM are normal.   Neck: Neck supple. No JVD present. No tracheal deviation present. No thyromegaly present.   Cardiovascular: Normal rate, regular rhythm and normal heart sounds. PMI is displaced. Exam reveals no gallop and no friction rub.   No murmur heard.  Pulmonary/Chest:  Effort normal and breath sounds normal. No respiratory distress. She has no wheezes. She has no rales. She exhibits no tenderness.   Abdominal: Soft. Bowel sounds are normal. She exhibits no distension and no mass. There is no tenderness. There is no rebound and no guarding.   Musculoskeletal: Normal range of motion. She exhibits no edema or tenderness.   Lymphadenopathy:     She has no cervical adenopathy.   Neurological: She is alert and oriented to person, place, and time. She has normal reflexes. No cranial nerve deficit. She exhibits normal muscle tone. Coordination normal.   Skin: Skin is warm and dry.   Psychiatric: She has a normal mood and affect. Her behavior is normal. Thought content normal.         Assessment:       1. Ventricular tachycardia    2. NICM (nonischemic cardiomyopathy)    3. Heart failure, systolic, chronic    4. Cardiomyopathy, idiopathic    5. Automatic implantable cardioverter-defibrillator in situ    6. Cancer of axillary tail of right breast         Plan:       Doing very well Asymptomatic. Echo EF is improved    RTC 6 months

## 2020-03-16 ENCOUNTER — PATIENT MESSAGE (OUTPATIENT)
Dept: HEMATOLOGY/ONCOLOGY | Facility: CLINIC | Age: 45
End: 2020-03-16

## 2020-03-16 RX ORDER — FUROSEMIDE 20 MG/1
TABLET ORAL
Qty: 30 TABLET | Refills: 5 | Status: SHIPPED | OUTPATIENT
Start: 2020-03-16 | End: 2020-04-17

## 2020-03-25 ENCOUNTER — TELEPHONE (OUTPATIENT)
Dept: HEMATOLOGY/ONCOLOGY | Facility: CLINIC | Age: 45
End: 2020-03-25

## 2020-04-17 RX ORDER — FUROSEMIDE 20 MG/1
TABLET ORAL
Qty: 30 TABLET | Refills: 5 | Status: SHIPPED | OUTPATIENT
Start: 2020-04-17 | End: 2020-11-05

## 2020-05-21 RX ORDER — CARVEDILOL PHOSPHATE 80 MG/1
CAPSULE, EXTENDED RELEASE ORAL
Qty: 30 CAPSULE | Refills: 5 | Status: SHIPPED | OUTPATIENT
Start: 2020-05-21 | End: 2020-11-20

## 2020-05-31 ENCOUNTER — CLINICAL SUPPORT (OUTPATIENT)
Dept: CARDIOLOGY | Facility: HOSPITAL | Age: 45
End: 2020-05-31
Attending: INTERNAL MEDICINE
Payer: COMMERCIAL

## 2020-05-31 DIAGNOSIS — I42.5 OTHER RESTRICTIVE CARDIOMYOPATHY: ICD-10-CM

## 2020-05-31 DIAGNOSIS — I47.20 VENTRICULAR TACHYCARDIA: ICD-10-CM

## 2020-05-31 DIAGNOSIS — Z95.810 PRESENCE OF AUTOMATIC (IMPLANTABLE) CARDIAC DEFIBRILLATOR: ICD-10-CM

## 2020-05-31 PROCEDURE — 93295 DEV INTERROG REMOTE 1/2/MLT: CPT | Mod: ,,, | Performed by: INTERNAL MEDICINE

## 2020-05-31 PROCEDURE — 93295 CARDIAC DEVICE CHECK - REMOTE: ICD-10-PCS | Mod: ,,, | Performed by: INTERNAL MEDICINE

## 2020-05-31 PROCEDURE — 93296 REM INTERROG EVL PM/IDS: CPT | Performed by: INTERNAL MEDICINE

## 2020-07-10 DIAGNOSIS — Z85.3 HISTORY OF BREAST CANCER IN FEMALE: Primary | ICD-10-CM

## 2020-07-10 NOTE — PROGRESS NOTES
Subjective:       Patient ID: Avis Graves is a 45 y.o. female.    Chief Complaint: No chief complaint on file.    HPI   Ms. Graves presents today for a scheduled follow up appointment.    Briefly, she is a 45-year-old -American female with a history of cardiomyopathy and an EF of less than 30%, who was diagnosed in 2017 with a carcinoma of the right breast, that was ER strongly positive, MO negative, and HER-2 negative.  On 2/07/2017 she underwent a right modified radical mastectomy with sentinel lymph node biopsy and insertion of a tissue expander.  The pathology report from the 02/07/2017 procedure indicates that she had a 4 cm carcinoma; three of the four sentinel lymph nodes were positive with macrometastases.  The patient was referred for further evaluation.  It was recommended to her to proceed with twelve weekly cycles of taxol, possibly with the addition of CMF upon completion.  She completed 12 cycles of weekly taxol, but decided against CMF and also against XRT.  She was subsequently started on anastrazole.  She also continues to receive IV infusions of vitamin C through Dr. Butcher.    A mammogram in May 2019 was read as BIRADS 1 and a one year follow up was recommended.  Her most recent DXA scan had shown osteopenia approaching osteoporosis on the hip, and osteoporosis on the spine.     Review of Systems  Overall she feels OK, and has no significant complaints today.   ECOG PS is 1.  She denies any depression, vomiting, diarrhea, constipation, diplopia, blurred vision, headaches, chest pain, palpitations, shortness of breath, left breast pain, abdominal pain, extremity pain, or difficulty ambulating.  The remainder of the ten-point ROS, including general, skin, lymph, H/N, cardiorespiratory, GI, , Neuro, Endocrine, and psychiatric is negative.     Objective:      Physical Exam  She is alert, oriented to time, place, person, pleasant, well      nourished, in no acute physical distress.     She is here with her .                               VITAL SIGNS:  Reviewed                                      HEENT:  Normal.  There are no nasal, oral, lip, gingival, auricular, or lid     lesions.  Mucosae are moist and pink, and there is no        thrush.  Pupils are equal, reactive to light and accommodation.              Extraocular muscle movements are intact.  Dentition is good.   There is a pea sized lymph node in the right occipital area.                                    NECK:  Supple without JVD, adenopathy, or thyromegaly.                       LUNGS:  Clear to auscultation without wheezing, rales, or rhonchi.           CARDIOVASCULAR:  Reveals an S1, S2, no murmurs, no rubs, no gallops.         ABDOMEN:  Soft, nontender, without organomegaly.  Bowel sounds are    present.                                                                     EXTREMITIES:  No cyanosis, clubbing, or edema.                               BREASTS:  She is status post right mastectomy with a tissue expander in place and a well-healed incision.     There are no masses in her left breast. Kheloid scars are noted  An AICD is palpated in the left infraclavicular area.                                    LYMPHATIC:  There is no cervical, axillary, or supraclavicular adenopathy.   SKIN:  Warm and moist, without petechiae, rashes, induration, or ecchymoses.           NEUROLOGIC:  DTRs are 0-1+ bilaterally, symmetrical, motor function is 5/5,  and cranial nerves are  within normal limits.      Assessment:       1. 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 Use of aromatase inhibitors    3.      CHF.  4.       Osteoporosis    Plan:          I have asked her to remain on anastrazole for now.   I will see her again in 3 months, and her mammogram will be repeated next week (at her request)  She will complete her 5 years of anastrazole in September 2022.  I have explained to her that in her case I will recommend that she remain  on anastrozole for at least 7 years  In regards to her osteoporosis, she will remain on fosamax.  Her multiple questions were answered to her satisfaction.  Duration of visit including time reviewing her chart was 25 minutes.

## 2020-07-13 ENCOUNTER — OFFICE VISIT (OUTPATIENT)
Dept: HEMATOLOGY/ONCOLOGY | Facility: CLINIC | Age: 45
End: 2020-07-13
Payer: COMMERCIAL

## 2020-07-13 VITALS
WEIGHT: 116.63 LBS | RESPIRATION RATE: 16 BRPM | TEMPERATURE: 98 F | BODY MASS INDEX: 20.66 KG/M2 | SYSTOLIC BLOOD PRESSURE: 117 MMHG | HEIGHT: 63 IN | DIASTOLIC BLOOD PRESSURE: 52 MMHG | HEART RATE: 75 BPM | OXYGEN SATURATION: 96 %

## 2020-07-13 DIAGNOSIS — C50.611 CANCER OF AXILLARY TAIL OF RIGHT BREAST: Primary | ICD-10-CM

## 2020-07-13 DIAGNOSIS — Z79.811 USE OF AROMATASE INHIBITORS: ICD-10-CM

## 2020-07-13 PROCEDURE — 99999 PR PBB SHADOW E&M-EST. PATIENT-LVL IV: CPT | Mod: PBBFAC,,, | Performed by: INTERNAL MEDICINE

## 2020-07-13 PROCEDURE — 3008F PR BODY MASS INDEX (BMI) DOCUMENTED: ICD-10-PCS | Mod: CPTII,S$GLB,, | Performed by: INTERNAL MEDICINE

## 2020-07-13 PROCEDURE — 99999 PR PBB SHADOW E&M-EST. PATIENT-LVL IV: ICD-10-PCS | Mod: PBBFAC,,, | Performed by: INTERNAL MEDICINE

## 2020-07-13 PROCEDURE — 99214 OFFICE O/P EST MOD 30 MIN: CPT | Mod: S$GLB,,, | Performed by: INTERNAL MEDICINE

## 2020-07-13 PROCEDURE — 3008F BODY MASS INDEX DOCD: CPT | Mod: CPTII,S$GLB,, | Performed by: INTERNAL MEDICINE

## 2020-07-13 PROCEDURE — 99214 PR OFFICE/OUTPT VISIT, EST, LEVL IV, 30-39 MIN: ICD-10-PCS | Mod: S$GLB,,, | Performed by: INTERNAL MEDICINE

## 2020-07-21 ENCOUNTER — LAB VISIT (OUTPATIENT)
Dept: LAB | Facility: HOSPITAL | Age: 45
End: 2020-07-21
Attending: INTERNAL MEDICINE
Payer: COMMERCIAL

## 2020-07-21 ENCOUNTER — HOSPITAL ENCOUNTER (OUTPATIENT)
Dept: RADIOLOGY | Facility: HOSPITAL | Age: 45
Discharge: HOME | End: 2020-07-21
Attending: INTERNAL MEDICINE
Payer: COMMERCIAL

## 2020-07-21 ENCOUNTER — OFFICE VISIT (OUTPATIENT)
Dept: INTERNAL MEDICINE | Facility: CLINIC | Age: 45
End: 2020-07-21
Payer: COMMERCIAL

## 2020-07-21 VITALS
SYSTOLIC BLOOD PRESSURE: 118 MMHG | BODY MASS INDEX: 24 KG/M2 | HEIGHT: 59 IN | WEIGHT: 119.06 LBS | OXYGEN SATURATION: 99 % | DIASTOLIC BLOOD PRESSURE: 58 MMHG | TEMPERATURE: 98 F | HEART RATE: 70 BPM

## 2020-07-21 DIAGNOSIS — C50.611 CANCER OF AXILLARY TAIL OF RIGHT BREAST: ICD-10-CM

## 2020-07-21 DIAGNOSIS — I47.20 VENTRICULAR TACHYCARDIA: ICD-10-CM

## 2020-07-21 DIAGNOSIS — E78.2 MIXED HYPERLIPIDEMIA: ICD-10-CM

## 2020-07-21 DIAGNOSIS — Z71.89 ADVICE GIVEN ABOUT COVID-19 VIRUS INFECTION: ICD-10-CM

## 2020-07-21 DIAGNOSIS — I42.8 NICM (NONISCHEMIC CARDIOMYOPATHY): ICD-10-CM

## 2020-07-21 DIAGNOSIS — D36.9 TUBULAR ADENOMA: Primary | ICD-10-CM

## 2020-07-21 DIAGNOSIS — J45.30 MILD PERSISTENT ASTHMA WITHOUT COMPLICATION: ICD-10-CM

## 2020-07-21 DIAGNOSIS — Z95.810 AUTOMATIC IMPLANTABLE CARDIOVERTER-DEFIBRILLATOR IN SITU: ICD-10-CM

## 2020-07-21 DIAGNOSIS — Z85.3 HISTORY OF BREAST CANCER IN FEMALE: ICD-10-CM

## 2020-07-21 PROBLEM — T45.1X5A ANEMIA ASSOCIATED WITH CHEMOTHERAPY: Status: RESOLVED | Noted: 2017-05-17 | Resolved: 2020-07-21

## 2020-07-21 PROBLEM — N63.0 BREAST NODULE: Status: RESOLVED | Noted: 2018-05-30 | Resolved: 2020-07-21

## 2020-07-21 PROBLEM — D64.81 ANEMIA ASSOCIATED WITH CHEMOTHERAPY: Status: RESOLVED | Noted: 2017-05-17 | Resolved: 2020-07-21

## 2020-07-21 PROBLEM — I42.9 CARDIOMYOPATHY, IDIOPATHIC: Status: RESOLVED | Noted: 2017-05-17 | Resolved: 2020-07-21

## 2020-07-21 PROBLEM — C50.919 BREAST CANCER: Status: RESOLVED | Noted: 2018-03-05 | Resolved: 2020-07-21

## 2020-07-21 LAB — SARS-COV-2 IGG SERPLBLD QL IA.RAPID: NEGATIVE

## 2020-07-21 PROCEDURE — 80061 LIPID PANEL: CPT

## 2020-07-21 PROCEDURE — 77061 BREAST TOMOSYNTHESIS UNI: CPT | Mod: TC,PO,LT

## 2020-07-21 PROCEDURE — 77065 DX MAMMO INCL CAD UNI: CPT | Mod: 26,LT,, | Performed by: RADIOLOGY

## 2020-07-21 PROCEDURE — 99214 OFFICE O/P EST MOD 30 MIN: CPT | Mod: S$GLB,,, | Performed by: INTERNAL MEDICINE

## 2020-07-21 PROCEDURE — 77061 MAMMO DIGITAL DIAGNOSTIC LEFT WITH TOMOSYNTHESIS_CAD: ICD-10-PCS | Mod: 26,LT,, | Performed by: RADIOLOGY

## 2020-07-21 PROCEDURE — 99999 PR PBB SHADOW E&M-EST. PATIENT-LVL V: ICD-10-PCS | Mod: PBBFAC,,, | Performed by: INTERNAL MEDICINE

## 2020-07-21 PROCEDURE — 77065 MAMMO DIGITAL DIAGNOSTIC LEFT WITH TOMOSYNTHESIS_CAD: ICD-10-PCS | Mod: 26,LT,, | Performed by: RADIOLOGY

## 2020-07-21 PROCEDURE — 86769 SARS-COV-2 COVID-19 ANTIBODY: CPT

## 2020-07-21 PROCEDURE — 99214 PR OFFICE/OUTPT VISIT, EST, LEVL IV, 30-39 MIN: ICD-10-PCS | Mod: S$GLB,,, | Performed by: INTERNAL MEDICINE

## 2020-07-21 PROCEDURE — 36415 COLL VENOUS BLD VENIPUNCTURE: CPT

## 2020-07-21 PROCEDURE — 77061 BREAST TOMOSYNTHESIS UNI: CPT | Mod: 26,LT,, | Performed by: RADIOLOGY

## 2020-07-21 PROCEDURE — 3008F PR BODY MASS INDEX (BMI) DOCUMENTED: ICD-10-PCS | Mod: CPTII,S$GLB,, | Performed by: INTERNAL MEDICINE

## 2020-07-21 PROCEDURE — 77065 DX MAMMO INCL CAD UNI: CPT | Mod: TC,PO,LT

## 2020-07-21 PROCEDURE — 99999 PR PBB SHADOW E&M-EST. PATIENT-LVL V: CPT | Mod: PBBFAC,,, | Performed by: INTERNAL MEDICINE

## 2020-07-21 PROCEDURE — 3008F BODY MASS INDEX DOCD: CPT | Mod: CPTII,S$GLB,, | Performed by: INTERNAL MEDICINE

## 2020-07-21 RX ORDER — ANASTROZOLE 1 MG/1
1 TABLET ORAL DAILY
COMMUNITY
End: 2023-09-11

## 2020-07-21 NOTE — PROGRESS NOTES
Subjective:       Patient ID: Avis Graves is a 45 y.o. female.    Chief Complaint:   Follow-up    [facility] - Ms Graves feels well.  She has been taking all medications as prescribed.  Missed GI appointment d/t COVID.  She had very high cholesterol levels in January, so we started a statin.  Due for repeat labs.  She has had some breathing issues in the humidity d/t her asthma, but it's mild.  Not a smoker.  Business is very slow d/t COVID    PMH:  Nonischemic Cardiomyopathy, with VT arrest and AICD implanted  Hx heart failure  Hx breast cancer s/p CTX and mastectomy.  No xrt  Osteoporosis  Hx colitis  HLP, now on a statin     Meds:  Reviewed and reconciled in EPIC with patient during visit today.     Review of Systems   Constitutional: Negative for fever.   HENT: Negative for congestion.    Respiratory: Negative for shortness of breath.    Cardiovascular: Negative for chest pain.   Gastrointestinal: Negative for abdominal pain.   Genitourinary: Negative for difficulty urinating.   Musculoskeletal: Negative for arthralgias.   Skin: Negative for rash.   Neurological: Negative for headaches.   Psychiatric/Behavioral: Negative for sleep disturbance.       Objective:      Physical Exam  Constitutional:       Appearance: She is well-developed.      Comments: Small woman in no distress   HENT:      Head: Normocephalic and atraumatic.   Cardiovascular:      Rate and Rhythm: Normal rate and regular rhythm.      Heart sounds: Normal heart sounds. No murmur. No friction rub. No gallop.    Pulmonary:      Effort: Pulmonary effort is normal. No respiratory distress.      Breath sounds: Normal breath sounds. No wheezing or rales.   Chest:      Chest wall: No tenderness.   Skin:     General: Skin is warm and dry.      Findings: No erythema.   Neurological:      Mental Status: She is alert and oriented to person, place, and time.         Assessment:       1. Tubular adenoma    2. Cancer of axillary tail of right breast    3.  Automatic implantable cardioverter-defibrillator in situ    4. Ventricular tachycardia    5. NICM (nonischemic cardiomyopathy)    6. Mild persistent asthma without complication    7. Advice Given About Covid-19 Virus Infection    8. Mixed hyperlipidemia        Plan:       Avis Epstein was seen today for follow-up.    Diagnoses and all orders for this visit:    Tubular adenoma - need GI evaluation to determine frequency of colonoscopy  -     Ambulatory referral/consult to Gastroenterology; Future    Cancer of axillary tail of right breast - stable on aromatase inhibitor    Automatic implantable cardioverter-defibrillator in situ - stable    Ventricular tachycardia    NICM (nonischemic cardiomyopathy)    Mild persistent asthma without complication - stable    Advice Given About Covid-19 Virus Infection - she'd like antibody testing  -     COVID-19 (SARS CoV-2) IgG Antibody; Future    Mixed hyperlipidemia - now on a statin.  Let's see how it's working  -     Lipid panel; Future    rtc prn, or in a year    TARA Herrera MD MPH  Staff Internist

## 2020-07-22 ENCOUNTER — PATIENT MESSAGE (OUTPATIENT)
Dept: INTERNAL MEDICINE | Facility: CLINIC | Age: 45
End: 2020-07-22

## 2020-07-22 DIAGNOSIS — E78.2 MIXED HYPERLIPIDEMIA: ICD-10-CM

## 2020-07-22 LAB
CHOLEST SERPL-MCNC: 250 MG/DL (ref 120–199)
CHOLEST/HDLC SERPL: 3.9 {RATIO} (ref 2–5)
HDLC SERPL-MCNC: 64 MG/DL (ref 40–75)
HDLC SERPL: 25.6 % (ref 20–50)
LDLC SERPL CALC-MCNC: 174 MG/DL (ref 63–159)
NONHDLC SERPL-MCNC: 186 MG/DL
TRIGL SERPL-MCNC: 60 MG/DL (ref 30–150)

## 2020-07-22 RX ORDER — ATORVASTATIN CALCIUM 80 MG/1
80 TABLET, FILM COATED ORAL DAILY
Qty: 90 TABLET | Refills: 3 | Status: SHIPPED | OUTPATIENT
Start: 2020-07-22 | End: 2022-08-25

## 2020-08-24 ENCOUNTER — PATIENT OUTREACH (OUTPATIENT)
Dept: ADMINISTRATIVE | Facility: OTHER | Age: 45
End: 2020-08-24

## 2020-08-29 ENCOUNTER — CLINICAL SUPPORT (OUTPATIENT)
Dept: CARDIOLOGY | Facility: HOSPITAL | Age: 45
End: 2020-08-29
Payer: COMMERCIAL

## 2020-08-29 DIAGNOSIS — Z95.810 PRESENCE OF AUTOMATIC (IMPLANTABLE) CARDIAC DEFIBRILLATOR: ICD-10-CM

## 2020-08-29 PROCEDURE — 93296 REM INTERROG EVL PM/IDS: CPT | Performed by: INTERNAL MEDICINE

## 2020-08-29 PROCEDURE — 93295 CARDIAC DEVICE CHECK - REMOTE: ICD-10-PCS | Mod: ,,, | Performed by: INTERNAL MEDICINE

## 2020-08-29 PROCEDURE — 93295 DEV INTERROG REMOTE 1/2/MLT: CPT | Mod: ,,, | Performed by: INTERNAL MEDICINE

## 2020-10-13 ENCOUNTER — OFFICE VISIT (OUTPATIENT)
Dept: HEMATOLOGY/ONCOLOGY | Facility: CLINIC | Age: 45
End: 2020-10-13
Payer: COMMERCIAL

## 2020-10-13 VITALS
BODY MASS INDEX: 23.46 KG/M2 | OXYGEN SATURATION: 100 % | TEMPERATURE: 98 F | HEIGHT: 59 IN | DIASTOLIC BLOOD PRESSURE: 61 MMHG | WEIGHT: 116.38 LBS | SYSTOLIC BLOOD PRESSURE: 99 MMHG | HEART RATE: 68 BPM | RESPIRATION RATE: 16 BRPM

## 2020-10-13 DIAGNOSIS — Z79.811 USE OF AROMATASE INHIBITORS: ICD-10-CM

## 2020-10-13 DIAGNOSIS — C50.611 CANCER OF AXILLARY TAIL OF RIGHT BREAST: Primary | ICD-10-CM

## 2020-10-13 PROCEDURE — 3008F BODY MASS INDEX DOCD: CPT | Mod: CPTII,S$GLB,, | Performed by: INTERNAL MEDICINE

## 2020-10-13 PROCEDURE — 3008F PR BODY MASS INDEX (BMI) DOCUMENTED: ICD-10-PCS | Mod: CPTII,S$GLB,, | Performed by: INTERNAL MEDICINE

## 2020-10-13 PROCEDURE — 99213 OFFICE O/P EST LOW 20 MIN: CPT | Mod: S$GLB,,, | Performed by: INTERNAL MEDICINE

## 2020-10-13 PROCEDURE — 99999 PR PBB SHADOW E&M-EST. PATIENT-LVL IV: CPT | Mod: PBBFAC,,, | Performed by: INTERNAL MEDICINE

## 2020-10-13 PROCEDURE — 99213 PR OFFICE/OUTPT VISIT, EST, LEVL III, 20-29 MIN: ICD-10-PCS | Mod: S$GLB,,, | Performed by: INTERNAL MEDICINE

## 2020-10-13 PROCEDURE — 99999 PR PBB SHADOW E&M-EST. PATIENT-LVL IV: ICD-10-PCS | Mod: PBBFAC,,, | Performed by: INTERNAL MEDICINE

## 2020-10-13 NOTE — PROGRESS NOTES
Subjective:       Patient ID: Avis Graves is a 45 y.o. female.    Chief Complaint: No chief complaint on file.    HPI   Ms. Graves presents today for a scheduled follow up appointment.    Briefly, she is a 45-year-old -American female with a history of cardiomyopathy and an EF of less than 30%, who was diagnosed in 2017 with a carcinoma of the right breast, that was ER strongly positive, MT negative, and HER-2 negative.  On 2/07/2017 she underwent a right modified radical mastectomy with sentinel lymph node biopsy and insertion of a tissue expander.  The pathology report from the 02/07/2017 procedure indicates that she had a 4 cm carcinoma; three of the four sentinel lymph nodes were positive with macrometastases.  The patient was referred for further evaluation.  It was recommended to her to proceed with twelve weekly cycles of taxol, possibly with the addition of CMF upon completion.  She completed 12 cycles of weekly taxol, but decided against CMF and also against XRT.  She was subsequently started on anastrazole.  She also continues to receive IV infusions of vitamin C through Dr. Butcher.    A mammogram in July 2020 was read as BIRADS 1 and a one year follow up was recommended.  Her most recent DXA scan had shown osteopenia approaching osteoporosis on the hip, and osteoporosis on the spine.     Review of Systems  Overall she feels OK, and has no significant complaints today.   ECOG PS is 1.  She denies any depression, vomiting, diarrhea, constipation, diplopia, blurred vision, headaches, chest pain, palpitations, shortness of breath, left breast pain, abdominal pain, extremity pain, or difficulty ambulating.  The remainder of the ten-point ROS, including general, skin, lymph, H/N, cardiorespiratory, GI, , Neuro, Endocrine, and psychiatric is negative.     Objective:      Physical Exam  She is alert, oriented to time, place, person, pleasant, well      nourished, in no acute physical distress.                              VITAL SIGNS:  Reviewed                                      HEENT:  Normal.  There are no nasal, oral, lip, gingival, auricular, or lid     lesions.  Mucosae are moist and pink, and there is no        thrush.  Pupils are equal, reactive to light and accommodation.              Extraocular muscle movements are intact.  Dentition is good.                                  NECK:  Supple without JVD, adenopathy, or thyromegaly.                       LUNGS:  Clear to auscultation without wheezing, rales, or rhonchi.           CARDIOVASCULAR:  Reveals an S1, S2, no murmurs, no rubs, no gallops.         ABDOMEN:  Soft, nontender, without organomegaly.  Bowel sounds are    present.                                                                     EXTREMITIES:  No cyanosis, clubbing, or edema.                               BREASTS:  She is status post right mastectomy with a tissue expander in place and a well-healed incision.     There are no masses in her left breast. Kheloid scars are noted  An AICD is palpated in the left infraclavicular area.                                    LYMPHATIC:  There is no cervical, axillary, or supraclavicular adenopathy.   SKIN:  Warm and moist, without petechiae, rashes, induration, or ecchymoses.           NEUROLOGIC:  DTRs are 0-1+ bilaterally, symmetrical, motor function is 5/5,  and cranial nerves are  within normal limits.      Assessment:       1. 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 Use of aromatase inhibitors    3.      CHF.  4.       Osteoporosis    Plan:          I have asked her to remain on anastrazole for now.   I will see her again in 4 months.,  Her mammogram will be repeated in July 2021.    She will complete her 5 years of anastrazole in September 2022.  I have explained to her that in her case I will recommend that she remain on anastrozole for at least 7 years.  In regards to her osteoporosis, she will remain on fosamax.  Her multiple  questions were answered to her satisfaction.

## 2020-11-24 ENCOUNTER — TELEPHONE (OUTPATIENT)
Dept: ELECTROPHYSIOLOGY | Facility: CLINIC | Age: 45
End: 2020-11-24

## 2020-11-24 DIAGNOSIS — I49.8 OTHER SPECIFIED CARDIAC ARRHYTHMIAS: Primary | ICD-10-CM

## 2020-11-25 ENCOUNTER — TELEPHONE (OUTPATIENT)
Dept: TRANSPLANT | Facility: CLINIC | Age: 45
End: 2020-11-25

## 2020-11-25 DIAGNOSIS — I50.22 CHRONIC SYSTOLIC CONGESTIVE HEART FAILURE: Primary | ICD-10-CM

## 2020-11-27 ENCOUNTER — CLINICAL SUPPORT (OUTPATIENT)
Dept: CARDIOLOGY | Facility: HOSPITAL | Age: 45
End: 2020-11-27
Payer: COMMERCIAL

## 2020-11-27 DIAGNOSIS — Z95.810 PRESENCE OF AUTOMATIC (IMPLANTABLE) CARDIAC DEFIBRILLATOR: ICD-10-CM

## 2020-11-27 PROCEDURE — 93295 CARDIAC DEVICE CHECK - REMOTE: ICD-10-PCS | Mod: ,,, | Performed by: INTERNAL MEDICINE

## 2020-11-27 PROCEDURE — 93295 DEV INTERROG REMOTE 1/2/MLT: CPT | Mod: ,,, | Performed by: INTERNAL MEDICINE

## 2020-11-27 PROCEDURE — 93296 REM INTERROG EVL PM/IDS: CPT | Performed by: INTERNAL MEDICINE

## 2021-01-11 ENCOUNTER — PATIENT OUTREACH (OUTPATIENT)
Dept: ADMINISTRATIVE | Facility: OTHER | Age: 46
End: 2021-01-11

## 2021-01-13 ENCOUNTER — OFFICE VISIT (OUTPATIENT)
Dept: TRANSPLANT | Facility: CLINIC | Age: 46
End: 2021-01-13
Payer: COMMERCIAL

## 2021-01-13 ENCOUNTER — OFFICE VISIT (OUTPATIENT)
Dept: HEMATOLOGY/ONCOLOGY | Facility: CLINIC | Age: 46
End: 2021-01-13
Payer: COMMERCIAL

## 2021-01-13 ENCOUNTER — OFFICE VISIT (OUTPATIENT)
Dept: GASTROENTEROLOGY | Facility: CLINIC | Age: 46
End: 2021-01-13
Payer: COMMERCIAL

## 2021-01-13 ENCOUNTER — HOSPITAL ENCOUNTER (OUTPATIENT)
Dept: CARDIOLOGY | Facility: CLINIC | Age: 46
Discharge: HOME | End: 2021-01-13
Payer: COMMERCIAL

## 2021-01-13 ENCOUNTER — CLINICAL SUPPORT (OUTPATIENT)
Dept: CARDIOLOGY | Facility: HOSPITAL | Age: 46
End: 2021-01-13
Attending: INTERNAL MEDICINE
Payer: COMMERCIAL

## 2021-01-13 ENCOUNTER — OFFICE VISIT (OUTPATIENT)
Dept: ELECTROPHYSIOLOGY | Facility: CLINIC | Age: 46
End: 2021-01-13
Payer: COMMERCIAL

## 2021-01-13 VITALS
BODY MASS INDEX: 23.87 KG/M2 | HEIGHT: 59 IN | SYSTOLIC BLOOD PRESSURE: 115 MMHG | WEIGHT: 118.38 LBS | HEART RATE: 74 BPM | DIASTOLIC BLOOD PRESSURE: 61 MMHG

## 2021-01-13 VITALS
TEMPERATURE: 98 F | BODY MASS INDEX: 23.78 KG/M2 | OXYGEN SATURATION: 99 % | SYSTOLIC BLOOD PRESSURE: 110 MMHG | RESPIRATION RATE: 16 BRPM | HEART RATE: 76 BPM | DIASTOLIC BLOOD PRESSURE: 60 MMHG | WEIGHT: 117.94 LBS | HEIGHT: 59 IN

## 2021-01-13 VITALS
HEIGHT: 59 IN | HEART RATE: 69 BPM | WEIGHT: 117.75 LBS | SYSTOLIC BLOOD PRESSURE: 100 MMHG | DIASTOLIC BLOOD PRESSURE: 69 MMHG | BODY MASS INDEX: 23.74 KG/M2

## 2021-01-13 VITALS
WEIGHT: 118.19 LBS | HEIGHT: 59 IN | HEART RATE: 67 BPM | DIASTOLIC BLOOD PRESSURE: 70 MMHG | SYSTOLIC BLOOD PRESSURE: 110 MMHG | BODY MASS INDEX: 23.83 KG/M2

## 2021-01-13 DIAGNOSIS — I42.8 NICM (NONISCHEMIC CARDIOMYOPATHY): ICD-10-CM

## 2021-01-13 DIAGNOSIS — C50.011 MALIGNANT NEOPLASM OF NIPPLE OF RIGHT BREAST IN FEMALE, UNSPECIFIED ESTROGEN RECEPTOR STATUS: Primary | ICD-10-CM

## 2021-01-13 DIAGNOSIS — K57.90 DIVERTICULOSIS: Primary | ICD-10-CM

## 2021-01-13 DIAGNOSIS — C50.611 CANCER OF AXILLARY TAIL OF RIGHT BREAST: Primary | ICD-10-CM

## 2021-01-13 DIAGNOSIS — I47.20 VENTRICULAR TACHYCARDIA: ICD-10-CM

## 2021-01-13 DIAGNOSIS — I50.22 HEART FAILURE, SYSTOLIC, CHRONIC: ICD-10-CM

## 2021-01-13 DIAGNOSIS — D36.9 TUBULAR ADENOMA: ICD-10-CM

## 2021-01-13 DIAGNOSIS — Z95.810 AUTOMATIC IMPLANTABLE CARDIOVERTER-DEFIBRILLATOR IN SITU: Primary | ICD-10-CM

## 2021-01-13 DIAGNOSIS — Z85.3 HISTORY OF BREAST CANCER: Primary | ICD-10-CM

## 2021-01-13 DIAGNOSIS — E78.2 MIXED HYPERLIPIDEMIA: ICD-10-CM

## 2021-01-13 DIAGNOSIS — Z95.810 AUTOMATIC IMPLANTABLE CARDIOVERTER-DEFIBRILLATOR IN SITU: ICD-10-CM

## 2021-01-13 DIAGNOSIS — Z79.811 USE OF AROMATASE INHIBITORS: ICD-10-CM

## 2021-01-13 DIAGNOSIS — I49.8 OTHER SPECIFIED CARDIAC ARRHYTHMIAS: ICD-10-CM

## 2021-01-13 DIAGNOSIS — Z86.010 HISTORY OF COLONIC POLYPS: ICD-10-CM

## 2021-01-13 PROCEDURE — 99999 PR PBB SHADOW E&M-EST. PATIENT-LVL II: CPT | Mod: PBBFAC,,, | Performed by: INTERNAL MEDICINE

## 2021-01-13 PROCEDURE — 1126F AMNT PAIN NOTED NONE PRSNT: CPT | Mod: S$GLB,,, | Performed by: INTERNAL MEDICINE

## 2021-01-13 PROCEDURE — 1126F AMNT PAIN NOTED NONE PRSNT: CPT | Mod: S$GLB,,, | Performed by: NURSE PRACTITIONER

## 2021-01-13 PROCEDURE — 93282 PRGRMG EVAL IMPLANTABLE DFB: CPT

## 2021-01-13 PROCEDURE — 3008F PR BODY MASS INDEX (BMI) DOCUMENTED: ICD-10-PCS | Mod: CPTII,S$GLB,, | Performed by: INTERNAL MEDICINE

## 2021-01-13 PROCEDURE — 3008F BODY MASS INDEX DOCD: CPT | Mod: CPTII,S$GLB,, | Performed by: INTERNAL MEDICINE

## 2021-01-13 PROCEDURE — 93282 PRGRMG EVAL IMPLANTABLE DFB: CPT | Mod: 26,,, | Performed by: INTERNAL MEDICINE

## 2021-01-13 PROCEDURE — 3008F BODY MASS INDEX DOCD: CPT | Mod: CPTII,S$GLB,, | Performed by: NURSE PRACTITIONER

## 2021-01-13 PROCEDURE — 99213 OFFICE O/P EST LOW 20 MIN: CPT | Mod: S$GLB,,, | Performed by: INTERNAL MEDICINE

## 2021-01-13 PROCEDURE — 99213 PR OFFICE/OUTPT VISIT, EST, LEVL III, 20-29 MIN: ICD-10-PCS | Mod: S$GLB,,, | Performed by: INTERNAL MEDICINE

## 2021-01-13 PROCEDURE — 93005 ELECTROCARDIOGRAM TRACING: CPT | Mod: S$GLB,,, | Performed by: INTERNAL MEDICINE

## 2021-01-13 PROCEDURE — 93010 ELECTROCARDIOGRAM REPORT: CPT | Mod: S$GLB,,, | Performed by: INTERNAL MEDICINE

## 2021-01-13 PROCEDURE — 99214 PR OFFICE/OUTPT VISIT, EST, LEVL IV, 30-39 MIN: ICD-10-PCS | Mod: S$GLB,,, | Performed by: NURSE PRACTITIONER

## 2021-01-13 PROCEDURE — 1126F PR PAIN SEVERITY QUANTIFIED, NO PAIN PRESENT: ICD-10-PCS | Mod: S$GLB,,, | Performed by: NURSE PRACTITIONER

## 2021-01-13 PROCEDURE — 93010 RHYTHM STRIP: ICD-10-PCS | Mod: S$GLB,,, | Performed by: INTERNAL MEDICINE

## 2021-01-13 PROCEDURE — 3008F PR BODY MASS INDEX (BMI) DOCUMENTED: ICD-10-PCS | Mod: CPTII,S$GLB,, | Performed by: NURSE PRACTITIONER

## 2021-01-13 PROCEDURE — 99203 PR OFFICE/OUTPT VISIT, NEW, LEVL III, 30-44 MIN: ICD-10-PCS | Mod: S$GLB,,, | Performed by: NURSE PRACTITIONER

## 2021-01-13 PROCEDURE — 99999 PR PBB SHADOW E&M-EST. PATIENT-LVL III: CPT | Mod: PBBFAC,,, | Performed by: NURSE PRACTITIONER

## 2021-01-13 PROCEDURE — 1126F PR PAIN SEVERITY QUANTIFIED, NO PAIN PRESENT: ICD-10-PCS | Mod: S$GLB,,, | Performed by: INTERNAL MEDICINE

## 2021-01-13 PROCEDURE — 99999 PR PBB SHADOW E&M-EST. PATIENT-LVL III: ICD-10-PCS | Mod: PBBFAC,,, | Performed by: NURSE PRACTITIONER

## 2021-01-13 PROCEDURE — 99999 PR PBB SHADOW E&M-EST. PATIENT-LVL II: ICD-10-PCS | Mod: PBBFAC,,, | Performed by: INTERNAL MEDICINE

## 2021-01-13 PROCEDURE — 99999 PR PBB SHADOW E&M-EST. PATIENT-LVL IV: ICD-10-PCS | Mod: PBBFAC,,, | Performed by: INTERNAL MEDICINE

## 2021-01-13 PROCEDURE — 99215 PR OFFICE/OUTPT VISIT, EST, LEVL V, 40-54 MIN: ICD-10-PCS | Mod: S$GLB,,, | Performed by: INTERNAL MEDICINE

## 2021-01-13 PROCEDURE — 99215 OFFICE O/P EST HI 40 MIN: CPT | Mod: S$GLB,,, | Performed by: INTERNAL MEDICINE

## 2021-01-13 PROCEDURE — 93282 CARDIAC DEVICE CHECK - IN CLINIC & HOSPITAL: ICD-10-PCS | Mod: 26,,, | Performed by: INTERNAL MEDICINE

## 2021-01-13 PROCEDURE — 93005 RHYTHM STRIP: ICD-10-PCS | Mod: S$GLB,,, | Performed by: INTERNAL MEDICINE

## 2021-01-13 PROCEDURE — 99214 OFFICE O/P EST MOD 30 MIN: CPT | Mod: S$GLB,,, | Performed by: NURSE PRACTITIONER

## 2021-01-13 PROCEDURE — 99999 PR PBB SHADOW E&M-EST. PATIENT-LVL IV: CPT | Mod: PBBFAC,,, | Performed by: INTERNAL MEDICINE

## 2021-01-13 PROCEDURE — 99203 OFFICE O/P NEW LOW 30 MIN: CPT | Mod: S$GLB,,, | Performed by: NURSE PRACTITIONER

## 2021-01-22 ENCOUNTER — PATIENT MESSAGE (OUTPATIENT)
Dept: ENDOSCOPY | Facility: HOSPITAL | Age: 46
End: 2021-01-22

## 2021-01-22 ENCOUNTER — TELEPHONE (OUTPATIENT)
Dept: ENDOSCOPY | Facility: HOSPITAL | Age: 46
End: 2021-01-22

## 2021-01-22 DIAGNOSIS — Z01.812 PRE-PROCEDURE LAB EXAM: Primary | ICD-10-CM

## 2021-01-22 DIAGNOSIS — Z12.11 SPECIAL SCREENING FOR MALIGNANT NEOPLASMS, COLON: Primary | ICD-10-CM

## 2021-01-22 RX ORDER — SODIUM, POTASSIUM,MAG SULFATES 17.5-3.13G
1 SOLUTION, RECONSTITUTED, ORAL ORAL DAILY
Qty: 1 KIT | Refills: 0 | Status: SHIPPED | OUTPATIENT
Start: 2021-01-22 | End: 2021-01-24

## 2021-02-21 ENCOUNTER — LAB VISIT (OUTPATIENT)
Dept: URGENT CARE | Facility: CLINIC | Age: 46
End: 2021-02-21
Payer: COMMERCIAL

## 2021-02-21 DIAGNOSIS — Z01.812 PRE-PROCEDURE LAB EXAM: ICD-10-CM

## 2021-02-21 PROCEDURE — U0005 INFEC AGEN DETEC AMPLI PROBE: HCPCS

## 2021-02-21 PROCEDURE — 99211 OFF/OP EST MAY X REQ PHY/QHP: CPT | Mod: S$GLB,,, | Performed by: PHYSICIAN ASSISTANT

## 2021-02-21 PROCEDURE — 99211 PR OFFICE/OUTPT VISIT, EST, LEVL I: ICD-10-PCS | Mod: S$GLB,,, | Performed by: PHYSICIAN ASSISTANT

## 2021-02-21 PROCEDURE — U0003 INFECTIOUS AGENT DETECTION BY NUCLEIC ACID (DNA OR RNA); SEVERE ACUTE RESPIRATORY SYNDROME CORONAVIRUS 2 (SARS-COV-2) (CORONAVIRUS DISEASE [COVID-19]), AMPLIFIED PROBE TECHNIQUE, MAKING USE OF HIGH THROUGHPUT TECHNOLOGIES AS DESCRIBED BY CMS-2020-01-R: HCPCS

## 2021-02-22 LAB — SARS-COV-2 RNA RESP QL NAA+PROBE: NOT DETECTED

## 2021-02-24 ENCOUNTER — HOSPITAL ENCOUNTER (OUTPATIENT)
Facility: HOSPITAL | Age: 46
Discharge: HOME | End: 2021-02-24
Attending: INTERNAL MEDICINE | Admitting: INTERNAL MEDICINE
Payer: COMMERCIAL

## 2021-02-24 ENCOUNTER — ANESTHESIA EVENT (OUTPATIENT)
Dept: ENDOSCOPY | Facility: HOSPITAL | Age: 46
End: 2021-02-24
Payer: COMMERCIAL

## 2021-02-24 ENCOUNTER — DOCUMENTATION ONLY (OUTPATIENT)
Dept: ELECTROPHYSIOLOGY | Facility: CLINIC | Age: 46
End: 2021-02-24

## 2021-02-24 ENCOUNTER — ANESTHESIA (OUTPATIENT)
Dept: ENDOSCOPY | Facility: HOSPITAL | Age: 46
End: 2021-02-24
Payer: COMMERCIAL

## 2021-02-24 VITALS
HEART RATE: 71 BPM | HEIGHT: 59 IN | WEIGHT: 113 LBS | OXYGEN SATURATION: 98 % | RESPIRATION RATE: 17 BRPM | BODY MASS INDEX: 22.78 KG/M2 | DIASTOLIC BLOOD PRESSURE: 71 MMHG | SYSTOLIC BLOOD PRESSURE: 117 MMHG | TEMPERATURE: 98 F

## 2021-02-24 DIAGNOSIS — Z86.010 HISTORY OF COLON POLYPS: Primary | ICD-10-CM

## 2021-02-24 PROBLEM — Z86.0101 HISTORY OF ADENOMATOUS POLYP OF COLON: Status: ACTIVE | Noted: 2021-02-24

## 2021-02-24 PROCEDURE — 25000003 PHARM REV CODE 250: Performed by: INTERNAL MEDICINE

## 2021-02-24 PROCEDURE — 37000008 HC ANESTHESIA 1ST 15 MINUTES: Performed by: INTERNAL MEDICINE

## 2021-02-24 PROCEDURE — G0105 COLORECTAL SCRN; HI RISK IND: HCPCS | Mod: ,,, | Performed by: INTERNAL MEDICINE

## 2021-02-24 PROCEDURE — E9220 PRA ENDO ANESTHESIA: HCPCS | Mod: ,,, | Performed by: NURSE ANESTHETIST, CERTIFIED REGISTERED

## 2021-02-24 PROCEDURE — 63600175 PHARM REV CODE 636 W HCPCS: Performed by: NURSE ANESTHETIST, CERTIFIED REGISTERED

## 2021-02-24 PROCEDURE — G0105 COLORECTAL SCRN; HI RISK IND: ICD-10-PCS | Mod: ,,, | Performed by: INTERNAL MEDICINE

## 2021-02-24 PROCEDURE — 37000009 HC ANESTHESIA EA ADD 15 MINS: Performed by: INTERNAL MEDICINE

## 2021-02-24 PROCEDURE — G0105 COLORECTAL SCRN; HI RISK IND: HCPCS | Performed by: INTERNAL MEDICINE

## 2021-02-24 PROCEDURE — 25000003 PHARM REV CODE 250: Performed by: NURSE ANESTHETIST, CERTIFIED REGISTERED

## 2021-02-24 PROCEDURE — E9220 PRA ENDO ANESTHESIA: ICD-10-PCS | Mod: ,,, | Performed by: NURSE ANESTHETIST, CERTIFIED REGISTERED

## 2021-02-24 RX ORDER — PHENYLEPHRINE HYDROCHLORIDE 10 MG/ML
INJECTION INTRAVENOUS
Status: DISCONTINUED | OUTPATIENT
Start: 2021-02-24 | End: 2021-02-24

## 2021-02-24 RX ORDER — PROPOFOL 10 MG/ML
VIAL (ML) INTRAVENOUS
Status: DISCONTINUED | OUTPATIENT
Start: 2021-02-24 | End: 2021-02-24

## 2021-02-24 RX ORDER — PROPOFOL 10 MG/ML
VIAL (ML) INTRAVENOUS CONTINUOUS PRN
Status: DISCONTINUED | OUTPATIENT
Start: 2021-02-24 | End: 2021-02-24

## 2021-02-24 RX ORDER — SODIUM CHLORIDE 9 MG/ML
INJECTION, SOLUTION INTRAVENOUS CONTINUOUS
Status: DISCONTINUED | OUTPATIENT
Start: 2021-02-24 | End: 2021-02-24 | Stop reason: HOSPADM

## 2021-02-24 RX ADMIN — SODIUM CHLORIDE: 0.9 INJECTION, SOLUTION INTRAVENOUS at 09:02

## 2021-02-24 RX ADMIN — PHENYLEPHRINE HYDROCHLORIDE 100 MCG: 10 INJECTION INTRAVENOUS at 02:02

## 2021-02-24 RX ADMIN — SODIUM CHLORIDE: 0.9 INJECTION, SOLUTION INTRAVENOUS at 01:02

## 2021-02-24 RX ADMIN — PROPOFOL 20 MG: 10 INJECTION, EMULSION INTRAVENOUS at 01:02

## 2021-02-24 RX ADMIN — PROPOFOL 100 MCG/KG/MIN: 10 INJECTION, EMULSION INTRAVENOUS at 01:02

## 2021-02-24 RX ADMIN — PROPOFOL 40 MG: 10 INJECTION, EMULSION INTRAVENOUS at 01:02

## 2021-02-24 RX ADMIN — SODIUM CHLORIDE: 0.9 INJECTION, SOLUTION INTRAVENOUS at 11:02

## 2021-02-25 ENCOUNTER — CLINICAL SUPPORT (OUTPATIENT)
Dept: CARDIOLOGY | Facility: HOSPITAL | Age: 46
End: 2021-02-25
Payer: COMMERCIAL

## 2021-02-25 DIAGNOSIS — Z95.810 PRESENCE OF AUTOMATIC (IMPLANTABLE) CARDIAC DEFIBRILLATOR: ICD-10-CM

## 2021-02-25 PROCEDURE — 93295 DEV INTERROG REMOTE 1/2/MLT: CPT | Mod: ,,, | Performed by: INTERNAL MEDICINE

## 2021-02-25 PROCEDURE — 93296 REM INTERROG EVL PM/IDS: CPT | Performed by: INTERNAL MEDICINE

## 2021-02-25 PROCEDURE — 93295 CARDIAC DEVICE CHECK - REMOTE: ICD-10-PCS | Mod: ,,, | Performed by: INTERNAL MEDICINE

## 2021-03-12 ENCOUNTER — TELEPHONE (OUTPATIENT)
Dept: TRANSPLANT | Facility: CLINIC | Age: 46
End: 2021-03-12

## 2021-03-15 DIAGNOSIS — I50.20 SYSTOLIC CONGESTIVE HEART FAILURE, UNSPECIFIED HF CHRONICITY: Primary | ICD-10-CM

## 2021-03-15 RX ORDER — CARVEDILOL 25 MG/1
25 TABLET ORAL 2 TIMES DAILY WITH MEALS
Qty: 60 TABLET | Refills: 11 | Status: SHIPPED | OUTPATIENT
Start: 2021-03-15 | End: 2022-04-06

## 2021-03-16 ENCOUNTER — TELEPHONE (OUTPATIENT)
Dept: CARDIOLOGY | Facility: HOSPITAL | Age: 46
End: 2021-03-16

## 2021-05-26 ENCOUNTER — CLINICAL SUPPORT (OUTPATIENT)
Dept: CARDIOLOGY | Facility: HOSPITAL | Age: 46
End: 2021-05-26
Attending: INTERNAL MEDICINE
Payer: COMMERCIAL

## 2021-05-26 DIAGNOSIS — I42.5 OTHER RESTRICTIVE CARDIOMYOPATHY: ICD-10-CM

## 2021-05-26 DIAGNOSIS — Z95.810 PRESENCE OF AUTOMATIC (IMPLANTABLE) CARDIAC DEFIBRILLATOR: ICD-10-CM

## 2021-05-26 PROCEDURE — 93296 REM INTERROG EVL PM/IDS: CPT | Performed by: INTERNAL MEDICINE

## 2021-05-26 PROCEDURE — 93295 CARDIAC DEVICE CHECK - REMOTE: ICD-10-PCS | Mod: ,,, | Performed by: INTERNAL MEDICINE

## 2021-05-26 PROCEDURE — 93295 DEV INTERROG REMOTE 1/2/MLT: CPT | Mod: ,,, | Performed by: INTERNAL MEDICINE

## 2021-06-13 ENCOUNTER — HOSPITAL ENCOUNTER (EMERGENCY)
Facility: HOSPITAL | Age: 46
Discharge: HOME | End: 2021-06-13
Attending: EMERGENCY MEDICINE
Payer: COMMERCIAL

## 2021-06-13 VITALS
BODY MASS INDEX: 22.82 KG/M2 | HEART RATE: 97 BPM | RESPIRATION RATE: 18 BRPM | TEMPERATURE: 98 F | OXYGEN SATURATION: 96 % | WEIGHT: 113 LBS | SYSTOLIC BLOOD PRESSURE: 98 MMHG | DIASTOLIC BLOOD PRESSURE: 54 MMHG

## 2021-06-13 DIAGNOSIS — M54.50 LOW BACK PAIN, UNSPECIFIED BACK PAIN LATERALITY, UNSPECIFIED CHRONICITY, UNSPECIFIED WHETHER SCIATICA PRESENT: Primary | ICD-10-CM

## 2021-06-13 PROCEDURE — 99284 PR EMERGENCY DEPT VISIT,LEVEL IV: ICD-10-PCS | Mod: ,,, | Performed by: EMERGENCY MEDICINE

## 2021-06-13 PROCEDURE — 99284 EMERGENCY DEPT VISIT MOD MDM: CPT | Mod: ,,, | Performed by: EMERGENCY MEDICINE

## 2021-06-13 PROCEDURE — 99283 EMERGENCY DEPT VISIT LOW MDM: CPT

## 2021-06-13 PROCEDURE — 25000003 PHARM REV CODE 250: Performed by: EMERGENCY MEDICINE

## 2021-06-13 RX ORDER — ACETAMINOPHEN 500 MG
1000 TABLET ORAL
Status: COMPLETED | OUTPATIENT
Start: 2021-06-13 | End: 2021-06-13

## 2021-06-13 RX ORDER — CYCLOBENZAPRINE HCL 5 MG
5 TABLET ORAL 3 TIMES DAILY PRN
Qty: 20 TABLET | Refills: 0 | Status: SHIPPED | OUTPATIENT
Start: 2021-06-13 | End: 2021-06-23

## 2021-06-13 RX ADMIN — ACETAMINOPHEN 1000 MG: 500 TABLET ORAL at 05:06

## 2021-07-11 ENCOUNTER — PATIENT MESSAGE (OUTPATIENT)
Dept: HEMATOLOGY/ONCOLOGY | Facility: CLINIC | Age: 46
End: 2021-07-11

## 2021-07-12 ENCOUNTER — TELEPHONE (OUTPATIENT)
Dept: HEMATOLOGY/ONCOLOGY | Facility: CLINIC | Age: 46
End: 2021-07-12

## 2021-08-24 ENCOUNTER — CLINICAL SUPPORT (OUTPATIENT)
Dept: CARDIOLOGY | Facility: HOSPITAL | Age: 46
End: 2021-08-24
Payer: COMMERCIAL

## 2021-08-24 DIAGNOSIS — Z95.810 PRESENCE OF AUTOMATIC (IMPLANTABLE) CARDIAC DEFIBRILLATOR: ICD-10-CM

## 2021-08-24 PROCEDURE — 93295 DEV INTERROG REMOTE 1/2/MLT: CPT | Mod: ,,, | Performed by: INTERNAL MEDICINE

## 2021-08-24 PROCEDURE — 93296 REM INTERROG EVL PM/IDS: CPT | Performed by: INTERNAL MEDICINE

## 2021-08-24 PROCEDURE — 93295 CARDIAC DEVICE CHECK - REMOTE: ICD-10-PCS | Mod: ,,, | Performed by: INTERNAL MEDICINE

## 2021-08-26 ENCOUNTER — PATIENT MESSAGE (OUTPATIENT)
Dept: HEMATOLOGY/ONCOLOGY | Facility: CLINIC | Age: 46
End: 2021-08-26

## 2021-09-08 ENCOUNTER — PATIENT MESSAGE (OUTPATIENT)
Dept: RADIOLOGY | Facility: HOSPITAL | Age: 46
End: 2021-09-08

## 2021-09-14 ENCOUNTER — TELEPHONE (OUTPATIENT)
Dept: ELECTROPHYSIOLOGY | Facility: CLINIC | Age: 46
End: 2021-09-14

## 2021-10-01 DIAGNOSIS — E87.6 HYPOKALEMIA: ICD-10-CM

## 2021-10-01 RX ORDER — POTASSIUM CHLORIDE 750 MG/1
10 TABLET, EXTENDED RELEASE ORAL 2 TIMES DAILY
Qty: 60 TABLET | Refills: 1 | Status: SHIPPED | OUTPATIENT
Start: 2021-10-01 | End: 2021-11-08

## 2021-10-04 ENCOUNTER — PATIENT MESSAGE (OUTPATIENT)
Dept: ADMINISTRATIVE | Facility: HOSPITAL | Age: 46
End: 2021-10-04

## 2021-10-08 RX ORDER — FUROSEMIDE 20 MG/1
20 TABLET ORAL DAILY
Qty: 30 TABLET | Refills: 5 | Status: SHIPPED | OUTPATIENT
Start: 2021-10-08 | End: 2022-04-06

## 2021-10-11 ENCOUNTER — OFFICE VISIT (OUTPATIENT)
Dept: HEMATOLOGY/ONCOLOGY | Facility: CLINIC | Age: 46
End: 2021-10-11
Payer: COMMERCIAL

## 2021-10-11 ENCOUNTER — HOSPITAL ENCOUNTER (OUTPATIENT)
Dept: RADIOLOGY | Facility: HOSPITAL | Age: 46
Discharge: HOME | End: 2021-10-11
Attending: INTERNAL MEDICINE
Payer: COMMERCIAL

## 2021-10-11 VITALS
DIASTOLIC BLOOD PRESSURE: 56 MMHG | HEART RATE: 68 BPM | WEIGHT: 119.5 LBS | SYSTOLIC BLOOD PRESSURE: 106 MMHG | BODY MASS INDEX: 25.08 KG/M2 | HEIGHT: 58 IN | TEMPERATURE: 98 F | OXYGEN SATURATION: 99 % | RESPIRATION RATE: 18 BRPM

## 2021-10-11 DIAGNOSIS — Z79.811 USE OF AROMATASE INHIBITORS: ICD-10-CM

## 2021-10-11 DIAGNOSIS — C50.611 CANCER OF AXILLARY TAIL OF RIGHT BREAST: ICD-10-CM

## 2021-10-11 DIAGNOSIS — C50.611 CANCER OF AXILLARY TAIL OF RIGHT BREAST: Primary | ICD-10-CM

## 2021-10-11 PROCEDURE — 3008F BODY MASS INDEX DOCD: CPT | Mod: CPTII,S$GLB,, | Performed by: INTERNAL MEDICINE

## 2021-10-11 PROCEDURE — 99213 OFFICE O/P EST LOW 20 MIN: CPT | Mod: S$GLB,,, | Performed by: INTERNAL MEDICINE

## 2021-10-11 PROCEDURE — 99213 PR OFFICE/OUTPT VISIT, EST, LEVL III, 20-29 MIN: ICD-10-PCS | Mod: S$GLB,,, | Performed by: INTERNAL MEDICINE

## 2021-10-11 PROCEDURE — 77065 MAMMO DIGITAL DIAGNOSTIC LEFT WITH TOMO: ICD-10-PCS | Mod: 26,LT,, | Performed by: RADIOLOGY

## 2021-10-11 PROCEDURE — 3078F PR MOST RECENT DIASTOLIC BLOOD PRESSURE < 80 MM HG: ICD-10-PCS | Mod: CPTII,S$GLB,, | Performed by: INTERNAL MEDICINE

## 2021-10-11 PROCEDURE — 77061 BREAST TOMOSYNTHESIS UNI: CPT | Mod: 26,LT,, | Performed by: RADIOLOGY

## 2021-10-11 PROCEDURE — 77061 BREAST TOMOSYNTHESIS UNI: CPT | Mod: TC,LT

## 2021-10-11 PROCEDURE — 1160F RVW MEDS BY RX/DR IN RCRD: CPT | Mod: CPTII,S$GLB,, | Performed by: INTERNAL MEDICINE

## 2021-10-11 PROCEDURE — 3074F SYST BP LT 130 MM HG: CPT | Mod: CPTII,S$GLB,, | Performed by: INTERNAL MEDICINE

## 2021-10-11 PROCEDURE — 3008F PR BODY MASS INDEX (BMI) DOCUMENTED: ICD-10-PCS | Mod: CPTII,S$GLB,, | Performed by: INTERNAL MEDICINE

## 2021-10-11 PROCEDURE — 1159F MED LIST DOCD IN RCRD: CPT | Mod: CPTII,S$GLB,, | Performed by: INTERNAL MEDICINE

## 2021-10-11 PROCEDURE — 1160F PR REVIEW ALL MEDS BY PRESCRIBER/CLIN PHARMACIST DOCUMENTED: ICD-10-PCS | Mod: CPTII,S$GLB,, | Performed by: INTERNAL MEDICINE

## 2021-10-11 PROCEDURE — 99999 PR PBB SHADOW E&M-EST. PATIENT-LVL IV: CPT | Mod: PBBFAC,,, | Performed by: INTERNAL MEDICINE

## 2021-10-11 PROCEDURE — 99999 PR PBB SHADOW E&M-EST. PATIENT-LVL IV: ICD-10-PCS | Mod: PBBFAC,,, | Performed by: INTERNAL MEDICINE

## 2021-10-11 PROCEDURE — 3078F DIAST BP <80 MM HG: CPT | Mod: CPTII,S$GLB,, | Performed by: INTERNAL MEDICINE

## 2021-10-11 PROCEDURE — 77061 MAMMO DIGITAL DIAGNOSTIC LEFT WITH TOMO: ICD-10-PCS | Mod: 26,LT,, | Performed by: RADIOLOGY

## 2021-10-11 PROCEDURE — 3074F PR MOST RECENT SYSTOLIC BLOOD PRESSURE < 130 MM HG: ICD-10-PCS | Mod: CPTII,S$GLB,, | Performed by: INTERNAL MEDICINE

## 2021-10-11 PROCEDURE — 77065 DX MAMMO INCL CAD UNI: CPT | Mod: 26,LT,, | Performed by: RADIOLOGY

## 2021-10-11 PROCEDURE — 1159F PR MEDICATION LIST DOCUMENTED IN MEDICAL RECORD: ICD-10-PCS | Mod: CPTII,S$GLB,, | Performed by: INTERNAL MEDICINE

## 2021-10-18 ENCOUNTER — PATIENT MESSAGE (OUTPATIENT)
Dept: TRANSPLANT | Facility: CLINIC | Age: 46
End: 2021-10-18
Payer: COMMERCIAL

## 2021-11-22 ENCOUNTER — CLINICAL SUPPORT (OUTPATIENT)
Dept: CARDIOLOGY | Facility: HOSPITAL | Age: 46
End: 2021-11-22
Payer: COMMERCIAL

## 2021-11-22 DIAGNOSIS — Z95.810 PRESENCE OF AUTOMATIC (IMPLANTABLE) CARDIAC DEFIBRILLATOR: ICD-10-CM

## 2021-11-22 PROCEDURE — 93295 CARDIAC DEVICE CHECK - REMOTE: ICD-10-PCS | Mod: ,,, | Performed by: INTERNAL MEDICINE

## 2021-11-22 PROCEDURE — 93296 REM INTERROG EVL PM/IDS: CPT | Performed by: INTERNAL MEDICINE

## 2021-11-22 PROCEDURE — 93295 DEV INTERROG REMOTE 1/2/MLT: CPT | Mod: ,,, | Performed by: INTERNAL MEDICINE

## 2022-02-20 ENCOUNTER — CLINICAL SUPPORT (OUTPATIENT)
Dept: CARDIOLOGY | Facility: HOSPITAL | Age: 47
End: 2022-02-20
Attending: INTERNAL MEDICINE
Payer: COMMERCIAL

## 2022-02-20 DIAGNOSIS — Z95.810 PRESENCE OF AUTOMATIC (IMPLANTABLE) CARDIAC DEFIBRILLATOR: ICD-10-CM

## 2022-02-20 PROCEDURE — 93296 REM INTERROG EVL PM/IDS: CPT | Performed by: INTERNAL MEDICINE

## 2022-03-16 ENCOUNTER — PATIENT MESSAGE (OUTPATIENT)
Dept: ADMINISTRATIVE | Facility: HOSPITAL | Age: 47
End: 2022-03-16
Payer: COMMERCIAL

## 2022-04-14 ENCOUNTER — TELEPHONE (OUTPATIENT)
Dept: ELECTROPHYSIOLOGY | Facility: CLINIC | Age: 47
End: 2022-04-14
Payer: COMMERCIAL

## 2022-05-21 ENCOUNTER — CLINICAL SUPPORT (OUTPATIENT)
Dept: CARDIOLOGY | Facility: HOSPITAL | Age: 47
End: 2022-05-21
Attending: INTERNAL MEDICINE
Payer: COMMERCIAL

## 2022-05-21 DIAGNOSIS — Z95.810 PRESENCE OF AUTOMATIC (IMPLANTABLE) CARDIAC DEFIBRILLATOR: ICD-10-CM

## 2022-05-21 PROCEDURE — 93296 REM INTERROG EVL PM/IDS: CPT | Performed by: INTERNAL MEDICINE

## 2022-05-21 PROCEDURE — 93295 DEV INTERROG REMOTE 1/2/MLT: CPT | Mod: ,,, | Performed by: INTERNAL MEDICINE

## 2022-05-21 PROCEDURE — 93295 CARDIAC DEVICE CHECK - REMOTE: ICD-10-PCS | Mod: ,,, | Performed by: INTERNAL MEDICINE

## 2022-05-27 ENCOUNTER — PATIENT OUTREACH (OUTPATIENT)
Dept: ADMINISTRATIVE | Facility: HOSPITAL | Age: 47
End: 2022-05-27
Payer: COMMERCIAL

## 2022-05-27 ENCOUNTER — PATIENT MESSAGE (OUTPATIENT)
Dept: ADMINISTRATIVE | Facility: HOSPITAL | Age: 47
End: 2022-05-27
Payer: COMMERCIAL

## 2022-05-27 NOTE — PROGRESS NOTES
Health Maintenance Due   Topic Date Due    Hepatitis C Screening  Never done    COVID-19 Vaccine (1) Never done    Pneumococcal Vaccines (Age 0-64) (1 - PCV) Never done    HIV Screening  Never done    TETANUS VACCINE  Never done    DEXA Scan  12/13/2021     Triggered LINKS; query failed. Updated Care Everywhere. Checked for outside lab results in [facility]; no results found. Portal message sent asking pt to schedule annual visit with Dr. Herrera or update PCP. Chart review completed.

## 2022-07-05 ENCOUNTER — TELEPHONE (OUTPATIENT)
Dept: INTERNAL MEDICINE | Facility: CLINIC | Age: 47
End: 2022-07-05
Payer: COMMERCIAL

## 2022-07-05 NOTE — TELEPHONE ENCOUNTER
I do labs after the visit b/c I do not know what to order before we talk.  Please inform patient.    D

## 2022-07-05 NOTE — TELEPHONE ENCOUNTER
----- Message from Riri Mora sent at 7/5/2022  4:42 PM CDT -----  Regarding: Order  Contact: [phone]  Patient is calling to schedule her annual need lab orders put in. Please contact pt

## 2022-07-06 ENCOUNTER — TELEPHONE (OUTPATIENT)
Dept: ELECTROPHYSIOLOGY | Facility: CLINIC | Age: 47
End: 2022-07-06
Payer: COMMERCIAL

## 2022-07-06 DIAGNOSIS — I49.8 OTHER SPECIFIED CARDIAC ARRHYTHMIAS: Primary | ICD-10-CM

## 2022-07-06 NOTE — TELEPHONE ENCOUNTER
Pt requested to schedule a device check. I informed her she was overdue for a 1y f/u and could schedule both of these at the same time. Appointment/device check/EKG confirmed for 9/23/22. Pt verbalized understanding and thanked me for calling.

## 2022-08-22 NOTE — PROGRESS NOTES
ANNUAL VISIT NOTE     PRESENTING HISTORY     Reason for Visit:  Annual visit.    No chief complaint on file.    History of Present Illness & ROS: Ms. Avis Graves is a 47 y.o. female.  Same day apt  Very pleasant lady, here with her supportive spouse, Paulo.   Seen by Dr. Alberto in Cardiology on yesterday.   Scheduled to have an Echo done today for her Cardiology team.   She will be seen by her est'd Cardiologist, Dr. Birmingham today.   She takes several supplements.   She is not taking the 'Lipitor' due to side effect concerns.   She will be getting her Dexa and Mammogram thru her Oncologist.   She is not taking the Fosamax.     She reports no acute issues or concerns today.    Fasting for blood work.     Review of Systems:  Eyes: denies visual changes at this time denies floaters   ENT: no nasal congestion or sore throat  Respiratory: no cough or shorness of breath  Cardiovascular: no chest pain or palpitations  Gastrointestinal: no nausea or vomiting, no abdominal pain or change in bowel habits  Genitourinary: no hematuria or dysuria; denies frequency  Hematologic/Lymphatic: no easy bruising or lymphadenopathy  Musculoskeletal: no arthralgias or myalgias  Neurological: no seizures or tremors  Endocrine: no heat or cold intolerance    PAST HISTORY:     Past Medical History:   Diagnosis Date    Allergy     Asthma     Breast cancer     Cancer     Breast     Cardiomyopathy     CHF (congestive heart failure)     Diverticulitis     Osteoporosis        Past Surgical History:   Procedure Laterality Date    BREAST RECONSTRUCTION      March 4, 2018    CARDIAC DEFIBRILLATOR PLACEMENT      X 2 [employer]     COLONOSCOPY N/A 2/24/2021    Procedure: COLONOSCOPY;  Surgeon: Héctor Napier MD;  Location: [facility] ([address] [place] [address]);  Service: Endoscopy;  Laterality: N/A;  covid-1/21/21-[facility]  okay to use magnet if needed per [facility]    HYSTERECTOMY      TONSILLECTOMY         Family  History   Problem Relation Age of Onset    Stroke Mother     Heart disease Mother     Cancer Mother 50        Breast    Asthma Mother     Kidney disease Mother     Breast cancer Mother     Kidney disease Father     Cancer Father 67        kidney    Diabetes Brother     Stomach cancer Paternal Grandfather     Lung cancer Paternal Grandfather     Prostate cancer Maternal Uncle        Social History     Socioeconomic History    Marital status:    Occupational History    Occupation: Instructor     Comment:    Tobacco Use    Smoking status: Never Smoker    Smokeless tobacco: Never Used   Substance and Sexual Activity    Alcohol use: No     Alcohol/week: 0.0 standard drinks    Drug use: No    Sexual activity: Yes     Partners: Male       MEDICATIONS & ALLERGIES:     Current Outpatient Medications on File Prior to Visit   Medication Sig Dispense Refill    albuterol (PROVENTIL/VENTOLIN HFA) 90 mcg/actuation inhaler INHALE 1 TO 2 PUFFS Q 4 TO 6 H PRN      alendronate (FOSAMAX) 70 MG tablet Take 1 tablet (70 mg total) by mouth every 7 days. 25 tablet 4    anastrozole (ARIMIDEX) 1 mg Tab Take 1 mg by mouth once daily.      atorvastatin (LIPITOR) 80 MG tablet Take 1 tablet (80 mg total) by mouth once daily. (Patient not taking: Reported on 1/22/2021) 90 tablet 3    carvediloL (COREG) 25 MG tablet TAKE ONE TABLET BY MOUTH TWICE A DAY WITH MEALS 60 tablet 11    coenzyme Q10 (CO Q-10) 300 mg Cap Take by mouth once daily.      furosemide (LASIX) 20 MG tablet TAKE ONE TABLET BY MOUTH DAILY. 30 tablet 5    LACTOBACILLUS ACIDOPHILUS (PROBIOTIC ORAL) Take by mouth every evening.      mometasone (NASONEX) 50 mcg/actuation nasal spray 2 sprays by Nasal route once daily. (Patient taking differently: 2 sprays by Nasal route daily as needed. ) 1 each 3    potassium chloride (KLOR-CON) 10 MEQ TbSR TAKE ONE CAPSULE BY MOUTH TWICE DAILY 180 tablet 3     No current facility-administered  medications on file prior to visit.        Review of patient's allergies indicates:   Allergen Reactions    Ciprofloxacin Rash       Medications Reconciliation:   I have reconciled the patient's home medications and discharge medications with the patient/family. I have updated all changes.  Refer to After-Visit Medication List.    OBJECTIVE:     Vital Signs:  There were no vitals filed for this visit.  Wt Readings from Last 3 Encounters:   10/11/21 1012 54.2 kg (119 lb 7.8 oz)   06/13/21 1645 51.3 kg (113 lb)   02/24/21 0944 51.3 kg (113 lb)     There is no height or weight on file to calculate BMI.     Wt Readings from Last 3 Encounters:   08/24/22 54.9 kg (121 lb 0.5 oz)   08/23/22 55.3 kg (121 lb 14.6 oz)   10/11/21 54.2 kg (119 lb 7.8 oz)     Temp Readings from Last 3 Encounters:   10/11/21 98 °F (36.7 °C)   06/13/21 98.1 °F (36.7 °C) (Oral)   02/24/21 97.9 °F (36.6 °C) (Oral)     BP Readings from Last 3 Encounters:   08/24/22 102/70   08/23/22 95/61   10/11/21 (!) 106/56     Pulse Readings from Last 3 Encounters:   08/24/22 (!) 55   08/23/22 74   10/11/21 68     Physical Exam:  General: Well developed, well nourished. No distress.  HEENT: Head is normocephalic, atraumatic; ears are normal.   Eyes: Clear conjunctiva.  Neck: Supple, symmetrical neck; trachea midline.  Lungs: Clear to auscultation bilaterally and normal respiratory effort.  Cardiovascular: Heart with regular rate and rhythm. No murmurs, gallops or rubs  (PM and Defib)  Extremities: No LE edema. Pulses 2+ and symmetric.   Abdomen: Abdomen is soft, non-tender non-distended with normal bowel sounds.  Skin: Skin color, texture, turgor normal. No rashes.  Musculoskeletal: Normal gait.   Neurologic: Normal strength and tone. No focal numbness or weakness.   Psychiatric: Not depressed.    Laboratory  Lab Results   Component Value Date    WBC 6.06 01/13/2021    HGB 13.3 01/13/2021    HCT 43.7 01/13/2021     01/13/2021    CHOL 250 (H) 07/21/2020     TRIG 60 07/21/2020    HDL 64 07/21/2020    ALT 11 01/13/2021    AST 18 01/13/2021     01/13/2021    K 4.4 01/13/2021     01/13/2021    CREATININE 0.7 01/13/2021    BUN 12 01/13/2021    CO2 31 (H) 01/13/2021    TSH 1.463 08/08/2014    INR 1.2 05/13/2017    HGBA1C 5.6 01/02/2020       ASSESSMENT & PLAN:     Annual physical exam  -     Comprehensive Metabolic Panel; Future; Expected date: 08/24/2022  -     CBC Auto Differential; Future; Expected date: 08/24/2022  -     Lipid Panel; Future; Expected date: 08/24/2022  -     Hepatitis C Antibody; Future; Expected date: 08/24/2022  -     Hemoglobin A1C; Future; Expected date: 08/24/2022  -     TSH; Future; Expected date: 08/24/2022  -     Vitamin D; Future; Expected date: 08/24/2022  -     VITAMIN B12; Future; Expected date: 08/24/2022    Mild persistent asthma without complication  Stable and controlled  Followed by Dr. Alcaraz   -     Comprehensive Metabolic Panel; Future; Expected date: 08/24/2022  -     CBC Auto Differential; Future; Expected date: 08/24/2022  -     Lipid Panel; Future; Expected date: 08/24/2022  -     Hepatitis C Antibody; Future; Expected date: 08/24/2022  -     Hemoglobin A1C; Future; Expected date: 08/24/2022  -     TSH; Future; Expected date: 08/24/2022  -     Vitamin D; Future; Expected date: 08/24/2022  -     VITAMIN B12; Future; Expected date: 08/24/2022    Mixed hyperlipidemia  Lab Results   Component Value Date    CHOL 250 (H) 07/21/2020    CHOL 264 (H) 01/02/2020    CHOL 309 (H) 09/30/2014     Lab Results   Component Value Date    HDL 64 07/21/2020    HDL 74 01/02/2020    HDL 58 09/30/2014     Lab Results   Component Value Date    LDLCALC 174.0 (H) 07/21/2020    LDLCALC 176.6 (H) 01/02/2020    LDLCALC 225.6 (H) 09/30/2014     Lab Results   Component Value Date    TRIG 60 07/21/2020    TRIG 67 01/02/2020    TRIG 127 09/30/2014     Lab Results   Component Value Date    CHOLHDL 25.6 07/21/2020    CHOLHDL 28.0 01/02/2020    CHOLHDL  18.8 (L) 09/30/2014   -     Lipid Panel; Future; Expected date: 08/24/2022      Automatic implantable cardioverter-defibrillator in situ  Followed by Dr. Alberto in Cardiology     Other orders  -     cholecalciferol, vitD3,/vit K2 (VITAMIN D3-VITAMIN K2) 250 mcg (10,000 unit)-45 mcg Cap; Take 1 tablet by mouth before evening meal.  Dispense: 90 capsule; Refill: 0  -     ascorbic acid, vitamin C, 1,500 mg TbSR; Take 1,500 tablets by mouth 2 (two) times a day.  Dispense: 180 each; Refill: 0  -     zinc 50 mg Tab; Take 1 tablet by mouth once daily at 6am.  Dispense: 90 each; Refill: 0  -     lysine (L-LYSINE) 500 mg Tab; Take 1 tablet by mouth once daily.  Dispense: 90 tablet; Refill: 0  -     cyanocobalamin (VITAMIN B-12) 250 MCG tablet; Take 1 tablet (250 mcg total) by mouth once daily.  Dispense: 90 tablet; Refill: 0      *Recommend follow up with Dr. Herrera in 3 months, sooner if indicated. Note shared.   Future Appointments   Date Time Provider Department Center   8/24/2022 10:20 AM Jerry Birmingham MD [facility] [id number] [facility]   8/24/2022  1:45 PM ECHO, [facility] ECHOSTR [facility]   9/23/2022 12:40 PM COORDINATED DEVICE CHECK [facility] HAILEE [facility]   9/23/2022  1:00 PM EKG, APPT [facility] EKG [facility]   9/23/2022  1:30 PM Cecilia Patel NP [facility] ARRHYTH [facility]   11/17/2022 10:00 AM HOME MONITOR DEVICE CHECK, [facility]        Medication List          Accurate as of August 24, 2022  9:59 AM. If you have any questions, ask your nurse or doctor.            START taking these medications    ascorbic acid (vitamin C) 1,500 mg Tbsr  Take 1,500 tablets by mouth 2 (two) times a day.  Started by: GEORGETTE Yang     cyanocobalamin 250 MCG tablet  Commonly known as: VITAMIN B-12  Take 1 tablet (250 mcg total) by mouth once daily.  Started by: GEORGETTE Yang     lysine 500 mg Tab  Commonly known as: L-LYSINE  Take 1 tablet by mouth once daily.  Started by: Tami ORTA  GEORGETTE Coffey     vitamin D3-vitamin K2 250 mcg (10,000 unit)-45 mcg Cap  Take 1 tablet by mouth before evening meal.  Started by: GEORGETTE Yang     zinc 50 mg Tab  Take 1 tablet by mouth once daily at 6am.  Started by: GEORGETTE Yang        CHANGE how you take these medications    mometasone 50 mcg/actuation nasal spray  Commonly known as: NASONEX  2 sprays by Nasal route once daily.  What changed:   · when to take this  · reasons to take this        CONTINUE taking these medications    albuterol 90 mcg/actuation inhaler  Commonly known as: PROVENTIL/VENTOLIN HFA     alendronate 70 MG tablet  Commonly known as: FOSAMAX  Take 1 tablet (70 mg total) by mouth every 7 days.     anastrozole 1 mg Tab  Commonly known as: ARIMIDEX     atorvastatin 80 MG tablet  Commonly known as: LIPITOR  Take 1 tablet (80 mg total) by mouth once daily.     carvediloL 25 MG tablet  Commonly known as: COREG  TAKE ONE TABLET BY MOUTH TWICE A DAY WITH MEALS     coenzyme Q10 300 mg Cap     furosemide 20 MG tablet  Commonly known as: LASIX  TAKE ONE TABLET BY MOUTH DAILY.     potassium chloride 10 MEQ Tbsr  Commonly known as: KLOR-CON  TAKE ONE CAPSULE BY MOUTH TWICE DAILY     PROBIOTIC ORAL           Where to Get Your Medications      Information about where to get these medications is not yet available    Ask your nurse or doctor about these medications  · ascorbic acid (vitamin C) 1,500 mg Tbsr  · cyanocobalamin 250 MCG tablet  · lysine 500 mg Tab  · vitamin D3-vitamin K2 250 mcg (10,000 unit)-45 mcg Cap  · zinc 50 mg Tab       Signing Physician:  GEORGETTE Yang

## 2022-08-23 ENCOUNTER — OFFICE VISIT (OUTPATIENT)
Dept: TRANSPLANT | Facility: CLINIC | Age: 47
End: 2022-08-23
Payer: COMMERCIAL

## 2022-08-23 ENCOUNTER — LAB VISIT (OUTPATIENT)
Dept: LAB | Facility: HOSPITAL | Age: 47
End: 2022-08-23
Attending: INTERNAL MEDICINE
Payer: COMMERCIAL

## 2022-08-23 VITALS
SYSTOLIC BLOOD PRESSURE: 95 MMHG | WEIGHT: 121.94 LBS | BODY MASS INDEX: 24.58 KG/M2 | HEIGHT: 59 IN | DIASTOLIC BLOOD PRESSURE: 61 MMHG | HEART RATE: 74 BPM

## 2022-08-23 DIAGNOSIS — I42.8 CARDIOMYOPATHY, NONISCHEMIC: ICD-10-CM

## 2022-08-23 DIAGNOSIS — E78.2 MIXED HYPERLIPIDEMIA: ICD-10-CM

## 2022-08-23 DIAGNOSIS — I50.22 CHRONIC SYSTOLIC HEART FAILURE: ICD-10-CM

## 2022-08-23 DIAGNOSIS — I47.20 VENTRICULAR TACHYCARDIA: ICD-10-CM

## 2022-08-23 DIAGNOSIS — I50.22 CHRONIC SYSTOLIC HEART FAILURE: Primary | ICD-10-CM

## 2022-08-23 DIAGNOSIS — Z95.810 ICD (IMPLANTABLE CARDIOVERTER-DEFIBRILLATOR) IN PLACE: ICD-10-CM

## 2022-08-23 LAB
ANION GAP SERPL CALC-SCNC: 8 MMOL/L (ref 8–16)
BNP SERPL-MCNC: 19 PG/ML (ref 0–99)
BUN SERPL-MCNC: 13 MG/DL (ref 6–20)
CALCIUM SERPL-MCNC: 10.2 MG/DL (ref 8.7–10.5)
CHLORIDE SERPL-SCNC: 99 MMOL/L (ref 95–110)
CO2 SERPL-SCNC: 33 MMOL/L (ref 23–29)
CREAT SERPL-MCNC: 0.8 MG/DL (ref 0.5–1.4)
EST. GFR  (NO RACE VARIABLE): >60 ML/MIN/1.73 M^2
GLUCOSE SERPL-MCNC: 97 MG/DL (ref 70–110)
POTASSIUM SERPL-SCNC: 3.9 MMOL/L (ref 3.5–5.1)
SODIUM SERPL-SCNC: 140 MMOL/L (ref 136–145)

## 2022-08-23 PROCEDURE — 3008F BODY MASS INDEX DOCD: CPT | Mod: CPTII,S$GLB,, | Performed by: INTERNAL MEDICINE

## 2022-08-23 PROCEDURE — 36415 COLL VENOUS BLD VENIPUNCTURE: CPT | Performed by: INTERNAL MEDICINE

## 2022-08-23 PROCEDURE — 3074F SYST BP LT 130 MM HG: CPT | Mod: CPTII,S$GLB,, | Performed by: INTERNAL MEDICINE

## 2022-08-23 PROCEDURE — 3078F PR MOST RECENT DIASTOLIC BLOOD PRESSURE < 80 MM HG: ICD-10-PCS | Mod: CPTII,S$GLB,, | Performed by: INTERNAL MEDICINE

## 2022-08-23 PROCEDURE — 3074F PR MOST RECENT SYSTOLIC BLOOD PRESSURE < 130 MM HG: ICD-10-PCS | Mod: CPTII,S$GLB,, | Performed by: INTERNAL MEDICINE

## 2022-08-23 PROCEDURE — 80048 BASIC METABOLIC PNL TOTAL CA: CPT | Performed by: INTERNAL MEDICINE

## 2022-08-23 PROCEDURE — 1160F PR REVIEW ALL MEDS BY PRESCRIBER/CLIN PHARMACIST DOCUMENTED: ICD-10-PCS | Mod: CPTII,S$GLB,, | Performed by: INTERNAL MEDICINE

## 2022-08-23 PROCEDURE — 1160F RVW MEDS BY RX/DR IN RCRD: CPT | Mod: CPTII,S$GLB,, | Performed by: INTERNAL MEDICINE

## 2022-08-23 PROCEDURE — 3008F PR BODY MASS INDEX (BMI) DOCUMENTED: ICD-10-PCS | Mod: CPTII,S$GLB,, | Performed by: INTERNAL MEDICINE

## 2022-08-23 PROCEDURE — 99215 OFFICE O/P EST HI 40 MIN: CPT | Mod: S$GLB,,, | Performed by: INTERNAL MEDICINE

## 2022-08-23 PROCEDURE — 99999 PR PBB SHADOW E&M-EST. PATIENT-LVL III: CPT | Mod: PBBFAC,,, | Performed by: INTERNAL MEDICINE

## 2022-08-23 PROCEDURE — 99215 PR OFFICE/OUTPT VISIT, EST, LEVL V, 40-54 MIN: ICD-10-PCS | Mod: S$GLB,,, | Performed by: INTERNAL MEDICINE

## 2022-08-23 PROCEDURE — 1159F MED LIST DOCD IN RCRD: CPT | Mod: CPTII,S$GLB,, | Performed by: INTERNAL MEDICINE

## 2022-08-23 PROCEDURE — 99999 PR PBB SHADOW E&M-EST. PATIENT-LVL III: ICD-10-PCS | Mod: PBBFAC,,, | Performed by: INTERNAL MEDICINE

## 2022-08-23 PROCEDURE — 1159F PR MEDICATION LIST DOCUMENTED IN MEDICAL RECORD: ICD-10-PCS | Mod: CPTII,S$GLB,, | Performed by: INTERNAL MEDICINE

## 2022-08-23 PROCEDURE — 3078F DIAST BP <80 MM HG: CPT | Mod: CPTII,S$GLB,, | Performed by: INTERNAL MEDICINE

## 2022-08-23 PROCEDURE — 83880 ASSAY OF NATRIURETIC PEPTIDE: CPT | Performed by: INTERNAL MEDICINE

## 2022-08-23 NOTE — PROGRESS NOTES
Subjective:     HPI:  Very pleasant 48 yo black female with stage C HFrEF (EF=35-40), NICMP, VT who comes for a follow-up visit. This is her 1st visit with me. She was last seen by Dr. Escobar over a year and half ago. She is doing really well. Reports NYHA class II symptoms. NO PND or orthopnea. No Recent HF admissions. Her current HF regimen includes carvedilol 25 mg BID only. She is not on a ARNI/ACE/ARB likely due to her low BP.  She is also not on an MRA or and SGLT2i. She does have an ICD and reports no ICD shocks. NO labs were ordered for today.       2D Echo with CFD done in   Moderately depressed left ventricular systolic function (EF 35-40%).     2 - Quantitatively measured LV function is 41%.     3 - Eccentric hypertrophy.     4 - Mildly depressed right ventricular systolic function .     5 - The estimated PA systolic pressure is 22 mmHg.     Past Medical History:   Diagnosis Date    Allergy     Asthma     Breast cancer     Cancer     Breast     Cardiomyopathy     CHF (congestive heart failure)     Diverticulitis     Osteoporosis      Past Surgical History:   Procedure Laterality Date    BREAST RECONSTRUCTION      2018    CARDIAC DEFIBRILLATOR PLACEMENT      X 2 [employer]     COLONOSCOPY N/A 2021    Procedure: COLONOSCOPY;  Surgeon: Héctor Napier MD;  Location: [facility] ([address] [place] [address]);  Service: Endoscopy;  Laterality: N/A;  covid-21-[facility]  okay to use magnet if needed per Pacemaker clinic-[facility]    HYSTERECTOMY      TONSILLECTOMY       OB History             Para        Term   0            AB        Living           SAB        IAB        Ectopic        Multiple        Live Births                   Review of Systems   Constitutional: Negative. Negative for chills, decreased appetite, diaphoresis, fever, malaise/fatigue, night sweats, weight gain and weight loss.   Eyes: Negative.    Cardiovascular: Positive for dyspnea on exertion. Negative for  "chest pain, claudication, cyanosis, irregular heartbeat, leg swelling, near-syncope, orthopnea, palpitations, paroxysmal nocturnal dyspnea and syncope.   Respiratory: Negative for cough, hemoptysis and shortness of breath.    Endocrine: Negative.    Hematologic/Lymphatic: Negative.    Skin: Negative for color change, dry skin and nail changes.   Musculoskeletal: Negative.    Gastrointestinal: Negative.    Genitourinary: Negative.    Neurological: Negative for weakness.       Objective:   Blood pressure 95/61, pulse 74, height 4' 11" (1.499 m), weight 55.3 kg (121 lb 14.6 oz).body mass index is 24.62 kg/m².  Physical Exam  Vitals and nursing note reviewed.   Constitutional:       Appearance: She is well-developed.   HENT:      Head: Normocephalic.   Eyes:      Pupils: Pupils are equal, round, and reactive to light.   Neck:      Vascular: No JVD.   Cardiovascular:      Rate and Rhythm: Normal rate and regular rhythm.      Chest Wall: PMI is displaced.      Pulses: Intact distal pulses.      Heart sounds: Normal heart sounds. No murmur heard.    No friction rub. No gallop.   Pulmonary:      Effort: Pulmonary effort is normal.      Breath sounds: Normal breath sounds. No wheezing or rales.   Abdominal:      General: Bowel sounds are normal.      Palpations: Abdomen is soft.   Musculoskeletal:      Cervical back: Neck supple.   Neurological:      Mental Status: She is alert and oriented to person, place, and time.         Labs:    Chemistry        Component Value Date/Time     01/13/2021 1157    K 4.4 01/13/2021 1157     01/13/2021 1157    CO2 31 (H) 01/13/2021 1157    BUN 12 01/13/2021 1157    CREATININE 0.7 01/13/2021 1157    GLU 97 01/13/2021 1157        Component Value Date/Time    CALCIUM 9.1 01/13/2021 1157    ALKPHOS 51 (L) 01/13/2021 1157    AST 18 01/13/2021 1157    ALT 11 01/13/2021 1157    BILITOT 0.5 01/13/2021 1157    ESTGFRAFRICA >60.0 01/13/2021 1157    EGFRNONAA >60.0 01/13/2021 1157    "       Magnesium   Date Value Ref Range Status   02/08/2017 2.1 1.6 - 2.6 mg/dL Final     Lab Results   Component Value Date    WBC 6.06 01/13/2021    HGB 13.3 01/13/2021    HCT 43.7 01/13/2021     01/13/2021     Lab Results   Component Value Date    INR 1.2 05/13/2017    INR 1.2 03/29/2017    INR 1.2 09/13/2015     BNP   Date Value Ref Range Status   01/13/2021 56 0 - 99 pg/mL Final     Comment:     Values of less than 100 pg/ml are consistent with non-CHF populations.   12/14/2017 29 0 - 99 pg/mL Final     Comment:     Values of less than 100 pg/ml are consistent with non-CHF populations.   01/26/2017 37 0 - 99 pg/mL Final     Comment:     Values of less than 100 pg/ml are consistent with non-CHF populations.       Assessment:      1. Chronic systolic heart failure    2. Cardiomyopathy, nonischemic    3. Ventricular tachycardia    4. Mixed hyperlipidemia    5. ICD (implantable cardioverter-defibrillator) in place        Plan:   Stage C HFrEF with moderately depressed EF based on echo done 2 years ago. NYHA class II symptoms. Euvolemic on exam.   Owing to low BP, she is only on Coreg. I recommend repeating her Echo to reevaluate her LV EF. If her LV EF I still depressed, I recommend adding an MRA spironolactone 25 mg daily. May also benefit from an SGLT2i.  Labs today  Recommend 2 gram sodium restriction and 1500cc fluid restriction.  Encourage physical activity with graded exercise program.  Requested patient to weigh themselves daily, and to notify us if their weight increases by more than 3 lbs in 1 day or 5 lbs in 1 week.   Has an ICD. She has an appointment with EP Dr. Mckeon tomorrow.   RTC in 1 year with tamie Alberto MD

## 2022-08-24 ENCOUNTER — OFFICE VISIT (OUTPATIENT)
Dept: HEMATOLOGY/ONCOLOGY | Facility: CLINIC | Age: 47
End: 2022-08-24
Payer: COMMERCIAL

## 2022-08-24 ENCOUNTER — OFFICE VISIT (OUTPATIENT)
Dept: INTERNAL MEDICINE | Facility: CLINIC | Age: 47
End: 2022-08-24
Payer: COMMERCIAL

## 2022-08-24 ENCOUNTER — TELEPHONE (OUTPATIENT)
Dept: INTERNAL MEDICINE | Facility: CLINIC | Age: 47
End: 2022-08-24

## 2022-08-24 ENCOUNTER — HOSPITAL ENCOUNTER (OUTPATIENT)
Dept: CARDIOLOGY | Facility: HOSPITAL | Age: 47
Discharge: HOME | End: 2022-08-24
Attending: INTERNAL MEDICINE
Payer: COMMERCIAL

## 2022-08-24 VITALS
DIASTOLIC BLOOD PRESSURE: 66 MMHG | HEART RATE: 68 BPM | SYSTOLIC BLOOD PRESSURE: 102 MMHG | OXYGEN SATURATION: 99 % | BODY MASS INDEX: 24.58 KG/M2 | TEMPERATURE: 98 F | WEIGHT: 121.94 LBS | RESPIRATION RATE: 18 BRPM | HEIGHT: 59 IN

## 2022-08-24 VITALS
DIASTOLIC BLOOD PRESSURE: 66 MMHG | HEIGHT: 59 IN | HEART RATE: 70 BPM | WEIGHT: 121 LBS | BODY MASS INDEX: 24.39 KG/M2 | SYSTOLIC BLOOD PRESSURE: 102 MMHG

## 2022-08-24 VITALS
DIASTOLIC BLOOD PRESSURE: 70 MMHG | HEART RATE: 55 BPM | SYSTOLIC BLOOD PRESSURE: 102 MMHG | HEIGHT: 59 IN | BODY MASS INDEX: 24.4 KG/M2 | WEIGHT: 121.06 LBS | OXYGEN SATURATION: 100 %

## 2022-08-24 DIAGNOSIS — I50.22 CHRONIC SYSTOLIC HEART FAILURE: ICD-10-CM

## 2022-08-24 DIAGNOSIS — R74.8 ELEVATED VITAMIN B12 LEVEL: Primary | ICD-10-CM

## 2022-08-24 DIAGNOSIS — Z95.810 AUTOMATIC IMPLANTABLE CARDIOVERTER-DEFIBRILLATOR IN SITU: ICD-10-CM

## 2022-08-24 DIAGNOSIS — E78.2 MIXED HYPERLIPIDEMIA: ICD-10-CM

## 2022-08-24 DIAGNOSIS — I42.8 CARDIOMYOPATHY, NONISCHEMIC: ICD-10-CM

## 2022-08-24 DIAGNOSIS — C50.611 CANCER OF AXILLARY TAIL OF RIGHT BREAST: Primary | ICD-10-CM

## 2022-08-24 DIAGNOSIS — E67.3 HIGH VITAMIN D LEVEL: ICD-10-CM

## 2022-08-24 DIAGNOSIS — J45.30 MILD PERSISTENT ASTHMA WITHOUT COMPLICATION: ICD-10-CM

## 2022-08-24 DIAGNOSIS — Z79.811 USE OF AROMATASE INHIBITORS: ICD-10-CM

## 2022-08-24 DIAGNOSIS — Z00.00 ANNUAL PHYSICAL EXAM: Primary | ICD-10-CM

## 2022-08-24 LAB
ASCENDING AORTA: 2.61 CM
AV INDEX (PROSTH): 0.77
AV MEAN GRADIENT: 5 MMHG
AV PEAK GRADIENT: 9 MMHG
AV VALVE AREA: 2.59 CM2
AV VELOCITY RATIO: 0.78
BSA FOR ECHO PROCEDURE: 1.51 M2
CV ECHO LV RWT: 0.27 CM
DOP CALC AO PEAK VEL: 1.47 M/S
DOP CALC AO VTI: 25.62 CM
DOP CALC LVOT AREA: 3.4 CM2
DOP CALC LVOT DIAMETER: 2.07 CM
DOP CALC LVOT PEAK VEL: 1.14 M/S
DOP CALC LVOT STROKE VOLUME: 66.23 CM3
DOP CALCLVOT PEAK VEL VTI: 19.69 CM
E WAVE DECELERATION TIME: 184.01 MSEC
E/A RATIO: 1.26
E/E' RATIO: 5.89 M/S
ECHO LV POSTERIOR WALL: 0.74 CM (ref 0.6–1.1)
EJECTION FRACTION: 35 %
FRACTIONAL SHORTENING: 22 % (ref 28–44)
INTERVENTRICULAR SEPTUM: 0.53 CM (ref 0.6–1.1)
IVRT: 102.76 MSEC
LA MAJOR: 4.38 CM
LA MINOR: 4.19 CM
LA WIDTH: 3.68 CM
LEFT ATRIUM SIZE: 3.27 CM
LEFT ATRIUM VOLUME INDEX MOD: 32.3 ML/M2
LEFT ATRIUM VOLUME INDEX: 29.4 ML/M2
LEFT ATRIUM VOLUME MOD: 48.06 CM3
LEFT ATRIUM VOLUME: 43.81 CM3
LEFT INTERNAL DIMENSION IN SYSTOLE: 4.23 CM (ref 2.1–4)
LEFT VENTRICLE DIASTOLIC VOLUME INDEX: 82.4 ML/M2
LEFT VENTRICLE DIASTOLIC VOLUME: 122.78 ML
LEFT VENTRICLE MASS INDEX: 78 G/M2
LEFT VENTRICLE SYSTOLIC VOLUME INDEX: 53.7 ML/M2
LEFT VENTRICLE SYSTOLIC VOLUME: 79.94 ML
LEFT VENTRICULAR INTERNAL DIMENSION IN DIASTOLE: 5.4 CM (ref 3.5–6)
LEFT VENTRICULAR MASS: 116.48 G
LV LATERAL E/E' RATIO: 5.3 M/S
LV SEPTAL E/E' RATIO: 6.63 M/S
MV A" WAVE DURATION": 21.12 MSEC
MV PEAK A VEL: 0.42 M/S
MV PEAK E VEL: 0.53 M/S
MV STENOSIS PRESSURE HALF TIME: 53.36 MS
MV VALVE AREA P 1/2 METHOD: 4.12 CM2
PISA TR MAX VEL: 2.03 M/S
PULM VEIN S/D RATIO: 0.85
PV PEAK D VEL: 0.52 M/S
PV PEAK S VEL: 0.44 M/S
QEF: 37 %
RA MAJOR: 3.81 CM
RA PRESSURE: 3 MMHG
RA WIDTH: 2.79 CM
RIGHT VENTRICULAR END-DIASTOLIC DIMENSION: 2.52 CM
RV TISSUE DOPPLER FREE WALL SYSTOLIC VELOCITY 1 (APICAL 4 CHAMBER VIEW): 10.12 CM/S
SINUS: 2.77 CM
STJ: 2.48 CM
TDI LATERAL: 0.1 M/S
TDI SEPTAL: 0.08 M/S
TDI: 0.09 M/S
TR MAX PG: 16 MMHG
TRICUSPID ANNULAR PLANE SYSTOLIC EXCURSION: 2.08 CM
TV REST PULMONARY ARTERY PRESSURE: 19 MMHG

## 2022-08-24 PROCEDURE — 99999 PR PBB SHADOW E&M-EST. PATIENT-LVL IV: ICD-10-PCS | Mod: PBBFAC,,, | Performed by: NURSE PRACTITIONER

## 2022-08-24 PROCEDURE — 1159F MED LIST DOCD IN RCRD: CPT | Mod: CPTII,S$GLB,, | Performed by: INTERNAL MEDICINE

## 2022-08-24 PROCEDURE — 93306 TTE W/DOPPLER COMPLETE: CPT | Mod: 26,,, | Performed by: INTERNAL MEDICINE

## 2022-08-24 PROCEDURE — 3008F BODY MASS INDEX DOCD: CPT | Mod: CPTII,S$GLB,, | Performed by: NURSE PRACTITIONER

## 2022-08-24 PROCEDURE — 3008F PR BODY MASS INDEX (BMI) DOCUMENTED: ICD-10-PCS | Mod: CPTII,S$GLB,, | Performed by: NURSE PRACTITIONER

## 2022-08-24 PROCEDURE — 99999 PR PBB SHADOW E&M-EST. PATIENT-LVL IV: ICD-10-PCS | Mod: PBBFAC,,, | Performed by: INTERNAL MEDICINE

## 2022-08-24 PROCEDURE — 93306 ECHO (CUPID ONLY): ICD-10-PCS | Mod: 26,,, | Performed by: INTERNAL MEDICINE

## 2022-08-24 PROCEDURE — 3074F SYST BP LT 130 MM HG: CPT | Mod: CPTII,S$GLB,, | Performed by: INTERNAL MEDICINE

## 2022-08-24 PROCEDURE — 99999 PR PBB SHADOW E&M-EST. PATIENT-LVL IV: CPT | Mod: PBBFAC,,, | Performed by: NURSE PRACTITIONER

## 2022-08-24 PROCEDURE — 3078F PR MOST RECENT DIASTOLIC BLOOD PRESSURE < 80 MM HG: ICD-10-PCS | Mod: CPTII,S$GLB,, | Performed by: INTERNAL MEDICINE

## 2022-08-24 PROCEDURE — 99396 PR PREVENTIVE VISIT,EST,40-64: ICD-10-PCS | Mod: S$GLB,,, | Performed by: NURSE PRACTITIONER

## 2022-08-24 PROCEDURE — 3008F PR BODY MASS INDEX (BMI) DOCUMENTED: ICD-10-PCS | Mod: CPTII,S$GLB,, | Performed by: INTERNAL MEDICINE

## 2022-08-24 PROCEDURE — 99213 PR OFFICE/OUTPT VISIT, EST, LEVL III, 20-29 MIN: ICD-10-PCS | Mod: S$GLB,,, | Performed by: INTERNAL MEDICINE

## 2022-08-24 PROCEDURE — 3074F PR MOST RECENT SYSTOLIC BLOOD PRESSURE < 130 MM HG: ICD-10-PCS | Mod: CPTII,S$GLB,, | Performed by: INTERNAL MEDICINE

## 2022-08-24 PROCEDURE — 1159F MED LIST DOCD IN RCRD: CPT | Mod: CPTII,S$GLB,, | Performed by: NURSE PRACTITIONER

## 2022-08-24 PROCEDURE — 3074F SYST BP LT 130 MM HG: CPT | Mod: CPTII,S$GLB,, | Performed by: NURSE PRACTITIONER

## 2022-08-24 PROCEDURE — 3078F PR MOST RECENT DIASTOLIC BLOOD PRESSURE < 80 MM HG: ICD-10-PCS | Mod: CPTII,S$GLB,, | Performed by: NURSE PRACTITIONER

## 2022-08-24 PROCEDURE — 1159F PR MEDICATION LIST DOCUMENTED IN MEDICAL RECORD: ICD-10-PCS | Mod: CPTII,S$GLB,, | Performed by: INTERNAL MEDICINE

## 2022-08-24 PROCEDURE — 99213 OFFICE O/P EST LOW 20 MIN: CPT | Mod: S$GLB,,, | Performed by: INTERNAL MEDICINE

## 2022-08-24 PROCEDURE — 99396 PREV VISIT EST AGE 40-64: CPT | Mod: S$GLB,,, | Performed by: NURSE PRACTITIONER

## 2022-08-24 PROCEDURE — 3074F PR MOST RECENT SYSTOLIC BLOOD PRESSURE < 130 MM HG: ICD-10-PCS | Mod: CPTII,S$GLB,, | Performed by: NURSE PRACTITIONER

## 2022-08-24 PROCEDURE — 1159F PR MEDICATION LIST DOCUMENTED IN MEDICAL RECORD: ICD-10-PCS | Mod: CPTII,S$GLB,, | Performed by: NURSE PRACTITIONER

## 2022-08-24 PROCEDURE — 3008F BODY MASS INDEX DOCD: CPT | Mod: CPTII,S$GLB,, | Performed by: INTERNAL MEDICINE

## 2022-08-24 PROCEDURE — 93306 TTE W/DOPPLER COMPLETE: CPT

## 2022-08-24 PROCEDURE — 3078F DIAST BP <80 MM HG: CPT | Mod: CPTII,S$GLB,, | Performed by: NURSE PRACTITIONER

## 2022-08-24 PROCEDURE — 3078F DIAST BP <80 MM HG: CPT | Mod: CPTII,S$GLB,, | Performed by: INTERNAL MEDICINE

## 2022-08-24 PROCEDURE — 99999 PR PBB SHADOW E&M-EST. PATIENT-LVL IV: CPT | Mod: PBBFAC,,, | Performed by: INTERNAL MEDICINE

## 2022-08-24 RX ORDER — DIAPER,BRIEF,ADULT, DISPOSABLE
500 EACH MISCELLANEOUS DAILY
Qty: 90 TABLET | Refills: 0
Start: 2022-08-24

## 2022-08-24 RX ORDER — ASCORBIC ACID 1500 MG
1500 TABLET, EXTENDED RELEASE ORAL 2 TIMES DAILY
Qty: 180 EACH | Refills: 0
Start: 2022-08-24

## 2022-08-24 RX ORDER — CYANOCOBALAMIN (VITAMIN B-12) 250 MCG
250 TABLET ORAL DAILY
Qty: 90 TABLET | Refills: 0
Start: 2022-08-24

## 2022-08-24 RX ORDER — SAME BUTANEDISULFONATE/BETAINE 400-600 MG
1 POWDER IN PACKET (EA) ORAL
Qty: 90 CAPSULE | Refills: 0
Start: 2022-08-24

## 2022-08-24 NOTE — PROGRESS NOTES
Subjective:       Patient ID: Avis Graves is a 47 y.o. female.    Chief Complaint: Cancer of axillary tail of right breast    HPI   Ms. Graves presents today for a scheduled follow up appointment.  I had last seen her in October 2021.    Briefly, she is a 47-year-old -American female with a history of cardiomyopathy and an EF of less than 30%, who was diagnosed in 2017 with a carcinoma of the right breast, that was ER strongly positive, MN negative, and HER-2 negative.  On 2/07/2017 she underwent a right modified radical mastectomy with sentinel lymph node biopsy and insertion of a tissue expander.  The pathology report from the 02/07/2017 procedure indicates that she had a 4 cm carcinoma; three of the four sentinel lymph nodes were positive with macrometastases.  The patient was referred for further evaluation.  It was recommended to her to proceed with twelve weekly cycles of taxol, possibly with the addition of CMF upon completion.  She completed 12 cycles of weekly taxol, but decided against CMF and also against XRT.  She was subsequently started on anastrazole.  She also continues to receive IV infusions of vitamin C through Dr. Butcher.    A mammogram in October 2021 was read as BIRADS 1 and a one year follow up was recommended.  Her most recent DXA scan had shown osteopenia approaching osteoporosis on the hip, and osteoporosis on the spine.     Review of Systems  Overall she feels OK, and has no significant complaints today.   ECOG PS is 1.  She denies any depression, vomiting, diarrhea, constipation, diplopia, blurred vision, headaches, chest pain, palpitations, shortness of breath, left breast pain, abdominal pain, extremity pain, or difficulty ambulating.  The remainder of the ten-point ROS, including general, skin, lymph, H/N, cardiorespiratory, GI, , Neuro, Endocrine, and psychiatric is negative.     Objective:      Physical Exam  She is alert, oriented to time, place, person, pleasant,  well      nourished, in no acute physical distress.                             VITAL SIGNS:  Reviewed                                      HEENT:  Normal.  There are no nasal, oral, lip, gingival, auricular, or lid     lesions.  Mucosae are moist and pink, and there is no        thrush.  Pupils are equal, reactive to light and accommodation.              Extraocular muscle movements are intact.  Dentition is good.                                  NECK:  Supple without JVD, adenopathy, or thyromegaly.                       LUNGS:  Clear to auscultation without wheezing, rales, or rhonchi.           CARDIOVASCULAR:  Reveals an S1, S2, no murmurs, no rubs, no gallops.         ABDOMEN:  Soft, nontender, without organomegaly.  Bowel sounds are    present.                                                                     EXTREMITIES:  No cyanosis, clubbing, or edema.                               BREASTS:  She is status post right mastectomy with a tissue expander in place and a well-healed incision.     There are no masses in her left breast. Kheloid scars are again noted.  An AICD is palpated in the left infraclavicular area.                                    LYMPHATIC:  There is no cervical, axillary, or supraclavicular adenopathy.   SKIN:  Warm and moist, without petechiae, rashes, induration, or ecchymoses.           NEUROLOGIC:  DTRs are 0-1+ bilaterally, symmetrical, motor function is 5/5,  and cranial nerves are  within normal limits.      Assessment:       1. 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 Use of aromatase inhibitors    3.      CHF.  4.      Osteoporosis    Plan:          I have asked her to remain on anastrazole for now and see me in early November with her yearly mammogram.    She will complete her 5 years of anastrazole in late September 2022.  I recommended that she remain on anastrozole for at least 7 years, however, she stated that she would prefer to discontinue after 5 years  Her  multiple questions were answered to her satisfaction.    Route Chart for Scheduling    Med Onc Chart Routing      Follow up with physician 3 months. See me in early November with a mammogram   Follow up with SHERLEY    Infusion scheduling note    Injection scheduling note    Labs    Imaging Mammogram      Pharmacy appointment    Other referrals

## 2022-08-25 ENCOUNTER — TELEPHONE (OUTPATIENT)
Dept: INTERNAL MEDICINE | Facility: CLINIC | Age: 47
End: 2022-08-25
Payer: COMMERCIAL

## 2022-08-25 ENCOUNTER — PATIENT MESSAGE (OUTPATIENT)
Dept: INTERNAL MEDICINE | Facility: CLINIC | Age: 47
End: 2022-08-25
Payer: COMMERCIAL

## 2022-08-25 DIAGNOSIS — E78.2 MIXED HYPERLIPIDEMIA: ICD-10-CM

## 2022-08-25 RX ORDER — ATORVASTATIN CALCIUM 80 MG/1
80 TABLET, FILM COATED ORAL DAILY
Qty: 90 TABLET | Refills: 3 | Status: SHIPPED | OUTPATIENT
Start: 2022-08-25 | End: 2022-08-29 | Stop reason: SDUPTHER

## 2022-08-25 RX ORDER — ATORVASTATIN CALCIUM 40 MG/1
40 TABLET, FILM COATED ORAL DAILY
Qty: 90 TABLET | Refills: 0 | Status: SHIPPED | OUTPATIENT
Start: 2022-08-25 | End: 2022-08-25

## 2022-08-25 NOTE — TELEPHONE ENCOUNTER
Have sent in a 90 day supply of Atorvastatin to her preferred pharmacy.   Has not been taking since 2020.   Will Rx 40 mg daily until seen by PCP.   Message sent to listed Primary Care Provider on chart in regards.     Lab Results   Component Value Date    CHOL 264 (H) 08/24/2022    CHOL 250 (H) 07/21/2020    CHOL 264 (H) 01/02/2020     Lab Results   Component Value Date    HDL 65 08/24/2022    HDL 64 07/21/2020    HDL 74 01/02/2020     Lab Results   Component Value Date    LDLCALC 189.6 (H) 08/24/2022    LDLCALC 174.0 (H) 07/21/2020    LDLCALC 176.6 (H) 01/02/2020     Lab Results   Component Value Date    TRIG 47 08/24/2022    TRIG 60 07/21/2020    TRIG 67 01/02/2020     Lab Results   Component Value Date    CHOLHDL 24.6 08/24/2022    CHOLHDL 25.6 07/21/2020    CHOLHDL 28.0 01/02/2020         CHILO

## 2022-08-25 NOTE — TELEPHONE ENCOUNTER
No new care gaps identified.  [facility] Embedded Care Gaps. Reference number: [id number]. 8/25/2022   1:39:55 PM CDT

## 2022-08-26 ENCOUNTER — TELEPHONE (OUTPATIENT)
Dept: INTERNAL MEDICINE | Facility: CLINIC | Age: 47
End: 2022-08-26
Payer: COMMERCIAL

## 2022-08-26 NOTE — TELEPHONE ENCOUNTER
Spoke with pt and she has declined to take the higher dose of Lipitor 80 mg and wants to discuss with provider. Appt made on 08/29/2022 at 9 AM. Virtual visit

## 2022-08-26 NOTE — TELEPHONE ENCOUNTER
"----- Message from GEORGETTE Hdez sent at 8/25/2022  7:58 PM CDT -----  Can you please reach out to this patient on behalf of Dr. Herrera and I and get her scheduled for a follow up appt with Dr. Herrera     Don't hesitate to reach out if we can do anything on this end.     Thanks for the help.     ----- Message -----  From: John Herrera II, MD  Sent: 8/25/2022   4:58 PM CDT  To: GEORGETTE Hdez    I'm happy to see her, if she will just call for an appointment.  ----- Message -----  From: GEORGETTE Hdez  Sent: 8/25/2022   3:43 PM CDT  To: John Herrera II, MD    Hey Dr. Herrera,     Wanted to respectfully reach out in regards...Noted that you are listed as primary, believe she has not seen you for some time and fell through, but, she really desires to have you as her Primary Care Physician (smile). Cannot see where she is scheduled follow with you . ?.  The skinny is saw her on yesterday for an 'annual'.  Some of her 'lab results' are needing some attention and happy to address, with hopes of having timely follow up with primary in the near future.. (I'll share my clinic note from yesterday).   Don't see where she has follow up with you scheduled.    Is still going to be followed by you as "primary"?    Best,   ` Tami               "

## 2022-08-29 ENCOUNTER — PATIENT MESSAGE (OUTPATIENT)
Dept: INTERNAL MEDICINE | Facility: CLINIC | Age: 47
End: 2022-08-29

## 2022-08-29 ENCOUNTER — OFFICE VISIT (OUTPATIENT)
Dept: INTERNAL MEDICINE | Facility: CLINIC | Age: 47
End: 2022-08-29
Payer: COMMERCIAL

## 2022-08-29 DIAGNOSIS — E78.2 MIXED HYPERLIPIDEMIA: Primary | ICD-10-CM

## 2022-08-29 DIAGNOSIS — C50.611 CANCER OF AXILLARY TAIL OF RIGHT BREAST: ICD-10-CM

## 2022-08-29 DIAGNOSIS — Z95.810 AUTOMATIC IMPLANTABLE CARDIOVERTER-DEFIBRILLATOR IN SITU: ICD-10-CM

## 2022-08-29 DIAGNOSIS — I50.22 HEART FAILURE, SYSTOLIC, CHRONIC: ICD-10-CM

## 2022-08-29 PROCEDURE — 1159F PR MEDICATION LIST DOCUMENTED IN MEDICAL RECORD: ICD-10-PCS | Mod: CPTII,95,, | Performed by: INTERNAL MEDICINE

## 2022-08-29 PROCEDURE — 1159F MED LIST DOCD IN RCRD: CPT | Mod: CPTII,95,, | Performed by: INTERNAL MEDICINE

## 2022-08-29 PROCEDURE — 3044F PR MOST RECENT HEMOGLOBIN A1C LEVEL <7.0%: ICD-10-PCS | Mod: CPTII,95,, | Performed by: INTERNAL MEDICINE

## 2022-08-29 PROCEDURE — 1160F RVW MEDS BY RX/DR IN RCRD: CPT | Mod: CPTII,95,, | Performed by: INTERNAL MEDICINE

## 2022-08-29 PROCEDURE — 99213 PR OFFICE/OUTPT VISIT, EST, LEVL III, 20-29 MIN: ICD-10-PCS | Mod: 95,,, | Performed by: INTERNAL MEDICINE

## 2022-08-29 PROCEDURE — 99213 OFFICE O/P EST LOW 20 MIN: CPT | Mod: 95,,, | Performed by: INTERNAL MEDICINE

## 2022-08-29 PROCEDURE — 1160F PR REVIEW ALL MEDS BY PRESCRIBER/CLIN PHARMACIST DOCUMENTED: ICD-10-PCS | Mod: CPTII,95,, | Performed by: INTERNAL MEDICINE

## 2022-08-29 PROCEDURE — 3044F HG A1C LEVEL LT 7.0%: CPT | Mod: CPTII,95,, | Performed by: INTERNAL MEDICINE

## 2022-08-29 RX ORDER — ATORVASTATIN CALCIUM 20 MG/1
20 TABLET, FILM COATED ORAL DAILY
Qty: 90 TABLET | Refills: 3 | Status: SHIPPED | OUTPATIENT
Start: 2022-08-29 | End: 2023-09-11

## 2022-08-29 NOTE — PROGRESS NOTES
Subjective:       Patient ID: Avis Graves is a 47 y.o. female.    Chief Complaint:   The patient location is: home in [place]  The chief complaint leading to consultation is: Statin Use    Visit type: audiovisual    Face to Face time with patient: 12 minutes  21 minutes of total time spent on the encounter, which includes face to face time and non-face to face time preparing to see the patient (eg, review of tests), Obtaining and/or reviewing separately obtained history, Documenting clinical information in the electronic or other health record, Independently interpreting results (not separately reported) and communicating results to the patient/family/caregiver, or Care coordination (not separately reported). Each patient to whom he or she provides medical services by telemedicine is:  (1) informed of the relationship between the physician and patient and the respective role of any other health care provider with respect to management of the patient; and (2) notified that he or she may decline to receive medical services by telemedicine and may withdraw from such care at any time.    Notes: Ms Graves had elevated cholesterol when she saw the NP; resistant to taking high dose statin b/c her family  members have not tolerated that well in the past.  We discussed the importance of the medication.  She's scheduled for mammogram and dexa; will get TDAP today    PMH:  Nonischemic Cardiomyopathy, with VT arrest and AICD implanted  Hx heart failure  Hx breast cancer s/p CTX and mastectomy.  No xrt  Osteoporosis  Hx colitis  HLP, now on a statin     Meds:  Reviewed and reconciled in EPIC with patient during visit today.     Answers submitted by the patient for this visit:  Review of Systems Questionnaire (Submitted on 8/28/2022)  activity change: No  unexpected weight change: No  neck pain: No  hearing loss: No  rhinorrhea: No  trouble swallowing: No  eye discharge: No  visual disturbance: No  chest tightness:  No  wheezing: No  chest pain: No  palpitations: No  blood in stool: No  constipation: No  vomiting: No  diarrhea: No  polydipsia: No  polyuria: No  difficulty urinating: No  hematuria: No  menstrual problem: No  dysuria: No  joint swelling: No  arthralgias: No  headaches: No  weakness: No  confusion: No  dysphoric mood: No    Objective:      Physical Exam  HENT:      Head: Normocephalic and atraumatic.   Pulmonary:      Effort: Pulmonary effort is normal.   Neurological:      General: No focal deficit present.      Mental Status: She is alert.   Psychiatric:         Mood and Affect: Mood normal.       Assessment:       1. Mixed hyperlipidemia    2. Heart failure, systolic, chronic    3. Automatic implantable cardioverter-defibrillator in situ    4. Cancer of axillary tail of right breast          Plan:       Diagnoses and all orders for this visit:    Mixed hyperlipidemia - she agreed to start a statin, but at low dose.  Sending that to her pharmacy now  -     atorvastatin (LIPITOR) 20 MG tablet; Take 1 tablet (20 mg total) by mouth once daily.  -     Lipid panel; Future    Heart failure, systolic, chronic - this is stable; continue present meds    Automatic implantable cardioverter-defibrillator in situ - also stable    Cancer of axillary tail of right breast - LAUREN.  Mammo later this year    Rtc 6 months    G Lenny Herrera MD MPH  Staff Internist

## 2022-09-19 NOTE — PROGRESS NOTES
Ms. Graves is a patient of Dr. Mckeon and was last seen in clinic 1/13/2021.      Subjective:   Patient ID:  Avis Graves is a 47 y.o. female who presents for follow-up of Pacemaker Check and Congestive Heart Failure  .     HPI:    Ms. Graves is a 47 y.o. female with NICM, HFrEF (EF 35-40%), a hx of VT s/p SC ICD (2009; generator change 2013), mild-persistent asthma, and a hx of breast cancer here for annual follow up.     Background:    Single chamber ICD implanted 2/2009  Remote history of one episode of monomorphic VT, asymptomatic, pace terminated. No recurrence.  Device reached CAREY >> generator change 10/28/2013.  Device interrogations have revealed stable function of lead, no significant arrhythmias. Has some SVT, the longer episodes are appropriately classified.  12/16/2019: No arrhythmia, No RV pacing.  1/13/2021: Ms. Graves is doing well from a device perspective with stable lead and device function. No sustained arrhythmia noted. No RV pacing. No recent CHF exacerbations. She feels well.    Update (09/23/2022):    Owing to low BP, she is only on Coreg. Echo to reevaluate her LV EF. If her LV EF I still depressed, I recommend adding an MRA spironolactone 25 mg daily. May also benefit from an SGLT2i.    Today she says overall she is feeling well. Recently ran a [place] in [place]. No CP, DE LA PAZ, LH, syncope. Does feel heart pounding occasionally in the epigastric area.     Device Interrogation (9/23/2022) reveals an intrinsic SR with stable lead and device function. No atrial arrhythmias. SVTx8, mac 1 min 24 seconds. NSVTx12, max 3 seconds. She paces 0.2% in the RV. Estimated battery longevity 2.9 years.     I have personally reviewed the patient's EKG today, which shows sinus rhythm at 70bpm. TX interval is 164. QRS is 102. QTc is 423.    Relevant Cardiac Test Results:    2D Echo (8/24/2022):  The left ventricle is mildly enlarged with moderately decreased systolic function. The estimated ejection fraction is  35-40%. Unchanged from prior.  Normal right ventricular size with normal right ventricular systolic function.  Grade I left ventricular diastolic dysfunction.  The estimated PA systolic pressure is 19 mmHg.  Normal central venous pressure (3 mmHg).    Current Outpatient Medications   Medication Sig    albuterol (PROVENTIL/VENTOLIN HFA) 90 mcg/actuation inhaler INHALE 1 TO 2 PUFFS Q 4 TO 6 H PRN    alendronate (FOSAMAX) 70 MG tablet Take 1 tablet (70 mg total) by mouth every 7 days.    anastrozole (ARIMIDEX) 1 mg Tab Take 1 mg by mouth once daily.    ascorbic acid, vitamin C, 1,500 mg TbSR Take 1,500 tablets by mouth 2 (two) times a day.    atorvastatin (LIPITOR) 20 MG tablet Take 1 tablet (20 mg total) by mouth once daily.    carvediloL (COREG) 25 MG tablet TAKE ONE TABLET BY MOUTH TWICE A DAY WITH MEALS    cholecalciferol, vitD3,/vit K2 (VITAMIN D3-VITAMIN K2) 250 mcg (10,000 unit)-45 mcg Cap Take 1 tablet by mouth before evening meal.    coenzyme Q10 300 mg Cap Take by mouth once daily.    cyanocobalamin (VITAMIN B-12) 250 MCG tablet Take 1 tablet (250 mcg total) by mouth once daily.    furosemide (LASIX) 20 MG tablet TAKE ONE TABLET BY MOUTH DAILY.    LACTOBACILLUS ACIDOPHILUS (PROBIOTIC ORAL) Take by mouth every evening.    lysine (L-LYSINE) 500 mg Tab Take 1 tablet by mouth once daily.    potassium chloride (KLOR-CON) 10 MEQ TbSR TAKE ONE CAPSULE BY MOUTH TWICE DAILY    zinc 50 mg Tab Take 1 tablet by mouth once daily at 6am.     No current facility-administered medications for this visit.       Review of Systems   Constitutional: Negative for malaise/fatigue.   Cardiovascular:  Positive for irregular heartbeat. Negative for chest pain, dyspnea on exertion, leg swelling and palpitations.   Respiratory:  Negative for shortness of breath.    Hematologic/Lymphatic: Negative for bleeding problem.   Skin:  Negative for rash.   Musculoskeletal:  Negative for myalgias.   Gastrointestinal:  Negative for hematemesis,  "hematochezia and nausea.   Genitourinary:  Negative for hematuria.   Neurological:  Negative for light-headedness.   Psychiatric/Behavioral:  Negative for altered mental status.    Allergic/Immunologic: Negative for persistent infections.     Objective:          /70 (BP Location: Left arm, Patient Position: Sitting, BP Method: Medium (Manual))   Pulse 70   Ht 4' 11" (1.499 m)   Wt 55.1 kg (121 lb 7.6 oz)   BMI 24.53 kg/m²     Physical Exam  Vitals and nursing note reviewed.   Constitutional:       Appearance: Normal appearance. She is well-developed.   HENT:      Head: Normocephalic.      Nose: Nose normal.   Eyes:      Pupils: Pupils are equal, round, and reactive to light.   Cardiovascular:      Rate and Rhythm: Normal rate and regular rhythm.   Pulmonary:      Effort: No respiratory distress.      Breath sounds: Normal breath sounds.   Chest:      Comments: Device to LUCW. Incision and pocket in good repair.    Musculoskeletal:         General: Normal range of motion.   Skin:     General: Skin is warm and dry.      Findings: No erythema.   Neurological:      Mental Status: She is alert and oriented to person, place, and time.   Psychiatric:         Speech: Speech normal.         Behavior: Behavior normal.         Lab Results   Component Value Date     08/24/2022    K 3.8 08/24/2022    MG 2.1 02/08/2017    BUN 11 08/24/2022    CREATININE 0.7 08/24/2022    ALT 13 08/24/2022    AST 17 08/24/2022    HGB 13.5 08/24/2022    HCT 42.2 08/24/2022    TSH 1.003 08/24/2022    LDLCALC 189.6 (H) 08/24/2022           Assessment:     1. ICD (implantable cardioverter-defibrillator) in place    2. Ventricular tachycardia    3. NICM (nonischemic cardiomyopathy)      Plan:     In summary, Ms. Graves is a 47 y.o. female with NICM, HFrEF (EF 35-40%), a hx of VT s/p SC ICD (2009; generator change 2013), mild-persistent asthma, and a hx of breast cancer here for annual follow up.   Ms. Graves is doing well from a device " perspective with stable lead and device function. No sustained arrhythmia noted.  SVT events c/w 5K race. No RV pacing. She is on coreg 25mg BID. Last EF stable at 35-40%. No CHF symptoms. She follows with [facility].    Continue current medication regimen and device settings.   Follow up in device clinic as scheduled.   Follow up in EP clinic in 1 year, sooner as needed.     *A copy of this note has been sent to Dr. Mckeon*    Follow up in about 1 year (around 9/23/2023).    ------------------------------------------------------------------    AB Wilkins, NP-C  Cardiac Electrophysiology

## 2022-09-23 ENCOUNTER — OFFICE VISIT (OUTPATIENT)
Dept: ELECTROPHYSIOLOGY | Facility: CLINIC | Age: 47
End: 2022-09-23
Payer: COMMERCIAL

## 2022-09-23 ENCOUNTER — CLINICAL SUPPORT (OUTPATIENT)
Dept: CARDIOLOGY | Facility: HOSPITAL | Age: 47
End: 2022-09-23
Attending: INTERNAL MEDICINE
Payer: COMMERCIAL

## 2022-09-23 ENCOUNTER — HOSPITAL ENCOUNTER (OUTPATIENT)
Dept: CARDIOLOGY | Facility: CLINIC | Age: 47
Discharge: HOME | End: 2022-09-23
Payer: COMMERCIAL

## 2022-09-23 VITALS
HEART RATE: 70 BPM | DIASTOLIC BLOOD PRESSURE: 70 MMHG | SYSTOLIC BLOOD PRESSURE: 102 MMHG | BODY MASS INDEX: 24.49 KG/M2 | HEIGHT: 59 IN | WEIGHT: 121.5 LBS

## 2022-09-23 DIAGNOSIS — I42.8 NICM (NONISCHEMIC CARDIOMYOPATHY): ICD-10-CM

## 2022-09-23 DIAGNOSIS — I49.8 OTHER SPECIFIED CARDIAC ARRHYTHMIAS: ICD-10-CM

## 2022-09-23 DIAGNOSIS — I47.20 VENTRICULAR TACHYCARDIA: ICD-10-CM

## 2022-09-23 DIAGNOSIS — Z95.810 ICD (IMPLANTABLE CARDIOVERTER-DEFIBRILLATOR) IN PLACE: Primary | ICD-10-CM

## 2022-09-23 PROCEDURE — 99999 PR PBB SHADOW E&M-EST. PATIENT-LVL IV: CPT | Mod: PBBFAC,,, | Performed by: NURSE PRACTITIONER

## 2022-09-23 PROCEDURE — 1159F MED LIST DOCD IN RCRD: CPT | Mod: CPTII,S$GLB,, | Performed by: NURSE PRACTITIONER

## 2022-09-23 PROCEDURE — 93282 PRGRMG EVAL IMPLANTABLE DFB: CPT | Mod: 26,,, | Performed by: INTERNAL MEDICINE

## 2022-09-23 PROCEDURE — 3008F BODY MASS INDEX DOCD: CPT | Mod: CPTII,S$GLB,, | Performed by: NURSE PRACTITIONER

## 2022-09-23 PROCEDURE — 93282 CARDIAC DEVICE CHECK - IN CLINIC & HOSPITAL: ICD-10-PCS | Mod: 26,,, | Performed by: INTERNAL MEDICINE

## 2022-09-23 PROCEDURE — 99999 PR PBB SHADOW E&M-EST. PATIENT-LVL IV: ICD-10-PCS | Mod: PBBFAC,,, | Performed by: NURSE PRACTITIONER

## 2022-09-23 PROCEDURE — 3074F SYST BP LT 130 MM HG: CPT | Mod: CPTII,S$GLB,, | Performed by: NURSE PRACTITIONER

## 2022-09-23 PROCEDURE — 3078F PR MOST RECENT DIASTOLIC BLOOD PRESSURE < 80 MM HG: ICD-10-PCS | Mod: CPTII,S$GLB,, | Performed by: NURSE PRACTITIONER

## 2022-09-23 PROCEDURE — 99214 OFFICE O/P EST MOD 30 MIN: CPT | Mod: S$GLB,,, | Performed by: NURSE PRACTITIONER

## 2022-09-23 PROCEDURE — 99214 PR OFFICE/OUTPT VISIT, EST, LEVL IV, 30-39 MIN: ICD-10-PCS | Mod: S$GLB,,, | Performed by: NURSE PRACTITIONER

## 2022-09-23 PROCEDURE — 3044F PR MOST RECENT HEMOGLOBIN A1C LEVEL <7.0%: ICD-10-PCS | Mod: CPTII,S$GLB,, | Performed by: NURSE PRACTITIONER

## 2022-09-23 PROCEDURE — 1160F PR REVIEW ALL MEDS BY PRESCRIBER/CLIN PHARMACIST DOCUMENTED: ICD-10-PCS | Mod: CPTII,S$GLB,, | Performed by: NURSE PRACTITIONER

## 2022-09-23 PROCEDURE — 3044F HG A1C LEVEL LT 7.0%: CPT | Mod: CPTII,S$GLB,, | Performed by: NURSE PRACTITIONER

## 2022-09-23 PROCEDURE — 93005 ELECTROCARDIOGRAM TRACING: CPT | Mod: S$GLB,,, | Performed by: INTERNAL MEDICINE

## 2022-09-23 PROCEDURE — 93282 PRGRMG EVAL IMPLANTABLE DFB: CPT

## 2022-09-23 PROCEDURE — 3078F DIAST BP <80 MM HG: CPT | Mod: CPTII,S$GLB,, | Performed by: NURSE PRACTITIONER

## 2022-09-23 PROCEDURE — 1160F RVW MEDS BY RX/DR IN RCRD: CPT | Mod: CPTII,S$GLB,, | Performed by: NURSE PRACTITIONER

## 2022-09-23 PROCEDURE — 93010 ELECTROCARDIOGRAM REPORT: CPT | Mod: S$GLB,,, | Performed by: INTERNAL MEDICINE

## 2022-09-23 PROCEDURE — 93005 RHYTHM STRIP: ICD-10-PCS | Mod: S$GLB,,, | Performed by: INTERNAL MEDICINE

## 2022-09-23 PROCEDURE — 93010 RHYTHM STRIP: ICD-10-PCS | Mod: S$GLB,,, | Performed by: INTERNAL MEDICINE

## 2022-09-23 PROCEDURE — 3074F PR MOST RECENT SYSTOLIC BLOOD PRESSURE < 130 MM HG: ICD-10-PCS | Mod: CPTII,S$GLB,, | Performed by: NURSE PRACTITIONER

## 2022-09-23 PROCEDURE — 3008F PR BODY MASS INDEX (BMI) DOCUMENTED: ICD-10-PCS | Mod: CPTII,S$GLB,, | Performed by: NURSE PRACTITIONER

## 2022-09-23 PROCEDURE — 1159F PR MEDICATION LIST DOCUMENTED IN MEDICAL RECORD: ICD-10-PCS | Mod: CPTII,S$GLB,, | Performed by: NURSE PRACTITIONER

## 2022-10-20 ENCOUNTER — PATIENT MESSAGE (OUTPATIENT)
Dept: ELECTROPHYSIOLOGY | Facility: CLINIC | Age: 47
End: 2022-10-20
Payer: COMMERCIAL

## 2022-11-07 ENCOUNTER — CLINICAL SUPPORT (OUTPATIENT)
Dept: CARDIOLOGY | Facility: HOSPITAL | Age: 47
End: 2022-11-07
Attending: INTERNAL MEDICINE
Payer: COMMERCIAL

## 2022-11-07 DIAGNOSIS — Z95.810 PRESENCE OF AUTOMATIC (IMPLANTABLE) CARDIAC DEFIBRILLATOR: ICD-10-CM

## 2022-11-07 PROCEDURE — 93296 REM INTERROG EVL PM/IDS: CPT | Performed by: INTERNAL MEDICINE

## 2022-11-07 PROCEDURE — 93295 DEV INTERROG REMOTE 1/2/MLT: CPT | Mod: ,,, | Performed by: INTERNAL MEDICINE

## 2022-11-07 PROCEDURE — 93295 CARDIAC DEVICE CHECK - REMOTE: ICD-10-PCS | Mod: ,,, | Performed by: INTERNAL MEDICINE

## 2022-11-28 ENCOUNTER — PATIENT MESSAGE (OUTPATIENT)
Dept: HEMATOLOGY/ONCOLOGY | Facility: CLINIC | Age: 47
End: 2022-11-28
Payer: COMMERCIAL

## 2022-11-28 ENCOUNTER — TELEPHONE (OUTPATIENT)
Dept: HEMATOLOGY/ONCOLOGY | Facility: CLINIC | Age: 47
End: 2022-11-28
Payer: COMMERCIAL

## 2022-11-28 DIAGNOSIS — C50.611 CANCER OF AXILLARY TAIL OF RIGHT BREAST: Primary | ICD-10-CM

## 2022-11-28 DIAGNOSIS — Z12.31 SCREENING MAMMOGRAM FOR BREAST CANCER: ICD-10-CM

## 2022-11-28 NOTE — TELEPHONE ENCOUNTER
----- Message from Linette Spicer sent at 11/28/2022  1:34 PM CST -----  Type: General Call      Who called: LUISA NESBITT [[phone]]     What is the request in detail: pt called and stated she missed he mammo appt and was wondering if those orders can get put back in and can she reschedule he appt      Would the patient rather a call back or a response via [facility]? call     Best call back [phone]

## 2023-01-01 NOTE — PROGRESS NOTES
Subjective:       Patient ID: Avis Graves is a 41 y.o. female.    Chief Complaint: Annual Exam and Establish Care   she is essentially presenting to establish primary care.  Entire chart reviewed, PMx, FHx, and Social History updated and reviewed.    She found a mass in her breast, it has been biopsied and is cancer.  She is meeting later today with Dr. Santana to planned surgery.  She has a pre-existing nonischemic cardiomyopathy.  Although her ejection fraction is 30%, she has no symptoms.  Her  accompanies her to this visit.  They do not have children.  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  HPI  Review of Systems   Constitutional: Negative for activity change, appetite change, chills, fatigue, fever and unexpected weight change.   HENT: Negative for dental problem, hearing loss, tinnitus, trouble swallowing and voice change.    Eyes: Negative for visual disturbance.   Respiratory: Negative for cough, chest tightness, shortness of breath and wheezing.    Cardiovascular: Negative for chest pain, palpitations and leg swelling.   Gastrointestinal: Negative for abdominal pain, blood in stool, constipation, diarrhea and nausea.   Genitourinary: Negative for difficulty urinating, dysuria, frequency and urgency.   Musculoskeletal: Negative for arthralgias, back pain, gait problem, joint swelling, myalgias, neck pain and neck stiffness.   Skin: Negative for rash.   Neurological: Negative for dizziness, tremors, speech difficulty, weakness, light-headedness and headaches.   Psychiatric/Behavioral: Negative for dysphoric mood and sleep disturbance. The patient is not nervous/anxious.        Objective:      Physical Exam   Constitutional: She is oriented to person, place, and time. She appears well-developed and well-nourished. No distress.   HENT:   Head: Normocephalic and atraumatic.   Right Ear: External ear normal.   Left Ear: External ear  normal.   Nose: Nose normal.   Mouth/Throat: Oropharynx is clear and moist. No oropharyngeal exudate.   Eyes: Conjunctivae and EOM are normal. Pupils are equal, round, and reactive to light. Right eye exhibits no discharge. No scleral icterus.   Neck: Normal range of motion and full passive range of motion without pain. Neck supple. No spinous process tenderness and no muscular tenderness present. Carotid bruit is not present. No thyromegaly present.   Cardiovascular: Normal rate, regular rhythm, S1 normal, S2 normal, normal heart sounds and intact distal pulses.  Exam reveals no gallop and no friction rub.    No murmur heard.  Pulmonary/Chest: Effort normal and breath sounds normal. No respiratory distress. She has no wheezes. She has no rales. She exhibits no tenderness.   Abdominal: Soft. Bowel sounds are normal. She exhibits no distension and no mass. There is no tenderness. There is no rebound and no guarding.   Genitourinary: Pelvic exam was performed with patient supine. Uterus is not deviated, not enlarged, not fixed and not tender. Cervix exhibits no motion tenderness, no discharge and no friability. Right adnexum displays no mass, no tenderness and no fullness. Left adnexum displays no mass, no tenderness and no fullness.   Musculoskeletal: Normal range of motion. She exhibits no edema or tenderness.   Lymphadenopathy:        Head (right side): No submental and no submandibular adenopathy present.        Head (left side): No submental and no submandibular adenopathy present.     She has no cervical adenopathy.        Right cervical: No superficial cervical, no deep cervical and no posterior cervical adenopathy present.       Left cervical: No superficial cervical, no deep cervical and no posterior cervical adenopathy present.        Right axillary: No pectoral and no lateral adenopathy present.        Left axillary: No pectoral and no lateral adenopathy present.       Right: No supraclavicular adenopathy  present.        Left: No supraclavicular adenopathy present.   Neurological: She is alert and oriented to person, place, and time. She has normal reflexes. No cranial nerve deficit. She exhibits normal muscle tone. Coordination normal.   Skin: Skin is warm and dry. No rash noted.   Psychiatric: She has a normal mood and affect. Her behavior is normal. Her mood appears not anxious. Her speech is not rapid and/or pressured. She is not agitated. She does not exhibit a depressed mood.   Normal behavior, thought content, insight and judgement.   Nursing note and vitals reviewed.      Assessment:       1. Annual physical exam    2. Mild persistent asthma without complication    3. NICM (nonischemic cardiomyopathy)    4. Automatic implantable cardioverter-defibrillator in situ    5. Ventricular tachycardia    6. Irritable bowel syndrome with constipation    7. Heart failure, systolic, chronic    8. Cancer of overlapping sites of right female breast        Plan:   Avis Epstein was seen today for annual exam and establish care.    Diagnoses and all orders for this visit:    Annual physical exam.  She looks healthy.  She is advised to have a flu shot and PCV 13 today.    Mild persistent asthma without complication.  This is asymptomatic.    NICM (nonischemic cardiomyopathy).  No symptoms of congestive heart failure.    Automatic implantable cardioverter-defibrillator in situ because of a history of  Ventricular tachycardia.  She has her pacemaker checked regularly.  She has never had a problem with this unit.    Irritable bowel syndrome with constipation.  Various strategies were discussed.  It is a very chronic, lifelong problem.    Heart failure, systolic, chronic.  Asymptomatic.    Breast Cancer, soon to proceed with right  mastectomy, with a lymph node exploration.    Other orders, refill for preventative inhaler provided  -     budesonide-formoterol 160-4.5 mcg (SYMBICORT) 160-4.5 mcg/actuation HFAA; Inhale 1 puff  into the lungs every 12 (twelve) hours.       [address]

## 2023-01-09 ENCOUNTER — TELEPHONE (OUTPATIENT)
Dept: PLASTIC SURGERY | Facility: CLINIC | Age: 48
End: 2023-01-09
Payer: COMMERCIAL

## 2023-01-09 NOTE — TELEPHONE ENCOUNTER
Pt request for appointment, appointment for NP: consult breast implant removal.  Pt reports breast implants placed in 2018 by Dr Nunez.  Appt made on pt behalf.

## 2023-01-31 ENCOUNTER — PATIENT MESSAGE (OUTPATIENT)
Dept: PLASTIC SURGERY | Facility: CLINIC | Age: 48
End: 2023-01-31
Payer: COMMERCIAL

## 2023-02-01 ENCOUNTER — PATIENT MESSAGE (OUTPATIENT)
Dept: PLASTIC SURGERY | Facility: CLINIC | Age: 48
End: 2023-02-01
Payer: COMMERCIAL

## 2023-02-05 ENCOUNTER — CLINICAL SUPPORT (OUTPATIENT)
Dept: CARDIOLOGY | Facility: HOSPITAL | Age: 48
End: 2023-02-05
Payer: COMMERCIAL

## 2023-02-05 DIAGNOSIS — Z95.810 PRESENCE OF AUTOMATIC (IMPLANTABLE) CARDIAC DEFIBRILLATOR: ICD-10-CM

## 2023-02-05 PROCEDURE — 93296 REM INTERROG EVL PM/IDS: CPT | Performed by: INTERNAL MEDICINE

## 2023-02-15 ENCOUNTER — OFFICE VISIT (OUTPATIENT)
Dept: PLASTIC SURGERY | Facility: CLINIC | Age: 48
End: 2023-02-15
Payer: COMMERCIAL

## 2023-02-15 VITALS
WEIGHT: 121 LBS | HEART RATE: 73 BPM | SYSTOLIC BLOOD PRESSURE: 104 MMHG | HEIGHT: 59 IN | BODY MASS INDEX: 24.39 KG/M2 | DIASTOLIC BLOOD PRESSURE: 59 MMHG

## 2023-02-15 DIAGNOSIS — C50.911 MALIGNANT NEOPLASM OF RIGHT FEMALE BREAST, UNSPECIFIED ESTROGEN RECEPTOR STATUS, UNSPECIFIED SITE OF BREAST: Primary | ICD-10-CM

## 2023-02-15 PROCEDURE — 1159F MED LIST DOCD IN RCRD: CPT | Mod: CPTII,S$GLB,, | Performed by: SURGERY

## 2023-02-15 PROCEDURE — 99999 PR PBB SHADOW E&M-EST. PATIENT-LVL III: CPT | Mod: PBBFAC,,, | Performed by: SURGERY

## 2023-02-15 PROCEDURE — 1159F PR MEDICATION LIST DOCUMENTED IN MEDICAL RECORD: ICD-10-PCS | Mod: CPTII,S$GLB,, | Performed by: SURGERY

## 2023-02-15 PROCEDURE — 99999 PR PBB SHADOW E&M-EST. PATIENT-LVL III: ICD-10-PCS | Mod: PBBFAC,,, | Performed by: SURGERY

## 2023-02-15 PROCEDURE — 3074F SYST BP LT 130 MM HG: CPT | Mod: CPTII,S$GLB,, | Performed by: SURGERY

## 2023-02-15 PROCEDURE — 3008F BODY MASS INDEX DOCD: CPT | Mod: CPTII,S$GLB,, | Performed by: SURGERY

## 2023-02-15 PROCEDURE — 99203 OFFICE O/P NEW LOW 30 MIN: CPT | Mod: S$GLB,,, | Performed by: SURGERY

## 2023-02-15 PROCEDURE — 3078F PR MOST RECENT DIASTOLIC BLOOD PRESSURE < 80 MM HG: ICD-10-PCS | Mod: CPTII,S$GLB,, | Performed by: SURGERY

## 2023-02-15 PROCEDURE — 1160F PR REVIEW ALL MEDS BY PRESCRIBER/CLIN PHARMACIST DOCUMENTED: ICD-10-PCS | Mod: CPTII,S$GLB,, | Performed by: SURGERY

## 2023-02-15 PROCEDURE — 3074F PR MOST RECENT SYSTOLIC BLOOD PRESSURE < 130 MM HG: ICD-10-PCS | Mod: CPTII,S$GLB,, | Performed by: SURGERY

## 2023-02-15 PROCEDURE — 1160F RVW MEDS BY RX/DR IN RCRD: CPT | Mod: CPTII,S$GLB,, | Performed by: SURGERY

## 2023-02-15 PROCEDURE — 99203 PR OFFICE/OUTPT VISIT, NEW, LEVL III, 30-44 MIN: ICD-10-PCS | Mod: S$GLB,,, | Performed by: SURGERY

## 2023-02-15 PROCEDURE — 3078F DIAST BP <80 MM HG: CPT | Mod: CPTII,S$GLB,, | Performed by: SURGERY

## 2023-02-15 PROCEDURE — 3008F PR BODY MASS INDEX (BMI) DOCUMENTED: ICD-10-PCS | Mod: CPTII,S$GLB,, | Performed by: SURGERY

## 2023-02-15 NOTE — PROGRESS NOTES
Subjective:      Avis Graves with a past medical history of congestive heart failure and breast cancer is a 47 y.o. year old female who presents to [facility] on 02/15/2023 for consultation regarding right implant removal. Pt states that she had her right mastectomy w/ Dr. Santana and right sided tissue expanders w/ Dr. Nunez placed in 2017. In 2018, she had a right implant exchanged with a left mastopexy.   She is here today for intermittent pain along the axilla for a year now. Pt states it comes and goes and nothing makes it better or worse. She only wants her right implant and capsule removed. Denies fever, chills, nausea, vomiting, or other systemic signs of infection.    Pt was seen by myself and Dr. Nunez.     Vitals:    02/15/23 0933   BP: (!) 104/59   Pulse: 73        Review of patient's allergies indicates:   Allergen Reactions    Ciprofloxacin Rash       Current Outpatient Medications on File Prior to Visit   Medication Sig Dispense Refill    albuterol (PROVENTIL/VENTOLIN HFA) 90 mcg/actuation inhaler INHALE 1 TO 2 PUFFS Q 4 TO 6 H PRN      anastrozole (ARIMIDEX) 1 mg Tab Take 1 mg by mouth once daily.      ascorbic acid, vitamin C, 1,500 mg TbSR Take 1,500 tablets by mouth 2 (two) times a day. 180 each 0    atorvastatin (LIPITOR) 20 MG tablet Take 1 tablet (20 mg total) by mouth once daily. 90 tablet 3    carvediloL (COREG) 25 MG tablet TAKE ONE TABLET BY MOUTH TWICE A DAY WITH MEALS 60 tablet 11    cholecalciferol, vitD3,/vit K2 (VITAMIN D3-VITAMIN K2) 250 mcg (10,000 unit)-45 mcg Cap Take 1 tablet by mouth before evening meal. 90 capsule 0    coenzyme Q10 300 mg Cap Take by mouth once daily.      cyanocobalamin (VITAMIN B-12) 250 MCG tablet Take 1 tablet (250 mcg total) by mouth once daily. 90 tablet 0    furosemide (LASIX) 20 MG tablet TAKE ONE TABLET BY MOUTH DAILY. 30 tablet 5    LACTOBACILLUS ACIDOPHILUS (PROBIOTIC ORAL) Take by mouth every evening.      lysine  (L-LYSINE) 500 mg Tab Take 1 tablet by mouth once daily. 90 tablet 0    potassium chloride (KLOR-CON) 10 MEQ TbSR TAKE ONE CAPSULE BY MOUTH TWICE DAILY 180 tablet 3    zinc 50 mg Tab Take 1 tablet by mouth once daily at 6am. 90 each 0    alendronate (FOSAMAX) 70 MG tablet Take 1 tablet (70 mg total) by mouth every 7 days. 25 tablet 4     No current facility-administered medications on file prior to visit.       Patient Active Problem List   Diagnosis    Heart failure, systolic, chronic    Ventricular tachycardia    Automatic implantable cardioverter-defibrillator in situ    Thrombus due to any device, implant or graft    NICM (nonischemic cardiomyopathy)    Irritable bowel syndrome with constipation    Diverticulitis of colon    Mild persistent asthma without complication    Cancer of axillary tail of right breast    Use of aromatase inhibitors    Osteoporosis    Tubular adenoma    Mixed hyperlipidemia    History of adenomatous polyp of colon       Past Surgical History:   Procedure Laterality Date    BREAST RECONSTRUCTION      March 4, 2018    CARDIAC DEFIBRILLATOR PLACEMENT      X 2 [employer]     COLONOSCOPY N/A 2/24/2021    Procedure: COLONOSCOPY;  Surgeon: Héctor Napier MD;  Location: [facility] ([address] [place] [address]);  Service: Endoscopy;  Laterality: N/A;  covid-1/21/21-[place] [facility]  okay to use magnet if needed per [facility]    HYSTERECTOMY      TONSILLECTOMY         Social History     Socioeconomic History    Marital status:    Occupational History    Occupation: Instructor     Comment:     Occupation:    Tobacco Use    Smoking status: Never    Smokeless tobacco: Never   Substance and Sexual Activity    Alcohol use: No     Alcohol/week: 0.0 standard drinks    Drug use: No    Sexual activity: Yes     Partners: Male           Review of Systems: pain along axilla    Objective:     Physical Exam:  Vitals:    02/15/23 0933   BP: (!) 104/59   Pulse: 73       WD WN  NAD  VSS  Normal resp effort  Right breast: incision CDI. No erythema or drainage. Tender to palpation on the lateral side of the breast. Surgically removed nipple.  Left breast: incision CDI. No erythema or drainage.         Assessment:       1. Malignant neoplasm of right female breast, unspecified estrogen receptor status, unspecified site of breast        Plan:   47 y.o. female with right sided pain and would like right implant and capsule removed  - Pt aware that she will be asymmetric. She does not want anything done to her left breast at all. She also does not want an implant to her right breast. She is okay with this. We will proceed as she wishes by only removing the right implant and capsule without further reconstruction.   - Risks, benefits and alternatives to surgery were discussed. Will submit for insurance authorization.  - Office staff to coordinate surgery date once insurance authorization obtained      All questions were answered. The patient was advised to call the clinic with any questions or concerns prior to their next visit.           Shantell Shultz PA-C  Plastic and Reconstructive Surgery   [phone]

## 2023-02-17 ENCOUNTER — PATIENT MESSAGE (OUTPATIENT)
Dept: PLASTIC SURGERY | Facility: CLINIC | Age: 48
End: 2023-02-17
Payer: COMMERCIAL

## 2023-02-17 ENCOUNTER — TELEPHONE (OUTPATIENT)
Dept: PLASTIC SURGERY | Facility: CLINIC | Age: 48
End: 2023-02-17
Payer: COMMERCIAL

## 2023-02-17 NOTE — TELEPHONE ENCOUNTER
Called pt in an attempt to schedule surgery. Voicemail is full, so unable to leave a message for pt to return call. Sent a message to pt on the portal instead for her to call and schedule sx when ready.

## 2023-02-28 ENCOUNTER — PATIENT MESSAGE (OUTPATIENT)
Dept: PLASTIC SURGERY | Facility: CLINIC | Age: 48
End: 2023-02-28
Payer: COMMERCIAL

## 2023-03-13 ENCOUNTER — TELEPHONE (OUTPATIENT)
Dept: SURGERY | Facility: CLINIC | Age: 48
End: 2023-03-13
Payer: COMMERCIAL

## 2023-03-13 NOTE — TELEPHONE ENCOUNTER
Attempt to contact patient - no answer - unable to leave voice message.  Other previous attempts to contact via in basket or phone remain unsuccessful with no pt. Response or return call.

## 2023-03-16 ENCOUNTER — TELEPHONE (OUTPATIENT)
Dept: PLASTIC SURGERY | Facility: CLINIC | Age: 48
End: 2023-03-16
Payer: COMMERCIAL

## 2023-03-16 NOTE — TELEPHONE ENCOUNTER
Contact with patient - RE: discuss surgery date.  Pt reports she is working on getting second opinion and at this time will call when ready to schedule a surgery date.  Call ended.

## 2023-04-03 NOTE — TELEPHONE ENCOUNTER
Called patient and she wanted to make sure the port was going in her arm and not her chest. Informed patient she is scheduled for a port placement in the arm on Thursday.   DATE OF SURGERY: 04/03/2023.     PREOPERATIVE DIAGNOSIS: Symptomatic right fifth toe chronic dislocation and recurrent mass.    POSTOPERATIVE DIAGNOSIS: Symptomatic right fifth toe chronic dislocation and recurrent mass.    PROCEDURE: (1) Right fifth metatarsophalangeal joint reconstruction including extensor tendon Z-lengthening, fifth metatarsal shortening osteotomy with internal fixation, and lateral capsular imbrication. (2) Incisional biopsy of right fifth metatarsophalangeal joint capsular thickening.    PRIMARY SURGEON: Jose Antonio Calix MD.    FIRST ASSISTANT: Chandler De Los Santos MD.     ANESTHESIA: General laryngeal mask airway (LMA) with regional block.     COMPLICATIONS: None apparent intraoperatively or immediately postoperatively.     IMPLANTS: Two titanium Arthrex 2.0 mm x 12 mm Snapoff screws to right fifth metatarsal osteotomy.     SIGNIFICANT FINDINGS: No gross evidence for infection. No gross evidence for a discrete, focal mass.     SPECIMENS: Right fifth metatarsal dorsolateral capsular thickening sent to pathology for permanent section.     DRAINS: None.     ESTIMATED BLOOD LOSS: 10 mL.     INDICATIONS:  Angie Leavitt is a 56 year old patient who underwent excisional biopsy of a right fifth metatarsophalangeal (MTP) joint mass by another provider at another facility. She has subsequently been diagnosed with what appears to be a recurrence of the mass and a chronic dislocation of the right fifth MTP joint. She appears to have symptoms due to both of these issues, and has tried but failed to obtain adequate relief from conservative treatment for these. She requested surgery to help alleviate her symptoms. The option of a repeat excision of the mass and reduction of the chronic dislocation was offered as a result. The nature of the planned procedure including what is entailed with the surgery, the risks, benefits, and alternatives to surgery, the postoperative plan, and realistic expectations for outcome  were discussed, all in layman's terms. No guarantees were expressed or implied as to outcome. The patient had the opportunity to have all the questions she had at the time asked and answered appropriately, verbalized her understanding of this discussion, and verbalized her wish to proceed with the planned procedure. Written informed consent was obtained.     DESCRIPTION OF PROCEDURE:             The patient, Angie Leavitt, was met in the preoperative area where the correct surgical site was identified with patient participation and marked by the primary surgeon. The anesthesia team performed a right sciatic nerve block. The patient was then brought to the operating room, where she was placed supine on the operating room table with all bony prominences well padded and a safety strap across the patient's waist.  The anesthesia team successfully induced general anesthesia and an LMA was placed.  A soft bump was placed underneath the hip of the operative extremity to keep the toes pointing upwards. Ancef was given IV per protocol.  Venous thromboembolic prophylaxis was performed with a sequential device on the nonoperative leg. The patient's operative lower extremity was then prepped and draped in the usual sterile fashion. Prior to proceeding with the operation a timeout was held per hospital policy correctly identifying the patient, procedure to be performed, and operative site including laterality. All in the room agreed.     The operative lower extremity was exsanguinated with a Tip bandage, which was then used as a tourniquet around the right calf, with a towel to protect the skin.  A longitudinal incision was made a knife through skin only in the dorsolateral aspect of the right distal fifth metatarsal and MTP joint, towards the base of the fifth toe. Careful subcutaneous dissection was performed down to the level of the MTP joint with meticulous hemostasis achieved with electrocautery. Careful scrutiny of  the plane between the skin and the joint showed no evidence of any discrete focal mass. The joint capsule was incised in line with the skin incision, and was noted to be somewhat thickened, consistent with known history of prior surgery, but again there was no evidence of any discrete focal mass in the capsule or joint either. There was no gross evidence for any purulence or infection. The MTP joint appeared to be dislocated dorsomedially as known from preoperative imaging. There was an erosion on the distal/lateral aspect of the metatarsal head with intact appearing cartilage on the majority of the joint, including directly distally. The toe was not able to be passively reduced at the MTP joint. The long extensor tendon was Z-lengthened with a tenotomy. A medial capsular release was performed very carefully to avoid any major neurovascular injury, keeping dissection directly on bone. Hohmann retractors were then placed directly on bone on the distal metatarsal and a Weil osteotomy was carefully performed with a saw from distal dorsal to proximal plantar, taking care to avoid major neurovascular injury. The metatarsal head and plantar distal shaft segment was then translated proximally by approximately 5 millimeters, which is what was required for the MTP joint to be passively reduced by direct visual confirmation without any undue soft tissue tension. The osteotomy was then stabilized with two Arthrex Snapoff screws which achieved stable fixation. C-arm images in multiple views confirmed joint reduction, appropriate osteotomy alignment, and appropriate implant placement. The tourniquet was deflated. The skin on the right calf appeared to be intact without lesions or concerns for developing pressure sores. All 5 toes appeared to be very well perfused. Hemostasis was achieved. The wound was thoroughly irrigated and aspirated. The MTP capsule was closed in a vest over pants fashion with 2-0 Vicryl suture to imbricate  the capsulotomy. A small portion of the capsule was excised and sent to pathology for analysis. The Z-lengthening tenotomy was repaired with 2-0 FiberWire suture. Final C-arm images were taken to confirm reduction of the fifth MTP joint without the need for pinning the joint. The subcutaneous tissues were closed with 2-0 Vicryl suture. The skin was closed with 3-0 nylon in a simple interrupted pattern. The skin was thoroughly cleaned and dried, and sterile dressings were applied including bacitracin ointment, Adaptic, gauze bandages, an ABD, and sterile cast padding. An ace wrap was carefully applied and the foot was placed into a walking boot.    All surgical counts were reported as correct at the end of the case. The patient was taken to the recovery room in stable condition.     I was present and scrubbed in for the entire case.     POSTOPERATIVE PLAN:  1. Weightbearing status: Nonweightbearing on operative foot for 6 weeks. May rest right heel on floor when sitting.  2. Immobilization: Walking boot. The boot may be removed or loosened as needed for comfort for brief periods and then reapply.  3. Dressings: Leave dressings clean, dry, and intact until changed in clinic.  4. DVT prophylaxis:  mg po daily until 6 weeks postop.  5. Follow up: First scheduled visit in 2 weeks in ortho clinic for dressing, wound check and possible suture removal if appropriate to do so at that time. Next normally scheduled appointment after that will be 6 weeks postop with weightbearing xrays of the operative foot. It is anticipated that if clinical and radiographic signs suggest partial healing, the patient might begin weightbearing on the operative foot at that time in a boot or postoperative shoe.     Jose Antonio Calix MD  Attending surgeon  Orthopaedic Surgery

## 2023-05-08 ENCOUNTER — CLINICAL SUPPORT (OUTPATIENT)
Dept: CARDIOLOGY | Facility: HOSPITAL | Age: 48
End: 2023-05-08
Payer: COMMERCIAL

## 2023-05-08 DIAGNOSIS — Z95.810 PRESENCE OF AUTOMATIC (IMPLANTABLE) CARDIAC DEFIBRILLATOR: ICD-10-CM

## 2023-05-08 PROCEDURE — 93295 CARDIAC DEVICE CHECK - REMOTE: ICD-10-PCS | Mod: ,,, | Performed by: INTERNAL MEDICINE

## 2023-05-08 PROCEDURE — 93296 REM INTERROG EVL PM/IDS: CPT | Performed by: INTERNAL MEDICINE

## 2023-05-08 PROCEDURE — 93295 DEV INTERROG REMOTE 1/2/MLT: CPT | Mod: ,,, | Performed by: INTERNAL MEDICINE

## 2023-05-26 RX ORDER — FUROSEMIDE 20 MG/1
TABLET ORAL
Qty: 30 TABLET | Refills: 5 | Status: SHIPPED | OUTPATIENT
Start: 2023-05-26 | End: 2023-12-18 | Stop reason: SDUPTHER

## 2023-05-31 DIAGNOSIS — I50.20 SYSTOLIC CONGESTIVE HEART FAILURE, UNSPECIFIED HF CHRONICITY: ICD-10-CM

## 2023-05-31 RX ORDER — CARVEDILOL 25 MG/1
TABLET ORAL
Qty: 60 TABLET | Refills: 11 | Status: SHIPPED | OUTPATIENT
Start: 2023-05-31

## 2023-07-07 DIAGNOSIS — E87.6 HYPOKALEMIA: ICD-10-CM

## 2023-07-07 NOTE — TELEPHONE ENCOUNTER
Refill Routing Note   Medication(s) are not appropriate for processing by [facility] for the following reason(s):      No active prescription written by PCP    [facility] action(s):  Defer Care Due:  Appointment due  Labs due   Medication Therapy Plan: Rx fell off due to Expiration Date: 06/06/23      Appointments  past 12m or future 3m with PCP    Date Provider   Last Visit   8/29/2022 John Herrera II, MD   Next Visit   Visit date not found John Herrera II, MD   ED visits in past 90 days: 0        Note composed:6:00 PM 07/07/2023

## 2023-07-07 NOTE — TELEPHONE ENCOUNTER
Care Due:                  Date            Visit Type   Department     Provider  --------------------------------------------------------------------------------                                ESTABLISHED                              PATIENT -    [facility]  Last Visit: 08-      [facility]      MEDICINE       John Herrera  Next Visit: None Scheduled  None         None Found                                                            Last  Test          Frequency    Reason                     Performed    Due Date  --------------------------------------------------------------------------------    Office Visit  12 months..  atorvastatin.............  08- 08-    CMP.........  12 months..  atorvastatin.............  08- 08-    Lipid Panel.  12 months..  atorvastatin.............  08- 08-    [facility] Embedded Care Due Messages. Reference number: [id number].   7/07/2023 5:55:06 PM CDT

## 2023-07-09 RX ORDER — POTASSIUM CHLORIDE 750 MG/1
CAPSULE, EXTENDED RELEASE ORAL
Qty: 180 CAPSULE | Refills: 3 | Status: SHIPPED | OUTPATIENT
Start: 2023-07-09 | End: 2023-09-11 | Stop reason: ALTCHOICE

## 2023-08-06 ENCOUNTER — CLINICAL SUPPORT (OUTPATIENT)
Dept: CARDIOLOGY | Facility: HOSPITAL | Age: 48
End: 2023-08-06
Payer: COMMERCIAL

## 2023-08-06 DIAGNOSIS — Z95.810 PRESENCE OF AUTOMATIC (IMPLANTABLE) CARDIAC DEFIBRILLATOR: ICD-10-CM

## 2023-08-06 PROCEDURE — 93296 REM INTERROG EVL PM/IDS: CPT | Performed by: INTERNAL MEDICINE

## 2023-09-11 ENCOUNTER — OFFICE VISIT (OUTPATIENT)
Dept: TRANSPLANT | Facility: CLINIC | Age: 48
End: 2023-09-11
Payer: COMMERCIAL

## 2023-09-11 ENCOUNTER — LAB VISIT (OUTPATIENT)
Dept: LAB | Facility: HOSPITAL | Age: 48
End: 2023-09-11
Attending: INTERNAL MEDICINE
Payer: COMMERCIAL

## 2023-09-11 VITALS
HEIGHT: 59 IN | WEIGHT: 123.25 LBS | DIASTOLIC BLOOD PRESSURE: 61 MMHG | BODY MASS INDEX: 24.85 KG/M2 | SYSTOLIC BLOOD PRESSURE: 96 MMHG | HEART RATE: 80 BPM

## 2023-09-11 DIAGNOSIS — I50.42 CHRONIC COMBINED SYSTOLIC AND DIASTOLIC HEART FAILURE: ICD-10-CM

## 2023-09-11 DIAGNOSIS — Z95.810 ICD (IMPLANTABLE CARDIOVERTER-DEFIBRILLATOR) IN PLACE: ICD-10-CM

## 2023-09-11 DIAGNOSIS — I42.8 NICM (NONISCHEMIC CARDIOMYOPATHY): ICD-10-CM

## 2023-09-11 DIAGNOSIS — I50.42 CHRONIC COMBINED SYSTOLIC AND DIASTOLIC HEART FAILURE: Primary | ICD-10-CM

## 2023-09-11 DIAGNOSIS — C50.611 CANCER OF AXILLARY TAIL OF RIGHT BREAST: ICD-10-CM

## 2023-09-11 DIAGNOSIS — I47.20 VENTRICULAR TACHYCARDIA: ICD-10-CM

## 2023-09-11 DIAGNOSIS — E78.2 MIXED HYPERLIPIDEMIA: ICD-10-CM

## 2023-09-11 LAB
ALBUMIN SERPL BCP-MCNC: 3.9 G/DL (ref 3.5–5.2)
ALP SERPL-CCNC: 62 U/L (ref 55–135)
ALT SERPL W/O P-5'-P-CCNC: 15 U/L (ref 10–44)
ANION GAP SERPL CALC-SCNC: 12 MMOL/L (ref 8–16)
AST SERPL-CCNC: 17 U/L (ref 10–40)
BILIRUB SERPL-MCNC: 0.4 MG/DL (ref 0.1–1)
BNP SERPL-MCNC: 69 PG/ML (ref 0–99)
BUN SERPL-MCNC: 14 MG/DL (ref 6–20)
CALCIUM SERPL-MCNC: 9.8 MG/DL (ref 8.7–10.5)
CHLORIDE SERPL-SCNC: 104 MMOL/L (ref 95–110)
CO2 SERPL-SCNC: 26 MMOL/L (ref 23–29)
CREAT SERPL-MCNC: 0.9 MG/DL (ref 0.5–1.4)
EST. GFR  (NO RACE VARIABLE): >60 ML/MIN/1.73 M^2
GLUCOSE SERPL-MCNC: 101 MG/DL (ref 70–110)
POTASSIUM SERPL-SCNC: 4.4 MMOL/L (ref 3.5–5.1)
PROT SERPL-MCNC: 7 G/DL (ref 6–8.4)
SODIUM SERPL-SCNC: 142 MMOL/L (ref 136–145)

## 2023-09-11 PROCEDURE — 99215 OFFICE O/P EST HI 40 MIN: CPT | Mod: S$GLB,,, | Performed by: INTERNAL MEDICINE

## 2023-09-11 PROCEDURE — 99215 PR OFFICE/OUTPT VISIT, EST, LEVL V, 40-54 MIN: ICD-10-PCS | Mod: S$GLB,,, | Performed by: INTERNAL MEDICINE

## 2023-09-11 PROCEDURE — 3008F BODY MASS INDEX DOCD: CPT | Mod: CPTII,S$GLB,, | Performed by: INTERNAL MEDICINE

## 2023-09-11 PROCEDURE — 1160F PR REVIEW ALL MEDS BY PRESCRIBER/CLIN PHARMACIST DOCUMENTED: ICD-10-PCS | Mod: CPTII,S$GLB,, | Performed by: INTERNAL MEDICINE

## 2023-09-11 PROCEDURE — 80053 COMPREHEN METABOLIC PANEL: CPT | Performed by: INTERNAL MEDICINE

## 2023-09-11 PROCEDURE — 3074F SYST BP LT 130 MM HG: CPT | Mod: CPTII,S$GLB,, | Performed by: INTERNAL MEDICINE

## 2023-09-11 PROCEDURE — 99999 PR PBB SHADOW E&M-EST. PATIENT-LVL IV: ICD-10-PCS | Mod: PBBFAC,,, | Performed by: INTERNAL MEDICINE

## 2023-09-11 PROCEDURE — 3008F PR BODY MASS INDEX (BMI) DOCUMENTED: ICD-10-PCS | Mod: CPTII,S$GLB,, | Performed by: INTERNAL MEDICINE

## 2023-09-11 PROCEDURE — 1159F MED LIST DOCD IN RCRD: CPT | Mod: CPTII,S$GLB,, | Performed by: INTERNAL MEDICINE

## 2023-09-11 PROCEDURE — 1160F RVW MEDS BY RX/DR IN RCRD: CPT | Mod: CPTII,S$GLB,, | Performed by: INTERNAL MEDICINE

## 2023-09-11 PROCEDURE — 3078F PR MOST RECENT DIASTOLIC BLOOD PRESSURE < 80 MM HG: ICD-10-PCS | Mod: CPTII,S$GLB,, | Performed by: INTERNAL MEDICINE

## 2023-09-11 PROCEDURE — 1159F PR MEDICATION LIST DOCUMENTED IN MEDICAL RECORD: ICD-10-PCS | Mod: CPTII,S$GLB,, | Performed by: INTERNAL MEDICINE

## 2023-09-11 PROCEDURE — 83880 ASSAY OF NATRIURETIC PEPTIDE: CPT | Performed by: INTERNAL MEDICINE

## 2023-09-11 PROCEDURE — 3074F PR MOST RECENT SYSTOLIC BLOOD PRESSURE < 130 MM HG: ICD-10-PCS | Mod: CPTII,S$GLB,, | Performed by: INTERNAL MEDICINE

## 2023-09-11 PROCEDURE — 3078F DIAST BP <80 MM HG: CPT | Mod: CPTII,S$GLB,, | Performed by: INTERNAL MEDICINE

## 2023-09-11 PROCEDURE — 36415 COLL VENOUS BLD VENIPUNCTURE: CPT | Performed by: INTERNAL MEDICINE

## 2023-09-11 PROCEDURE — 99999 PR PBB SHADOW E&M-EST. PATIENT-LVL IV: CPT | Mod: PBBFAC,,, | Performed by: INTERNAL MEDICINE

## 2023-09-11 RX ORDER — SPIRONOLACTONE 25 MG/1
TABLET ORAL
Qty: 30 TABLET | Refills: 11 | Status: SHIPPED | OUTPATIENT
Start: 2023-09-11

## 2023-09-11 NOTE — PROGRESS NOTES
Subjective:       HPI:  Very pleasant 49 yo black female with stage C HFrEF (EF=35-40), NICMP, VT who comes for a follow-up visit. This is her 2ndvisit with me. I had seen her a year ago. She continues to do really well. Reports NYHA class II symptoms. NO PND or orthopnea. No Recent HF admissions. Her current HF regimen includes only carvedilol 25 mg BID only. She is not on a ARNI/ACE/ARB likely due to her low BP.  She has been reluctant to start an MRA or and SGLT2i. She does have an ICD and reports no ICD shocks. Her labs today are pending.      2D Echo with CFD done in 2022  The left ventricle is mildly enlarged with moderately decreased systolic function. The estimated ejection fraction is 35-40%.  Normal right ventricular size with normal right ventricular systolic function.  Grade I left ventricular diastolic dysfunction.  The estimated PA systolic pressure is 19 mmHg.  Normal central venous pressure (3 mmHg).     Past Medical History:   Diagnosis Date    Allergy     Asthma     Breast cancer     Cancer     Breast     Cardiomyopathy     CHF (congestive heart failure)     Diverticulitis     Osteoporosis      Past Surgical History:   Procedure Laterality Date    BREAST RECONSTRUCTION      2018    CARDIAC DEFIBRILLATOR PLACEMENT      X 2 Medtronic     COLONOSCOPY N/A 2021    Procedure: COLONOSCOPY;  Surgeon: Héctor Napier MD;  Location: [facility] ([address] [place] [address]);  Service: Endoscopy;  Laterality: N/A;  [facility]id-21-[facility]  okay to use magnet if needed per [facility]    HYSTERECTOMY      TONSILLECTOMY       OB History               Para        Term   0            AB        Living             SAB        IAB        Ectopic        Multiple        Live Births                   Review of Systems   Constitutional: Negative. Negative for chills, decreased appetite, diaphoresis, fever, malaise/fatigue, night sweats, weight gain and weight loss.   Eyes: Negative.   "  Cardiovascular:  Negative for chest pain, claudication, cyanosis, dyspnea on exertion, irregular heartbeat, leg swelling, near-syncope, orthopnea, palpitations, paroxysmal nocturnal dyspnea and syncope.   Respiratory:  Negative for cough, hemoptysis and shortness of breath.    Endocrine: Negative.    Hematologic/Lymphatic: Negative.    Skin:  Negative for color change, dry skin and nail changes.   Musculoskeletal: Negative.    Gastrointestinal: Negative.    Genitourinary: Negative.    Neurological:  Negative for weakness.       Objective:   Blood pressure 96/61, pulse 80, height 4' 11" (1.499 m), weight 55.9 kg (123 lb 3.8 oz).body mass index is 24.89 kg/m².  Physical Exam  Vitals and nursing note reviewed.   Constitutional:       Appearance: She is well-developed.   HENT:      Head: Normocephalic.   Eyes:      Pupils: Pupils are equal, round, and reactive to light.   Neck:      Vascular: No JVD.   Cardiovascular:      Rate and Rhythm: Normal rate and regular rhythm.      Chest Wall: PMI is displaced.      Pulses: Intact distal pulses.      Heart sounds: Normal heart sounds. No murmur heard.     No friction rub. No gallop.   Pulmonary:      Effort: Pulmonary effort is normal.      Breath sounds: Normal breath sounds. No wheezing or rales.   Abdominal:      General: Bowel sounds are normal.      Palpations: Abdomen is soft.   Musculoskeletal:      Cervical back: Neck supple.   Neurological:      Mental Status: She is alert and oriented to person, place, and time.         Labs:    Chemistry        Component Value Date/Time     08/24/2022 1058    K 3.8 08/24/2022 1058     08/24/2022 1058    CO2 28 08/24/2022 1058    BUN 11 08/24/2022 1058    CREATININE 0.7 08/24/2022 1058    GLU 83 08/24/2022 1058        Component Value Date/Time    CALCIUM 9.3 08/24/2022 1058    ALKPHOS 57 08/24/2022 1058    AST 17 08/24/2022 1058    ALT 13 08/24/2022 1058    BILITOT 0.5 08/24/2022 1058    ESTGFRAFRICA >60.0 01/13/2021 " 1157    EGFRNONAA >60.0 01/13/2021 1157          Magnesium   Date Value Ref Range Status   02/08/2017 2.1 1.6 - 2.6 mg/dL Final     Lab Results   Component Value Date    WBC 5.65 08/24/2022    HGB 13.5 08/24/2022    HCT 42.2 08/24/2022     08/24/2022     Lab Results   Component Value Date    INR 1.2 05/13/2017    INR 1.2 03/29/2017    INR 1.2 09/13/2015     BNP   Date Value Ref Range Status   08/23/2022 19 0 - 99 pg/mL Final     Comment:     Values of less than 100 pg/ml are consistent with non-CHF populations.   01/13/2021 56 0 - 99 pg/mL Final     Comment:     Values of less than 100 pg/ml are consistent with non-CHF populations.   12/14/2017 29 0 - 99 pg/mL Final     Comment:     Values of less than 100 pg/ml are consistent with non-CHF populations.         Assessment:      1. Chronic combined systolic and diastolic heart failure    2. NICM (nonischemic cardiomyopathy)    3. Ventricular tachycardia    4. ICD (implantable cardioverter-defibrillator) in place    5. Cancer of axillary tail of right breast    6. Mixed hyperlipidemia        Plan:   Stage C HFrEF with moderately depressed EF based on echo done 1 year ago. NYHA class II symptoms. Euvolemic on exam.   Labs today are pending  We discussed the rationale to optimize her GDMT and my recommendation to start an MRA which should not affect her BP. She agreed. Patient instructed to take half a tab (12.5 mg) daily for 1 week. Labs next week. If labs are ok, increase spironolactone to 1 tab 25 mg) daily.   DC potassium  Recommend 2 gram sodium restriction and 1500cc fluid restriction.  Encourage physical activity with graded exercise program.  Requested patient to weigh themselves daily, and to notify us if their weight increases by more than 3 lbs in 1 day or 5 lbs in 1 week.   Has an ICD. No ICD shocks. Followed by EP  Dr. Mckeon.  RTC in 3 months with labs and Echo      Crescencio Alberto MD

## 2023-09-11 NOTE — PATIENT INSTRUCTIONS
Take half a tab (12.5 mg) daily for 1 week. Labs next week. If labs are ok, increase spironolactone to 1 tab 25 mg) daily.   Labs next week

## 2023-11-03 ENCOUNTER — CLINICAL SUPPORT (OUTPATIENT)
Dept: CARDIOLOGY | Facility: HOSPITAL | Age: 48
End: 2023-11-03

## 2023-11-03 DIAGNOSIS — Z95.810 PRESENCE OF AUTOMATIC (IMPLANTABLE) CARDIAC DEFIBRILLATOR: ICD-10-CM

## 2023-11-05 ENCOUNTER — CLINICAL SUPPORT (OUTPATIENT)
Dept: CARDIOLOGY | Facility: HOSPITAL | Age: 48
End: 2023-11-05
Attending: INTERNAL MEDICINE
Payer: COMMERCIAL

## 2023-11-05 DIAGNOSIS — Z95.810 PRESENCE OF AUTOMATIC (IMPLANTABLE) CARDIAC DEFIBRILLATOR: ICD-10-CM

## 2023-11-05 PROCEDURE — 93296 REM INTERROG EVL PM/IDS: CPT | Performed by: INTERNAL MEDICINE

## 2023-11-05 PROCEDURE — 93295 CARDIAC DEVICE CHECK - REMOTE: ICD-10-PCS | Mod: ,,, | Performed by: INTERNAL MEDICINE

## 2023-11-05 PROCEDURE — 93295 DEV INTERROG REMOTE 1/2/MLT: CPT | Mod: ,,, | Performed by: INTERNAL MEDICINE

## 2023-12-18 RX ORDER — FUROSEMIDE 20 MG/1
TABLET ORAL
Qty: 30 TABLET | Refills: 5 | Status: SHIPPED | OUTPATIENT
Start: 2023-12-18

## 2023-12-27 LAB
OHS CV DC REMOTE DEVICE TYPE: NORMAL
OHS CV RV PACING PERCENT: 0.16 %

## 2024-02-04 ENCOUNTER — CLINICAL SUPPORT (OUTPATIENT)
Dept: CARDIOLOGY | Facility: HOSPITAL | Age: 49
End: 2024-02-04
Payer: COMMERCIAL

## 2024-02-04 ENCOUNTER — CLINICAL SUPPORT (OUTPATIENT)
Dept: CARDIOLOGY | Facility: HOSPITAL | Age: 49
End: 2024-02-04
Attending: INTERNAL MEDICINE
Payer: COMMERCIAL

## 2024-02-04 DIAGNOSIS — Z95.810 PRESENCE OF AUTOMATIC (IMPLANTABLE) CARDIAC DEFIBRILLATOR: ICD-10-CM

## 2024-02-04 PROCEDURE — 93295 DEV INTERROG REMOTE 1/2/MLT: CPT | Mod: ,,, | Performed by: INTERNAL MEDICINE

## 2024-02-04 PROCEDURE — 93296 REM INTERROG EVL PM/IDS: CPT | Performed by: INTERNAL MEDICINE

## 2024-02-16 LAB
OHS CV DC REMOTE DEVICE TYPE: NORMAL
OHS CV RV PACING PERCENT: 0.11 %

## 2024-03-21 ENCOUNTER — PATIENT OUTREACH (OUTPATIENT)
Dept: ADMINISTRATIVE | Facility: HOSPITAL | Age: 49
End: 2024-03-21
Payer: COMMERCIAL

## 2024-03-21 ENCOUNTER — PATIENT MESSAGE (OUTPATIENT)
Dept: ADMINISTRATIVE | Facility: HOSPITAL | Age: 49
End: 2024-03-21
Payer: COMMERCIAL

## 2024-03-21 NOTE — PROGRESS NOTES
Health Maintenance Due   Topic Date Due    COVID-19 Vaccine (1) Never done    Pneumococcal Vaccines (Age 0-64) (1 of 2 - PCV) Never done    TETANUS VACCINE  Never done    Shingles Vaccine (1 of 2) Never done    DEXA Scan  12/13/2021    Mammogram  10/11/2022    Influenza Vaccine (1) Never done     Triggered LINKS and reconciled immunizations. Updated Care Everywhere. Pt outreached to schedule annual PCP visit. Chart review completed.

## 2024-05-04 ENCOUNTER — CLINICAL SUPPORT (OUTPATIENT)
Dept: CARDIOLOGY | Facility: HOSPITAL | Age: 49
End: 2024-05-04
Payer: COMMERCIAL

## 2024-05-04 ENCOUNTER — CLINICAL SUPPORT (OUTPATIENT)
Dept: CARDIOLOGY | Facility: HOSPITAL | Age: 49
End: 2024-05-04
Attending: INTERNAL MEDICINE
Payer: COMMERCIAL

## 2024-05-04 DIAGNOSIS — Z95.810 PRESENCE OF AUTOMATIC (IMPLANTABLE) CARDIAC DEFIBRILLATOR: ICD-10-CM

## 2024-05-04 PROCEDURE — 93296 REM INTERROG EVL PM/IDS: CPT | Performed by: INTERNAL MEDICINE

## 2024-05-22 LAB
OHS CV DC REMOTE DEVICE TYPE: NORMAL
OHS CV RV PACING PERCENT: 0.09 %

## 2024-06-17 DIAGNOSIS — I50.20 SYSTOLIC CONGESTIVE HEART FAILURE, UNSPECIFIED HF CHRONICITY: ICD-10-CM

## 2024-06-18 RX ORDER — FUROSEMIDE 20 MG/1
TABLET ORAL
Qty: 30 TABLET | Refills: 5 | Status: SHIPPED | OUTPATIENT
Start: 2024-06-18

## 2024-06-18 RX ORDER — CARVEDILOL 25 MG/1
TABLET ORAL
Qty: 60 TABLET | Refills: 11 | Status: SHIPPED | OUTPATIENT
Start: 2024-06-18

## 2024-06-21 DIAGNOSIS — I47.20 VENTRICULAR TACHYCARDIA: Primary | ICD-10-CM

## 2024-07-01 NOTE — PROGRESS NOTES
"Ms. Graves is a patient of Dr. Mckeon and was last seen in clinic 9/33/2022.      Subjective:   Patient ID:  Avis Graves is a 49 y.o. female who presents for follow up of ICD  .     HPI:    Ms. Graves is a 49 y.o. female with NICM, HFrEF (EF 35-40%), a hx of VT s/p SC ICD (2009; generator change 2013), mild-persistent asthma, and a hx of breast cancer here for annual follow up.     Background:    Single chamber ICD implanted 2/2009  Remote history of one episode of monomorphic VT, asymptomatic, pace terminated. No recurrence.  Device reached CAREY >> generator change 10/28/2013.  Device interrogations have revealed stable function of lead, no significant arrhythmias. Has some SVT, the longer episodes are appropriately classified.  12/16/2019: No arrhythmia, No RV pacing.  1/13/2021: Ms. Graves is doing well from a device perspective with stable lead and device function. No sustained arrhythmia noted. No RV pacing. No recent CHF exacerbations. She feels well.    9/23/2022: Ms. Graves is doing well from a device perspective with stable lead and device function. No sustained arrhythmia noted.  SVT events c/w 5K race. No RV pacing. She is on coreg 25mg BID. Last EF stable at 35-40%. No CHF symptoms. She follows with HTS.    Update (07/02/2024):    Today she says she has noticed being more dehydrated recently. Also noticed brief palpitations past few days. No CP, DE LA PAZ, syncope reported.    She is on coreg 25mg BID.    SVT x 51, "VT" x 107 (counters last reset 9/23/22). Max SVT duration 1 min 48 secs on 8/16/2023. Max SVT duration w/ egram 26 secs on 5/28/2024: sudden onset tachycardia noted. Max "VT" duration w/ egram 1 sec on 6/28/24    Device Interrogation (7/2/2024) reveals an intrinsic SR with stable lead and device function. No atrial arrhythmias. SVT x 51, "VT" x 107 (counters last reset 9/23/22). Max SVT duration 1 min 48 secs on 8/16/2023. Max SVT duration w/ egram 26 secs on 5/28/2024: sudden onset " "tachycardia noted. Max "VT" duration w/ egram 1 sec on 6/28/24. No treated episodes. She paces 0.2% in the RV. Estimated battery longevity 16 months.     I have personally reviewed the patient's EKG today, which shows sinus rhythm at 74bpm. OK interval is 166. QRS is 102. QTc is 430.    Relevant Cardiac Test Results:    2D Echo (8/4/2022):  The left ventricle is mildly enlarged with moderately decreased systolic function. The estimated ejection fraction is 35-40%.  Normal right ventricular size with normal right ventricular systolic function.  Grade I left ventricular diastolic dysfunction.  The estimated PA systolic pressure is 19 mmHg.  Normal central venous pressure (3 mmHg).    Current Outpatient Medications   Medication Sig    carvediloL (COREG) 25 MG tablet TAKE ONE TABLET BY MOUTH TWICE A DAY WITH MEALS    albuterol (PROVENTIL/VENTOLIN HFA) 90 mcg/actuation inhaler INHALE 1 TO 2 PUFFS Q 4 TO 6 H PRN    ascorbic acid, vitamin C, 1,500 mg TbSR Take 1,500 tablets by mouth 2 (two) times a day.    cholecalciferol, vitD3,/vit K2 (VITAMIN D3-VITAMIN K2) 250 mcg (10,000 unit)-45 mcg Cap Take 1 tablet by mouth before evening meal.    coenzyme Q10 300 mg Cap Take by mouth once daily.    cyanocobalamin (VITAMIN B-12) 250 MCG tablet Take 1 tablet (250 mcg total) by mouth once daily.    furosemide (LASIX) 20 MG tablet TAKE ONE TABLET BY MOUTH DAILY.    LACTOBACILLUS ACIDOPHILUS (PROBIOTIC ORAL) Take by mouth every evening.    lysine (L-LYSINE) 500 mg Tab Take 1 tablet by mouth once daily.    spironolactone (ALDACTONE) 25 MG tablet Take half a tab (12.5 mg) daily for 1 week. Labs next week. If labs are ok, increase spironolactone to 1 tab 25 mg) daily.     No current facility-administered medications for this visit.       Review of Systems   Constitutional: Negative for malaise/fatigue.   Cardiovascular:  Positive for palpitations. Negative for chest pain, dyspnea on exertion, irregular heartbeat and leg swelling. " "  Respiratory:  Negative for shortness of breath.    Hematologic/Lymphatic: Negative for bleeding problem.   Skin:  Negative for rash.   Musculoskeletal:  Negative for myalgias.   Gastrointestinal:  Negative for hematemesis, hematochezia and nausea.   Genitourinary:  Negative for hematuria.   Neurological:  Negative for light-headedness.   Psychiatric/Behavioral:  Negative for altered mental status.    Allergic/Immunologic: Negative for persistent infections.       Objective:          BP 90/60   Pulse 76   Ht 4' 11" (1.499 m)   Wt 55.5 kg (122 lb 5.7 oz)   BMI 24.71 kg/m²     Physical Exam  Vitals and nursing note reviewed.   Constitutional:       Appearance: Normal appearance. She is well-developed.   HENT:      Head: Normocephalic.      Nose: Nose normal.   Eyes:      Pupils: Pupils are equal, round, and reactive to light.   Cardiovascular:      Rate and Rhythm: Normal rate and regular rhythm.   Pulmonary:      Effort: No respiratory distress.      Breath sounds: Normal breath sounds.   Chest:      Comments: Device to LUCW. Incision and pocket in good repair.  Keloid significantly improved  Musculoskeletal:         General: Normal range of motion.   Skin:     General: Skin is warm and dry.      Findings: No erythema.   Neurological:      Mental Status: She is alert and oriented to person, place, and time.   Psychiatric:         Speech: Speech normal.         Behavior: Behavior normal.           Lab Results   Component Value Date     09/11/2023    K 4.4 09/11/2023    MG 2.1 02/08/2017    BUN 14 09/11/2023    CREATININE 0.9 09/11/2023    ALT 15 09/11/2023    AST 17 09/11/2023    HGB 13.5 08/24/2022    HCT 42.2 08/24/2022    TSH 1.003 08/24/2022    LDLCALC 189.6 (H) 08/24/2022             Assessment:     1. Automatic implantable cardioverter-defibrillator in situ    2. Chronic combined systolic and diastolic heart failure    3. Ventricular tachycardia    4. Heart failure, systolic, chronic    5. NICM " (nonischemic cardiomyopathy)      Plan:     In summary, Ms. Graves is a 49 y.o. female with NICM, HFrEF (EF 35-40%), a hx of VT s/p SC ICD (2009; generator change 2013), mild-persistent asthma, and a hx of breast cancer here for annual follow up.   Ms. Graves is doing well from a device perspective with stable lead and device function. No atrial arrhythmia. No sustained VT noted. No treated episodes. No RV pacing. Some recent SVT episodes - she says she has been dehydrated recently.   Pt overdue for HTS follow up with echo and labs. Will add TSH to labs and help pt with appt. 16 months to CAREY. Note: Hx of keloid to device incision.    TSH, +labs and echo per HTS with HTS appt after  Continue current medication regimen and device settings.   Follow up in device clinic as scheduled.   Follow up in EP clinic in 1 year, sooner as needed.     *A copy of this note has been sent to Dr. Mckeon*    Follow up in about 1 year (around 7/2/2025).    ------------------------------------------------------------------    AB Wilkins, NP-C  Cardiac Electrophysiology

## 2024-07-02 ENCOUNTER — CLINICAL SUPPORT (OUTPATIENT)
Dept: CARDIOLOGY | Facility: HOSPITAL | Age: 49
End: 2024-07-02
Attending: INTERNAL MEDICINE
Payer: COMMERCIAL

## 2024-07-02 ENCOUNTER — OFFICE VISIT (OUTPATIENT)
Dept: ELECTROPHYSIOLOGY | Facility: CLINIC | Age: 49
End: 2024-07-02
Payer: COMMERCIAL

## 2024-07-02 VITALS
HEART RATE: 76 BPM | WEIGHT: 122.38 LBS | DIASTOLIC BLOOD PRESSURE: 60 MMHG | SYSTOLIC BLOOD PRESSURE: 90 MMHG | BODY MASS INDEX: 24.67 KG/M2 | HEIGHT: 59 IN

## 2024-07-02 DIAGNOSIS — I47.20 VENTRICULAR TACHYCARDIA: ICD-10-CM

## 2024-07-02 DIAGNOSIS — I50.42 CHRONIC COMBINED SYSTOLIC AND DIASTOLIC HEART FAILURE: ICD-10-CM

## 2024-07-02 DIAGNOSIS — I42.8 NICM (NONISCHEMIC CARDIOMYOPATHY): ICD-10-CM

## 2024-07-02 DIAGNOSIS — I50.22 HEART FAILURE, SYSTOLIC, CHRONIC: ICD-10-CM

## 2024-07-02 DIAGNOSIS — Z95.810 AUTOMATIC IMPLANTABLE CARDIOVERTER-DEFIBRILLATOR IN SITU: Primary | ICD-10-CM

## 2024-07-02 LAB
OHS QRS DURATION: 104 MS
OHS QTC CALCULATION: 438 MS

## 2024-07-02 PROCEDURE — 99999 PR PBB SHADOW E&M-EST. PATIENT-LVL III: CPT | Mod: PBBFAC,,, | Performed by: NURSE PRACTITIONER

## 2024-07-02 PROCEDURE — 93005 ELECTROCARDIOGRAM TRACING: CPT | Mod: S$GLB,,, | Performed by: INTERNAL MEDICINE

## 2024-07-02 PROCEDURE — 1160F RVW MEDS BY RX/DR IN RCRD: CPT | Mod: CPTII,S$GLB,, | Performed by: NURSE PRACTITIONER

## 2024-07-02 PROCEDURE — 99214 OFFICE O/P EST MOD 30 MIN: CPT | Mod: S$GLB,,, | Performed by: NURSE PRACTITIONER

## 2024-07-02 PROCEDURE — 3008F BODY MASS INDEX DOCD: CPT | Mod: CPTII,S$GLB,, | Performed by: NURSE PRACTITIONER

## 2024-07-02 PROCEDURE — 93282 PRGRMG EVAL IMPLANTABLE DFB: CPT

## 2024-07-02 PROCEDURE — 1159F MED LIST DOCD IN RCRD: CPT | Mod: CPTII,S$GLB,, | Performed by: NURSE PRACTITIONER

## 2024-07-02 PROCEDURE — 3078F DIAST BP <80 MM HG: CPT | Mod: CPTII,S$GLB,, | Performed by: NURSE PRACTITIONER

## 2024-07-02 PROCEDURE — 3074F SYST BP LT 130 MM HG: CPT | Mod: CPTII,S$GLB,, | Performed by: NURSE PRACTITIONER

## 2024-07-02 PROCEDURE — 93010 ELECTROCARDIOGRAM REPORT: CPT | Mod: S$GLB,,, | Performed by: INTERNAL MEDICINE

## 2024-07-02 RX ORDER — POTASSIUM CHLORIDE 750 MG/1
10 CAPSULE, EXTENDED RELEASE ORAL 2 TIMES DAILY
COMMUNITY
Start: 2024-06-17

## 2024-07-02 NOTE — Clinical Note
Hi  - looks like pt is overdue for HTS follow up. I asked her to schedule her echo and labs ordered by Dr. Alberto but it looks like the labs did not get scheduled. She does have the echo scheduled if you could help her get an HTS follow up after. Thanks!

## 2024-07-05 ENCOUNTER — PATIENT MESSAGE (OUTPATIENT)
Dept: ELECTROPHYSIOLOGY | Facility: CLINIC | Age: 49
End: 2024-07-05
Payer: COMMERCIAL

## 2024-07-18 ENCOUNTER — TELEPHONE (OUTPATIENT)
Dept: INTERNAL MEDICINE | Facility: CLINIC | Age: 49
End: 2024-07-18
Payer: COMMERCIAL

## 2024-07-18 NOTE — TELEPHONE ENCOUNTER
No care due was identified.  [facility] Embedded Care Due Messages. Reference number: [id number].   7/18/2024 1:47:49 PM CDT

## 2024-07-19 RX ORDER — POTASSIUM CHLORIDE 750 MG/1
10 CAPSULE, EXTENDED RELEASE ORAL 2 TIMES DAILY
Qty: 60 CAPSULE | Refills: 3 | Status: SHIPPED | OUTPATIENT
Start: 2024-07-19

## 2024-07-19 NOTE — TELEPHONE ENCOUNTER
Please call the patient.  I have not seen her in 2 years.  She needs to come for a follow-up.  Last visit was virtual, so she should do this follow-up in person.  I will give 30 days of potassium, but no more refills until she comes for a visit

## 2024-08-03 ENCOUNTER — CLINICAL SUPPORT (OUTPATIENT)
Dept: CARDIOLOGY | Facility: HOSPITAL | Age: 49
End: 2024-08-03
Payer: COMMERCIAL

## 2024-08-03 ENCOUNTER — CLINICAL SUPPORT (OUTPATIENT)
Dept: CARDIOLOGY | Facility: HOSPITAL | Age: 49
End: 2024-08-03
Attending: INTERNAL MEDICINE
Payer: COMMERCIAL

## 2024-08-03 DIAGNOSIS — Z95.810 PRESENCE OF AUTOMATIC (IMPLANTABLE) CARDIAC DEFIBRILLATOR: ICD-10-CM

## 2024-08-03 DIAGNOSIS — I47.20 VENTRICULAR TACHYCARDIA, UNSPECIFIED: ICD-10-CM

## 2024-08-03 DIAGNOSIS — I42.8 OTHER CARDIOMYOPATHIES: ICD-10-CM

## 2024-08-03 PROCEDURE — 93296 REM INTERROG EVL PM/IDS: CPT | Performed by: INTERNAL MEDICINE

## 2024-08-03 PROCEDURE — 93295 DEV INTERROG REMOTE 1/2/MLT: CPT | Mod: ,,, | Performed by: INTERNAL MEDICINE

## 2024-08-09 LAB
OHS CV DC REMOTE DEVICE TYPE: NORMAL
OHS CV RV PACING PERCENT: 0.15 %

## 2024-08-23 ENCOUNTER — HOSPITAL ENCOUNTER (OUTPATIENT)
Dept: CARDIOLOGY | Facility: HOSPITAL | Age: 49
Discharge: HOME | End: 2024-08-23
Attending: INTERNAL MEDICINE
Payer: COMMERCIAL

## 2024-08-23 ENCOUNTER — OFFICE VISIT (OUTPATIENT)
Dept: TRANSPLANT | Facility: CLINIC | Age: 49
End: 2024-08-23
Payer: COMMERCIAL

## 2024-08-23 VITALS
BODY MASS INDEX: 25.46 KG/M2 | DIASTOLIC BLOOD PRESSURE: 54 MMHG | SYSTOLIC BLOOD PRESSURE: 83 MMHG | HEART RATE: 88 BPM | WEIGHT: 126.31 LBS | HEIGHT: 59 IN

## 2024-08-23 VITALS
SYSTOLIC BLOOD PRESSURE: 90 MMHG | DIASTOLIC BLOOD PRESSURE: 60 MMHG | HEART RATE: 70 BPM | BODY MASS INDEX: 24.44 KG/M2 | HEIGHT: 59 IN | WEIGHT: 121.25 LBS

## 2024-08-23 DIAGNOSIS — I50.42 CHRONIC COMBINED SYSTOLIC AND DIASTOLIC HEART FAILURE: ICD-10-CM

## 2024-08-23 DIAGNOSIS — I50.42 CHRONIC COMBINED SYSTOLIC AND DIASTOLIC HEART FAILURE: Primary | ICD-10-CM

## 2024-08-23 LAB
ASCENDING AORTA: 2.45 CM
AV INDEX (PROSTH): 0.54
AV MEAN GRADIENT: 5 MMHG
AV PEAK GRADIENT: 9 MMHG
AV VALVE AREA BY VELOCITY RATIO: 1.88 CM²
AV VALVE AREA: 2.06 CM²
AV VELOCITY RATIO: 0.49
BSA FOR ECHO PROCEDURE: 1.51 M2
CV ECHO LV RWT: 0.24 CM
DOP CALC AO PEAK VEL: 1.53 M/S
DOP CALC AO VTI: 28.5 CM
DOP CALC LVOT AREA: 3.8 CM2
DOP CALC LVOT DIAMETER: 2.21 CM
DOP CALC LVOT PEAK VEL: 0.75 M/S
DOP CALC LVOT STROKE VOLUME: 58.58 CM3
DOP CALCLVOT PEAK VEL VTI: 15.28 CM
E WAVE DECELERATION TIME: 160.06 MSEC
E/A RATIO: 1.27
E/E' RATIO: 9.38 M/S
ECHO LV POSTERIOR WALL: 0.65 CM (ref 0.6–1.1)
EJECTION FRACTION: 30 %
FRACTIONAL SHORTENING: 20 % (ref 28–44)
INTERVENTRICULAR SEPTUM: 0.59 CM (ref 0.6–1.1)
IVRT: 129.4 MSEC
LA MAJOR: 3.82 CM
LA MINOR: 4.22 CM
LA WIDTH: 2.8 CM
LEFT ATRIUM SIZE: 2.43 CM
LEFT ATRIUM VOLUME INDEX MOD: 16.4 ML/M2
LEFT ATRIUM VOLUME INDEX: 15.7 ML/M2
LEFT ATRIUM VOLUME MOD: 24.34 CM3
LEFT ATRIUM VOLUME: 23.19 CM3
LEFT INTERNAL DIMENSION IN SYSTOLE: 4.29 CM (ref 2.1–4)
LEFT VENTRICLE DIASTOLIC VOLUME INDEX: 93.69 ML/M2
LEFT VENTRICLE DIASTOLIC VOLUME: 138.66 ML
LEFT VENTRICLE MASS INDEX: 75 G/M2
LEFT VENTRICLE SYSTOLIC VOLUME INDEX: 55.7 ML/M2
LEFT VENTRICLE SYSTOLIC VOLUME: 82.41 ML
LEFT VENTRICULAR INTERNAL DIMENSION IN DIASTOLE: 5.36 CM (ref 3.5–6)
LEFT VENTRICULAR MASS: 111.68 G
LV LATERAL E/E' RATIO: 9.38 M/S
LV SEPTAL E/E' RATIO: 9.38 M/S
MV A" WAVE DURATION": 22.07 MSEC
MV PEAK A VEL: 0.59 M/S
MV PEAK E VEL: 0.75 M/S
OHS LV EJECTION FRACTION SIMPSONS BIPLANE MOD: 32 %
PISA TR MAX VEL: 2.17 M/S
PULM VEIN S/D RATIO: 1.38
PV PEAK D VEL: 0.37 M/S
PV PEAK S VEL: 0.51 M/S
RA MAJOR: 3.93 CM
RA PRESSURE ESTIMATED: 3 MMHG
RA WIDTH: 2.71 CM
RIGHT VENTRICLE DIASTOLIC BASEL DIMENSION: 2.5 CM
RV TB RVSP: 5 MMHG
SINUS: 2.48 CM
STJ: 2.23 CM
TDI LATERAL: 0.08 M/S
TDI SEPTAL: 0.08 M/S
TDI: 0.08 M/S
TR MAX PG: 19 MMHG
TRICUSPID ANNULAR PLANE SYSTOLIC EXCURSION: 1.41 CM
TV REST PULMONARY ARTERY PRESSURE: 22 MMHG
Z-SCORE OF LEFT VENTRICULAR DIMENSION IN END DIASTOLE: 1.95
Z-SCORE OF LEFT VENTRICULAR DIMENSION IN END SYSTOLE: 3.55

## 2024-08-23 PROCEDURE — 93306 TTE W/DOPPLER COMPLETE: CPT | Mod: 26,,, | Performed by: INTERNAL MEDICINE

## 2024-08-23 PROCEDURE — 93306 TTE W/DOPPLER COMPLETE: CPT

## 2024-08-23 PROCEDURE — 99999 PR PBB SHADOW E&M-EST. PATIENT-LVL IV: CPT | Mod: PBBFAC,,, | Performed by: INTERNAL MEDICINE

## 2024-08-23 NOTE — PROGRESS NOTES
Subjective:       HPI:  Very pleasant 48 yo black female with stage C HFrEF (EF=35-40), NICMP, VT who comes for a follow-up visit. This is her 3rd visit with me. I had seen 6 motnhs ago. She continues to do really well. Reports NYHA class II symptoms. NO PND or orthopnea. Walked a 5 k without limitations. No Recent HF admissions. Her current HF regimen includes only carvedilol 25 mg BID only. She is not on a ARNI/ACE/ARB likely due to her low BP.  She has been reluctant to start an MRA or and SGLT2i. She does have an ICD and reports no ICD shocks. Her labs today were reviewed. I also reviewed her Echo done today that showed a slight drop in her EF from 35-40% to 30-35%.      2D Echo with CFD done today     Left Ventricle: The left ventricle is normal in size. Ventricular mass is normal. Normal wall thickness. Moderate global hypokinesis present. There is moderately reduced systolic function with a visually estimated ejection fraction of 30 - 35%. Ejection fraction by visual approximation is 30%. Biplane (2D) method of discs ejection fraction is 32%. Grade I diastolic dysfunction.    Right Ventricle: Normal right ventricular cavity size. Wall thickness is normal. Systolic function is borderline low. Pacemaker lead present in the ventricle.    Right Atrium: Lead present in the right atrium.    Tricuspid Valve: There is mild regurgitation.    Pulmonary Artery: The estimated pulmonary artery systolic pressure is 22 mmHg.    IVC/SVC: Normal venous pressure at 3 mmHg.    Past Medical History:   Diagnosis Date    Allergy     Asthma     Breast cancer     Cancer     Breast     Cardiomyopathy     CHF (congestive heart failure)     Diverticulitis     Osteoporosis      Past Surgical History:   Procedure Laterality Date    BREAST RECONSTRUCTION      March 4, 2018    CARDIAC DEFIBRILLATOR PLACEMENT      X 2 [employer]tronic     COLONOSCOPY N/A 2/24/2021    Procedure: COLONOSCOPY;  Surgeon: Héctor Napier MD;  Location: [facility] ([id number]);  Service: Endoscopy;  Laterality: N/A;  [facility]  okay to use magnet if needed per Pacemaker clinic-[facility]    HYSTERECTOMY      TONSILLECTOMY       OB History               Para        Term   0            AB        Living             SAB        IAB        Ectopic        Multiple        Live Births                   Review of Systems   Constitutional: Negative. Negative for chills, decreased appetite, diaphoresis, fever, malaise/fatigue, night sweats, weight gain and weight loss.   Eyes: Negative.    Cardiovascular:  Negative for chest pain, claudication, cyanosis, dyspnea on exertion, irregular heartbeat, leg swelling, near-syncope, orthopnea, palpitations, paroxysmal nocturnal dyspnea and syncope.   Respiratory:  Negative for cough, hemoptysis and shortness of breath.    Endocrine: Negative.    Hematologic/Lymphatic: Negative.    Skin:  Negative for color change, dry skin and nail changes.   Musculoskeletal: Negative.    Gastrointestinal: Negative.    Genitourinary: Negative.    Neurological:  Negative for weakness.       Objective:   Blood pressure (!) 83/54, pulse 88, height 4' 11" (1.499 m), weight 57.3 kg (126 lb 5.2 oz).body mass index is 25.51 kg/m².  Physical Exam  Vitals and nursing note reviewed.   Constitutional:       Appearance: She is well-developed.   HENT:      Head: Normocephalic.   Eyes:      Pupils: Pupils are equal, round, and reactive to light.   Neck:      Vascular: No JVD.   Cardiovascular:      Rate and Rhythm: Normal rate and regular rhythm.      Chest Wall: PMI is displaced.      Pulses: Intact distal pulses.      Heart sounds: Normal heart sounds. No murmur heard.     No friction rub. No gallop.   Pulmonary:      Effort: Pulmonary effort is normal.      Breath sounds: Normal breath sounds. No wheezing or rales.   Abdominal:      General: Bowel sounds are normal.      Palpations: Abdomen is soft.   Musculoskeletal:      Cervical back: Neck supple. "   Neurological:      Mental Status: She is alert and oriented to person, place, and time.         Labs:    Chemistry        Component Value Date/Time     08/23/2024 0750    K 3.9 08/23/2024 0750     08/23/2024 0750    CO2 30 (H) 08/23/2024 0750    BUN 19 08/23/2024 0750    CREATININE 0.8 08/23/2024 0750    GLU 92 08/23/2024 0750        Component Value Date/Time    CALCIUM 9.3 08/23/2024 0750    ALKPHOS 62 09/11/2023 1307    AST 17 09/11/2023 1307    ALT 15 09/11/2023 1307    BILITOT 0.4 09/11/2023 1307    ESTGFRAFRICA >60.0 01/13/2021 1157    EGFRNONAA >60.0 01/13/2021 1157          Magnesium   Date Value Ref Range Status   02/08/2017 2.1 1.6 - 2.6 mg/dL Final     Lab Results   Component Value Date    WBC 5.65 08/24/2022    HGB 13.5 08/24/2022    HCT 42.2 08/24/2022     08/24/2022     Lab Results   Component Value Date    INR 1.2 05/13/2017    INR 1.2 03/29/2017    INR 1.2 09/13/2015     BNP   Date Value Ref Range Status   09/11/2023 69 0 - 99 pg/mL Final     Comment:     Values of less than 100 pg/ml are consistent with non-CHF populations.   08/23/2022 19 0 - 99 pg/mL Final     Comment:     Values of less than 100 pg/ml are consistent with non-CHF populations.   01/13/2021 56 0 - 99 pg/mL Final     Comment:     Values of less than 100 pg/ml are consistent with non-CHF populations.         Assessment:      1. Chronic combined systolic and diastolic heart failure        Plan:   Stage C HFrEF with moderately depressed EF based on echo done today (EF dropped from 35-40%  to 32% today). NYHA class II symptoms. Euvolemic on exam.   Unfortunately she never started the aldactone I prescribed during last visit. She is still very apprehensive to add it to her regimen,. We had a long conversation about the role of GDMT and reverse LV remodeling. I discussed adding and MRA and SGLT2i but she wants to think about it. She will inform me if and when she starts her Aldactone so we can get labs within a week of  initiation.   I also told her to DC potassium when she starts spironolactone  Recommend 2 gram sodium restriction and 1500cc fluid restriction.  Encourage physical activity with graded exercise program.  Requested patient to weigh themselves daily, and to notify us if their weight increases by more than 3 lbs in 1 day or 5 lbs in 1 week.   Has an ICD. No ICD shocks. Followed by EP  Dr. Mckeon.  RTC in 6 months with labs and Echo      Crescencio Alberto MD

## 2024-09-05 ENCOUNTER — PATIENT MESSAGE (OUTPATIENT)
Dept: TRANSPLANT | Facility: CLINIC | Age: 49
End: 2024-09-05
Payer: COMMERCIAL

## 2024-09-10 ENCOUNTER — LAB VISIT (OUTPATIENT)
Dept: LAB | Facility: HOSPITAL | Age: 49
End: 2024-09-10
Attending: INTERNAL MEDICINE
Payer: COMMERCIAL

## 2024-09-10 DIAGNOSIS — I50.42 CHRONIC COMBINED SYSTOLIC AND DIASTOLIC HEART FAILURE: ICD-10-CM

## 2024-09-10 LAB — BNP SERPL-MCNC: 23 PG/ML (ref 0–99)

## 2024-09-10 PROCEDURE — 83880 ASSAY OF NATRIURETIC PEPTIDE: CPT | Performed by: INTERNAL MEDICINE

## 2024-09-10 PROCEDURE — 36415 COLL VENOUS BLD VENIPUNCTURE: CPT | Performed by: INTERNAL MEDICINE

## 2024-09-17 DIAGNOSIS — I50.42 CHRONIC COMBINED SYSTOLIC AND DIASTOLIC HEART FAILURE: Primary | ICD-10-CM

## 2024-09-18 ENCOUNTER — LAB VISIT (OUTPATIENT)
Dept: LAB | Facility: HOSPITAL | Age: 49
End: 2024-09-18
Attending: INTERNAL MEDICINE
Payer: COMMERCIAL

## 2024-09-18 DIAGNOSIS — I50.42 CHRONIC COMBINED SYSTOLIC AND DIASTOLIC HEART FAILURE: ICD-10-CM

## 2024-09-18 LAB
ANION GAP SERPL CALC-SCNC: 8 MMOL/L (ref 8–16)
BUN SERPL-MCNC: 16 MG/DL (ref 6–20)
CALCIUM SERPL-MCNC: 9.2 MG/DL (ref 8.7–10.5)
CHLORIDE SERPL-SCNC: 106 MMOL/L (ref 95–110)
CO2 SERPL-SCNC: 27 MMOL/L (ref 23–29)
CREAT SERPL-MCNC: 0.8 MG/DL (ref 0.5–1.4)
EST. GFR  (NO RACE VARIABLE): >60 ML/MIN/1.73 M^2
GLUCOSE SERPL-MCNC: 101 MG/DL (ref 70–110)
POTASSIUM SERPL-SCNC: 4.1 MMOL/L (ref 3.5–5.1)
SODIUM SERPL-SCNC: 141 MMOL/L (ref 136–145)

## 2024-09-18 PROCEDURE — 36415 COLL VENOUS BLD VENIPUNCTURE: CPT | Performed by: INTERNAL MEDICINE

## 2024-09-18 PROCEDURE — 80048 BASIC METABOLIC PNL TOTAL CA: CPT | Performed by: INTERNAL MEDICINE

## 2024-09-19 ENCOUNTER — PATIENT MESSAGE (OUTPATIENT)
Dept: TRANSPLANT | Facility: CLINIC | Age: 49
End: 2024-09-19
Payer: COMMERCIAL

## 2024-09-26 ENCOUNTER — PATIENT MESSAGE (OUTPATIENT)
Dept: TRANSPLANT | Facility: CLINIC | Age: 49
End: 2024-09-26
Payer: COMMERCIAL

## 2024-09-26 ENCOUNTER — LAB VISIT (OUTPATIENT)
Dept: LAB | Facility: HOSPITAL | Age: 49
End: 2024-09-26
Attending: INTERNAL MEDICINE
Payer: COMMERCIAL

## 2024-09-26 ENCOUNTER — TELEPHONE (OUTPATIENT)
Dept: TRANSPLANT | Facility: CLINIC | Age: 49
End: 2024-09-26
Payer: COMMERCIAL

## 2024-09-26 DIAGNOSIS — I50.42 CHRONIC COMBINED SYSTOLIC AND DIASTOLIC HEART FAILURE: ICD-10-CM

## 2024-09-26 LAB
ANION GAP SERPL CALC-SCNC: 8 MMOL/L (ref 8–16)
BUN SERPL-MCNC: 18 MG/DL (ref 6–20)
CALCIUM SERPL-MCNC: 9.6 MG/DL (ref 8.7–10.5)
CHLORIDE SERPL-SCNC: 105 MMOL/L (ref 95–110)
CO2 SERPL-SCNC: 27 MMOL/L (ref 23–29)
CREAT SERPL-MCNC: 1 MG/DL (ref 0.5–1.4)
EST. GFR  (NO RACE VARIABLE): >60 ML/MIN/1.73 M^2
GLUCOSE SERPL-MCNC: 95 MG/DL (ref 70–110)
POTASSIUM SERPL-SCNC: 4.7 MMOL/L (ref 3.5–5.1)
SODIUM SERPL-SCNC: 140 MMOL/L (ref 136–145)

## 2024-09-26 PROCEDURE — 36415 COLL VENOUS BLD VENIPUNCTURE: CPT | Performed by: INTERNAL MEDICINE

## 2024-09-26 PROCEDURE — 80048 BASIC METABOLIC PNL TOTAL CA: CPT | Performed by: INTERNAL MEDICINE

## 2024-09-26 NOTE — TELEPHONE ENCOUNTER
9/26/24 - Received today's lab  results, for increase in spironolactone to 25 mg daily, 9/19 (See message thread for full details, via patient encounter's).    Na/K+ 140/4.7, BUN/Cr - 18/1.0.    Sent message to patient via patient messaging regarding normal lab results, as this is her preferred method of communication.      
No

## 2024-10-21 DIAGNOSIS — I50.42 CHRONIC COMBINED SYSTOLIC AND DIASTOLIC HEART FAILURE: ICD-10-CM

## 2024-10-21 RX ORDER — SPIRONOLACTONE 25 MG/1
TABLET ORAL
Qty: 30 TABLET | Refills: 11 | Status: SHIPPED | OUTPATIENT
Start: 2024-10-21

## 2024-10-29 ENCOUNTER — PATIENT MESSAGE (OUTPATIENT)
Dept: TRANSPLANT | Facility: CLINIC | Age: 49
End: 2024-10-29
Payer: COMMERCIAL

## 2024-10-31 ENCOUNTER — TELEPHONE (OUTPATIENT)
Dept: TRANSPLANT | Facility: CLINIC | Age: 49
End: 2024-10-31
Payer: COMMERCIAL

## 2024-11-02 ENCOUNTER — CLINICAL SUPPORT (OUTPATIENT)
Dept: CARDIOLOGY | Facility: HOSPITAL | Age: 49
End: 2024-11-02
Attending: INTERNAL MEDICINE
Payer: COMMERCIAL

## 2024-11-02 ENCOUNTER — CLINICAL SUPPORT (OUTPATIENT)
Dept: CARDIOLOGY | Facility: HOSPITAL | Age: 49
End: 2024-11-02
Payer: COMMERCIAL

## 2024-11-02 DIAGNOSIS — Z95.810 PRESENCE OF AUTOMATIC (IMPLANTABLE) CARDIAC DEFIBRILLATOR: ICD-10-CM

## 2024-11-02 DIAGNOSIS — I47.20 VENTRICULAR TACHYCARDIA, UNSPECIFIED: ICD-10-CM

## 2024-11-02 DIAGNOSIS — I42.8 OTHER CARDIOMYOPATHIES: ICD-10-CM

## 2024-11-02 PROCEDURE — 93296 REM INTERROG EVL PM/IDS: CPT | Performed by: INTERNAL MEDICINE

## 2024-11-02 PROCEDURE — 93295 DEV INTERROG REMOTE 1/2/MLT: CPT | Mod: ,,, | Performed by: INTERNAL MEDICINE

## 2024-11-25 LAB
OHS CV DC REMOTE DEVICE TYPE: NORMAL
OHS CV RV PACING PERCENT: 0.05 %

## 2024-12-21 RX ORDER — FUROSEMIDE 20 MG/1
TABLET ORAL
Qty: 30 TABLET | Refills: 5 | Status: SHIPPED | OUTPATIENT
Start: 2024-12-21

## 2025-01-19 NOTE — TELEPHONE ENCOUNTER
Refill Routing Note   Medication(s) are not appropriate for processing by [facility] for the following reason(s):      - Non-participating provider    [facility] action(s):  Route       Medication Therapy Plan: Not seen by Dr. Herrera since 2020, forwarding to NP who conducted annual physical  Medication reconciliation completed: No     Appointments  past 12m or future 3m with PCP    Date Provider   Last Visit   7/21/2020 John Herrera II, MD   Next Visit   Visit date not found John Herrera II, MD   ED visits in past 90 days: 0        Note composed:1:41 PM 08/25/2022            flank

## 2025-02-01 ENCOUNTER — CLINICAL SUPPORT (OUTPATIENT)
Dept: CARDIOLOGY | Facility: HOSPITAL | Age: 50
End: 2025-02-01
Attending: INTERNAL MEDICINE
Payer: COMMERCIAL

## 2025-02-01 ENCOUNTER — CLINICAL SUPPORT (OUTPATIENT)
Dept: CARDIOLOGY | Facility: HOSPITAL | Age: 50
End: 2025-02-01
Payer: COMMERCIAL

## 2025-02-01 DIAGNOSIS — I42.8 OTHER CARDIOMYOPATHIES: ICD-10-CM

## 2025-02-01 DIAGNOSIS — I47.20 VENTRICULAR TACHYCARDIA, UNSPECIFIED: ICD-10-CM

## 2025-02-01 DIAGNOSIS — Z95.810 PRESENCE OF AUTOMATIC (IMPLANTABLE) CARDIAC DEFIBRILLATOR: ICD-10-CM

## 2025-02-01 PROCEDURE — 93296 REM INTERROG EVL PM/IDS: CPT | Performed by: INTERNAL MEDICINE

## 2025-02-01 PROCEDURE — 93295 DEV INTERROG REMOTE 1/2/MLT: CPT | Mod: ,,, | Performed by: INTERNAL MEDICINE

## 2025-02-10 LAB
OHS CV DC REMOTE DEVICE TYPE: NORMAL
OHS CV RV PACING PERCENT: 0.02 %

## 2025-02-14 ENCOUNTER — OFFICE VISIT (OUTPATIENT)
Dept: TRANSPLANT | Facility: CLINIC | Age: 50
End: 2025-02-14
Payer: COMMERCIAL

## 2025-02-14 ENCOUNTER — LAB VISIT (OUTPATIENT)
Dept: LAB | Facility: HOSPITAL | Age: 50
End: 2025-02-14
Attending: INTERNAL MEDICINE
Payer: COMMERCIAL

## 2025-02-14 VITALS
SYSTOLIC BLOOD PRESSURE: 103 MMHG | DIASTOLIC BLOOD PRESSURE: 60 MMHG | BODY MASS INDEX: 26.98 KG/M2 | HEIGHT: 59 IN | HEART RATE: 86 BPM | WEIGHT: 133.81 LBS

## 2025-02-14 DIAGNOSIS — Z95.810 AUTOMATIC IMPLANTABLE CARDIOVERTER-DEFIBRILLATOR IN SITU: ICD-10-CM

## 2025-02-14 DIAGNOSIS — I50.42 CHRONIC COMBINED SYSTOLIC AND DIASTOLIC HEART FAILURE: ICD-10-CM

## 2025-02-14 DIAGNOSIS — I50.20 SYSTOLIC CONGESTIVE HEART FAILURE, UNSPECIFIED HF CHRONICITY: ICD-10-CM

## 2025-02-14 DIAGNOSIS — I47.20 VENTRICULAR TACHYCARDIA: ICD-10-CM

## 2025-02-14 DIAGNOSIS — I42.8 CARDIOMYOPATHY, NONISCHEMIC: ICD-10-CM

## 2025-02-14 DIAGNOSIS — I50.42 CHRONIC COMBINED SYSTOLIC AND DIASTOLIC HEART FAILURE: Primary | ICD-10-CM

## 2025-02-14 LAB — BNP SERPL-MCNC: 37 PG/ML (ref 0–99)

## 2025-02-14 PROCEDURE — 99999 PR PBB SHADOW E&M-EST. PATIENT-LVL III: CPT | Mod: PBBFAC,,, | Performed by: INTERNAL MEDICINE

## 2025-02-14 PROCEDURE — 83880 ASSAY OF NATRIURETIC PEPTIDE: CPT | Performed by: INTERNAL MEDICINE

## 2025-02-14 PROCEDURE — 36415 COLL VENOUS BLD VENIPUNCTURE: CPT | Performed by: INTERNAL MEDICINE

## 2025-02-14 RX ORDER — CARVEDILOL 25 MG/1
25 TABLET ORAL 2 TIMES DAILY WITH MEALS
Qty: 60 TABLET | Refills: 11 | Status: SHIPPED | OUTPATIENT
Start: 2025-02-14

## 2025-02-14 RX ORDER — SPIRONOLACTONE 25 MG/1
25 TABLET ORAL DAILY
Qty: 90 TABLET | Refills: 3 | Status: SHIPPED | OUTPATIENT
Start: 2025-02-14

## 2025-02-14 NOTE — PROGRESS NOTES
Subjective:       HPI:  Very pleasant 50 yo black female with stage C HFrEF (EF=35-40), NICMP, [place] who comes for a follow-up visit. This is her 3rd visit with me. I had seen 6 motnhs ago. She continues to do really well. Reports NYHA class II symptoms. NO PND or orthopnea. Walked a 5 k without limitations. No Recent HF admissions. Her current HF regimen includes only carvedilol 25 mg BID only. She is not on a ARNI/ACE/ARB likely due to her low BP.  She had been reluctant to start an MRA or and SGLT2i but agreed to start an MRA during last visit. She does have an ICD and reports no ICD shocks. Her labs today were reviewed. I also reviewed her Echo done during last visit showed a slight drop in her EF from 35-40% to 30-35%.      2D Echo with CFD done during last visit.     Left Ventricle: The left ventricle is normal in size. Ventricular mass is normal. Normal wall thickness. Moderate global hypokinesis present. There is moderately reduced systolic function with a visually estimated ejection fraction of 30 - 35%. Ejection fraction by visual approximation is 30%. Biplane (2D) method of discs ejection fraction is 32%. Grade I diastolic dysfunction.    Right Ventricle: Normal right ventricular cavity size. Wall thickness is normal. Systolic function is borderline low. Pacemaker lead present in the ventricle.    Right Atrium: Lead present in the right atrium.    Tricuspid Valve: There is mild regurgitation.    Pulmonary Artery: The estimated pulmonary artery systolic pressure is 22 mmHg.    IVC/SVC: Normal venous pressure at 3 mmHg.    Past Medical History:   Diagnosis Date    Allergy     Asthma     Breast cancer     Cancer     Breast     Cardiomyopathy     CHF (congestive heart failure)     Diverticulitis     Osteoporosis      Past Surgical History:   Procedure Laterality Date    BREAST RECONSTRUCTION      March 4, 2018    CARDIAC DEFIBRILLATOR PLACEMENT      X 2 [employer]     COLONOSCOPY N/A 2/24/2021    Procedure:  "COLONOSCOPY;  Surgeon: Héctor Napier MD;  Location: [facility] ([address] [place] [address]);  Service: Endoscopy;  Laterality: N/A;  covid-21-[facility]  okay to use magnet if needed per [facility]    HYSTERECTOMY      TONSILLECTOMY       OB History               Para        Term   0            AB        Living             SAB        IAB        Ectopic        Multiple        Live Births                   Review of Systems   Constitutional: Negative. Negative for chills, decreased appetite, diaphoresis, fever, malaise/fatigue, night sweats, weight gain and weight loss.   Eyes: Negative.    Cardiovascular:  Negative for chest pain, claudication, cyanosis, dyspnea on exertion, irregular heartbeat, leg swelling, near-syncope, orthopnea, palpitations, paroxysmal nocturnal dyspnea and syncope.   Respiratory:  Negative for cough, hemoptysis and shortness of breath.    Endocrine: Negative.    Hematologic/Lymphatic: Negative.    Skin:  Negative for color change, dry skin and nail changes.   Musculoskeletal: Negative.    Gastrointestinal: Negative.    Genitourinary: Negative.    Neurological:  Negative for weakness.       Objective:   Blood pressure 103/60, pulse 86, height 4' 11" (1.499 m), weight 60.7 kg (133 lb 13.1 oz).body mass index is 27.03 kg/m².  Physical Exam  Vitals and nursing note reviewed.   Constitutional:       Appearance: She is well-developed.   HENT:      Head: Normocephalic.   Eyes:      Pupils: Pupils are equal, round, and reactive to light.   Neck:      Vascular: No JVD.   Cardiovascular:      Rate and Rhythm: Normal rate and regular rhythm.      Chest Wall: PMI is displaced.      Pulses: Intact distal pulses.      Heart sounds: Normal heart sounds. No murmur heard.     No friction rub. No gallop.   Pulmonary:      Effort: Pulmonary effort is normal.      Breath sounds: Normal breath sounds. No wheezing or rales.   Abdominal:      General: Bowel sounds are normal.      Palpations: " Abdomen is soft.   Musculoskeletal:      Cervical back: Neck supple.   Neurological:      Mental Status: She is alert and oriented to person, place, and time.         Labs:    Chemistry        Component Value Date/Time     09/26/2024 0833    K 4.7 09/26/2024 0833     09/26/2024 0833    CO2 27 09/26/2024 0833    BUN 18 09/26/2024 0833    CREATININE 1.0 09/26/2024 0833    GLU 95 09/26/2024 0833        Component Value Date/Time    CALCIUM 9.6 09/26/2024 0833    ALKPHOS 62 09/11/2023 1307    AST 17 09/11/2023 1307    ALT 15 09/11/2023 1307    BILITOT 0.4 09/11/2023 1307    ESTGFRAFRICA >60.0 01/13/2021 1157    EGFRNONAA >60.0 01/13/2021 1157          Magnesium   Date Value Ref Range Status   02/08/2017 2.1 1.6 - 2.6 mg/dL Final     Lab Results   Component Value Date    WBC 5.65 08/24/2022    HGB 13.5 08/24/2022    HCT 42.2 08/24/2022     08/24/2022     Lab Results   Component Value Date    INR 1.2 05/13/2017    INR 1.2 03/29/2017    INR 1.2 09/13/2015     BNP   Date Value Ref Range Status   09/10/2024 23 0 - 99 pg/mL Final     Comment:     Values of less than 100 pg/ml are consistent with non-CHF populations.   09/11/2023 69 0 - 99 pg/mL Final     Comment:     Values of less than 100 pg/ml are consistent with non-CHF populations.   08/23/2022 19 0 - 99 pg/mL Final     Comment:     Values of less than 100 pg/ml are consistent with non-CHF populations.         Assessment:      1. Chronic combined systolic and diastolic heart failure    2. Cardiomyopathy, nonischemic    3. Automatic implantable cardioverter-defibrillator in situ    4. Ventricular tachycardia        Plan:   Stage C HFrEF with moderately depressed EF based on previous echo (EF 30%). Doing really well.  NYHA class II symptoms. Euvolemic on exam.   Continue current HF regimen   Recommend 2 gram sodium restriction and 1500cc fluid restriction.  Encourage physical activity with graded exercise program.  Requested patient to weigh themselves  daily, and to notify us if their weight increases by more than 3 lbs in 1 day or 5 lbs in 1 week.   Has an ICD. No ICD shocks.  RTC in 6 months with labs        Advance Care Planning   Patient has ACP docs in file.      Crescencio Alberto MD

## 2025-03-24 ENCOUNTER — TELEPHONE (OUTPATIENT)
Dept: CARDIOLOGY | Facility: HOSPITAL | Age: 50
End: 2025-03-24
Payer: COMMERCIAL

## 2025-03-24 ENCOUNTER — TELEPHONE (OUTPATIENT)
Dept: ELECTROPHYSIOLOGY | Facility: CLINIC | Age: 50
End: 2025-03-24
Payer: COMMERCIAL

## 2025-03-24 NOTE — TELEPHONE ENCOUNTER
----- Message from Joanna sent at 3/24/2025  1:40 PM CDT -----  Patient is calling in regards to sending a transmission. She is having trouble with doing so. She can be reached at [phone].Thank you

## 2025-03-24 NOTE — TELEPHONE ENCOUNTER
"Spoke with patient. Every night at exactly the same time, she feels a "thump" and about "5 clicks" from her device. She is sending a transmission now. Can you please review, escalate if needed, and reach out to her?  Her last check showed about 8 months of battery left. She is wondering if that is what she is feeling.  Thanks  "

## 2025-03-24 NOTE — TELEPHONE ENCOUNTER
"Returned the pt's call on this afternoon.  Pt attempted to send a transmission while on the phone and her monitor is showing error code "3248". Pt keep her remote monitor plugged in all the time.  Advised pt that error code typically occurs when the reader is not holding a charge.  Provided pt the contact # for [employer] so as they can further troubleshoot her monitor.  It is likely pt will need a new reader.  If so, it generally takes 5-7 business days to receive it from [employer].  Also advised pt should she develop any symptoms or have any additional questions and/or concerns while waiting for the reader to contact the [facility]. Provided pt the call back # for the [facility].  Understanding was verbalized.  Pt appreciated the call.  "

## 2025-03-24 NOTE — TELEPHONE ENCOUNTER
----- Message from Med Assistant Clarke sent at 3/24/2025  9:24 AM CDT -----    ----- Message -----  From: Joanna Wagner  Sent: 3/24/2025   8:51 AM CDT  To: [facility] Staff    Patient is calling in regards to her device, stated she feels a heavy pulse every night at 12:07 am. She can be reached at [phone].Thank you

## 2025-05-05 ENCOUNTER — CLINICAL SUPPORT (OUTPATIENT)
Dept: CARDIOLOGY | Facility: HOSPITAL | Age: 50
End: 2025-05-05
Attending: INTERNAL MEDICINE
Payer: COMMERCIAL

## 2025-05-05 ENCOUNTER — CLINICAL SUPPORT (OUTPATIENT)
Dept: CARDIOLOGY | Facility: HOSPITAL | Age: 50
End: 2025-05-05
Payer: COMMERCIAL

## 2025-05-05 DIAGNOSIS — I47.20 VENTRICULAR TACHYCARDIA, UNSPECIFIED: ICD-10-CM

## 2025-05-05 DIAGNOSIS — Z95.810 PRESENCE OF AUTOMATIC (IMPLANTABLE) CARDIAC DEFIBRILLATOR: ICD-10-CM

## 2025-05-05 DIAGNOSIS — I42.8 OTHER CARDIOMYOPATHIES: ICD-10-CM

## 2025-05-05 PROCEDURE — 93296 REM INTERROG EVL PM/IDS: CPT | Performed by: INTERNAL MEDICINE

## 2025-05-05 PROCEDURE — 93295 DEV INTERROG REMOTE 1/2/MLT: CPT | Mod: ,,, | Performed by: INTERNAL MEDICINE

## 2025-05-06 ENCOUNTER — TELEPHONE (OUTPATIENT)
Dept: ELECTROPHYSIOLOGY | Facility: CLINIC | Age: 50
End: 2025-05-06
Payer: COMMERCIAL

## 2025-05-06 DIAGNOSIS — I47.20 VENTRICULAR TACHYCARDIA: Primary | ICD-10-CM

## 2025-05-06 NOTE — TELEPHONE ENCOUNTER
----- Message from Juan Orozco sent at 5/5/2025  4:51 PM CDT -----  Regarding: FW: Recall 7/2025    ----- Message -----  From: Irene Pagan RN  Sent: 5/5/2025   7:35 AM CDT  To: [facility] Staff  Subject: Recall 7/2025                                    Please call to schedule 7/2025 recall. Patient is nearing RRT on device, 6 mos of battery life left. Please schedule annual follow up to discuss generator change. Thank you!Irene

## 2025-05-19 LAB
OHS CV DC REMOTE DEVICE TYPE: NORMAL
OHS CV RV PACING PERCENT: 0.01 %

## 2025-07-07 ENCOUNTER — PATIENT MESSAGE (OUTPATIENT)
Dept: PLASTIC SURGERY | Facility: CLINIC | Age: 50
End: 2025-07-07
Payer: COMMERCIAL

## 2025-07-09 RX ORDER — FUROSEMIDE 20 MG/1
20 TABLET ORAL
Qty: 30 TABLET | Refills: 5 | Status: SHIPPED | OUTPATIENT
Start: 2025-07-09

## 2025-07-16 ENCOUNTER — OFFICE VISIT (OUTPATIENT)
Dept: PLASTIC SURGERY | Facility: CLINIC | Age: 50
End: 2025-07-16
Payer: COMMERCIAL

## 2025-07-16 DIAGNOSIS — Z85.3 PERSONAL HISTORY OF BREAST CANCER: Primary | ICD-10-CM

## 2025-07-16 PROCEDURE — 99999 PR PBB SHADOW E&M-EST. PATIENT-LVL III: CPT | Mod: PBBFAC,,, | Performed by: SURGERY

## 2025-07-16 NOTE — PROGRESS NOTES
Patient presents [facility] surgery [facility] years after having breast reconstruction.  Patient has a left production followed by tissue expander and implant placement.  I did see her several years ago with a complaint of pain in the axillary region.  Patient complains when she puts her arm down she has pain but also complains that intermittently she gets shooting pain through the breasts.  Her capsular soft I really do not have an explanation for this.  She wanted to take the implant out 2 years ago but we did not do that.  I discussed with her that I can liposuction in the axillary region which would help relieve the feeling that she is having a she puts her arm down.  I can also take a look inside the capsule.  The implant is a bit flat on that signs the possibility of the implant has flipped.  I discussed with her that even if we take the implant out I can not guarantee I would relieve her pain.  Patient would like to think about it and then will make a decision.  With the

## 2025-07-24 ENCOUNTER — PATIENT MESSAGE (OUTPATIENT)
Dept: PLASTIC SURGERY | Facility: CLINIC | Age: 50
End: 2025-07-24
Payer: COMMERCIAL

## 2025-08-04 ENCOUNTER — CLINICAL SUPPORT (OUTPATIENT)
Dept: CARDIOLOGY | Facility: HOSPITAL | Age: 50
End: 2025-08-04

## 2025-08-04 ENCOUNTER — CLINICAL SUPPORT (OUTPATIENT)
Dept: CARDIOLOGY | Facility: HOSPITAL | Age: 50
End: 2025-08-04
Attending: INTERNAL MEDICINE
Payer: COMMERCIAL

## 2025-08-04 DIAGNOSIS — I42.8 OTHER CARDIOMYOPATHIES: ICD-10-CM

## 2025-08-04 DIAGNOSIS — I47.20 VENTRICULAR TACHYCARDIA, UNSPECIFIED: ICD-10-CM

## 2025-08-04 DIAGNOSIS — Z95.810 PRESENCE OF AUTOMATIC (IMPLANTABLE) CARDIAC DEFIBRILLATOR: ICD-10-CM

## 2025-08-04 PROCEDURE — 93295 DEV INTERROG REMOTE 1/2/MLT: CPT | Mod: ,,, | Performed by: INTERNAL MEDICINE

## 2025-08-04 PROCEDURE — 93296 REM INTERROG EVL PM/IDS: CPT | Performed by: INTERNAL MEDICINE

## 2025-08-05 DIAGNOSIS — Z85.3 PERSONAL HISTORY OF BREAST CANCER: Primary | ICD-10-CM

## 2025-09-04 LAB
OHS CV DC REMOTE DEVICE TYPE: NORMAL
OHS CV RV PACING PERCENT: 0.01 %

## (undated) DEVICE — SUT MONOCRYL 3-0 PS-2 UND

## (undated) DEVICE — TRAY MINOR GEN SURG

## (undated) DEVICE — ELECTRODE BLADE INSULATED 1 IN

## (undated) DEVICE — SPONGE DERMACEA GAUZE 4X4

## (undated) DEVICE — DRESSING XEROFORM FOIL PK 1X8

## (undated) DEVICE — GOWN SURGICAL X-LARGE

## (undated) DEVICE — APPLICATOR CHLORAPREP ORN 26ML

## (undated) DEVICE — SEE MEDLINE ITEM 157128

## (undated) DEVICE — STAPLER SKIN SUBCUTICULAR

## (undated) DEVICE — SYS LABEL CORRECT MED

## (undated) DEVICE — APPLIER CLIP LIAGCLIP 9.375IN

## (undated) DEVICE — ELECTRODE EXTENDED BLADE

## (undated) DEVICE — DRAIN CHANNEL ROUND 19FR

## (undated) DEVICE — GAUZE DERMACEA LOW PLY 2X4YRDS

## (undated) DEVICE — SYR 50CC LL

## (undated) DEVICE — STOCKINET 4INX48

## (undated) DEVICE — SOL NS 1000CC

## (undated) DEVICE — SUT MCRYL PLUS 4-0 PS2 27IN

## (undated) DEVICE — SUT ETHILON 4-0 PC5 18IN

## (undated) DEVICE — SEE MEDLINE ITEM 152512

## (undated) DEVICE — SYS CLSR DERMABOND PRINEO 22CM

## (undated) DEVICE — HOOK STAY ELAS 5MM 8EA/PK

## (undated) DEVICE — BLADE SURG #15 CARBON STEEL

## (undated) DEVICE — DRAIN CHANNEL ROUND 15FR

## (undated) DEVICE — BRA POST SURGICAL 34-36 B-D

## (undated) DEVICE — SKINMARKER & RULER REGULAR X-F

## (undated) DEVICE — GAUZE SPONGE 4X4 12PLY

## (undated) DEVICE — SYS PRINEO SKIN CLOSURE

## (undated) DEVICE — SUT MONOCRYL 4-0 PS-2

## (undated) DEVICE — SPONGE LAP 18X18 PREWASHED

## (undated) DEVICE — SOL 9P NACL IRR PIC IL

## (undated) DEVICE — STAPLER SKIN REGULAR

## (undated) DEVICE — NDL 18GA X1 1/2 REG BEVEL

## (undated) DEVICE — SEE MEDLINE ITEM 152487

## (undated) DEVICE — SUT VICRYL 3-0 27 SH

## (undated) DEVICE — GOWN AERO CHROME W/ TOWEL XL

## (undated) DEVICE — BOVIE SUCTION

## (undated) DEVICE — SUT SILK 2-0 PS 18IN BLACK

## (undated) DEVICE — PACK UNIVERSAL SPLIT II

## (undated) DEVICE — GAUZE FLUFF XXLG 36X36 2 PLY

## (undated) DEVICE — SET FLUID TRANSFER ASEPTIC

## (undated) DEVICE — ELECTRODE REM PLYHSV RETURN 9

## (undated) DEVICE — SUT 2-0 VICRYL / CT-1

## (undated) DEVICE — EVACUATOR WOUND BULB 100CC

## (undated) DEVICE — NDL HYPO REG 25G X 1 1/2

## (undated) DEVICE — STAPLER SKIN ROTATING HEAD

## (undated) DEVICE — PAD ABD 8X10 STERILE

## (undated) DEVICE — BRA SURGICAL MED 36-38

## (undated) DEVICE — SUT 2-0 VICRYL / SH (J417)

## (undated) DEVICE — Device

## (undated) DEVICE — SEE MEDLINE ITEM 152622

## (undated) DEVICE — DRAPE THYROID WITH ARMBOARD

## (undated) DEVICE — IMPLANTABLE DEVICE
Type: IMPLANTABLE DEVICE | Site: BREAST | Status: NON-FUNCTIONAL
Removed: 2018-03-05

## (undated) DEVICE — NEOGUARD COVER 4X30CM STERILE

## (undated) DEVICE — SYR DISP LL 5CC

## (undated) DEVICE — SUT PDS II 2-0 CT1

## (undated) DEVICE — SEE MEDLINE ITEM 146417

## (undated) DEVICE — GAUZE SPONGE 4'X4 12 PLY

## (undated) DEVICE — TRAY FOLEY 16FR INFECTION CONT

## (undated) DEVICE — CUP MEDICINE STERILE 2OZ

## (undated) DEVICE — SUT ETHILON 2-0 PSLX 30IN

## (undated) DEVICE — DRAPE STERI INSTRUMENT 1018